# Patient Record
Sex: MALE | Race: WHITE | Employment: UNEMPLOYED | URBAN - METROPOLITAN AREA
[De-identification: names, ages, dates, MRNs, and addresses within clinical notes are randomized per-mention and may not be internally consistent; named-entity substitution may affect disease eponyms.]

---

## 2019-05-05 ENCOUNTER — APPOINTMENT (OUTPATIENT)
Dept: GENERAL RADIOLOGY | Age: 59
DRG: 177 | End: 2019-05-05
Attending: EMERGENCY MEDICINE
Payer: OTHER GOVERNMENT

## 2019-05-05 ENCOUNTER — HOSPITAL ENCOUNTER (INPATIENT)
Age: 59
LOS: 10 days | Discharge: HOME HEALTH | DRG: 177 | End: 2019-05-15
Attending: EMERGENCY MEDICINE | Admitting: FAMILY MEDICINE
Payer: OTHER GOVERNMENT

## 2019-05-05 ENCOUNTER — APPOINTMENT (OUTPATIENT)
Dept: ULTRASOUND IMAGING | Age: 59
DRG: 177 | End: 2019-05-05
Attending: FAMILY MEDICINE
Payer: OTHER GOVERNMENT

## 2019-05-05 DIAGNOSIS — F10.10 ETOH ABUSE: ICD-10-CM

## 2019-05-05 DIAGNOSIS — A41.9 SEPSIS, DUE TO UNSPECIFIED ORGANISM: ICD-10-CM

## 2019-05-05 DIAGNOSIS — J18.9 PNEUMONIA OF RIGHT LOWER LOBE DUE TO INFECTIOUS ORGANISM: Primary | ICD-10-CM

## 2019-05-05 DIAGNOSIS — E87.6 HYPOKALEMIA: ICD-10-CM

## 2019-05-05 PROBLEM — D69.6 THROMBOCYTOPENIA (HCC): Status: ACTIVE | Noted: 2019-05-05

## 2019-05-05 PROBLEM — K92.2 GI BLEED: Status: ACTIVE | Noted: 2019-05-05

## 2019-05-05 PROBLEM — I48.91 A-FIB (HCC): Status: ACTIVE | Noted: 2019-05-05

## 2019-05-05 PROBLEM — R00.0 TACHYCARDIA: Status: ACTIVE | Noted: 2019-05-05

## 2019-05-05 LAB
ALBUMIN SERPL-MCNC: 2.8 G/DL (ref 3.2–4.6)
ALBUMIN/GLOB SERPL: 0.5 {RATIO} (ref 1.2–3.5)
ALP SERPL-CCNC: 58 U/L (ref 50–136)
ALT SERPL-CCNC: 30 U/L (ref 12–65)
AMMONIA PLAS-SCNC: <10 UMOL/L (ref 11–32)
ANION GAP SERPL CALC-SCNC: 17 MMOL/L (ref 7–16)
AST SERPL-CCNC: 41 U/L (ref 15–37)
ATRIAL RATE: 108 BPM
ATRIAL RATE: 110 BPM
BASOPHILS # BLD: 0.1 K/UL (ref 0–0.2)
BASOPHILS NFR BLD: 2 % (ref 0–2)
BILIRUB SERPL-MCNC: 1.2 MG/DL (ref 0.2–1.1)
BNP SERPL-MCNC: 56 PG/ML
BUN SERPL-MCNC: 17 MG/DL (ref 8–23)
CALCIUM SERPL-MCNC: 9.5 MG/DL (ref 8.3–10.4)
CALCULATED P AXIS, ECG09: 76 DEGREES
CALCULATED P AXIS, ECG09: 78 DEGREES
CALCULATED R AXIS, ECG10: -57 DEGREES
CALCULATED R AXIS, ECG10: -75 DEGREES
CALCULATED T AXIS, ECG11: 57 DEGREES
CALCULATED T AXIS, ECG11: 70 DEGREES
CHLORIDE SERPL-SCNC: 91 MMOL/L (ref 98–107)
CO2 SERPL-SCNC: 24 MMOL/L (ref 21–32)
CREAT SERPL-MCNC: 0.69 MG/DL (ref 0.8–1.5)
DIAGNOSIS, 93000: NORMAL
DIAGNOSIS, 93000: NORMAL
DIFFERENTIAL METHOD BLD: ABNORMAL
EOSINOPHIL # BLD: 0 K/UL (ref 0–0.8)
EOSINOPHIL NFR BLD: 0 % (ref 0.5–7.8)
ERYTHROCYTE [DISTWIDTH] IN BLOOD BY AUTOMATED COUNT: 13.6 % (ref 11.9–14.6)
ETHANOL SERPL-MCNC: 19 MG/DL
GLOBULIN SER CALC-MCNC: 5.5 G/DL (ref 2.3–3.5)
GLUCOSE SERPL-MCNC: 106 MG/DL (ref 65–100)
HCT VFR BLD AUTO: 41.9 % (ref 41.1–50.3)
HGB BLD-MCNC: 15.1 G/DL (ref 13.6–17.2)
IMM GRANULOCYTES # BLD AUTO: 0 K/UL (ref 0–0.5)
IMM GRANULOCYTES NFR BLD AUTO: 1 % (ref 0–5)
INR PPP: 1.1
LACTATE BLD-SCNC: 2.8 MMOL/L (ref 0.5–1.9)
LIPASE SERPL-CCNC: 198 U/L (ref 73–393)
LYMPHOCYTES # BLD: 0.6 K/UL (ref 0.5–4.6)
LYMPHOCYTES NFR BLD: 13 % (ref 13–44)
MAGNESIUM SERPL-MCNC: 1.8 MG/DL (ref 1.8–2.4)
MCH RBC QN AUTO: 37.4 PG (ref 26.1–32.9)
MCHC RBC AUTO-ENTMCNC: 36 G/DL (ref 31.4–35)
MCV RBC AUTO: 103.7 FL (ref 79.6–97.8)
MONOCYTES # BLD: 0.3 K/UL (ref 0.1–1.3)
MONOCYTES NFR BLD: 8 % (ref 4–12)
NEUTS SEG # BLD: 3.3 K/UL (ref 1.7–8.2)
NEUTS SEG NFR BLD: 76 % (ref 43–78)
NRBC # BLD: 0 K/UL (ref 0–0.2)
P-R INTERVAL, ECG05: 134 MS
P-R INTERVAL, ECG05: 136 MS
PLATELET # BLD AUTO: 45 K/UL (ref 150–450)
PLATELET COMMENTS,PCOM: ABNORMAL
PMV BLD AUTO: 11.5 FL (ref 9.4–12.3)
POTASSIUM SERPL-SCNC: 2.9 MMOL/L (ref 3.5–5.1)
PROCALCITONIN SERPL-MCNC: 3.6 NG/ML
PROT SERPL-MCNC: 8.3 G/DL (ref 6.3–8.2)
PROTHROMBIN TIME: 14.2 SEC (ref 11.7–14.5)
Q-T INTERVAL, ECG07: 306 MS
Q-T INTERVAL, ECG07: 330 MS
QRS DURATION, ECG06: 82 MS
QRS DURATION, ECG06: 84 MS
QTC CALCULATION (BEZET), ECG08: 414 MS
QTC CALCULATION (BEZET), ECG08: 442 MS
RBC # BLD AUTO: 4.04 M/UL (ref 4.23–5.6)
RBC MORPH BLD: ABNORMAL
SODIUM SERPL-SCNC: 132 MMOL/L (ref 136–145)
TROPONIN I SERPL-MCNC: <0.02 NG/ML (ref 0.02–0.05)
VENTRICULAR RATE, ECG03: 108 BPM
VENTRICULAR RATE, ECG03: 110 BPM
WBC # BLD AUTO: 4.3 K/UL (ref 4.3–11.1)
WBC MORPH BLD: ABNORMAL

## 2019-05-05 PROCEDURE — 65660000000 HC RM CCU STEPDOWN

## 2019-05-05 PROCEDURE — 77030020263 HC SOL INJ SOD CL0.9% LFCR 1000ML

## 2019-05-05 PROCEDURE — 96374 THER/PROPH/DIAG INJ IV PUSH: CPT | Performed by: EMERGENCY MEDICINE

## 2019-05-05 PROCEDURE — 74011000302 HC RX REV CODE- 302: Performed by: FAMILY MEDICINE

## 2019-05-05 PROCEDURE — 76700 US EXAM ABDOM COMPLETE: CPT

## 2019-05-05 PROCEDURE — 84145 PROCALCITONIN (PCT): CPT

## 2019-05-05 PROCEDURE — 83880 ASSAY OF NATRIURETIC PEPTIDE: CPT

## 2019-05-05 PROCEDURE — 87186 SC STD MICRODIL/AGAR DIL: CPT

## 2019-05-05 PROCEDURE — 87040 BLOOD CULTURE FOR BACTERIA: CPT

## 2019-05-05 PROCEDURE — 83605 ASSAY OF LACTIC ACID: CPT

## 2019-05-05 PROCEDURE — 74011000250 HC RX REV CODE- 250: Performed by: FAMILY MEDICINE

## 2019-05-05 PROCEDURE — 74011000258 HC RX REV CODE- 258: Performed by: EMERGENCY MEDICINE

## 2019-05-05 PROCEDURE — 99285 EMERGENCY DEPT VISIT HI MDM: CPT | Performed by: EMERGENCY MEDICINE

## 2019-05-05 PROCEDURE — 82140 ASSAY OF AMMONIA: CPT

## 2019-05-05 PROCEDURE — 71046 X-RAY EXAM CHEST 2 VIEWS: CPT

## 2019-05-05 PROCEDURE — 84484 ASSAY OF TROPONIN QUANT: CPT

## 2019-05-05 PROCEDURE — 80307 DRUG TEST PRSMV CHEM ANLYZR: CPT

## 2019-05-05 PROCEDURE — 93005 ELECTROCARDIOGRAM TRACING: CPT | Performed by: EMERGENCY MEDICINE

## 2019-05-05 PROCEDURE — 74011250636 HC RX REV CODE- 250/636: Performed by: FAMILY MEDICINE

## 2019-05-05 PROCEDURE — 74011250636 HC RX REV CODE- 250/636: Performed by: EMERGENCY MEDICINE

## 2019-05-05 PROCEDURE — 83690 ASSAY OF LIPASE: CPT

## 2019-05-05 PROCEDURE — C9113 INJ PANTOPRAZOLE SODIUM, VIA: HCPCS | Performed by: FAMILY MEDICINE

## 2019-05-05 PROCEDURE — 80053 COMPREHEN METABOLIC PANEL: CPT

## 2019-05-05 PROCEDURE — 74011000258 HC RX REV CODE- 258: Performed by: FAMILY MEDICINE

## 2019-05-05 PROCEDURE — 83735 ASSAY OF MAGNESIUM: CPT

## 2019-05-05 PROCEDURE — 85610 PROTHROMBIN TIME: CPT

## 2019-05-05 PROCEDURE — 85025 COMPLETE CBC W/AUTO DIFF WBC: CPT

## 2019-05-05 PROCEDURE — 36415 COLL VENOUS BLD VENIPUNCTURE: CPT

## 2019-05-05 PROCEDURE — 77030020256 HC SOL INJ NACL 0.9%  500ML

## 2019-05-05 PROCEDURE — 93005 ELECTROCARDIOGRAM TRACING: CPT | Performed by: FAMILY MEDICINE

## 2019-05-05 PROCEDURE — 74011250637 HC RX REV CODE- 250/637: Performed by: FAMILY MEDICINE

## 2019-05-05 PROCEDURE — 87077 CULTURE AEROBIC IDENTIFY: CPT

## 2019-05-05 PROCEDURE — 86580 TB INTRADERMAL TEST: CPT | Performed by: FAMILY MEDICINE

## 2019-05-05 RX ORDER — LORAZEPAM 2 MG/ML
1 INJECTION INTRAMUSCULAR EVERY 4 HOURS
Status: DISCONTINUED | OUTPATIENT
Start: 2019-05-05 | End: 2019-05-05

## 2019-05-05 RX ORDER — LORAZEPAM 2 MG/ML
2 INJECTION INTRAMUSCULAR ONCE
Status: COMPLETED | OUTPATIENT
Start: 2019-05-05 | End: 2019-05-05

## 2019-05-05 RX ORDER — METOPROLOL TARTRATE 5 MG/5ML
5 INJECTION INTRAVENOUS ONCE
Status: COMPLETED | OUTPATIENT
Start: 2019-05-05 | End: 2019-05-05

## 2019-05-05 RX ORDER — SODIUM CHLORIDE 0.9 % (FLUSH) 0.9 %
5-40 SYRINGE (ML) INJECTION EVERY 8 HOURS
Status: DISCONTINUED | OUTPATIENT
Start: 2019-05-05 | End: 2019-05-15 | Stop reason: HOSPADM

## 2019-05-05 RX ORDER — MORPHINE SULFATE 2 MG/ML
1 INJECTION, SOLUTION INTRAMUSCULAR; INTRAVENOUS
Status: DISCONTINUED | OUTPATIENT
Start: 2019-05-05 | End: 2019-05-15 | Stop reason: HOSPADM

## 2019-05-05 RX ORDER — ACETAMINOPHEN 325 MG/1
650 TABLET ORAL
Status: DISCONTINUED | OUTPATIENT
Start: 2019-05-05 | End: 2019-05-15 | Stop reason: HOSPADM

## 2019-05-05 RX ORDER — ONDANSETRON 2 MG/ML
4 INJECTION INTRAMUSCULAR; INTRAVENOUS
Status: DISCONTINUED | OUTPATIENT
Start: 2019-05-05 | End: 2019-05-15 | Stop reason: HOSPADM

## 2019-05-05 RX ORDER — SODIUM CHLORIDE 0.9 % (FLUSH) 0.9 %
5-40 SYRINGE (ML) INJECTION EVERY 8 HOURS
Status: DISCONTINUED | OUTPATIENT
Start: 2019-05-05 | End: 2019-05-10 | Stop reason: SDUPTHER

## 2019-05-05 RX ORDER — POTASSIUM CHLORIDE 20 MEQ/1
40 TABLET, EXTENDED RELEASE ORAL
Status: COMPLETED | OUTPATIENT
Start: 2019-05-05 | End: 2019-05-05

## 2019-05-05 RX ORDER — SODIUM CHLORIDE 9 MG/ML
75 INJECTION, SOLUTION INTRAVENOUS CONTINUOUS
Status: DISCONTINUED | OUTPATIENT
Start: 2019-05-05 | End: 2019-05-09

## 2019-05-05 RX ORDER — NALOXONE HYDROCHLORIDE 0.4 MG/ML
0.4 INJECTION, SOLUTION INTRAMUSCULAR; INTRAVENOUS; SUBCUTANEOUS AS NEEDED
Status: DISCONTINUED | OUTPATIENT
Start: 2019-05-05 | End: 2019-05-15 | Stop reason: HOSPADM

## 2019-05-05 RX ORDER — DIPHENHYDRAMINE HYDROCHLORIDE 50 MG/ML
12.5 INJECTION, SOLUTION INTRAMUSCULAR; INTRAVENOUS
Status: DISCONTINUED | OUTPATIENT
Start: 2019-05-05 | End: 2019-05-15 | Stop reason: HOSPADM

## 2019-05-05 RX ORDER — AMLODIPINE BESYLATE 5 MG/1
5 TABLET ORAL DAILY
Status: DISCONTINUED | OUTPATIENT
Start: 2019-05-05 | End: 2019-05-12

## 2019-05-05 RX ORDER — SODIUM CHLORIDE 0.9 % (FLUSH) 0.9 %
5-40 SYRINGE (ML) INJECTION AS NEEDED
Status: DISCONTINUED | OUTPATIENT
Start: 2019-05-05 | End: 2019-05-15 | Stop reason: HOSPADM

## 2019-05-05 RX ORDER — LORAZEPAM 2 MG/ML
2 INJECTION INTRAMUSCULAR EVERY 4 HOURS
Status: DISCONTINUED | OUTPATIENT
Start: 2019-05-05 | End: 2019-05-06

## 2019-05-05 RX ORDER — LORAZEPAM 1 MG/1
3-4 TABLET ORAL
Status: ACTIVE | OUTPATIENT
Start: 2019-05-05 | End: 2019-05-05

## 2019-05-05 RX ORDER — CHLORDIAZEPOXIDE HYDROCHLORIDE 25 MG/1
50 CAPSULE, GELATIN COATED ORAL EVERY 8 HOURS
Status: DISCONTINUED | OUTPATIENT
Start: 2019-05-05 | End: 2019-05-06

## 2019-05-05 RX ORDER — ACETAMINOPHEN 325 MG/1
650 TABLET ORAL
Status: DISCONTINUED | OUTPATIENT
Start: 2019-05-05 | End: 2019-05-05 | Stop reason: SDUPTHER

## 2019-05-05 RX ORDER — LORAZEPAM 2 MG/ML
1 INJECTION INTRAMUSCULAR
Status: ACTIVE | OUTPATIENT
Start: 2019-05-05 | End: 2019-05-05

## 2019-05-05 RX ORDER — HYDROCODONE BITARTRATE AND ACETAMINOPHEN 5; 325 MG/1; MG/1
1 TABLET ORAL
Status: DISCONTINUED | OUTPATIENT
Start: 2019-05-05 | End: 2019-05-15 | Stop reason: HOSPADM

## 2019-05-05 RX ORDER — IBUPROFEN 200 MG
1 TABLET ORAL EVERY 24 HOURS
Status: DISCONTINUED | OUTPATIENT
Start: 2019-05-05 | End: 2019-05-15 | Stop reason: HOSPADM

## 2019-05-05 RX ORDER — ATORVASTATIN CALCIUM 40 MG/1
40 TABLET, FILM COATED ORAL DAILY
Status: DISCONTINUED | OUTPATIENT
Start: 2019-05-05 | End: 2019-05-12

## 2019-05-05 RX ORDER — SODIUM CHLORIDE 0.9 % (FLUSH) 0.9 %
5-40 SYRINGE (ML) INJECTION AS NEEDED
Status: DISCONTINUED | OUTPATIENT
Start: 2019-05-05 | End: 2019-05-10 | Stop reason: SDUPTHER

## 2019-05-05 RX ORDER — LORAZEPAM 2 MG/ML
1 INJECTION INTRAMUSCULAR
Status: COMPLETED | OUTPATIENT
Start: 2019-05-05 | End: 2019-05-05

## 2019-05-05 RX ORDER — LORAZEPAM 1 MG/1
1-2 TABLET ORAL
Status: DISPENSED | OUTPATIENT
Start: 2019-05-05 | End: 2019-05-05

## 2019-05-05 RX ORDER — NITROGLYCERIN 0.4 MG/1
0.4 TABLET SUBLINGUAL
Status: DISCONTINUED | OUTPATIENT
Start: 2019-05-05 | End: 2019-05-15 | Stop reason: HOSPADM

## 2019-05-05 RX ADMIN — ACETAMINOPHEN 650 MG: 325 TABLET, FILM COATED ORAL at 12:01

## 2019-05-05 RX ADMIN — SODIUM CHLORIDE 1000 ML: 900 INJECTION, SOLUTION INTRAVENOUS at 07:53

## 2019-05-05 RX ADMIN — SODIUM CHLORIDE 125 ML/HR: 900 INJECTION, SOLUTION INTRAVENOUS at 11:12

## 2019-05-05 RX ADMIN — AZITHROMYCIN MONOHYDRATE 500 MG: 500 INJECTION, POWDER, LYOPHILIZED, FOR SOLUTION INTRAVENOUS at 11:49

## 2019-05-05 RX ADMIN — TUBERCULIN PURIFIED PROTEIN DERIVATIVE 5 UNITS: 5 INJECTION, SOLUTION INTRADERMAL at 13:54

## 2019-05-05 RX ADMIN — PANTOPRAZOLE SODIUM 40 MG: 40 INJECTION, POWDER, FOR SOLUTION INTRAVENOUS at 11:48

## 2019-05-05 RX ADMIN — CHLORDIAZEPOXIDE HYDROCHLORIDE 50 MG: 25 CAPSULE ORAL at 22:00

## 2019-05-05 RX ADMIN — PANTOPRAZOLE SODIUM 40 MG: 40 INJECTION, POWDER, FOR SOLUTION INTRAVENOUS at 21:30

## 2019-05-05 RX ADMIN — CEFTRIAXONE SODIUM 1 G: 1 INJECTION, POWDER, FOR SOLUTION INTRAMUSCULAR; INTRAVENOUS at 11:12

## 2019-05-05 RX ADMIN — Medication 5 ML: at 23:56

## 2019-05-05 RX ADMIN — LORAZEPAM 1 MG: 1 TABLET ORAL at 14:03

## 2019-05-05 RX ADMIN — LORAZEPAM 1 MG: 2 INJECTION INTRAMUSCULAR; INTRAVENOUS at 09:02

## 2019-05-05 RX ADMIN — CHLORDIAZEPOXIDE HYDROCHLORIDE 50 MG: 25 CAPSULE ORAL at 11:53

## 2019-05-05 RX ADMIN — Medication 10 ML: at 22:00

## 2019-05-05 RX ADMIN — Medication 10 ML: at 11:39

## 2019-05-05 RX ADMIN — POTASSIUM CHLORIDE 40 MEQ: 20 TABLET, EXTENDED RELEASE ORAL at 11:48

## 2019-05-05 RX ADMIN — LORAZEPAM 2 MG: 2 INJECTION INTRAMUSCULAR at 17:55

## 2019-05-05 RX ADMIN — ATORVASTATIN CALCIUM 40 MG: 40 TABLET, FILM COATED ORAL at 11:48

## 2019-05-05 RX ADMIN — LORAZEPAM 2 MG: 2 INJECTION INTRAMUSCULAR; INTRAVENOUS at 11:11

## 2019-05-05 RX ADMIN — DIPHENHYDRAMINE HYDROCHLORIDE 12.5 MG: 50 INJECTION, SOLUTION INTRAMUSCULAR; INTRAVENOUS at 23:55

## 2019-05-05 RX ADMIN — THIAMINE HYDROCHLORIDE: 100 INJECTION, SOLUTION INTRAMUSCULAR; INTRAVENOUS at 14:38

## 2019-05-05 RX ADMIN — AMLODIPINE BESYLATE 5 MG: 5 TABLET ORAL at 11:48

## 2019-05-05 RX ADMIN — METOPROLOL TARTRATE 5 MG: 5 INJECTION INTRAVENOUS at 11:36

## 2019-05-05 NOTE — CONSULTS
[facility] Consult Note       Primary GI Physician:     Referring Provider:  Dr. Louis Romberg Date:  5/5/2019    Admit Date:  5/5/2019    Chief Complaint:  Melena, alcohol abuse    Subjective:     History of Present Illness:  Patient is a 59 y.o. male with PMH of HTN and alcohol abuse, who is seen in consultation at the request of Dr. Yi Snow for melena and thrombocytopenia. He was admitted today for pneumonia after presenting to the ED with a couple week history of cough, body aches and chills for three days. CXR showed right basilar infiltrate, he was admitted by hospitalists and is on rocephin and zithromax. CBC significant for platelet count of 38X with hgb of 15.1, .7, and white count 4300. Sodium 132, potassium 2.9, BUN 17, creatinine 0.69, t. Bili 1.2, albumin 2.8, ALT 30, AST 41, alk phos 58 and lipase 198. Abdominal ultrasound today showed hepatic steatosis, a 1.6 cm hypoechoic lesion in the right lobe of the liver which could represent focal fatty sparing but hepatic protocol CT recommended as outpatient to r/o small lesion. Also noted was gallbladder sludge and adenomyomatosis. He had been in sinus tachy, but remote tele reported heart rate 170-200, possible rapid a. Fib. He was transferred to third floor and cardiology consultation pending. Tmax is 101.6. Blood cultures pending. Patient reports about 12 episodes of black loose stools this past week--without any use of bismuth or oral iron. Most recent black stool was reportedly one hour ago. He was noted to have black stool on examiner's glove in ER. No abdominal pain. No red blood per rectum. He did have nausea and vomiting 6 days ago without hematemesis or coffee ground emesis but no recurrent nausea/vomiting. He denies any known prior history of PUD, liver disease. He was unaware of thrombocytopenia until this admission. He does drink 1 pint of vodka per day.   Smokes about a pack to 1.5 packs of cigarettes per day. Also takes about 3 ibuprofen most days for muscle/joint pain related to his work as a . Had a colonoscopy 3 years ago at the [place] in [facility] with 7 polyps removed. No significant weight change. PMH:  HTN  Alcohol abuse  Colon polyps (colo in 2016 at [place] revealed 7 colon polyps per patient)    PSH:  No past surgical history on file. Allergies:  No Known Allergies    Home Medications:  Prior to Admission medications    Medication Sig Start Date End Date Taking? Authorizing Provider   aspirin 81 mg chewable tablet Take 1 Tab by mouth daily. 11/28/16   Jolene CARCAMO NP   nitroglycerin (NITROSTAT) 0.4 mg SL tablet 1 Tab by SubLINGual route every five (5) minutes as needed for Chest Pain. 11/28/16   Jolene CARCAMO NP   atorvastatin (LIPITOR) 40 mg tablet Take 1 Tab by mouth daily. 11/28/16   Jolene CARCAMO NP   amLODIPine (NORVASC) 5 mg tablet Take 5 mg by mouth daily. Other, MD Sully       Hospital Medications:  Current Facility-Administered Medications   Medication Dose Route Frequency    cefTRIAXone (ROCEPHIN) 1 g in 0.9% sodium chloride (MBP/ADV) 50 mL  1 g IntraVENous Q24H    azithromycin (ZITHROMAX) 500 mg in 0.9% sodium chloride (MBP/ADV) 250 mL  500 mg IntraVENous Q24H    chlordiazePOXIDE (LIBRIUM) capsule 50 mg  50 mg Oral Q8H    0.9% sodium chloride 1,000 mL with mvi, adult no. 4 with vit K 10 mL, thiamine 929 mg, folic acid 1 mg infusion   IntraVENous Q24H    0.9% sodium chloride infusion  125 mL/hr IntraVENous CONTINUOUS    amLODIPine (NORVASC) tablet 5 mg  5 mg Oral DAILY    atorvastatin (LIPITOR) tablet 40 mg  40 mg Oral DAILY    nitroglycerin (NITROSTAT) tablet 0.4 mg  0.4 mg SubLINGual Q5MIN PRN    sodium chloride (NS) flush 5-40 mL  5-40 mL IntraVENous Q8H    sodium chloride (NS) flush 5-40 mL  5-40 mL IntraVENous PRN    tuberculin injection 5 Units  5 Units IntraDERMal ONCE    acetaminophen (TYLENOL) tablet 650 mg  650 mg Oral Q4H PRN  HYDROcodone-acetaminophen (NORCO) 5-325 mg per tablet 1 Tab  1 Tab Oral Q4H PRN    morphine injection 1 mg  1 mg IntraVENous Q4H PRN    nicotine (NICODERM CQ) 21 mg/24 hr patch 1 Patch  1 Patch TransDERmal Q24H    ondansetron (ZOFRAN) injection 4 mg  4 mg IntraVENous Q4H PRN    diphenhydrAMINE (BENADRYL) injection 12.5 mg  12.5 mg IntraVENous Q4H PRN    naloxone (NARCAN) injection 0.4 mg  0.4 mg IntraVENous PRN    pantoprazole (PROTONIX) 40 mg in sodium chloride 0.9% 10 mL injection  40 mg IntraVENous Q12H    LORazepam (ATIVAN) injection 2 mg  2 mg IntraVENous Q4H    sodium chloride (NS) flush 5-40 mL  5-40 mL IntraVENous Q8H    sodium chloride (NS) flush 5-40 mL  5-40 mL IntraVENous PRN    LORazepam (ATIVAN) injection 1 mg  1 mg IntraVENous Q15MIN PRN    LORazepam (ATIVAN) tablet 3-4 mg  3-4 mg Oral Q1H    LORazepam (ATIVAN) tablet 1-2 mg  1-2 mg Oral Q1H       Social History:  Social History     Tobacco Use    Smoking status: Current Every Day Smoker     Packs/day: 2.00   Substance Use Topics    Alcohol use: Yes     Comment: approx 1 pint per day       Family History:  Family History   Problem Relation Age of Onset    Cancer Neg Hx        Review of Systems:  A detailed 10 system ROS is obtained, with pertinent positives as listed above and in admission H&P. Says his SOB is improved since this morning. Has pleuritic chest pain exacerbated by coughing. All others are negative. Diet:  Clear liquids    Objective:     Physical Exam:  Vitals:  Visit Vitals  /75 (BP 1 Location: Right arm, BP Patient Position: At rest)   Pulse (!) 110   Temp (!) 101.6 °F (38.7 °C)   Resp 20   Ht 5' 11\" (1.803 m)   Wt 67.1 kg (148 lb)   SpO2 91%   BMI 20.64 kg/m²     Gen:  Pt is alert, cooperative, no acute distress  Skin:  Extremities and face reveal no rashes. HEENT: Sclerae anicteric. Extra-occular muscles are intact. No oral ulcers. No abnormal pigmentation of the lips.   The neck is supple. Cardiovascular: tachycardic and irregular. Respiratory:  Comfortable breathing with no accessory muscle use. On room air. Mildly coarse breath sounds on the right  GI:  Abdomen nondistended, soft, and nontender. Normal active bowel sounds. No enlargement of the liver or spleen. No masses palpable. Rectal:  Deferred  Musculoskeletal:  No pitting edema of the lower legs. Neurological:  Gross memory appears intact. Patient is alert and oriented. Psychiatric:  Mood appears appropriate with judgement intact. Laboratory:    Recent Labs     05/05/19  0729   WBC 4.3   HGB 15.1   HCT 41.9   PLT 45*   .7*   *   K 2.9*   CL 91*   CO2 24   BUN 17   CREA 0.69*   CA 9.5   MG 1.8   *   AP 58   SGOT 41*   ALT 30   TBILI 1.2*   ALB 2.8*   TP 8.3*   LPSE 198          Assessment:     Principal Problem:    PNA (pneumonia) (5/5/2019)    Active Problems:    HTN (hypertension) (11/19/2014)      Tobacco abuse (11/28/2016)      ETOH abuse (11/28/2016)      Tachycardia (5/5/2019)      Thrombocytopenia ([address] (5/5/2019)      GI bleed (5/5/2019)      A-fib ([facility] (5/5/2019)      60 y/o male with HTN, colon polyps, h/o EtOH and tobacco abuse is admitted with pneumonia, a. Fib with RVR, EtOH withdrawal and seen by GI for black stools x 1 week and thrombocytopenia. However, his normal hgb of 15.1,normal BUN/creatinine ratio and normal BP argues AGAINST significant/brisk GIB. He is at increased risk for PUD given his NSAIDs, tobacco and EtOH history. Also, his thrombocytopenia raises concern for possible portal hypertension, particularly with his ongoing alcohol abuse, and eventual evaluation with EGD is recommended to assess for PHG and varices.    Plan:   1) check PT/INR to further assess synthetic hepatic function  2) follow hgb  3) empiric IV PPI  4) EGD when he is more stable (currently being evaluated/treated for a. Fib, pneumonia and alcohol withdrawal) unless development of brisk bleeding associated with hemodynamic instability and significant drop in hgb necessitates emergent evaluation. May make him NPO after midnight tonight and reassess in am for possible EGD pending status of other medical issues  5) avoid EtOH, NSAIDs, tobacco  6) hepatic protocol CT as outpatient to follow up on abnormal area in the right hepatic lobe (which may just be an area of focal fatty sparing)  7) replace K+. Defer to primary team  8) he is up to date with surveillance colonoscopies through the [place]    Patient is seen and examined in collaboration with Dr. Nowell Severs. Assessment and plan as per Dr. Jennifer Hylton.   GEO Morales

## 2019-05-05 NOTE — PROGRESS NOTES
Remote tele reports heart rate 170 to 200  Rate 177 now  Said had been sinus tach but now possible rapid a fib  Patient denies symptoms  Dr Montserrat Ashby notified

## 2019-05-05 NOTE — PROGRESS NOTES
Patient received to room 807 from ER  Alert responds appropriate  Dr Janeen Avalos in to see patient

## 2019-05-05 NOTE — H&P
Hospitalist H&P Note Admit Date:  2019  7:26 AM  
Name:  Darreld Jeans Age:  59 y.o. 
:  1960 MRN:  [id number] PCP:  Gillian Bryan MD 
Treatment Team: Attending Provider: Darylene Kindler, MD; Consulting Provider: Kael Avalos MD; Consulting Provider: Sheri Dee MD 
Generalized weakness, body aches/pain, cough, black stools/diarrhea HPI:  
59 yr old male pt with known h/o htn, h/o cardiac cath- non occlusive coronaries, known alcohol dependent. Pt since past 3 weeks c/o generalized weakness ,body aches and decreased appetite. Says the last he had any thing to eat was on Monday. Last he had alcohol was yesterday- drink 1 pint a day- says never had alcohol withdrawal. 
 
Cough since past 2 weeks, dry then productive, chills since past 3 days. Diarrhea 2-3 episodes since past week, black/melenotic. In er pt has mild tremors upper extremities,alcohol odour breath,mild tachycardia. cxr- infiltrate-rt basal. 
Ultrasound abd- ordered for alcohol dependence and thrombocytopenia showed- 
IMPRESSION:  
1. Hepatic steatosis. 2. 1.6 cm low hypoechoic lesion within the right lobe of the liver. Findings 
could represent focal fatty sparing or a small lesion. Correlation with CT 
abdomen, hepatic protocol, recommended as an outpatient on a nonemergent basis. 3. Adenomyomatosis of the gallbladder. 4. Sludge in the gallbladder. 5. Cyst involving the lower pole the right kidney. Hb 15.1,platelet 46,M 7.7,OOGAICE 19. Temp on floor 101.6 Rectal examination- black stool on glove Once pt was admitted to floor heart rate high of 170- afib- then went back to sinus heart rate 110-130/min. Pt was initially admitted to 8th floor,then transferred to telemetry for afib. Pt will be admitted for pna,GI bleed,afib with rvr and alcohol withdrawal. 
 
 
 
10 systems reviewed and negative except as noted in HPI. Past Medical History:  
Diagnosis Date  A-fib ([facility] 2019  Hypertension   
  
past surgical history - polypectomy No Known Allergies Social History Tobacco Use  Smoking status: Current Every Day Smoker Packs/day: 2.00 Substance Use Topics  Alcohol use: Yes Comment: approx 1 pint per day Family History Problem Relation Age of Onset  Cancer Neg Hx There is no immunization history for the selected administration types on file for this patient. PTA Medications: 
Prior to Admission Medications Prescriptions Last Dose Informant Patient Reported? Taking? amLODIPine (NORVASC) 5 mg tablet   Yes No  
Sig: Take 5 mg by mouth daily. aspirin 81 mg chewable tablet   Yes No  
Sig: Take 1 Tab by mouth daily. atorvastatin (LIPITOR) 40 mg tablet   No No  
Sig: Take 1 Tab by mouth daily. nitroglycerin (NITROSTAT) 0.4 mg SL tablet   No No  
Si Tab by SubLINGual route every five (5) minutes as needed for Chest Pain. Facility-Administered Medications: None Objective:  
 
Patient Vitals for the past 24 hrs: 
 Temp Pulse Resp BP SpO2  
19 1136  (!) 110     
19 1042 (!) 101.6 °F (38.7 °C) (!) 114 20 165/75 91 % 19 0933  100   94 % 19 0920  (!) 111  134/77 94 % 19 0912    141/73 93 % 19 0910  92   91 % 19 0841  (!) 119  145/74 93 % 19 0801  (!) 108 (!) 33 150/73 94 % 19 0741  (!) 110 29 143/75 93 % 19 0735  (!) 106 (!) 0 142/76 95 % 19 0723 98.2 °F (36.8 °C) 90 20 142/76 98 % Oxygen Therapy O2 Sat (%): 91 % (19 1042) Pulse via Oximetry: 100 beats per minute (19 0933) O2 Device: Room air (19 0723) No intake or output data in the 24 hours ending 19 1209 Physical Exam: 
General:    Well nourished. Alert. Eyes:   Normal sclera. Extraocular movements intact. ENT:  Normocephalic, atraumatic. Moist mucous membranes CV:   Tachycardia No murmur, rub, or gallop. Lungs:  Coarse breath sounds rt >left Abdomen: Soft, nontender, nondistended. Bowel sounds normal.  
Extremities: Warm and dry. No cyanosis or edema. Neurologic: CN II-XII grossly intact. Sensation intact. mild tremor upper extremities Skin:     No rashes or jaundice. Psych:            Mild anxious I reviewed the labs, imaging, EKGs, telemetry, and other studies done this admission. Data Review:  
Recent Results (from the past 24 hour(s)) CBC WITH AUTOMATED DIFF Collection Time: 05/05/19  7:29 AM  
Result Value Ref Range WBC 4.3 4.3 - 11.1 K/uL  
 RBC 4.04 (L) 4.23 - 5.6 M/uL  
 HGB 15.1 13.6 - 17.2 g/dL HCT 41.9 41.1 - 50.3 % .7 (H) 79.6 - 97.8 FL  
 MCH 37.4 (H) 26.1 - 32.9 PG  
 MCHC 36.0 (H) 31.4 - 35.0 g/dL  
 RDW 13.6 11.9 - 14.6 % PLATELET 45 (L) 232 - 450 K/uL MPV 11.5 9.4 - 12.3 FL ABSOLUTE NRBC 0.00 0.0 - 0.2 K/uL NEUTROPHILS 76 43 - 78 % LYMPHOCYTES 13 13 - 44 % MONOCYTES 8 4.0 - 12.0 % EOSINOPHILS 0 (L) 0.5 - 7.8 % BASOPHILS 2 0.0 - 2.0 % IMMATURE GRANULOCYTES 1 0.0 - 5.0 %  
 ABS. NEUTROPHILS 3.3 1.7 - 8.2 K/UL  
 ABS. LYMPHOCYTES 0.6 0.5 - 4.6 K/UL  
 ABS. MONOCYTES 0.3 0.1 - 1.3 K/UL  
 ABS. EOSINOPHILS 0.0 0.0 - 0.8 K/UL  
 ABS. BASOPHILS 0.1 0.0 - 0.2 K/UL  
 ABS. IMM. GRANS. 0.0 0.0 - 0.5 K/UL  
 RBC COMMENTS NORMOCYTIC/NORMOCHROMIC    
 WBC COMMENTS ATYPICAL LYMPHOCYTES PRESENT    
 PLATELET COMMENTS DECREASED    
 DF AUTOMATED METABOLIC PANEL, COMPREHENSIVE Collection Time: 05/05/19  7:29 AM  
Result Value Ref Range Sodium 132 (L) 136 - 145 mmol/L Potassium 2.9 (LL) 3.5 - 5.1 mmol/L Chloride 91 (L) 98 - 107 mmol/L  
 CO2 24 21 - 32 mmol/L Anion gap 17 (H) 7 - 16 mmol/L Glucose 106 (H) 65 - 100 mg/dL BUN 17 8 - 23 MG/DL Creatinine 0.69 (L) 0.8 - 1.5 MG/DL  
 GFR est AA >60 >60 ml/min/1.73m2 GFR est non-AA >60 >60 ml/min/1.73m2 Calcium 9.5 8.3 - 10.4 MG/DL  Bilirubin, total 1.2 (H) 0.2 - 1.1 MG/DL  
 ALT (SGPT) 30 12 - 65 U/L  
 AST (SGOT) 41 (H) 15 - 37 U/L Alk. phosphatase 58 50 - 136 U/L Protein, total 8.3 (H) 6.3 - 8.2 g/dL Albumin 2.8 (L) 3.2 - 4.6 g/dL Globulin 5.5 (H) 2.3 - 3.5 g/dL A-G Ratio 0.5 (L) 1.2 - 3.5 BNP Collection Time: 05/05/19  7:29 AM  
Result Value Ref Range BNP 56 (H) 0 pg/mL TROPONIN I Collection Time: 05/05/19  7:29 AM  
Result Value Ref Range Troponin-I, Qt. <0.02 (L) 0.02 - 0.05 NG/ML  
LIPASE Collection Time: 05/05/19  7:29 AM  
Result Value Ref Range Lipase 198 73 - 393 U/L MAGNESIUM Collection Time: 05/05/19  7:29 AM  
Result Value Ref Range Magnesium 1.8 1.8 - 2.4 mg/dL PROCALCITONIN Collection Time: 05/05/19  7:29 AM  
Result Value Ref Range Procalcitonin 3.6 ng/mL EKG, 12 LEAD, INITIAL Collection Time: 05/05/19  7:42 AM  
Result Value Ref Range Ventricular Rate 110 BPM  
 Atrial Rate 110 BPM  
 P-R Interval 134 ms QRS Duration 84 ms Q-T Interval 306 ms QTC Calculation (Bezet) 414 ms Calculated P Axis 78 degrees Calculated R Axis -75 degrees Calculated T Axis 70 degrees Diagnosis Sinus tachycardia with occasional Premature ventricular complexes Left axis deviation Abnormal ECG When compared with ECG of 28-NOV-2016 08:21, 
Vent. rate has increased BY  47 BPM 
QRS axis Shifted left Confirmed by Juarez Cruz ([id number]) on 5/5/2019 10:20:17 AM 
  
POC LACTIC ACID Collection Time: 05/05/19  7:54 AM  
Result Value Ref Range Lactic Acid (POC) 2.80 (H) 0.5 - 1.9 mmol/L  
AMMONIA Collection Time: 05/05/19  9:09 AM  
Result Value Ref Range Ammonia <10 (L) 11 - 32 UMOL/L  
ETHYL ALCOHOL Collection Time: 05/05/19  9:16 AM  
Result Value Ref Range ALCOHOL(ETHYL),SERUM 19 MG/DL All Micro Results Procedure Component Value Units Date/Time CULTURE, BLOOD [[id number]] Collected:  05/05/19 0910 Order Status:  Completed Specimen:  Blood Updated:  05/05/19 0941 CULTURE, BLOOD [[id number]] Collected:  05/05/19 8214 Order Status:  Completed Specimen:  Blood Updated:  05/05/19 0941 Other Studies: Xr Chest Pa Lat Result Date: 5/5/2019 History: SHOB x 2 weeks Two views chest COMPARISON: 11/28/2016 Findings: There is a new right basilar infiltrate present. The cardiac silhouette, and mediastinal contour, and osseous structures are stable. Impression: New right basilar infiltrate could represent pneumonia in the appropriate clinical setting. Follow-up until resolution recommended. Us Abd Comp Result Date: 5/5/2019 Abdominal Ultrasound INDICATION:  thrombocytopenia,alcohol dependence FINDINGS: There are no discrete lesions in the visualized portions of the liver, pancreas, or spleen. Splenic calcifications are present. There is increased hepatic echogenicity. Within the right lobe of the liver, there is an area of low echogenicity measuring 1.4 x 1.4 x 1.6 cm. There is no bile duct dilatation. The common bile duct measures 6 mm. Sludge seen in the gallbladder. There is evidence of gallbladder adenomyomatosis. The right kidney measures 12.9 cm in length. The left kidney measures 12.9 cm. There is no hydronephrosis. There is a lower pole cyst measuring 2.9 cm within the right kidney. There is no ascites. The aorta and inferior vena cava are normal in caliber. IMPRESSION: 1. Hepatic steatosis. 2. 1.6 cm low hypoechoic lesion within the right lobe of the liver. Findings could represent focal fatty sparing or a small lesion. Correlation with CT abdomen, hepatic protocol, recommended as an outpatient on a nonemergent basis. 3. Adenomyomatosis of the gallbladder. 4. Sludge in the gallbladder. 5. Cyst involving the lower pole the right kidney. Assessment and Plan:  
 
Hospital Problems as of 5/5/2019 Never Reviewed Codes Class Noted - Resolved POA * (Principal) PNA (pneumonia) ICD-10-CM: J18.9 ICD-9-CM: 287  5/5/2019 - Present Unknown Tachycardia ICD-10-CM: R00.0 ICD-9-CM: 785.0  5/5/2019 - Present Unknown Thrombocytopenia ([address] ICD-10-CM: D69.6 ICD-9-CM: 287.5  5/5/2019 - Present Unknown GI bleed ICD-10-CM: K92.2 ICD-9-CM: 578.9  5/5/2019 - Present Unknown A-fib [facility]) ICD-10-CM: I48.91 
ICD-9-CM: 427.31  5/5/2019 - Present Unknown Tobacco abuse ICD-10-CM: Z72.0 ICD-9-CM: 305.1  11/28/2016 - Present Yes ETOH abuse ICD-10-CM: F10.10 ICD-9-CM: 305.00  11/28/2016 - Present Yes HTN (hypertension) (Chronic) ICD-10-CM: I10 
ICD-9-CM: 401.9  11/19/2014 - Present Yes PLAN: 
pna- cont rocephin and zithromax Alcohol with drawl- cont ciwa protocol- advised on cessation 
afib with rvr- cardiology consulted Gi bleed- rectal examination - melena- protonic bid- gi consulted Thrombocytopenia- prob alcohol related liver problems 
htn Nicotine dependence- advised on cessation. DVT ppx:  scd Anticipated DC needs:   
Code status:  Full Estimated LOS:  Greater than 2 midnights Risk:  high Signed: 
Deepika Hilario MD

## 2019-05-05 NOTE — ED TRIAGE NOTES
Patient reports decreased appetite for 3 weeks. Reports cough and shortness of breath for 2 weeks. States he initially had white sputum production, but about a week ago the sputum turned red. Reports pain \"from head to toe\". Also reports he feels very unstable on his feet. Denies dizziness, just states he feels off balance.

## 2019-05-05 NOTE — ED PROVIDER NOTES
Patient presents to the ER complaining of shortness of breath, fatigue as well as cough and vomiting. Reports symptoms started 3 weeks ago. Has had significant exertional dyspnea. Reports cough, productive of yellowish to green sputum. Reports occasional hemoptysis. Reports subjective fevers and chills. Also has had some nausea and vomiting. Reports generalized weakness and fatigue. The history is provided by the patient. Cough This is a recurrent problem. The current episode started more than 1 week ago. The problem has not changed since onset. The cough is productive of blood-tinged sputum, productive of purulent sputum and productive of brown sputum. Patient reports a subjective fever - was not measured. Associated symptoms include myalgias, shortness of breath, nausea and vomiting. Pertinent negatives include no sweats and no confusion. He has tried nothing for the symptoms. He is a smoker. Past Medical History:  
Diagnosis Date  Hypertension No past surgical history on file. Family History:  
Problem Relation Age of Onset  Cancer Neg Hx Social History Socioeconomic History  Marital status:  Spouse name: Not on file  Number of children: Not on file  Years of education: Not on file  Highest education level: Not on file Occupational History  Not on file Social Needs  Financial resource strain: Not on file  Food insecurity:  
  Worry: Not on file Inability: Not on file  Transportation needs:  
  Medical: Not on file Non-medical: Not on file Tobacco Use  Smoking status: Current Every Day Smoker Packs/day: 2.00 Substance and Sexual Activity  Alcohol use: Yes Comment: approx 1 pint per day  Drug use: Not on file  Sexual activity: Yes  
  Partners: Female Lifestyle  Physical activity:  
  Days per week: Not on file Minutes per session: Not on file  Stress: Not on file Relationships  Social connections:  
  Talks on phone: Not on file Gets together: Not on file Attends Faith service: Not on file Active member of club or organization: Not on file Attends meetings of clubs or organizations: Not on file Relationship status: Not on file  Intimate partner violence:  
  Fear of current or ex partner: Not on file Emotionally abused: Not on file Physically abused: Not on file Forced sexual activity: Not on file Other Topics Concern  Not on file Social History Narrative  Not on file ALLERGIES: Patient has no known allergies. Review of Systems Constitutional: Negative for fatigue, fever and unexpected weight change. HENT: Negative for congestion. Eyes: Negative for photophobia and visual disturbance. Respiratory: Positive for cough, chest tightness and shortness of breath. Gastrointestinal: Positive for nausea and vomiting. Negative for abdominal pain. Endocrine: Negative for polydipsia and polyphagia. Genitourinary: Negative for flank pain, frequency and urgency. Musculoskeletal: Positive for myalgias. Negative for back pain. Skin: Negative for pallor. Allergic/Immunologic: Negative for food allergies and immunocompromised state. Neurological: Negative for light-headedness. Hematological: Negative for adenopathy. Does not bruise/bleed easily. Psychiatric/Behavioral: Negative for behavioral problems and confusion. All other systems reviewed and are negative. Vitals:  
 05/05/19 5627 BP: 142/76 Pulse: 90 Resp: 20 Temp: 98.2 °F (36.8 °C) SpO2: 98% Weight: 67.1 kg (148 lb) Height: 5' 11\" (1.803 m) Physical Exam  
Constitutional: He is oriented to person, place, and time. He appears well-developed and well-nourished. Eyes: Pupils are equal, round, and reactive to light. Conjunctivae and EOM are normal.  
Cardiovascular: Regular rhythm. Tachycardia present. Pulmonary/Chest: Effort normal. No respiratory distress. He has rales. Abdominal: Soft. Bowel sounds are normal. He exhibits no distension. There is no tenderness. Musculoskeletal: Normal range of motion. He exhibits no edema or deformity. Neurological: He is alert and oriented to person, place, and time. No cranial nerve deficit. Nursing note and vitals reviewed. MDM Number of Diagnoses or Management Options Hypokalemia:  
Pneumonia of right lower lobe due to infectious organism [facility]):  
Sepsis, due to unspecified organism [facility]):  
Diagnosis management comments: Differential diagnoses in this patient is brought to include pneumonia, volume overload, electrolyte problem, alcohol withdrawal 
 
8:52 AM 
Lactic acid is elevated at 2.8. Normal white blood cell count. Platelets low at 45 Chemistry panel is significant for a potassium of 2.9. Normal lipase. Normal magnesium, Procalcitonin was elevated at 3.6. Chest ray shows a right basilar infiltrate. Concern for pneumonia as well as possible aspiration We'll discuss case with hospitalist for admission Amount and/or Complexity of Data Reviewed Clinical lab tests: ordered and reviewed Tests in the radiology section of CPT®: ordered and reviewed Discuss the patient with other providers: yes (Hospitalist) Risk of Complications, Morbidity, and/or Mortality Presenting problems: moderate Diagnostic procedures: low Management options: moderate Patient Progress Patient progress: stable Procedures Results Include: 
 
Recent Results (from the past 24 hour(s)) CBC WITH AUTOMATED DIFF Collection Time: 05/05/19  7:29 AM  
Result Value Ref Range WBC 4.3 4.3 - 11.1 K/uL  
 RBC 4.04 (L) 4.23 - 5.6 M/uL  
 HGB 15.1 13.6 - 17.2 g/dL HCT 41.9 41.1 - 50.3 % .7 (H) 79.6 - 97.8 FL  
 MCH 37.4 (H) 26.1 - 32.9 PG  
 MCHC 36.0 (H) 31.4 - 35.0 g/dL  
 RDW 13.6 11.9 - 14.6 % PLATELET 45 (L) 227 - 450 K/uL MPV 11.5 9.4 - 12.3 FL ABSOLUTE NRBC 0.00 0.0 - 0.2 K/uL DF PENDING   
METABOLIC PANEL, COMPREHENSIVE Collection Time: 05/05/19  7:29 AM  
Result Value Ref Range Sodium 132 (L) 136 - 145 mmol/L Potassium 2.9 (LL) 3.5 - 5.1 mmol/L Chloride 91 (L) 98 - 107 mmol/L  
 CO2 24 21 - 32 mmol/L Anion gap 17 (H) 7 - 16 mmol/L Glucose 106 (H) 65 - 100 mg/dL BUN 17 8 - 23 MG/DL Creatinine 0.69 (L) 0.8 - 1.5 MG/DL  
 GFR est AA >60 >60 ml/min/1.73m2 GFR est non-AA >60 >60 ml/min/1.73m2 Calcium 9.5 8.3 - 10.4 MG/DL Bilirubin, total 1.2 (H) 0.2 - 1.1 MG/DL  
 ALT (SGPT) 30 12 - 65 U/L  
 AST (SGOT) 41 (H) 15 - 37 U/L Alk. phosphatase 58 50 - 136 U/L Protein, total 8.3 (H) 6.3 - 8.2 g/dL Albumin 2.8 (L) 3.2 - 4.6 g/dL Globulin 5.5 (H) 2.3 - 3.5 g/dL A-G Ratio 0.5 (L) 1.2 - 3.5 BNP Collection Time: 05/05/19  7:29 AM  
Result Value Ref Range BNP 56 (H) 0 pg/mL TROPONIN I Collection Time: 05/05/19  7:29 AM  
Result Value Ref Range Troponin-I, Qt. <0.02 (L) 0.02 - 0.05 NG/ML  
LIPASE Collection Time: 05/05/19  7:29 AM  
Result Value Ref Range Lipase 198 73 - 393 U/L MAGNESIUM Collection Time: 05/05/19  7:29 AM  
Result Value Ref Range Magnesium 1.8 1.8 - 2.4 mg/dL PROCALCITONIN Collection Time: 05/05/19  7:29 AM  
Result Value Ref Range Procalcitonin 3.6 ng/mL POC LACTIC ACID Collection Time: 05/05/19  7:54 AM  
Result Value Ref Range Lactic Acid (POC) 2.80 (H) 0.5 - 1.9 mmol/L Voice dictation software was used during the making of this note. This software is not perfect and grammatical and other typographical errors may be present. This note has been proofread, but may still contain errors.  
Bettie Mar MD; 5/5/2019 @8:53 AM  
===================================================================

## 2019-05-05 NOTE — PROGRESS NOTES
TRANSFER - IN REPORT: 
 
Verbal report received from Andrea Main RN (name) on Lela Polanco  being received from ED (unit) for routine progression of care Report consisted of patients Situation, Background, Assessment and  
Recommendations(SBAR). Information from the following report(s) SBAR and Kardex was reviewed with the receiving nurse. Opportunity for questions and clarification was provided. Assessment completed upon patients arrival to unit and care assumed. SBAR given to primary receiving Samuel STERN.

## 2019-05-05 NOTE — PROGRESS NOTES
TRANSFER - IN REPORT: 
 
Verbal report received from Riana Claros RN(name) on Starlett Lie  being received from 8th(unit) for change in patient condition(A.Fib RVR) Report consisted of patients Situation, Background, Assessment and  
Recommendations(SBAR). Information from the following report(s) SBAR, Kardex, Intake/Output, MAR and Cardiac Rhythm ST/A. Fib was reviewed with the receiving nurse. Opportunity for questions and clarification was provided. Assessment completed upon patients arrival to unit and care assumed.

## 2019-05-05 NOTE — PROGRESS NOTES
TRANSFER - OUT REPORT: 
 
Verbal report given to Cathy(name) on Carolyn Main  being transferred to [facility](unit) for routine progression of care Report consisted of patients Situation, Background, Assessment and  
Recommendations(SBAR). Information from the following report(s) SBAR was reviewed with the receiving nurse. Lines:  
Peripheral IV 05/05/19 Left Antecubital (Active) Site Assessment Clean, dry, & intact 5/5/2019  7:37 AM  
Phlebitis Assessment 0 5/5/2019  7:37 AM  
Infiltration Assessment 0 5/5/2019  7:37 AM  
Dressing Status Clean, dry, & intact 5/5/2019  7:37 AM  
Hub Color/Line Status Pink 5/5/2019  7:37 AM  
   
Peripheral IV 05/05/19 Right Forearm (Active) Site Assessment Clean, dry, & intact 5/5/2019  9:10 AM  
Phlebitis Assessment 0 5/5/2019  9:10 AM  
Infiltration Assessment 0 5/5/2019  9:10 AM  
Dressing Status Clean, dry, & intact 5/5/2019  9:10 AM  
Hub Color/Line Status Pink 5/5/2019  9:10 AM  
  
 
Opportunity for questions and clarification was provided

## 2019-05-05 NOTE — ROUTINE PROCESS
TRANSFER - OUT REPORT: 
 
Verbal report given to Loly Jose on [address]  being transferred to 8th floor for routine progression of care Report consisted of patients Situation, Background, Assessment and  
Recommendations(SBAR). Information from the following report(s) ED Summary was reviewed with the receiving nurse. Lines:  
Peripheral IV 05/05/19 Left Antecubital (Active) Site Assessment Clean, dry, & intact 5/5/2019  7:37 AM  
Phlebitis Assessment 0 5/5/2019  7:37 AM  
Infiltration Assessment 0 5/5/2019  7:37 AM  
Dressing Status Clean, dry, & intact 5/5/2019  7:37 AM  
Hub Color/Line Status Pink 5/5/2019  7:37 AM  
   
Peripheral IV 05/05/19 Right Forearm (Active) Site Assessment Clean, dry, & intact 5/5/2019  9:10 AM  
Phlebitis Assessment 0 5/5/2019  9:10 AM  
Infiltration Assessment 0 5/5/2019  9:10 AM  
Dressing Status Clean, dry, & intact 5/5/2019  9:10 AM  
Hub Color/Line Status Pink 5/5/2019  9:10 AM  
  
 
Opportunity for questions and clarification was provided.    
 
Patient transported with:

## 2019-05-05 NOTE — PROGRESS NOTES
TRANSFER - IN REPORT: 
 
Verbal report received from Neelam Frausto RN(name) on [facility]  being received from ER(unit) for routine progression of care Report consisted of patients Situation, Background, Assessment and  
Recommendations(SBAR). Information from the following report(s) SBAR was reviewed with the receiving nurse. Opportunity for questions and clarification was provided. Assessment completed upon patients arrival to unit and care assumed.

## 2019-05-05 NOTE — PROGRESS NOTES
Skin assessed upon arrival to unit. Sacrum pink but blanchable. Heels intact. No other abnormalities noted.

## 2019-05-05 NOTE — H&P (VIEW-ONLY)
[facility] Consult Note Primary GI Physician:  
 
Referring Provider:  Dr. Glenna Jennings Consult Date:  5/5/2019 Admit Date:  5/5/2019 Chief Complaint:  Melena, alcohol abuse Subjective:  
 
History of Present Illness:  Patient is a 59 y.o. male with PMH of HTN and alcohol abuse, who is seen in consultation at the request of Dr. Glenna Jennings for melena and thrombocytopenia. He was admitted today for pneumonia after presenting to the ED with a couple week history of cough, body aches and chills for three days. CXR showed right basilar infiltrate, he was admitted by hospitalists and is on rocephin and zithromax. CBC significant for platelet count of 56D with hgb of 15.1, .7, and white count 4300. Sodium 132, potassium 2.9, BUN 17, creatinine 0.69, t. Bili 1.2, albumin 2.8, ALT 30, AST 41, alk phos 58 and lipase 198. Abdominal ultrasound today showed hepatic steatosis, a 1.6 cm hypoechoic lesion in the right lobe of the liver which could represent focal fatty sparing but hepatic protocol CT recommended as outpatient to r/o small lesion. Also noted was gallbladder sludge and adenomyomatosis. He had been in sinus tachy, but remote tele reported heart rate 170-200, possible rapid a. Fib. He was transferred to third floor and cardiology consultation pending. Tmax is 101.6. Blood cultures pending. Patient reports about 12 episodes of black loose stools this past week--without any use of bismuth or oral iron. Most recent black stool was reportedly one hour ago. He was noted to have black stool on examiner's glove in ER. No abdominal pain. No red blood per rectum. He did have nausea and vomiting 6 days ago without hematemesis or coffee ground emesis but no recurrent nausea/vomiting. He denies any known prior history of PUD, liver disease. He was unaware of thrombocytopenia until this admission. He does drink 1 pint of vodka per day.   Smokes about a pack to 1.5 packs of cigarettes per day. Also takes about 3 ibuprofen most days for muscle/joint pain related to his work as a . Had a colonoscopy 3 years ago at the [place] in [facility] with 7 polyps removed. No significant weight change. PMH: 
HTN Alcohol abuse Colon polyps (colo in 2016 at [place] revealed 7 colon polyps per patient) PSH: 
No past surgical history on file. Allergies: 
No Known Allergies Home Medications: 
Prior to Admission medications Medication Sig Start Date End Date Taking? Authorizing Provider  
aspirin 81 mg chewable tablet Take 1 Tab by mouth daily. 11/28/16   nAnabel CARCAMO NP  
nitroglycerin (NITROSTAT) 0.4 mg SL tablet 1 Tab by SubLINGual route every five (5) minutes as needed for Chest Pain. 11/28/16   Annabel CARCAMO NP  
atorvastatin (LIPITOR) 40 mg tablet Take 1 Tab by mouth daily. 11/28/16   Annabel CARCAMO NP  
amLODIPine (NORVASC) 5 mg tablet Take 5 mg by mouth daily. Other, MD Sully  
 
 
Hospital Medications: 
Current Facility-Administered Medications Medication Dose Route Frequency  cefTRIAXone (ROCEPHIN) 1 g in 0.9% sodium chloride (MBP/ADV) 50 mL  1 g IntraVENous Q24H  
 azithromycin (ZITHROMAX) 500 mg in 0.9% sodium chloride (MBP/ADV) 250 mL  500 mg IntraVENous Q24H  chlordiazePOXIDE (LIBRIUM) capsule 50 mg  50 mg Oral Q8H  
 0.9% sodium chloride 1,000 mL with mvi, adult no. 4 with vit K 10 mL, thiamine 831 mg, folic acid 1 mg infusion   IntraVENous Q24H  
 0.9% sodium chloride infusion  125 mL/hr IntraVENous CONTINUOUS  
 amLODIPine (NORVASC) tablet 5 mg  5 mg Oral DAILY  atorvastatin (LIPITOR) tablet 40 mg  40 mg Oral DAILY  nitroglycerin (NITROSTAT) tablet 0.4 mg  0.4 mg SubLINGual Q5MIN PRN  
 sodium chloride (NS) flush 5-40 mL  5-40 mL IntraVENous Q8H  
 sodium chloride (NS) flush 5-40 mL  5-40 mL IntraVENous PRN  
 tuberculin injection 5 Units  5 Units IntraDERMal ONCE  
  acetaminophen (TYLENOL) tablet 650 mg  650 mg Oral Q4H PRN  
 HYDROcodone-acetaminophen (NORCO) 5-325 mg per tablet 1 Tab  1 Tab Oral Q4H PRN  
 morphine injection 1 mg  1 mg IntraVENous Q4H PRN  
 nicotine (NICODERM CQ) 21 mg/24 hr patch 1 Patch  1 Patch TransDERmal Q24H  
 ondansetron (ZOFRAN) injection 4 mg  4 mg IntraVENous Q4H PRN  
 diphenhydrAMINE (BENADRYL) injection 12.5 mg  12.5 mg IntraVENous Q4H PRN  
 naloxone (NARCAN) injection 0.4 mg  0.4 mg IntraVENous PRN  pantoprazole (PROTONIX) 40 mg in sodium chloride 0.9% 10 mL injection  40 mg IntraVENous Q12H  
 LORazepam (ATIVAN) injection 2 mg  2 mg IntraVENous Q4H  
 sodium chloride (NS) flush 5-40 mL  5-40 mL IntraVENous Q8H  
 sodium chloride (NS) flush 5-40 mL  5-40 mL IntraVENous PRN  
 LORazepam (ATIVAN) injection 1 mg  1 mg IntraVENous Q15MIN PRN  
 LORazepam (ATIVAN) tablet 3-4 mg  3-4 mg Oral Q1H  
 LORazepam (ATIVAN) tablet 1-2 mg  1-2 mg Oral Q1H Social History: 
Social History Tobacco Use  Smoking status: Current Every Day Smoker Packs/day: 2.00 Substance Use Topics  Alcohol use: Yes Comment: approx 1 pint per day Family History: 
Family History Problem Relation Age of Onset  Cancer Neg Hx Review of Systems: A detailed 10 system ROS is obtained, with pertinent positives as listed above and in admission H&P. Says his SOB is improved since this morning. Has pleuritic chest pain exacerbated by coughing. All others are negative. Diet:  Clear liquids Objective:  
 
Physical Exam: 
Vitals: 
Visit Vitals /75 (BP 1 Location: Right arm, BP Patient Position: At rest) Pulse (!) 110 Temp (!) 101.6 °F (38.7 °C) Resp 20 Ht 5' 11\" (1.803 m) Wt 67.1 kg (148 lb) SpO2 91% BMI 20.64 kg/m² Gen:  Pt is alert, cooperative, no acute distress Skin:  Extremities and face reveal no rashes. HEENT: Sclerae anicteric. Extra-occular muscles are intact.   No oral ulcers. No abnormal pigmentation of the lips. The neck is supple. Cardiovascular: tachycardic and irregular. Respiratory:  Comfortable breathing with no accessory muscle use. On room air. Mildly coarse breath sounds on the right GI:  Abdomen nondistended, soft, and nontender. Normal active bowel sounds. No enlargement of the liver or spleen. No masses palpable. Rectal:  Deferred Musculoskeletal:  No pitting edema of the lower legs. Neurological:  Gross memory appears intact. Patient is alert and oriented. Psychiatric:  Mood appears appropriate with judgement intact. Laboratory:   
Recent Labs 05/05/19 
4843 WBC 4.3 HGB 15.1 HCT 41.9 PLT 45* .7* *  
K 2.9*  
CL 91* CO2 24 BUN 17 CREA 0.69* CA 9.5 MG 1.8  
* AP 58 SGOT 41* ALT 30  
TBILI 1.2* ALB 2.8*  
TP 8.3*  
LPSE 198 Assessment:  
 
Principal Problem: 
  PNA (pneumonia) (5/5/2019) Active Problems: 
  HTN (hypertension) (11/19/2014) Tobacco abuse (11/28/2016) ETOH abuse (11/28/2016) Tachycardia (5/5/2019) Thrombocytopenia ([facility]) (5/5/2019) GI bleed (5/5/2019) A-fib ([facility]) (5/5/2019) 58 y/o male with HTN, colon polyps, h/o EtOH and tobacco abuse is admitted with pneumonia, a. Fib with RVR, EtOH withdrawal and seen by GI for black stools x 1 week and thrombocytopenia. However, his normal hgb of 15.1,normal BUN/creatinine ratio and normal BP argues AGAINST significant/brisk GIB. He is at increased risk for PUD given his NSAIDs, tobacco and EtOH history. Also, his thrombocytopenia raises concern for possible portal hypertension, particularly with his ongoing alcohol abuse, and eventual evaluation with EGD is recommended to assess for PHG and varices. Plan:  
1) check PT/INR to further assess synthetic hepatic function 2) follow hgb 3) empiric IV PPI 4) EGD when he is more stable (currently being evaluated/treated for a. Fib, pneumonia and alcohol withdrawal) unless development of brisk bleeding associated with hemodynamic instability and significant drop in hgb necessitates emergent evaluation. May make him NPO after midnight tonight and reassess in am for possible EGD pending status of other medical issues 5) avoid EtOH, NSAIDs, tobacco 
6) hepatic protocol CT as outpatient to follow up on abnormal area in the right hepatic lobe (which may just be an area of focal fatty sparing) 7) replace K+. Defer to primary team 
8) he is up to date with surveillance colonoscopies through the [place] Patient is seen and examined in collaboration with Dr. Mary Alice Neves. Assessment and plan as per Dr. Sorin Cervantes.  
GEO Pepper

## 2019-05-05 NOTE — CONSULTS
[facility] Consult                Date of  Admission: 5/5/2019  7:26 AM     Primary Care Physician: Dr Rossy Brown  Primary Cardiologist: None  Referring Physician: Dr Nallely Padgett  Consulting Physician: Dr Cristhian Nieves    CC/Reason for consult: alverto Priest is a 59 y.o. male admitted for PNA (pneumonia) [J18.9]. He has a h/o tobacco abuse, alcohol abuse and htn. LHC  w nonobstructive CAD, echo  w EF 65-70%. He was admitted to [address] [place] [address] 5-5 w nausea and SOB, started on antibiotics. When he was placed on telemetry he was noted to be in a fib w rate 170-200. He was given lopressor and converted back to ST. No h/o a fib. Lactic acid 2.8, WBC 4.3, hgb 15, platelets 45, , K 2.9, cr .69, procal 3.6, BNP 56, trop less than . 02. EKG showed ST w rate 110 w NSST/T wave changes. /75. Patient Active Problem List   Diagnosis Code    Cellulitis L03.90    HTN (hypertension) I10    Chest pain R07.9    Shortness of breath R06.02    Tobacco abuse Z72.0    ETOH abuse F10.10    PNA (pneumonia) J18.9    Tachycardia R00.0    Thrombocytopenia (HCC) D69.6    GI bleed K92.2    A-fib (HCC) I48.91       Past Medical History:   Diagnosis Date    A-fib ([facility] 5/5/2019    Hypertension       No past surgical history on file. No Known Allergies   Family History   Problem Relation Age of Onset    Cancer Neg Hx       Social History     Tobacco Use    Smoking status: Current Every Day Smoker     Packs/day: 2.00   Substance Use Topics    Alcohol use: Yes     Comment: approx 1 pint per day        Current Facility-Administered Medications   Medication Dose Route Frequency    cefTRIAXone (ROCEPHIN) 1 g in 0.9% sodium chloride (MBP/ADV) 50 mL  1 g IntraVENous Q24H    azithromycin (ZITHROMAX) 500 mg in 0.9% sodium chloride (MBP/ADV) 250 mL  500 mg IntraVENous Q24H    chlordiazePOXIDE (LIBRIUM) capsule 50 mg  50 mg Oral Q8H    0.9% sodium chloride 1,000 mL with mvi, adult no. 4 with vit K 10 mL, thiamine 025 mg, folic acid 1 mg infusion   IntraVENous Q24H    0.9% sodium chloride infusion  125 mL/hr IntraVENous CONTINUOUS    amLODIPine (NORVASC) tablet 5 mg  5 mg Oral DAILY    atorvastatin (LIPITOR) tablet 40 mg  40 mg Oral DAILY    nitroglycerin (NITROSTAT) tablet 0.4 mg  0.4 mg SubLINGual Q5MIN PRN    sodium chloride (NS) flush 5-40 mL  5-40 mL IntraVENous Q8H    sodium chloride (NS) flush 5-40 mL  5-40 mL IntraVENous PRN    tuberculin injection 5 Units  5 Units IntraDERMal ONCE    acetaminophen (TYLENOL) tablet 650 mg  650 mg Oral Q4H PRN    HYDROcodone-acetaminophen (NORCO) 5-325 mg per tablet 1 Tab  1 Tab Oral Q4H PRN    morphine injection 1 mg  1 mg IntraVENous Q4H PRN    nicotine (NICODERM CQ) 21 mg/24 hr patch 1 Patch  1 Patch TransDERmal Q24H    ondansetron (ZOFRAN) injection 4 mg  4 mg IntraVENous Q4H PRN    diphenhydrAMINE (BENADRYL) injection 12.5 mg  12.5 mg IntraVENous Q4H PRN    naloxone (NARCAN) injection 0.4 mg  0.4 mg IntraVENous PRN    pantoprazole (PROTONIX) 40 mg in sodium chloride 0.9% 10 mL injection  40 mg IntraVENous Q12H    LORazepam (ATIVAN) injection 2 mg  2 mg IntraVENous Q4H    sodium chloride (NS) flush 5-40 mL  5-40 mL IntraVENous Q8H    sodium chloride (NS) flush 5-40 mL  5-40 mL IntraVENous PRN    LORazepam (ATIVAN) injection 1 mg  1 mg IntraVENous Q15MIN PRN    LORazepam (ATIVAN) tablet 3-4 mg  3-4 mg Oral Q1H    LORazepam (ATIVAN) tablet 1-2 mg  1-2 mg Oral Q1H       Review of Symptoms:  General: no weight change,  no weakness, fever or chills  Skin: no rashes, lumps, or other skin changes  HEENT: no headache, dizziness, lightheadedness, vision changes, hearing changes, tinnitus, vertigo, sinus pressure/pain, bleeding gums, sore throat, or hoarseness  Neck: no swollen glands, goiter, pain or stiffness  Respiratory: no cough, sputum, hemoptysis, + dyspnea, wheezing  Cardiovascular: + as per HPI  Gastrointestinal: no GERD, constipation, diarrhea, liver problems, or h/o GI bleed  Urinary: no frequency, urgency , hematuria, burning/pain with urination, recent flank pain, polyuria, nocturia, or difficulty urinating  Peripheral Vascular: no claudication, leg cramps, prior DVTs, swelling of calves, legs, or feet, color change, or swelling with redness or tenderness  Musculoskeletal: no muscle or joint pain/stiffness, joint swelling, erythema of joints, or back pain  Psychiatric: no depression or excessive stress  Neurological: no sensory or motor loss, seizures, syncope, tremors, numbness, no dementia  Hematologic: no anemia, easy bruising or bleeding  Endocrine: no thyroid problems, heat or cold intolerance, excessive sweating, polyuria, polydipsia, no diabetes.        Physical Exam  Vitals:    05/05/19 0920 05/05/19 0933 05/05/19 1042 05/05/19 1136   BP: 134/77  165/75    Pulse: (!) 111 100 (!) 114 (!) 110   Resp:   20    Temp:   (!) 101.6 °F (38.7 °C)    SpO2: 94% 94% 91%    Weight:       Height:           Physical Exam:  General: Well Developed, Well Nourished, No Acute Distress  HEENT: pupils equal and round, no abnormalities noted  Neck: supple, no JVD, no carotid bruits  Heart: S1S2 with RRR without murmurs or gallops  Lungs: Clear throughout auscultation bilaterally without adventitious sounds  Abd: soft, nontender, nondistended, with good bowel sounds  Ext: warm, no edema, calves supple/nontender, pulses 2+ bilaterally  Skin: warm and dry  Psychiatric: Normal mood and affect  Neurologic: Alert and oriented X 3      Labs:   Recent Labs     05/05/19  0729   *   K 2.9*   MG 1.8   BUN 17   CREA 0.69*   *   WBC 4.3   HGB 15.1   HCT 41.9   PLT 45*        Assessment/Plan:     Assessment:    PNA (pneumonia) (5/5/2019)- Antibiotics     HTN (hypertension) (11/19/2014)- norvasc     Tobacco abuse (11/28/2016)- encouraged to quit    ETOH abuse (11/28/2016)- encouragement to quit     Thrombocytopenia ([facility] (5/5/2019)- monitor     GI bleed (5/5/2019)- Protonix, GI consulted     A-fib ([facility] (5/5/2019)- Amiodarone to try and maintain NSR, not good anticoagulation candidate due to thrombocytopnea and possible GI bleed    Thank you very much for this referral. We appreciate the opportunity to participate in this patient's care. We will follow along with above stated plan.     Willem Villela PA-C  Consulting MD: Denis Goddard

## 2019-05-06 ENCOUNTER — ANESTHESIA EVENT (OUTPATIENT)
Dept: ENDOSCOPY | Age: 59
DRG: 177 | End: 2019-05-06
Payer: OTHER GOVERNMENT

## 2019-05-06 PROBLEM — R78.81 BACTEREMIA DUE TO GRAM-NEGATIVE BACTERIA: Status: ACTIVE | Noted: 2019-05-06

## 2019-05-06 LAB
ABO + RH BLD: NORMAL
ANION GAP SERPL CALC-SCNC: 11 MMOL/L (ref 7–16)
BASOPHILS # BLD: 0.1 K/UL (ref 0–0.2)
BASOPHILS NFR BLD: 1 % (ref 0–2)
BLOOD GROUP ANTIBODIES SERPL: NORMAL
BUN SERPL-MCNC: 11 MG/DL (ref 8–23)
CALCIUM SERPL-MCNC: 8.5 MG/DL (ref 8.3–10.4)
CHLORIDE SERPL-SCNC: 101 MMOL/L (ref 98–107)
CO2 SERPL-SCNC: 26 MMOL/L (ref 21–32)
CREAT SERPL-MCNC: 0.36 MG/DL (ref 0.8–1.5)
DIFFERENTIAL METHOD BLD: ABNORMAL
EOSINOPHIL # BLD: 0 K/UL (ref 0–0.8)
EOSINOPHIL NFR BLD: 0 % (ref 0.5–7.8)
ERYTHROCYTE [DISTWIDTH] IN BLOOD BY AUTOMATED COUNT: 13.7 % (ref 11.9–14.6)
GLUCOSE SERPL-MCNC: 94 MG/DL (ref 65–100)
HCT VFR BLD AUTO: 34.8 % (ref 41.1–50.3)
HEMOCCULT STL QL: POSITIVE
HGB BLD-MCNC: 12.5 G/DL (ref 13.6–17.2)
IMM GRANULOCYTES # BLD AUTO: 0 K/UL (ref 0–0.5)
IMM GRANULOCYTES NFR BLD AUTO: 0 % (ref 0–5)
LYMPHOCYTES # BLD: 0.8 K/UL (ref 0.5–4.6)
LYMPHOCYTES NFR BLD: 14 % (ref 13–44)
MAGNESIUM SERPL-MCNC: 1.7 MG/DL (ref 1.8–2.4)
MCH RBC QN AUTO: 37.3 PG (ref 26.1–32.9)
MCHC RBC AUTO-ENTMCNC: 35.9 G/DL (ref 31.4–35)
MCV RBC AUTO: 103.9 FL (ref 79.6–97.8)
MM INDURATION POC: 0 MM (ref 0–5)
MONOCYTES # BLD: 0.7 K/UL (ref 0.1–1.3)
MONOCYTES NFR BLD: 12 % (ref 4–12)
NEUTS SEG # BLD: 3.9 K/UL (ref 1.7–8.2)
NEUTS SEG NFR BLD: 73 % (ref 43–78)
NRBC # BLD: 0 K/UL (ref 0–0.2)
PLATELET # BLD AUTO: 38 K/UL (ref 150–450)
PLATELET COMMENTS,PCOM: ABNORMAL
PMV BLD AUTO: 12.1 FL (ref 9.4–12.3)
POTASSIUM SERPL-SCNC: 2.6 MMOL/L (ref 3.5–5.1)
PPD POC: NEGATIVE NEGATIVE
RBC # BLD AUTO: 3.35 M/UL (ref 4.23–5.6)
RBC MORPH BLD: ABNORMAL
SODIUM SERPL-SCNC: 138 MMOL/L (ref 136–145)
SPECIMEN EXP DATE BLD: NORMAL
WBC # BLD AUTO: 5.5 K/UL (ref 4.3–11.1)
WBC MORPH BLD: ABNORMAL

## 2019-05-06 PROCEDURE — 82272 OCCULT BLD FECES 1-3 TESTS: CPT

## 2019-05-06 PROCEDURE — 65660000000 HC RM CCU STEPDOWN

## 2019-05-06 PROCEDURE — 74011250637 HC RX REV CODE- 250/637: Performed by: FAMILY MEDICINE

## 2019-05-06 PROCEDURE — C8929 TTE W OR WO FOL WCON,DOPPLER: HCPCS

## 2019-05-06 PROCEDURE — 87389 HIV-1 AG W/HIV-1&-2 AB AG IA: CPT

## 2019-05-06 PROCEDURE — 74011250637 HC RX REV CODE- 250/637: Performed by: INTERNAL MEDICINE

## 2019-05-06 PROCEDURE — 80048 BASIC METABOLIC PNL TOTAL CA: CPT

## 2019-05-06 PROCEDURE — 74011250636 HC RX REV CODE- 250/636: Performed by: FAMILY MEDICINE

## 2019-05-06 PROCEDURE — 86900 BLOOD TYPING SEROLOGIC ABO: CPT

## 2019-05-06 PROCEDURE — 74011000258 HC RX REV CODE- 258: Performed by: FAMILY MEDICINE

## 2019-05-06 PROCEDURE — 83735 ASSAY OF MAGNESIUM: CPT

## 2019-05-06 PROCEDURE — 85025 COMPLETE CBC W/AUTO DIFF WBC: CPT

## 2019-05-06 PROCEDURE — 36415 COLL VENOUS BLD VENIPUNCTURE: CPT

## 2019-05-06 PROCEDURE — 77030020120 HC VLV RESP PEP HI -B

## 2019-05-06 PROCEDURE — 86803 HEPATITIS C AB TEST: CPT

## 2019-05-06 PROCEDURE — C9113 INJ PANTOPRAZOLE SODIUM, VIA: HCPCS | Performed by: FAMILY MEDICINE

## 2019-05-06 PROCEDURE — 77030019605

## 2019-05-06 PROCEDURE — 74011000250 HC RX REV CODE- 250: Performed by: FAMILY MEDICINE

## 2019-05-06 RX ORDER — POTASSIUM CHLORIDE 20 MEQ/1
40 TABLET, EXTENDED RELEASE ORAL 2 TIMES DAILY
Status: COMPLETED | OUTPATIENT
Start: 2019-05-06 | End: 2019-05-06

## 2019-05-06 RX ORDER — POTASSIUM CHLORIDE 20 MEQ/1
40 TABLET, EXTENDED RELEASE ORAL 2 TIMES DAILY
Status: COMPLETED | OUTPATIENT
Start: 2019-05-07 | End: 2019-05-07

## 2019-05-06 RX ORDER — METOPROLOL TARTRATE 50 MG/1
50 TABLET ORAL 2 TIMES DAILY
Status: DISCONTINUED | OUTPATIENT
Start: 2019-05-06 | End: 2019-05-12

## 2019-05-06 RX ORDER — LORAZEPAM 2 MG/ML
1 INJECTION INTRAMUSCULAR EVERY 4 HOURS
Status: DISCONTINUED | OUTPATIENT
Start: 2019-05-06 | End: 2019-05-11

## 2019-05-06 RX ORDER — CHLORDIAZEPOXIDE HYDROCHLORIDE 25 MG/1
25 CAPSULE, GELATIN COATED ORAL EVERY 8 HOURS
Status: DISCONTINUED | OUTPATIENT
Start: 2019-05-06 | End: 2019-05-12

## 2019-05-06 RX ORDER — MAGNESIUM SULFATE HEPTAHYDRATE 40 MG/ML
2 INJECTION, SOLUTION INTRAVENOUS ONCE
Status: COMPLETED | OUTPATIENT
Start: 2019-05-06 | End: 2019-05-06

## 2019-05-06 RX ADMIN — ACETAMINOPHEN 650 MG: 325 TABLET, FILM COATED ORAL at 17:53

## 2019-05-06 RX ADMIN — Medication 10 ML: at 06:16

## 2019-05-06 RX ADMIN — SODIUM CHLORIDE 125 ML/HR: 900 INJECTION, SOLUTION INTRAVENOUS at 00:00

## 2019-05-06 RX ADMIN — POTASSIUM CHLORIDE 40 MEQ: 20 TABLET, EXTENDED RELEASE ORAL at 17:11

## 2019-05-06 RX ADMIN — AZITHROMYCIN MONOHYDRATE 500 MG: 500 INJECTION, POWDER, LYOPHILIZED, FOR SOLUTION INTRAVENOUS at 09:52

## 2019-05-06 RX ADMIN — Medication 5 ML: at 21:19

## 2019-05-06 RX ADMIN — AMLODIPINE BESYLATE 5 MG: 5 TABLET ORAL at 08:18

## 2019-05-06 RX ADMIN — POTASSIUM CHLORIDE 40 MEQ: 20 TABLET, EXTENDED RELEASE ORAL at 06:20

## 2019-05-06 RX ADMIN — METOPROLOL TARTRATE 50 MG: 50 TABLET ORAL at 08:18

## 2019-05-06 RX ADMIN — SODIUM CHLORIDE 125 ML/HR: 900 INJECTION, SOLUTION INTRAVENOUS at 08:18

## 2019-05-06 RX ADMIN — PERFLUTREN 1 ML: 6.52 INJECTION, SUSPENSION INTRAVENOUS at 14:00

## 2019-05-06 RX ADMIN — CHLORDIAZEPOXIDE HYDROCHLORIDE 50 MG: 25 CAPSULE ORAL at 06:12

## 2019-05-06 RX ADMIN — PANTOPRAZOLE SODIUM 40 MG: 40 INJECTION, POWDER, FOR SOLUTION INTRAVENOUS at 08:19

## 2019-05-06 RX ADMIN — LORAZEPAM 2 MG: 2 INJECTION INTRAMUSCULAR at 16:49

## 2019-05-06 RX ADMIN — PANTOPRAZOLE SODIUM 40 MG: 40 INJECTION, POWDER, FOR SOLUTION INTRAVENOUS at 21:18

## 2019-05-06 RX ADMIN — METOPROLOL TARTRATE 50 MG: 50 TABLET ORAL at 17:10

## 2019-05-06 RX ADMIN — CHLORDIAZEPOXIDE HYDROCHLORIDE 25 MG: 25 CAPSULE ORAL at 21:19

## 2019-05-06 RX ADMIN — CEFTRIAXONE SODIUM 1 G: 1 INJECTION, POWDER, FOR SOLUTION INTRAMUSCULAR; INTRAVENOUS at 09:52

## 2019-05-06 RX ADMIN — Medication 10 ML: at 21:10

## 2019-05-06 RX ADMIN — THIAMINE HYDROCHLORIDE: 100 INJECTION, SOLUTION INTRAMUSCULAR; INTRAVENOUS at 12:36

## 2019-05-06 RX ADMIN — MAGNESIUM SULFATE HEPTAHYDRATE 2 G: 40 INJECTION, SOLUTION INTRAVENOUS at 17:53

## 2019-05-06 RX ADMIN — CHLORDIAZEPOXIDE HYDROCHLORIDE 50 MG: 25 CAPSULE ORAL at 14:11

## 2019-05-06 RX ADMIN — LORAZEPAM 2 MG: 2 INJECTION INTRAMUSCULAR at 12:56

## 2019-05-06 RX ADMIN — ATORVASTATIN CALCIUM 40 MG: 40 TABLET, FILM COATED ORAL at 08:18

## 2019-05-06 NOTE — PROGRESS NOTES
[facility] Progress Note Admit Date:  5/5/2019 Today's Date:  5/6/2019 CC:  Melena Subjective:  
 
Patient reports continued black stool. Per RN, no BM since at least 7AM.  Hgb down to 12.5 from Hgb 15.1. Platelets are low at 38 from 45. INR 1.1.  K low at 2.6 and being replaced per RN. BUN/Cr WNL. He denies abdominal pain, N/V. Reports breathing difficulty comes and goes. This morning has improved some and even with laying completely flat. Was in Afib, now in NSR. No oral anticoagulation per Cardiology due to high risk for bleed. Medications:  
Current Facility-Administered Medications Medication Dose Route Frequency  potassium chloride (K-DUR, KLOR-CON) SR tablet 40 mEq  40 mEq Oral BID  metoprolol tartrate (LOPRESSOR) tablet 50 mg  50 mg Oral BID  cefTRIAXone (ROCEPHIN) 1 g in 0.9% sodium chloride (MBP/ADV) 50 mL  1 g IntraVENous Q24H  
 azithromycin (ZITHROMAX) 500 mg in 0.9% sodium chloride (MBP/ADV) 250 mL  500 mg IntraVENous Q24H  chlordiazePOXIDE (LIBRIUM) capsule 50 mg  50 mg Oral Q8H  
 0.9% sodium chloride 1,000 mL with mvi, adult no. 4 with vit K 10 mL, thiamine 489 mg, folic acid 1 mg infusion   IntraVENous Q24H  
 0.9% sodium chloride infusion  125 mL/hr IntraVENous CONTINUOUS  
 amLODIPine (NORVASC) tablet 5 mg  5 mg Oral DAILY  atorvastatin (LIPITOR) tablet 40 mg  40 mg Oral DAILY  nitroglycerin (NITROSTAT) tablet 0.4 mg  0.4 mg SubLINGual Q5MIN PRN  
 sodium chloride (NS) flush 5-40 mL  5-40 mL IntraVENous Q8H  
 sodium chloride (NS) flush 5-40 mL  5-40 mL IntraVENous PRN  
 tuberculin injection 5 Units  5 Units IntraDERMal ONCE  
 acetaminophen (TYLENOL) tablet 650 mg  650 mg Oral Q4H PRN  
 HYDROcodone-acetaminophen (NORCO) 5-325 mg per tablet 1 Tab  1 Tab Oral Q4H PRN  
 morphine injection 1 mg  1 mg IntraVENous Q4H PRN  
 nicotine (NICODERM CQ) 21 mg/24 hr patch 1 Patch  1 Patch TransDERmal Q24H  ondansetron (ZOFRAN) injection 4 mg  4 mg IntraVENous Q4H PRN  
 diphenhydrAMINE (BENADRYL) injection 12.5 mg  12.5 mg IntraVENous Q4H PRN  
 naloxone (NARCAN) injection 0.4 mg  0.4 mg IntraVENous PRN  pantoprazole (PROTONIX) 40 mg in sodium chloride 0.9% 10 mL injection  40 mg IntraVENous Q12H  
 LORazepam (ATIVAN) injection 2 mg  2 mg IntraVENous Q4H  
 sodium chloride (NS) flush 5-40 mL  5-40 mL IntraVENous Q8H  
 sodium chloride (NS) flush 5-40 mL  5-40 mL IntraVENous PRN Review of Systems: ROS was obtained, with pertinent positives as listed above. No chest pain or SOB. Diet:  NPO Objective:  
Vitals: 
Visit Vitals /71 Pulse 75 Temp 98.3 °F (36.8 °C) Resp 20 Ht 5' 11\" (1.803 m) Wt 72.3 kg (159 lb 8 oz) SpO2 94% BMI 22.25 kg/m² Intake/Output: 
No intake/output data recorded. 05/04 1901 - 05/06 0700 In: 480 [P.O.:480] Out: 900 [Urine:900] Exam: 
General appearance: alert, cooperative, no distress Lungs: clear to auscultation bilaterally anteriorly. +Slight coarse breath sounds posteriorly. Heart: regular rate and rhythm Abdomen: soft, non-tender. Bowel sounds normal. No masses, no organomegaly Neuro:  alert and oriented Data Review (Labs):   
Recent Labs 05/06/19 
8191 05/05/19 
1413 05/05/19 
3376 WBC 5.5  --  4.3 HGB 12.5*  --  15.1 HCT 34.8*  --  41.9 PLT 38*  --  45* .9*  --  103.7*   --  132*  
K 2.6*  --  2.9*  
  --  91* CO2 26  --  24 BUN 11  --  17  
CREA 0.36*  --  0.69* CA 8.5  --  9.5 MG  --   --  1.8 GLU 94  --  106* AP  --   --  62 SGOT  --   --  41* ALT  --   --  30  
TBILI  --   --  1.2* ALB  --   --  2.8*  
TP  --   --  8.3*  
LPSE  --   --  198 PTP  --  14.2  --   
INR  --  1.1  -- Assessment:  
 
Principal Problem: 
  PNA (pneumonia) (5/5/2019) Active Problems: 
  HTN (hypertension) (11/19/2014) Tobacco abuse (11/28/2016) ETOH abuse (11/28/2016) Tachycardia (5/5/2019) Thrombocytopenia ([facility] (5/5/2019) GI bleed (5/5/2019) A-fib ([facility] (5/5/2019) 60 y/o male with HTN, colon polyps, h/o EtOH and tobacco abuse is admitted with pneumonia, a. Fib with RVR, EtOH withdrawal and seen by GI for black stools x 1 week and thrombocytopenia. Platelets low at 38. INR 1.1. On presentation had normal hgb of 15.1 (decreased now to Hgb 12.5), with normal BUN/creatinine ratio and normal BP which argued AGAINST significant/brisk GIB. He is at increased risk for PUD given his NSAIDs, tobacco and EtOH history. Also, his thrombocytopenia raises concern for possible portal hypertension, particularly with his ongoing alcohol abuse, and eventual evaluation with EGD is recommended to assess for PHG and varices. Plan:  
1) Follow for recurrent overt GI bleed/melena. 2) Monitor trend of Hgb 3) empiric IV PPI 4) EGD when he is more stable (currently being evaluated/treated for a. Fib, pneumonia and alcohol withdrawal) unless development of brisk bleeding associated with hemodynamic instability and significant drop in hgb necessitates emergent evaluation. He is NPO at this time. Ok to have clear liquids for now. His Afib has converted to NSR. PNA seems to be improving clinically. He is on tx with IV Rocephin, IV Zithromax. May make him NPO after midnight tonight and reassess in am for possible EGD pending status of other medical issues 5) avoid EtOH, NSAIDs, tobacco 
6) hepatic protocol CT as outpatient to follow up on abnormal area in the right hepatic lobe (which may just be an area of focal fatty sparing) 7) Continue to replace K+. Defer to primary team 
8) he is up to date with surveillance colonoscopies through the [place] Clement Hernandez PA-C [facility] I have seen and examined this patient, and agree with above assessment and plan. Planning on EGD in the AM to assess for bleeding. FU echo results Replace electrolytes NPO p MN 
COnt PPI Darren Doyle MD

## 2019-05-06 NOTE — CONSULTS
Infectious Disease Consult    Today's Date: 5/6/2019   Admit Date: 5/5/2019    Impression:   · GNR bacteremia (5/5), source likely PNA   · RLL CAP vs aspiration  · Alcoholism with tobacco use-drinks pint of vodka daily   · Melena-GI following  · AF with RVR     Plan:   ·  Stop Azithromycin and continue CTX 2g IV daily while GNR speciation pending. Duration likely 10 days total.   · Screen HIV/HCV  · Discussed alcoholism and how this relates to current illness. Anti-infectives:   · CTX (5/5-  · Azithro (5/5-5/6)    Subjective:   Date of Consultation:  May 6, 2019  Referring Physician: Fabricio Hidalgo     Patient is a 59 y.o. male with significant medical hx for alcoholism and HTN, who presents to ED for myalgias, chills and cough (sx starting 3 weeks ago). Reportedly, he has not eaten in one week. CXR in ED showing RLL infiltrate. CBC revealing thrombocytopenia, CMP with hypokalemia, and normal hepatic function. US + hepatic steatosis, 1.6cm hypoechoic lesion to R lobe of liver, GB sludge, and adenomyomatosis. Started on CTX and azithromycin. Tm 101. 6F without leukocytosis. Both aerobic bottles + GNR. GI consulted for melena and plan to perform EGD when stable. Hospitalization complicated by AF with RVR. He complains of black loose stools, anorexia, cough with sputum production, fever with chills. Denies dysuria, N/V, or abdominal pain.   -Lives with his wife in apartment off [facility]. Works as a  and has not worked in 3 weeks. Drinks at least one pint of vodka daily, 1.5 PPD cigarettes, denies illicit drug use.      Patient Active Problem List   Diagnosis Code    Cellulitis L03.90    HTN (hypertension) I10    Chest pain R07.9    Shortness of breath R06.02    Tobacco abuse Z72.0    ETOH abuse F10.10    PNA (pneumonia) J18.9    Tachycardia R00.0    Thrombocytopenia (HCC) D69.6    GI bleed K92.2    A-fib (HCC) I48.91    Bacteremia due to Gram-negative bacteria R78.81     Past Medical History: Diagnosis Date    A-fib [facility]) 2019    Hypertension       Family History   Problem Relation Age of Onset    Cancer Neg Hx       Social History     Tobacco Use    Smoking status: Current Every Day Smoker     Packs/day: 2.00   Substance Use Topics    Alcohol use: Yes     Comment: approx 1 pint per day     No past surgical history on file. Prior to Admission medications    Medication Sig Start Date End Date Taking? Authorizing Provider   aspirin 81 mg chewable tablet Take 1 Tab by mouth daily. 16   Haley Givens D, NP   nitroglycerin (NITROSTAT) 0.4 mg SL tablet 1 Tab by SubLINGual route every five (5) minutes as needed for Chest Pain. 16   Haley Givens D, NP   atorvastatin (LIPITOR) 40 mg tablet Take 1 Tab by mouth daily. 16   Haley Givens D, NP   amLODIPine (NORVASC) 5 mg tablet Take 5 mg by mouth daily. Other, MD Sully       No Known Allergies     Review of Systems:  A comprehensive review of systems was negative except for that written in the History of Present Illness. Objective:     Visit Vitals  /71   Pulse 75   Temp 98.3 °F (36.8 °C)   Resp 20   Ht 5' 11\" (1.803 m)   Wt 72.3 kg (159 lb 8 oz)   SpO2 94%   BMI 22.25 kg/m²     Temp (24hrs), Av.2 °F (37.3 °C), Min:97.6 °F (36.4 °C), Max:100.6 °F (38.1 °C)       Lines:  Peripheral IV:       Physical Exam:    General:  Alert, cooperative,  appears stated age   Eyes:  Sclera anicteric. Pupils equally round and reactive to light. Mouth/Throat: Mucous membranes normal, oral pharynx clear   Neck: Supple   Lungs:   Diminished with RLL crackles, on RA    CV:  Regular rate and irreg rhythm,no murmur, click, rub or gallop   Abdomen:   Soft, non-tender. bowel sounds normal. non-distended   Extremities: No cyanosis or edema   Skin: Skin color, texture, turgor normal. no acute rash or lesions.  + tattoos    Lymph nodes: Cervical and supraclavicular normal   Musculoskeletal: No swelling or deformity   Lines/Devices: Intact, no erythema, drainage or tenderness   Psych: Alert and oriented, normal mood affect given the setting       Data Review:     CBC:  Recent Labs     05/06/19  0504 05/05/19 0729   WBC 5.5 4.3   GRANS 73 76   MONOS 12 8   EOS 0* 0*   ANEU 3.9 3.3   ABL 0.8 0.6   HGB 12.5* 15.1   HCT 34.8* 41.9   PLT 38* 45*       BMP:  Recent Labs     05/06/19  0504 05/05/19 0729   CREA 0.36* 0.69*   BUN 11 17    132*   K 2.6* 2.9*    91*   CO2 26 24   AGAP 11 17*   GLU 94 106*       LFTS:  Recent Labs     05/05/19 0729   TBILI 1.2*   ALT 30   SGOT 41*   AP 58   TP 8.3*   ALB 2.8*       Microbiology:     All Micro Results     Procedure Component Value Units Date/Time    CULTURE, BLOOD [[id number]]  (Abnormal) Collected:  05/05/19 0910    Order Status:  Completed Specimen:  Blood Updated:  05/06/19 0640     Special Requests: --        LEFT  FOREARM       GRAM STAIN GRAM NEGATIVE RODS         AEROBIC BOTTLE POSITIVE               CRITICAL RESULT NOT CALLED DUE TO PREVIOUS NOTIFICATION OF CRITICAL RESULT WITHIN THE LAST 24 HOURS.            Culture result: GRAM NEGATIVE RODS               CULTURE IN PROGRESS,FURTHER UPDATES TO FOLLOW          CULTURE, BLOOD [[id number]]  (Abnormal) Collected:  05/05/19 0909    Order Status:  Completed Specimen:  Blood Updated:  05/06/19 9152     Special Requests: --        RIGHT  FOREARM       GRAM STAIN GRAM NEGATIVE RODS         AEROBIC BOTTLE POSITIVE               RESULTS VERIFIED, PHONED TO AND READ BACK BY [address] [place] [address] Street, RN ON 05/05/2019 AT 1815 R           Culture result: GRAM NEGATIVE RODS               IDENTIFICATION AND SUSCEPTIBILITY TO FOLLOW                Imaging:   See HPI/EPIC     Signed By: Julito Regalado NP     May 6, 2019

## 2019-05-06 NOTE — PROGRESS NOTES
Patient rhythm change from A-FIB., uncontrolled rate, 160's, and Now, rhythm has  Changed back to NSR, heart rate 90's. Bhavna Agrawal NP notified via phone. No new orders at this time. Continue to monitor.

## 2019-05-06 NOTE — PROGRESS NOTES
Dr. Eliza Finn requested this Nurse to page [facility], whom are in on consult, for further orders regarding Cardiac Rhythm changes.

## 2019-05-06 NOTE — PROGRESS NOTES
5/6/2019 6:26 AM 
 
Admit Date: 5/5/2019 Admit Diagnosis: PNA (pneumonia) [J18.9] Subjective:  
 Patient has converted back to NSR. Objective:  
  
Visit Vitals /69 (BP 1 Location: Right arm, BP Patient Position: At rest) Pulse 97 Temp 97.6 °F (36.4 °C) Resp (!) 34 Ht 5' 11\" (1.803 m) Wt 67.1 kg (148 lb) SpO2 92% BMI 20.64 kg/m² ROS:  General ROS: negative for - chills Hematological and Lymphatic ROS: negative for - blood clots or jaundice Respiratory ROS: positive for - shortness of breath Cardiovascular ROS: negative for - chest pain Gastrointestinal ROS: no abdominal pain, change in bowel habits, or black or bloody stools Neurological ROS: no TIA or stroke symptoms Physical Exam: 
 
Physical Examination: General appearance - alert, well appearing, and in no distress Mental status - alert, oriented to person, place, and time Eyes - pupils equal and reactive, extraocular eye movements intact Neck/lymph - supple, no significant adenopathy Chest/CV - clear to auscultation, no wheezes, rales or rhonchi, symmetric air entry Heart - normal rate, regular rhythm, normal S1, S2, no murmurs, rubs, clicks or gallops Abdomen/GI - soft, nontender, nondistended, no masses or organomegaly Musculoskeletal - no joint tenderness, deformity or swelling Extremities - peripheral pulses normal, no pedal edema, no clubbing or cyanosis Skin - normal coloration and turgor, no rashes, no suspicious skin lesions noted Current Facility-Administered Medications Medication Dose Route Frequency  potassium chloride (K-DUR, KLOR-CON) SR tablet 40 mEq  40 mEq Oral BID  cefTRIAXone (ROCEPHIN) 1 g in 0.9% sodium chloride (MBP/ADV) 50 mL  1 g IntraVENous Q24H  
 azithromycin (ZITHROMAX) 500 mg in 0.9% sodium chloride (MBP/ADV) 250 mL  500 mg IntraVENous Q24H  chlordiazePOXIDE (LIBRIUM) capsule 50 mg  50 mg Oral Q8H  
  0.9% sodium chloride 1,000 mL with mvi, adult no. 4 with vit K 10 mL, thiamine 674 mg, folic acid 1 mg infusion   IntraVENous Q24H  
 0.9% sodium chloride infusion  125 mL/hr IntraVENous CONTINUOUS  
 amLODIPine (NORVASC) tablet 5 mg  5 mg Oral DAILY  atorvastatin (LIPITOR) tablet 40 mg  40 mg Oral DAILY  nitroglycerin (NITROSTAT) tablet 0.4 mg  0.4 mg SubLINGual Q5MIN PRN  
 sodium chloride (NS) flush 5-40 mL  5-40 mL IntraVENous Q8H  
 sodium chloride (NS) flush 5-40 mL  5-40 mL IntraVENous PRN  
 tuberculin injection 5 Units  5 Units IntraDERMal ONCE  
 acetaminophen (TYLENOL) tablet 650 mg  650 mg Oral Q4H PRN  
 HYDROcodone-acetaminophen (NORCO) 5-325 mg per tablet 1 Tab  1 Tab Oral Q4H PRN  
 morphine injection 1 mg  1 mg IntraVENous Q4H PRN  
 nicotine (NICODERM CQ) 21 mg/24 hr patch 1 Patch  1 Patch TransDERmal Q24H  
 ondansetron (ZOFRAN) injection 4 mg  4 mg IntraVENous Q4H PRN  
 diphenhydrAMINE (BENADRYL) injection 12.5 mg  12.5 mg IntraVENous Q4H PRN  
 naloxone (NARCAN) injection 0.4 mg  0.4 mg IntraVENous PRN  pantoprazole (PROTONIX) 40 mg in sodium chloride 0.9% 10 mL injection  40 mg IntraVENous Q12H  
 LORazepam (ATIVAN) injection 2 mg  2 mg IntraVENous Q4H  
 sodium chloride (NS) flush 5-40 mL  5-40 mL IntraVENous Q8H  
 sodium chloride (NS) flush 5-40 mL  5-40 mL IntraVENous PRN Data Review:  
@LABRCNT(Na,K,BUN,CREA,WBC,HGB,HCT,PLT,INR,TRP,TCHOL*,Triglyceride*,LDL*,LDLCPOC HDL*,HDL])@ TELEMETRY: nsr Assessment/Plan:  
 
Principal Problem: 
  PNA (pneumonia) (5/5/2019)on meds continue Active Problems: 
  HTN (hypertension) (11/19/2014)The current medical regimen is effective;  continue present plan and medications. Tobacco abuse (11/28/2016) ETOH abuse (11/28/2016) Tachycardia (5/5/2019) Thrombocytopenia ([address] (5/5/2019) GI bleed (5/5/2019) A-fib ([facility] (5/5/2019)now NSR. No OAC due to risk of bleeding.  Convert to po lopressor. Call if needed Radha Simental MD

## 2019-05-06 NOTE — PROGRESS NOTES
Care Management Interventions PCP Verified by CM: Yes(Jan 2019) Palliative Care Criteria Met (RRAT>21 & CHF Dx)?: No(RRAT 3 Dx Pneumonia ) Transition of Care Consult (CM Consult): Discharge Planning Discharge Durable Medical Equipment: No(none) Physical Therapy Consult: No 
Occupational Therapy Consult: No 
Speech Therapy Consult: No 
Current Support Network: Lives with Spouse Confirm Follow Up Transport: Self Plan discussed with Pt/Family/Caregiver: Yes Freedom of Choice Offered: Yes Discharge Location Discharge Placement: Unable to determine at this time Met with patient for d/c planning. Patient alert and oriented x 3, independent of ADL's and lives with his wife Lara Alves [phone] in one story home. He requires no DME at home and is able to drive. [address] has [address] and is able to obtain his medications at [facility] [employer] [phone]. He goes to the [place] [facility] in [place] and will obtain some medications from there as needed. Noted patient has positive blood cultures and ID consult. Patient with history of alcohol use and monitoring for withdrawal. CM following for d/c and ID notes. May need IV antibiotics at d/c.  Current d/c plan is home but may need antibiotics IV at d/c

## 2019-05-06 NOTE — PROGRESS NOTES
Dr. Geneva Youngblood notified that Patient had Rhythm change from NSR into Bigeminy, then into A-Fib., uncontrolled rate, 150 to 160 BPM.

## 2019-05-06 NOTE — PROGRESS NOTES
Progress note Admit Date:  2019  7:26 AM  
Name:  Darreld Jeans Age:  59 y.o. 
:  1960 MRN:  [id number] PCP:  Gillian Bryan MD 
Treatment Team: Attending Provider: Darylene Kindler, MD; Consulting Provider: Kael Avalos MD 
Generalized weakness, body aches/pain, cough, black stools/diarrhea HPI:  
59 yr old male pt with known h/o htn, h/o cardiac cath- non occlusive coronaries, known alcohol dependent. Pt since past 3 weeks c/o generalized weakness ,body aches and decreased appetite. Says the last he had any thing to eat was on Monday. Last he had alcohol was yesterday- drink 1 pint a day- says never had alcohol withdrawal. 
 
Cough since past 2 weeks, dry then productive, chills since past 3 days. Diarrhea 2-3 episodes since past week, black/melenotic. In er pt has mild tremors upper extremities,alcohol odour breath,mild tachycardia. cxr- infiltrate-rt basal. 
Ultrasound abd- ordered for alcohol dependence and thrombocytopenia showed- 
IMPRESSION:  
1. Hepatic steatosis. 2. 1.6 cm low hypoechoic lesion within the right lobe of the liver. Findings 
could represent focal fatty sparing or a small lesion. Correlation with CT 
abdomen, hepatic protocol, recommended as an outpatient on a nonemergent basis. 3. Adenomyomatosis of the gallbladder. 4. Sludge in the gallbladder. 5. Cyst involving the lower pole the right kidney. Hb 15.1,platelet 52,L 6.3,PYAZFLI 19. Temp on floor 101.6 Rectal examination- black stool on glove Once pt was admitted to floor heart rate high of 170- afib- then went back to sinus heart rate 110-130/min. Pt was initially admitted to 8th floor,then transferred to telemetry for afib. Pt will be admitted for pna,GI bleed,afib with rvr and alcohol withdrawal. 
 
19 Pt awake,alert,says feels better. Blood culture gram negative rods. Hypokalemia. Says had 2 bowel movements since admission 10 systems reviewed and negative except as noted in HPI. Past Medical History:  
Diagnosis Date  A-fib ([address] 2019  Hypertension   
  
past surgical history - polypectomy No Known Allergies Social History Tobacco Use  Smoking status: Current Every Day Smoker Packs/day: 2.00 Substance Use Topics  Alcohol use: Yes Comment: approx 1 pint per day Family History Problem Relation Age of Onset  Cancer Neg Hx Immunization History Administered Date(s) Administered  TB Skin Test (PPD) Intradermal 2019 PTA Medications: 
Prior to Admission Medications Prescriptions Last Dose Informant Patient Reported? Taking? amLODIPine (NORVASC) 5 mg tablet   Yes No  
Sig: Take 5 mg by mouth daily. aspirin 81 mg chewable tablet   Yes No  
Sig: Take 1 Tab by mouth daily. atorvastatin (LIPITOR) 40 mg tablet   No No  
Sig: Take 1 Tab by mouth daily. nitroglycerin (NITROSTAT) 0.4 mg SL tablet   No No  
Si Tab by SubLINGual route every five (5) minutes as needed for Chest Pain. Facility-Administered Medications: None Objective:  
 
Patient Vitals for the past 24 hrs: 
 Temp Pulse Resp BP SpO2  
1941 98.3 °F (36.8 °C) 75 20 119/71 94 % 19 0449 97.6 °F (36.4 °C) 97 (!) 34 119/69 92 % 19 0302 99.5 °F (37.5 °C) 100 17 111/70 93 % 19 0252 99.6 °F (37.6 °C) (!) 160 18 102/64 93 % 19 0037 99.9 °F (37.7 °C) 100 20 124/70 93 % 19 2032 98.9 °F (37.2 °C) (!) 110 20 128/75 91 % 19 1645 (!) 100.6 °F (38.1 °C) (!) 134 20 130/70 96 % 19 1325 99 °F (37.2 °C) 74 20 105/63 93 % Oxygen Therapy O2 Sat (%): 94 % (19) Pulse via Oximetry: 100 beats per minute (1933) O2 Device: Room air (19) Intake/Output Summary (Last 24 hours) at 2019 1158 Last data filed at 2019 0000 Gross per 24 hour Intake 480 ml Output 900 ml Net -420 ml Physical Exam: General:    Well nourished. Alert. Eyes:   Normal sclera. Extraocular movements intact. ENT:  Normocephalic, atraumatic. Moist mucous membranes CV:   Tachycardia No murmur, rub, or gallop. Lungs:  Coarse breath sounds rt >left Abdomen: Soft, nontender, nondistended. Bowel sounds normal.  
Extremities: Warm and dry. No cyanosis or edema. Neurologic: CN II-XII grossly intact. Sensation intact. mild tremor upper extremities Skin:     No rashes or jaundice. Psych:            Mild anxious I reviewed the labs, imaging, EKGs, telemetry, and other studies done this admission. Data Review:  
Recent Results (from the past 24 hour(s)) PROTHROMBIN TIME + INR Collection Time: 05/05/19  2:13 PM  
Result Value Ref Range Prothrombin time 14.2 11.7 - 14.5 sec INR 1.1 METABOLIC PANEL, BASIC Collection Time: 05/06/19  5:04 AM  
Result Value Ref Range Sodium 138 136 - 145 mmol/L Potassium 2.6 (LL) 3.5 - 5.1 mmol/L Chloride 101 98 - 107 mmol/L  
 CO2 26 21 - 32 mmol/L Anion gap 11 7 - 16 mmol/L Glucose 94 65 - 100 mg/dL BUN 11 8 - 23 MG/DL Creatinine 0.36 (L) 0.8 - 1.5 MG/DL  
 GFR est AA >60 >60 ml/min/1.73m2 GFR est non-AA >60 >60 ml/min/1.73m2 Calcium 8.5 8.3 - 10.4 MG/DL  
CBC WITH AUTOMATED DIFF Collection Time: 05/06/19  5:04 AM  
Result Value Ref Range WBC 5.5 4.3 - 11.1 K/uL  
 RBC 3.35 (L) 4.23 - 5.6 M/uL  
 HGB 12.5 (L) 13.6 - 17.2 g/dL HCT 34.8 (L) 41.1 - 50.3 % .9 (H) 79.6 - 97.8 FL  
 MCH 37.3 (H) 26.1 - 32.9 PG  
 MCHC 35.9 (H) 31.4 - 35.0 g/dL  
 RDW 13.7 11.9 - 14.6 % PLATELET 38 (L) 504 - 450 K/uL MPV 12.1 9.4 - 12.3 FL ABSOLUTE NRBC 0.00 0.0 - 0.2 K/uL NEUTROPHILS 73 43 - 78 % LYMPHOCYTES 14 13 - 44 % MONOCYTES 12 4.0 - 12.0 % EOSINOPHILS 0 (L) 0.5 - 7.8 % BASOPHILS 1 0.0 - 2.0 % IMMATURE GRANULOCYTES 0 0.0 - 5.0 %  
 ABS. NEUTROPHILS 3.9 1.7 - 8.2 K/UL  
 ABS. LYMPHOCYTES 0.8 0.5 - 4.6 K/UL ABS. MONOCYTES 0.7 0.1 - 1.3 K/UL  
 ABS. EOSINOPHILS 0.0 0.0 - 0.8 K/UL  
 ABS. BASOPHILS 0.1 0.0 - 0.2 K/UL  
 ABS. IMM. GRANS. 0.0 0.0 - 0.5 K/UL  
 RBC COMMENTS NORMOCYTIC/NORMOCHROMIC    
 WBC COMMENTS Result Confirmed By Smear PLATELET COMMENTS MODERATE    
 DF AUTOMATED OCCULT BLOOD, STOOL Collection Time: 05/06/19 11:34 AM  
Result Value Ref Range Occult blood, stool POSITIVE (A) NEG All Micro Results Procedure Component Value Units Date/Time CULTURE, BLOOD [[id number]]  (Abnormal) Collected:  05/05/19 0910 Order Status:  Completed Specimen:  Blood Updated:  05/06/19 5906 Special Requests: --     
  LEFT 
FOREARM 
  
  GRAM STAIN GRAM NEGATIVE RODS AEROBIC BOTTLE POSITIVE CRITICAL RESULT NOT CALLED DUE TO PREVIOUS NOTIFICATION OF CRITICAL RESULT WITHIN THE LAST 24 HOURS. Culture result: GRAM NEGATIVE RODS     
      
  CULTURE IN PROGRESS,FURTHER UPDATES TO FOLLOW CULTURE, BLOOD [[id number]]  (Abnormal) Collected:  05/05/19 7867 Order Status:  Completed Specimen:  Blood Updated:  05/06/19 9058 Special Requests: --     
  RIGHT 
FOREARM 
  
  GRAM STAIN GRAM NEGATIVE RODS AEROBIC BOTTLE POSITIVE RESULTS VERIFIED, PHONED TO AND READ BACK BY Sam Temple RN ON 05/05/2019 AT 1815 WMR Culture result: [id number] [facility] IDENTIFICATION AND SUSCEPTIBILITY TO FOLLOW Other Studies: No results found. Assessment and Plan:  
 
Hospital Problems as of 5/6/2019 Never Reviewed Codes Class Noted - Resolved POA Bacteremia due to Gram-negative bacteria ICD-10-CM: R78.81 ICD-9-CM: 790.7, 041.85  5/6/2019 - Present Unknown * (Principal) PNA (pneumonia) ICD-10-CM: J18.9 ICD-9-CM: 286  5/5/2019 - Present Unknown Tachycardia ICD-10-CM: R00.0 ICD-9-CM: 785.0  5/5/2019 - Present Unknown Thrombocytopenia ([facility] [address] [facility] ICD-10-CM: D69.6 ICD-9-CM: 287.5  5/5/2019 - Present Unknown GI bleed ICD-10-CM: K92.2 ICD-9-CM: 578.9  5/5/2019 - Present Unknown A-fib [facility]) ICD-10-CM: I48.91 
ICD-9-CM: 427.31  5/5/2019 - Present Unknown Tobacco abuse ICD-10-CM: Z72.0 ICD-9-CM: 305.1  11/28/2016 - Present Yes ETOH abuse ICD-10-CM: F10.10 ICD-9-CM: 305.00  11/28/2016 - Present Yes HTN (hypertension) (Chronic) ICD-10-CM: I10 
ICD-9-CM: 401.9  11/19/2014 - Present Yes PLAN: 
Bacteremia- cont antibiotics- will consult ID- order echo 
pna- cont rocephin and zithromax Hypokalemia- replace Alcohol with drawl- cont ciwa protocol- advised on cessation 
afib with rvr- cardiology consulted Gi bleed- rectal examination - melena- protonic bid- gi consulted Thrombocytopenia- prob alcohol related liver problems 
htn Nicotine dependence- advised on cessation. DVT ppx:  scd Anticipated DC needs:   
Code status:  Full Estimated LOS:  Greater than 2 midnights Risk:  high Signed: 
Minh Brizuela MD

## 2019-05-07 ENCOUNTER — ANESTHESIA (OUTPATIENT)
Dept: ENDOSCOPY | Age: 59
DRG: 177 | End: 2019-05-07
Payer: OTHER GOVERNMENT

## 2019-05-07 LAB
ALBUMIN SERPL-MCNC: 1.9 G/DL (ref 3.2–4.6)
ALBUMIN/GLOB SERPL: 0.4 {RATIO} (ref 1.2–3.5)
ALP SERPL-CCNC: 49 U/L (ref 50–136)
ALT SERPL-CCNC: 35 U/L (ref 12–65)
ANION GAP SERPL CALC-SCNC: 9 MMOL/L (ref 7–16)
AST SERPL-CCNC: 99 U/L (ref 15–37)
BACTERIA SPEC CULT: ABNORMAL
BILIRUB SERPL-MCNC: 0.8 MG/DL (ref 0.2–1.1)
BUN SERPL-MCNC: 12 MG/DL (ref 8–23)
CALCIUM SERPL-MCNC: 8.3 MG/DL (ref 8.3–10.4)
CHLORIDE SERPL-SCNC: 108 MMOL/L (ref 98–107)
CO2 SERPL-SCNC: 25 MMOL/L (ref 21–32)
CREAT SERPL-MCNC: 0.36 MG/DL (ref 0.8–1.5)
ERYTHROCYTE [DISTWIDTH] IN BLOOD BY AUTOMATED COUNT: 14.2 % (ref 11.9–14.6)
GLOBULIN SER CALC-MCNC: 4.3 G/DL (ref 2.3–3.5)
GLUCOSE SERPL-MCNC: 92 MG/DL (ref 65–100)
GRAM STN SPEC: ABNORMAL
HCT VFR BLD AUTO: 34.3 % (ref 41.1–50.3)
HCV AB S/CO SERPL IA: <0.1 S/CO RATIO (ref 0–0.9)
HCV AB SERPL QL IA: NORMAL
HGB BLD-MCNC: 12 G/DL (ref 13.6–17.2)
HIV 1+2 AB+HIV1 P24 AG SERPL QL IA: NON REACTIVE
MAGNESIUM SERPL-MCNC: 2 MG/DL (ref 1.8–2.4)
MCH RBC QN AUTO: 36.7 PG (ref 26.1–32.9)
MCHC RBC AUTO-ENTMCNC: 35 G/DL (ref 31.4–35)
MCV RBC AUTO: 104.9 FL (ref 79.6–97.8)
MM INDURATION POC: 0 MM (ref 0–5)
NRBC # BLD: 0 K/UL (ref 0–0.2)
PLATELET # BLD AUTO: 48 K/UL (ref 150–450)
PMV BLD AUTO: 11.5 FL (ref 9.4–12.3)
POTASSIUM SERPL-SCNC: 2.4 MMOL/L (ref 3.5–5.1)
POTASSIUM SERPL-SCNC: 3 MMOL/L (ref 3.5–5.1)
PPD POC: NEGATIVE NEGATIVE
PROT SERPL-MCNC: 6.2 G/DL (ref 6.3–8.2)
RBC # BLD AUTO: 3.27 M/UL (ref 4.23–5.6)
SERVICE CMNT-IMP: ABNORMAL
SERVICE CMNT-IMP: ABNORMAL
SODIUM SERPL-SCNC: 142 MMOL/L (ref 136–145)
WBC # BLD AUTO: 8.5 K/UL (ref 4.3–11.1)

## 2019-05-07 PROCEDURE — 76060000031 HC ANESTHESIA FIRST 0.5 HR: Performed by: INTERNAL MEDICINE

## 2019-05-07 PROCEDURE — 74011250637 HC RX REV CODE- 250/637: Performed by: FAMILY MEDICINE

## 2019-05-07 PROCEDURE — 74011250636 HC RX REV CODE- 250/636: Performed by: INTERNAL MEDICINE

## 2019-05-07 PROCEDURE — 74011250636 HC RX REV CODE- 250/636

## 2019-05-07 PROCEDURE — 74011000258 HC RX REV CODE- 258: Performed by: NURSE PRACTITIONER

## 2019-05-07 PROCEDURE — 74011250636 HC RX REV CODE- 250/636: Performed by: HOSPITALIST

## 2019-05-07 PROCEDURE — 83735 ASSAY OF MAGNESIUM: CPT

## 2019-05-07 PROCEDURE — 36415 COLL VENOUS BLD VENIPUNCTURE: CPT

## 2019-05-07 PROCEDURE — 77030020263 HC SOL INJ SOD CL0.9% LFCR 1000ML

## 2019-05-07 PROCEDURE — 84132 ASSAY OF SERUM POTASSIUM: CPT

## 2019-05-07 PROCEDURE — 85027 COMPLETE CBC AUTOMATED: CPT

## 2019-05-07 PROCEDURE — 80053 COMPREHEN METABOLIC PANEL: CPT

## 2019-05-07 PROCEDURE — 65660000000 HC RM CCU STEPDOWN

## 2019-05-07 PROCEDURE — 74011250636 HC RX REV CODE- 250/636: Performed by: NURSE PRACTITIONER

## 2019-05-07 PROCEDURE — C9113 INJ PANTOPRAZOLE SODIUM, VIA: HCPCS | Performed by: FAMILY MEDICINE

## 2019-05-07 PROCEDURE — 74011250637 HC RX REV CODE- 250/637: Performed by: INTERNAL MEDICINE

## 2019-05-07 PROCEDURE — 74011000250 HC RX REV CODE- 250: Performed by: FAMILY MEDICINE

## 2019-05-07 PROCEDURE — 97530 THERAPEUTIC ACTIVITIES: CPT

## 2019-05-07 PROCEDURE — 0DJ08ZZ INSPECTION OF UPPER INTESTINAL TRACT, VIA NATURAL OR ARTIFICIAL OPENING ENDOSCOPIC: ICD-10-PCS | Performed by: INTERNAL MEDICINE

## 2019-05-07 PROCEDURE — 76040000025: Performed by: INTERNAL MEDICINE

## 2019-05-07 PROCEDURE — 97162 PT EVAL MOD COMPLEX 30 MIN: CPT

## 2019-05-07 PROCEDURE — 74011250636 HC RX REV CODE- 250/636: Performed by: FAMILY MEDICINE

## 2019-05-07 RX ORDER — PROPOFOL 10 MG/ML
INJECTION, EMULSION INTRAVENOUS AS NEEDED
Status: DISCONTINUED | OUTPATIENT
Start: 2019-05-07 | End: 2019-05-07 | Stop reason: HOSPADM

## 2019-05-07 RX ORDER — CIPROFLOXACIN 500 MG/1
750 TABLET ORAL EVERY 12 HOURS
Status: DISCONTINUED | OUTPATIENT
Start: 2019-05-08 | End: 2019-05-12

## 2019-05-07 RX ORDER — POTASSIUM CHLORIDE 14.9 MG/ML
20 INJECTION INTRAVENOUS
Status: COMPLETED | OUTPATIENT
Start: 2019-05-07 | End: 2019-05-07

## 2019-05-07 RX ORDER — LIDOCAINE HYDROCHLORIDE 20 MG/ML
INJECTION, SOLUTION EPIDURAL; INFILTRATION; INTRACAUDAL; PERINEURAL AS NEEDED
Status: DISCONTINUED | OUTPATIENT
Start: 2019-05-07 | End: 2019-05-07 | Stop reason: HOSPADM

## 2019-05-07 RX ORDER — SODIUM CHLORIDE, SODIUM LACTATE, POTASSIUM CHLORIDE, CALCIUM CHLORIDE 600; 310; 30; 20 MG/100ML; MG/100ML; MG/100ML; MG/100ML
100 INJECTION, SOLUTION INTRAVENOUS CONTINUOUS
Status: DISCONTINUED | OUTPATIENT
Start: 2019-05-07 | End: 2019-05-07 | Stop reason: HOSPADM

## 2019-05-07 RX ADMIN — ACETAMINOPHEN 650 MG: 325 TABLET, FILM COATED ORAL at 22:14

## 2019-05-07 RX ADMIN — Medication 10 ML: at 05:58

## 2019-05-07 RX ADMIN — POTASSIUM CHLORIDE 40 MEQ: 20 TABLET, EXTENDED RELEASE ORAL at 08:53

## 2019-05-07 RX ADMIN — POTASSIUM CHLORIDE 20 MEQ: 200 INJECTION, SOLUTION INTRAVENOUS at 10:34

## 2019-05-07 RX ADMIN — ATORVASTATIN CALCIUM 40 MG: 40 TABLET, FILM COATED ORAL at 08:53

## 2019-05-07 RX ADMIN — PANTOPRAZOLE SODIUM 40 MG: 40 INJECTION, POWDER, FOR SOLUTION INTRAVENOUS at 22:02

## 2019-05-07 RX ADMIN — CHLORDIAZEPOXIDE HYDROCHLORIDE 25 MG: 25 CAPSULE ORAL at 05:58

## 2019-05-07 RX ADMIN — CHLORDIAZEPOXIDE HYDROCHLORIDE 25 MG: 25 CAPSULE ORAL at 22:02

## 2019-05-07 RX ADMIN — Medication 10 ML: at 22:02

## 2019-05-07 RX ADMIN — METOPROLOL TARTRATE 50 MG: 50 TABLET ORAL at 08:53

## 2019-05-07 RX ADMIN — POTASSIUM CHLORIDE 20 MEQ: 200 INJECTION, SOLUTION INTRAVENOUS at 06:45

## 2019-05-07 RX ADMIN — AMLODIPINE BESYLATE 5 MG: 5 TABLET ORAL at 08:53

## 2019-05-07 RX ADMIN — SODIUM CHLORIDE 125 ML/HR: 900 INJECTION, SOLUTION INTRAVENOUS at 03:00

## 2019-05-07 RX ADMIN — METOPROLOL TARTRATE 50 MG: 50 TABLET ORAL at 17:35

## 2019-05-07 RX ADMIN — SODIUM CHLORIDE 75 ML/HR: 900 INJECTION, SOLUTION INTRAVENOUS at 20:45

## 2019-05-07 RX ADMIN — SODIUM CHLORIDE, SODIUM LACTATE, POTASSIUM CHLORIDE, AND CALCIUM CHLORIDE 100 ML/HR: 600; 310; 30; 20 INJECTION, SOLUTION INTRAVENOUS at 14:07

## 2019-05-07 RX ADMIN — LIDOCAINE HYDROCHLORIDE 60 MG: 20 INJECTION, SOLUTION EPIDURAL; INFILTRATION; INTRACAUDAL; PERINEURAL at 14:21

## 2019-05-07 RX ADMIN — PROPOFOL 20 MG: 10 INJECTION, EMULSION INTRAVENOUS at 14:24

## 2019-05-07 RX ADMIN — PANTOPRAZOLE SODIUM 40 MG: 40 INJECTION, POWDER, FOR SOLUTION INTRAVENOUS at 08:53

## 2019-05-07 RX ADMIN — PROPOFOL 50 MG: 10 INJECTION, EMULSION INTRAVENOUS at 14:21

## 2019-05-07 RX ADMIN — THIAMINE HYDROCHLORIDE: 100 INJECTION, SOLUTION INTRAMUSCULAR; INTRAVENOUS at 11:00

## 2019-05-07 RX ADMIN — POTASSIUM CHLORIDE 20 MEQ: 200 INJECTION, SOLUTION INTRAVENOUS at 08:52

## 2019-05-07 RX ADMIN — CEFTRIAXONE 2 G: 2 INJECTION, POWDER, FOR SOLUTION INTRAMUSCULAR; INTRAVENOUS at 10:32

## 2019-05-07 RX ADMIN — POTASSIUM CHLORIDE 40 MEQ: 20 TABLET, EXTENDED RELEASE ORAL at 17:35

## 2019-05-07 NOTE — PROGRESS NOTES
Initial visit was made, emotional support and a spiritual presence were provided.  card was left with the patient. Hailee Eugene

## 2019-05-07 NOTE — INTERVAL H&P NOTE
H&P Update: 
Hellen Horta was seen and examined. History and physical has been reviewed. The patient has been examined.  There have been no significant clinical changes since the completion of the originally dated History and Physical.

## 2019-05-07 NOTE — PROGRESS NOTES
TRANSFER - IN REPORT: 
 
Verbal report received from [place], [facility] on [address] [facility] being received from GI lab for routine progression of care Report consisted of patients Situation, Background, Assessment and Recommendations(SBAR). Information from the following report(s) SBAR, Kardex, Procedure Summary, Intake/Output, MAR, Recent Results and Med Rec Status was reviewed with the receiving nurse. Opportunity for questions and clarification was provided. Assessment completed upon patients arrival to unit and care assumed.

## 2019-05-07 NOTE — PROGRESS NOTES
Attempted to see pt, but he was on his way to EGD. Will attempt later as appropriate and time allows. Emily Mcguire OTR/L 
5/7/2019

## 2019-05-07 NOTE — PROGRESS NOTES
Problem: Mobility Impaired (Adult and Pediatric) Goal: *Acute Goals and Plan of Care (Insert Text) Description STG: 
(1.)Mr. [place] will move from supine to sit and sit to supine , scoot up and down and roll side to side with MINIMAL ASSIST within 3 treatment day(s). (2.)Mr. [place] will transfer from bed to chair and chair to bed with MODERATE ASSIST using the least restrictive device within 3 treatment day(s). (3.)Mr. [place] will ambulate with MODERATE ASSIST for 10 feet with the least restrictive device within 3 treatment day(s). (4.)Mr. [place] will perform standing static and dynamic balance activities x 15 minutes with MODERATE ASSIST to improve safety within 3 day(s). LTG: 
(1.)Mr. [place] will move from supine to sit and sit to supine , scoot up and down and roll side to side in bed with CONTACT GUARD ASSIST within 7 treatment day(s). (2.)Mr. [place] will transfer from bed to chair and chair to bed with MINIMAL ASSIST using the least restrictive device within 7 treatment day(s). (3.)Mr. [place] will ambulate with MINIMAL ASSIST for 25 feet with the least restrictive device within 7 treatment day(s). (4.)Mr. [place] will perform standing static and dynamic balance activities x 15 minutes with MINIMAL ASSIST to improve safety within 7 day(s). ________________________________________________________________________________________________ Outcome: Progressing Towards Goal 
  
PHYSICAL THERAPY: Initial Assessment, Daily Note and PM 5/7/2019 INPATIENT: PT Visit Days : 1 Payor: Kinjal Dotson / Plan: [place] [employer] / Product Type: Federal Funded Programs /   
  
NAME/AGE/GENDER: Angel Stein is a 59 y.o. male PRIMARY DIAGNOSIS: PNA (pneumonia) [J18.9] PNA (pneumonia) PNA (pneumonia) Procedure(s) (LRB): ESOPHAGOGASTRODUODENOSCOPY (EGD)/BMI 22.25/ROOM 305 (N/A) Day of Surgery ICD-10: Treatment Diagnosis: Difficulty in walking, Not elsewhere classified (R26.2) Precaution/Allergies: 
Patient has no known allergies. ASSESSMENT:  
 
Mr. Dany Manrique is a 59 y.o. Male admitted for pneumonia. He lives in an apartment per chart, however unable to obtain other history questions from patient as he is drowsy and confused. He is supine on contact, sleeping, but easily awoken and agreeable to physical therapy evaluation. He required additional time for all mobility and noted with decreased command following and decreased attention. He transferred to edge of bed with moderate assist, stood with maximal assist from raised bed and attempted ambulation, however unable to take steps forward. He sat suddenly without warning. Treatment initiated to include an additional sit to stand and scooting at edge of bed with moderate assist and max verbal cues. Moderate assist to return to supine. Angel Stein is currently functioning below his baseline and would benefit from skilled PT during acute care stay to maximize safety and independence with functional mobility. This section established at most recent assessment PROBLEM LIST (Impairments causing functional limitations): 
Decreased Strength Decreased ADL/Functional Activities Decreased Transfer Abilities Decreased Ambulation Ability/Technique Decreased Balance Decreased Activity Tolerance Decreased Pacing Skills Increased Shortness of Breath Decreased Knowledge of Precautions Decreased [place] with Home Exercise Program 
 INTERVENTIONS PLANNED: (Benefits and precautions of physical therapy have been discussed with the patient.) Balance Exercise Bed Mobility Family Education Gait Training Group Therapy Home Exercise Program (HEP) Therapeutic Activites Therapeutic Exercise/Strengthening Transfer Training TREATMENT PLAN: Frequency/Duration: 3 times a week for duration of hospital stay Rehabilitation Potential For Stated Goals: Good REHAB RECOMMENDATIONS (at time of discharge pending progress):   
Placement: It is my opinion, based on this patient's performance to date, that Mr. Alex Frazier may benefit from intensive therapy at an [facility] after discharge due to potential to make ongoing and sustainable functional improvement that is of practical value. [place] Equipment:  
None at this time HISTORY:  
History of Present Injury/Illness (Reason for Referral): 
Patient is a 59 y.o. male with PMH of HTN and alcohol abuse, who is seen in consultation at the request of Dr. Mateusz Chacon for melena and thrombocytopenia. He was admitted today for pneumonia after presenting to the ED with a couple week history of cough, body aches and chills for three days. CXR showed right basilar infiltrate, he was admitted by hospitalists and is on rocephin and zithromax. CBC significant for platelet count of 61X with hgb of 15.1, .7, and white count 4300. Sodium 132, potassium 2.9, BUN 17, creatinine 0.69, t. Bili 1.2, albumin 2.8, ALT 30, AST 41, alk phos 58 and lipase 198. Abdominal ultrasound today showed hepatic steatosis, a 1.6 cm hypoechoic lesion in the right lobe of the liver which could represent focal fatty sparing but hepatic protocol CT recommended as outpatient to r/o small lesion. Also noted was gallbladder sludge and adenomyomatosis. He had been in sinus tachy, but remote tele reported heart rate 170-200, possible rapid a. Fib. He was transferred to third floor and cardiology consultation pending. Tmax is 101.6. Blood cultures pending. Patient reports about 12 episodes of black loose stools this past week--without any use of bismuth or oral iron. Most recent black stool was reportedly one hour ago. He was noted to have black stool on examiner's glove in ER. No abdominal pain. No red blood per rectum.   He did have nausea and vomiting 6 days ago without hematemesis or coffee ground emesis but no recurrent nausea/vomiting. He denies any known prior history of PUD, liver disease. He was unaware of thrombocytopenia until this admission. He does drink 1 pint of vodka per day. Smokes about a pack to 1.5 packs of cigarettes per day. Also takes about 3 ibuprofen most days for muscle/joint pain related to his work as a . Had a colonoscopy 3 years ago at the [place] [facility] with 7 polyps removed. No significant weight change. Past Medical History/Comorbidities:  
Mr. Angelo Michael  has a past medical history of A-fib ([address] (5/5/2019) and Hypertension. He also has no past medical history of Aneurysm ([address] Arthritis, Asthma, Autoimmune disease ([address] CAD (coronary artery disease), Cancer ([address] Chronic kidney disease, Chronic obstructive pulmonary disease ([address] Chronic pain, Coagulation disorder ([address] Diabetes ([address] GERD (gastroesophageal reflux disease), Heart failure ([address] Ill-defined condition, Liver disease, Morbid obesity ([address] Psychiatric disorder, PUD (peptic ulcer disease), Seizures ([address] Stroke ([address] Thromboembolus ([address] Thyroid disease, or Unspecified sleep apnea. Mr. Angelo Michael  has no past surgical history on file. Social History/Living Environment:  
Home Environment: Apartment One/Two Story Residence: One story Living Alone: No 
Support Systems: Spouse/Significant Other/Partner Patient Expects to be Discharged to[de-identified] Private residence Current DME Used/Available at Home: None Prior Level of Function/Work/Activity: 
unknown Number of Personal Factors/Comorbidities that affect the Plan of Care: 3+: HIGH COMPLEXITY EXAMINATION:  
Most Recent Physical Functioning:  
Gross Assessment: 
AROM: Generally decreased, functional 
PROM: Generally decreased, functional 
         
  
Posture: 
Posture (WDL): Exceptions to [facility] Posture Assessment: Forward head, Rounded shoulders Balance: 
Sitting: Impaired Sitting - Static: Fair (occasional) Sitting - Dynamic: Fair (occasional) Standing: Impaired Standing - Static: Poor Standing - Dynamic : Poor Bed Mobility: 
Rolling: Moderate assistance Supine to Sit: Moderate assistance Sit to Supine: Moderate assistance Scooting: Moderate assistance Wheelchair Mobility: 
  
Transfers: 
Sit to Stand: Maximum assistance Stand to Sit: Maximum assistance Gait: 
  
Base of Support: Center of gravity altered;Narrowed Speed/Nelly: Slow;Shuffled;Fluctuations Gait Abnormalities: Circumduction; Ataxic; Festinating gait; Path deviations; Shuffling gait;Trunk sway increased Distance (ft): (only able to take steps in place.) Assistive Device: Walker, rolling;Gait belt Ambulation - Level of Assistance: Moderate assistance Interventions: Manual cues; Safety awareness training;Verbal cues; Visual/Demos Body Structures Involved: 
Nerves Heart Digestive Structures Muscles Body Functions Affected: 
Sensory/Pain Cardio Respiratory Neuromusculoskeletal 
Movement Related Activities and Participation Affected: Mobility Self Care Domestic Life Interpersonal Interactions and Relationships Community, Social and [place] Number of elements that affect the Plan of Care: 4+: HIGH COMPLEXITY CLINICAL PRESENTATION:  
Presentation: Evolving clinical presentation with changing clinical characteristics: MODERATE COMPLEXITY CLINICAL DECISION [address] [place] [address] AM-PAC? ?6 Clicks? Basic Mobility Inpatient Short Form How much difficulty does the patient currently have. .. Unable A Lot A Little None 1. Turning over in bed (including adjusting bedclothes, sheets and blankets)? ? 1   ? 2   ? 3   ? 4  
2. Sitting down on and standing up from a chair with arms ( e.g., wheelchair, bedside commode, etc.)   ? 1   ? 2   ? 3   ? 4  
3. Moving from lying on back to sitting on the side of the bed?   ? 1   ? 2   ? 3   ? 4 How much help from another person does the patient currently need. ..  Total A Lot A Little None 4. Moving to and from a bed to a chair (including a wheelchair)? ? 1   ? 2   ? 3   ? 4  
5. Need to walk in hospital room? ? 1   ? 2   ? 3   ? 4  
6. Climbing 3-5 steps with a railing? ? 1   ? 2   ? 3   ? 4  
© 2007, Trustees of [address] [place] [address] [address] [address], under license to [employer] All rights reserved Score:  Initial: 9 Most Recent: X (Date: -- ) Interpretation of Tool:  Represents activities that are increasingly more difficult (i.e. Bed mobility, Transfers, Gait). Medical Necessity:    
Patient demonstrates good 
 rehab potential due to higher previous functional level. Reason for Services/Other Comments: 
Patient continues to require modification of therapeutic interventions to increase complexity of exercises Rodney Orantes Use of outcome tool(s) and clinical judgement create a POC that gives a: Questionable prediction of patient's progress: MODERATE COMPLEXITY  
  
 
 
 
TREATMENT:  
(In addition to Assessment/Re-Assessment sessions the following treatments were rendered) Pre-treatment Symptoms/Complaints:   
Pain: Initial:  
Pain Intensity 1: 0  Post Session:  0/10 Therapeutic Activity: (    10 minutes): Therapeutic activities including sit to stand and scooting at edge of bed  to improve mobility, strength and balance. Required moderate Manual cues; Safety awareness training;Verbal cues; Visual/Demos to promote static and dynamic balance in standing. Braces/Orthotics/Lines/Etc:  
IV 
O2 Device: Room air Treatment/Session Assessment:   
Response to Treatment:  Patient required moderate assist to sit to stand at edge of bed and scoot. Interdisciplinary Collaboration:  
Physical Therapist 
Registered Nurse After treatment position/precautions:  
Supine in bed Bed alarm/tab alert on Bed/Chair-wheels locked Bed in low position Call light within reach RN notified Compliance with Program/Exercises: Will assess as treatment progresses Recommendations/Intent for next treatment session: \"Next visit will focus on advancements to more challenging activities and reduction in assistance provided\". Total Treatment Duration: PT Patient Time In/Time Out Time In: 9102 Time Out: 1502 Shamika Grant DPT

## 2019-05-07 NOTE — PROGRESS NOTES
Shift change, with bedside reporting completed. Reinforced Safety precautions: Call for assistance prior to activity, and use of nonskid socks before getting out of bed (OOB). Verbalized understanding of safety instructions. Hand held call-light within reach. Bed alarm on and functioning as intended. No request when asked.

## 2019-05-07 NOTE — ROUTINE PROCESS
TRANSFER - OUT REPORT: 
 
Verbal report given to [facility], RN(name) on Angela Lie  being transferred to 3rd floor(unit) for routine post - op Report consisted of patients Situation, Background, Assessment and  
Recommendations(SBAR). Information from the following report(s) SBAR, Kardex, Procedure Summary, Intake/Output and MAR was reviewed with the receiving nurse. Lines:  
Peripheral IV 05/05/19 Left Antecubital (Active) Site Assessment Clean, dry, & intact 5/7/2019 12:15 PM  
Phlebitis Assessment 0 5/7/2019 12:15 PM  
Infiltration Assessment 0 5/7/2019 12:15 PM  
Dressing Status Clean, dry, & intact 5/7/2019 12:15 PM  
Dressing Type Transparent;Tape 5/7/2019 12:15 PM  
Hub Color/Line Status Infusing 5/7/2019 12:15 PM  
Alcohol Cap Used No 5/6/2019 10:00 PM  
   
Peripheral IV 05/05/19 Right Forearm (Active) Site Assessment Clean, dry, & intact 5/7/2019 12:15 PM  
Phlebitis Assessment 0 5/7/2019 12:15 PM  
Infiltration Assessment 0 5/7/2019 12:15 PM  
Dressing Status Clean, dry, & intact 5/7/2019 12:15 PM  
Dressing Type Transparent;Tape 5/7/2019 12:15 PM  
Hub Color/Line Status Infusing 5/7/2019 12:15 PM  
Alcohol Cap Used No 5/6/2019 10:00 PM  
  
 
Opportunity for questions and clarification was provided.

## 2019-05-07 NOTE — PROGRESS NOTES
Progress note Admit Date:  2019  7:26 AM  
Name:  Arlene Tanner Age:  59 y.o. 
:  1960 MRN:  [id number] PCP:  Madeline Pedersen MD 
Treatment Team: Attending Provider: Mihaela Sung MD; Consulting Provider: Tierra Nicole MD; Consulting Provider: Radha Michel MD; Utilization Review: Princess Zheng RN; Care Manager: Ida Garcia RN 
 
SUBJECTIVE:  
59 yr old male pt with known h/o htn, h/o cardiac cath- non occlusive coronaries, known alcohol dependent admitted for pna,GI bleed,afib with rvr and alcohol withdrawal 19: 
Pt seen at bedside Is lethargic, alert/awake. Speech is not so clear, mumbling. Denies any chest/abdominal pain, nausea/vomiting He is NPO, wanting to drink some water, but GI is planning for EGD today. No fever, chills, diarrhea. ROS: 10 point ROS negative except what mentioned above. Objective:  
 
Patient Vitals for the past 24 hrs: 
 Temp Pulse Resp BP SpO2  
19 0945 98.3 °F (36.8 °C) 96 18 144/77 93 % 19 0547 99.7 °F (37.6 °C) 76 20 142/77 95 % 19 0032 98.5 °F (36.9 °C) 64 20 118/55 92 % 19 2058 99.7 °F (37.6 °C) 90 20 101/54 90 % 19 1911 99.3 °F (37.4 °C) (!) 101 20 138/72 92 % 19 1712 (!) 103 °F (39.4 °C)      
19 1411 98 °F (36.7 °C) (!) 105 20 135/71 95 % Oxygen Therapy O2 Sat (%): 93 % (19 0945) Pulse via Oximetry: 100 beats per minute (19 0933) O2 Device: Room air (19 0945) Intake/Output Summary (Last 24 hours) at 2019 1019 Last data filed at 2019 0945 Gross per 24 hour Intake 870 ml Output 400 ml Net 470 ml Physical Exam: 
General:    Frail, lethargic, alert/awake, NAD Eyes:   Normal sclera. Extraocular movements intact. ENT:  Normocephalic, atraumatic. Moist mucous membranes CV:   Tachycardia No murmur, rub, or gallop.    
Lungs:  Coarse breath sounds bilaterally, R>L 
 Abdomen: Soft, nontender, nondistended. Bowel sounds normal.  
Extremities: Warm and dry. No cyanosis or edema. Neurologic: CN II-XII grossly intact. Sensation intact. mild tremor upper extremities Skin:     No rashes or jaundice. Psych:            Mild anxious I reviewed the labs, imaging, EKGs, telemetry, and other studies done this admission. Data Review:  
Recent Results (from the past 24 hour(s)) OCCULT BLOOD, STOOL Collection Time: 05/06/19 11:34 AM  
Result Value Ref Range Occult blood, stool POSITIVE (A) NEG    
PLEASE READ & DOCUMENT PPD TEST IN 24 HRS Collection Time: 05/06/19  1:54 PM  
Result Value Ref Range PPD Negative Negative  
 mm Induration 0 0 - 5 mm HIV 1/2 AG/AB, 4TH GENERATION,W RFLX CONFIRM Collection Time: 05/06/19  2:23 PM  
Result Value Ref Range HIV Screen, 4th gen Non Reactive Non Reactive HCV AB W/RFLX TO SAMANTHA Collection Time: 05/06/19  2:23 PM  
Result Value Ref Range HCV Ab <0.1 0.0 - 0.9 s/co ratio HCV INTERPRETATION Collection Time: 05/06/19  2:23 PM  
Result Value Ref Range HCV Interpretation Comment TYPE & SCREEN Collection Time: 05/06/19  6:49 PM  
Result Value Ref Range Crossmatch Expiration 05/09/2019 ABO/Rh(D) A POSITIVE Antibody screen NEG   
CBC W/O DIFF Collection Time: 05/07/19  4:13 AM  
Result Value Ref Range WBC 8.5 4.3 - 11.1 K/uL  
 RBC 3.27 (L) 4.23 - 5.6 M/uL  
 HGB 12.0 (L) 13.6 - 17.2 g/dL HCT 34.3 (L) 41.1 - 50.3 % .9 (H) 79.6 - 97.8 FL  
 MCH 36.7 (H) 26.1 - 32.9 PG  
 MCHC 35.0 31.4 - 35.0 g/dL  
 RDW 14.2 11.9 - 14.6 % PLATELET 48 (L) 897 - 450 K/uL MPV 11.5 9.4 - 12.3 FL ABSOLUTE NRBC 0.00 0.0 - 0.2 K/uL METABOLIC PANEL, COMPREHENSIVE Collection Time: 05/07/19  4:13 AM  
Result Value Ref Range Sodium 142 136 - 145 mmol/L Potassium 2.4 (LL) 3.5 - 5.1 mmol/L Chloride 108 (H) 98 - 107 mmol/L  
 CO2 25 21 - 32 mmol/L  Anion gap 9 7 - 16 mmol/L  
 Glucose 92 65 - 100 mg/dL BUN 12 8 - 23 MG/DL Creatinine 0.36 (L) 0.8 - 1.5 MG/DL  
 GFR est AA >60 >60 ml/min/1.73m2 GFR est non-AA >60 >60 ml/min/1.73m2 Calcium 8.3 8.3 - 10.4 MG/DL Bilirubin, total 0.8 0.2 - 1.1 MG/DL  
 ALT (SGPT) 35 12 - 65 U/L  
 AST (SGOT) 99 (H) 15 - 37 U/L Alk. phosphatase 49 (L) 50 - 136 U/L Protein, total 6.2 (L) 6.3 - 8.2 g/dL Albumin 1.9 (L) 3.2 - 4.6 g/dL Globulin 4.3 (H) 2.3 - 3.5 g/dL A-G Ratio 0.4 (L) 1.2 - 3.5 MAGNESIUM Collection Time: 05/07/19  4:13 AM  
Result Value Ref Range Magnesium 2.0 1.8 - 2.4 mg/dL All Micro Results Procedure Component Value Units Date/Time CULTURE, BLOOD [[id number]]  (Abnormal) Collected:  05/05/19 0910 Order Status:  Completed Specimen:  Blood Updated:  05/07/19 6740 Special Requests: --     
  LEFT 
FOREARM 
  
  GRAM STAIN GRAM NEGATIVE RODS AEROBIC BOTTLE POSITIVE CRITICAL RESULT NOT CALLED DUE TO PREVIOUS NOTIFICATION OF CRITICAL RESULT WITHIN THE LAST 24 HOURS. Culture result: GRAM NEGATIVE RODS     
   FOR ID AND SUSCEPTIBILITY SEE ACCESSION NO [id number] CULTURE, BLOOD [[id number]]  (Abnormal)  (Susceptibility) Collected:  05/05/19 8321 Order Status:  Completed Specimen:  Blood Updated:  05/07/19 0914 Special Requests: --     
  RIGHT 
FOREARM 
  
  GRAM STAIN GRAM NEGATIVE RODS AEROBIC BOTTLE POSITIVE RESULTS VERIFIED, PHONED TO AND READ BACK BY Pat Mann RN ON 05/05/2019 AT 1815 WMR Culture result: ACINETOBACTER BAUMANNII Other Studies: No results found. Assessment and Plan:  
 
Hospital Problems as of 5/7/2019 Never Reviewed Codes Class Noted - Resolved POA Bacteremia due to Gram-negative bacteria ICD-10-CM: R78.81 ICD-9-CM: 790.7, 041.85  5/6/2019 - Present Unknown * (Principal) PNA (pneumonia) ICD-10-CM: J18.9 ICD-9-CM: 210  5/5/2019 - Present Unknown Tachycardia ICD-10-CM: R00.0 ICD-9-CM: 785.0  5/5/2019 - Present Unknown Thrombocytopenia ([address] ICD-10-CM: D69.6 ICD-9-CM: 287.5  5/5/2019 - Present Unknown GI bleed ICD-10-CM: K92.2 ICD-9-CM: 578.9  5/5/2019 - Present Unknown A-fib [facility]) ICD-10-CM: I48.91 
ICD-9-CM: 427.31  5/5/2019 - Present Unknown Tobacco abuse ICD-10-CM: Z72.0 ICD-9-CM: 305.1  11/28/2016 - Present Yes ETOH abuse ICD-10-CM: F10.10 ICD-9-CM: 305.00  11/28/2016 - Present Yes HTN (hypertension) (Chronic) ICD-10-CM: I10 
ICD-9-CM: 401.9  11/19/2014 - Present Yes PLAN: 
Acinetobacter bacteremia: continue IV rocephin for 1 more day, transition to oral Cipro from 5/7/19 as per ID. Pt is on room air, not in respiratory distress. Pneumonia is unlikely. Hypokalemia- getting oral and IV replacement. 2.4 today. Alcohol with drawl- cont ciwa protocol- advised on cessation 
afib with rvr- controlled with oral Lopressor. GI bleed: plan for EGD today. May switch from IV protonix to oral. 
Thrombocytopenia- from chronic alcohol use, no intervention HTN: optimally controlled. Continue lopressor and norvasc. Nicotine dependence- advised on cessation. PT/OT eval 
PPD ordered DVT ppx:  scd Dispo: pending until medically stable.  
Signed: 
Kourtney Chaudhry MD

## 2019-05-07 NOTE — PROGRESS NOTES
TRANSFER - IN REPORT: 
 
Verbal report received from Shauna Chambers RN(name) on Kassandra Srivastava  being received from [facility](unit) for ordered procedure Report consisted of patients Situation, Background, Assessment and  
Recommendations(SBAR). Information from the following report(s) SBAR, MAR and Recent Results was reviewed with the receiving nurse. Opportunity for questions and clarification was provided. No consent on the chart, nurse states patient is lethargic but alert and oriented. Gaines Dakins and Dr. Linda Elizabeth aware

## 2019-05-07 NOTE — ANESTHESIA PREPROCEDURE EVALUATION
Relevant Problems No relevant active problems Anesthetic History No history of anesthetic complications Review of Systems / Medical History Patient summary reviewed and pertinent labs reviewed Pulmonary Pneumonia and smoker Neuro/Psych Within defined limits Cardiovascular Hypertension Dysrhythmias (RvR - currently NSR) : atrial fibrillation GI/Hepatic/Renal 
Within defined limits Endo/Other Within defined limits Other Findings Comments: ETOH abuse Physical Exam 
 
Airway Mallampati: II 
TM Distance: > 6 cm Neck ROM: normal range of motion Mouth opening: Normal 
 
 Cardiovascular Rhythm: regular Rate: normal 
 
 
 
 Dental 
 
 
  
Pulmonary Decreased breath sounds: bilateral 
 
 
 
Pertinent negatives: No rhonchi and wheezes Abdominal 
 
 
 
 Other Findings Anesthetic Plan ASA: 3 Anesthesia type: total IV anesthesia Induction: Intravenous Anesthetic plan and risks discussed with: Patient K+ has been replaced. Plan for TIVA

## 2019-05-07 NOTE — PROCEDURES
EGD Procedure Note    PreOp Diagnosis:   Acute blood loss anemia     PostOp Diagnosis:  Moderate gastritis   No active bleeding     Medications:  Monitored Anesthesia    Procedure:  EGD   Instrument:   After informed consent was obtained, the patient was sedated and the endoscope was inserted  in the mouth and advanced into the duodenum without difficulty. The scope was slowly withdrawn while the mucosa was carefully inspected, including a retroflexed view of the cardia and fundus. Findings:   OROPHARYNX: The piriform sinuses, arytenoid cartilage and vocal cords were briefly evaluated on entry and withdrawal of the endoscope through the oropharynx. These were found to be unremarkable. ESOPHAGUS: The esophageal mucosa was unremarkable. The squamocolumnar junction was intact without erosions, ulcerations, rings, webs or strictures. There was no evidence of Garcia's type intestinal metaplasia. No evidence of esophageal varices     STOMACH: The gastric mucosa was erythematous, consistent with Moderate proximal gastritis. The gastric antrum appeared normal. There were no erosions, ulcerations, polyps, mass lesions, vascular ectasias or other abnormalities noted. This may represent portal hypertensive gastropathy or alcoholic gastritis. The pylorus was patent and easily intubated. There was no hiatal hernia noted by direct view or retroflexion within the stomach. DUODENUM: The endoscope was advanced as far as possible into the duodenum. The villi appeared normal.  There were no mucosal breaks, erosions, ulcerations, vascular ectasias or other abnormalities present.       Recommendations:  B.i.d. PPI   Avoid NSAIDs   Alcohol abstinence   Advance diet   Mild anemia and severe thrombocytopenia may be related to bone marrow suppression from alcohol and malnutrition etc.     Nancy Magaña MD

## 2019-05-07 NOTE — PROGRESS NOTES
Orders for recheck on K+ level prior to possible EGD this afternoon. Patient has received 40meq of oral potassium and 60meq of IV potassium.

## 2019-05-07 NOTE — PROGRESS NOTES
Infectious Disease Note Today's Date: 2019 Admit Date: 2019 Impression: · Acinetobacter baumannii bacteremia (), source likely PNA · RLL CAP vs aspiration · Alcoholism with tobacco use-drinks pint of vodka daily · Melena-GI following, EGD today · Debility Plan:  
·  Continue CTX 2g IV daily for today while NPO. Change to Cipro 750mg oral BID to complete 10 day duration. · EGD planned for today Anti-infectives:  
· CTX (- 
· Azithro (-) Subjective: More lethargic today but will awaken easily. No complaints except for dry mouth. No Known Allergies Review of Systems:  A comprehensive review of systems was negative except for that written in the History of Present Illness. Objective:  
 
Visit Vitals /77 Pulse 76 Temp 99.7 °F (37.6 °C) Resp 20 Ht 5' 11\" (1.803 m) Wt 70.3 kg (155 lb) SpO2 95% BMI 21.62 kg/m² Temp (24hrs), Av.5 °F (37.5 °C), Min:98 °F (36.7 °C), Max:103 °F (39.4 °C) Lines:  Peripheral IV:    
 
Physical Exam:   
General:  Alert, cooperative,  appears stated age Eyes:  Sclera anicteric. Pupils equally round and reactive to light. Mouth/Throat: Mucous membranes normal, oral pharynx clear Neck: Supple Lungs:   Diminished with RLL crackles, on RA   
CV:  Regular rate and irreg rhythm,no murmur, click, rub or gallop Abdomen:   Soft, non-tender. bowel sounds normal. non-distended Extremities: No cyanosis or edema Skin: Skin color, texture, turgor normal. no acute rash or lesions. + tattoos Lymph nodes: Cervical and supraclavicular normal  
Musculoskeletal: No swelling or deformity Lines/Devices:  Intact, no erythema, drainage or tenderness Psych: Alert and oriented, normal mood affect given the setting Data Review: CBC: 
Recent Labs 19 
0413 19 
1258 19 
3636 WBC 8.5 5.5 4.3 GRANS  --  73 76 MONOS  --  12 8 EOS  --  0* 0* ANEU  --  3.9 3.3 ABL  --  0.8 0.6 HGB 12.0* 12.5* 15.1 HCT 34.3* 34.8* 41.9 PLT 48* 38* 45* BMP: 
Recent Labs 05/07/19 
0413 05/06/19 
1274 05/05/19 
9621 CREA 0.36* 0.36* 0.69* BUN 12 11 17  138 132*  
K 2.4* 2.6* 2.9*  
* 101 91* CO2 25 26 24 AGAP 9 11 17* GLU 92 94 106* LFTS: 
Recent Labs 05/07/19 
0413 05/05/19 
9220 TBILI 0.8 1.2* ALT 35 30 SGOT 99* 41* AP 49* 58 TP 6.2* 8.3* ALB 1.9* 2.8* Microbiology:  
 
All Micro Results Procedure Component Value Units Date/Time CULTURE, BLOOD [[id number]]  (Abnormal) Collected:  05/05/19 0910 Order Status:  Completed Specimen:  Blood Updated:  05/07/19 9682 Special Requests: --     
  LEFT 
FOREARM 
  
  GRAM STAIN GRAM NEGATIVE RODS AEROBIC BOTTLE POSITIVE CRITICAL RESULT NOT CALLED DUE TO PREVIOUS NOTIFICATION OF CRITICAL RESULT WITHIN THE LAST 24 HOURS. Culture result: GRAM NEGATIVE RODS     
   FOR ID AND SUSCEPTIBILITY SEE ACCESSION NO [id number] CULTURE, BLOOD [[id number]]  (Abnormal)  (Susceptibility) Collected:  05/05/19 4440 Order Status:  Completed Specimen:  Blood Updated:  05/07/19 7427 Special Requests: --     
  RIGHT 
FOREARM 
  
  GRAM STAIN GRAM NEGATIVE RODS AEROBIC BOTTLE POSITIVE RESULTS VERIFIED, PHONED TO AND READ BACK BY Russell Spears RN ON 05/05/2019 AT 1815 WMR Culture result: ACINETOBACTER BAUMANNII Imaging:  
See HPI/EPIC Signed By: Chano Jaimes NP May 7, 2019

## 2019-05-08 LAB
ERYTHROCYTE [DISTWIDTH] IN BLOOD BY AUTOMATED COUNT: 14.6 % (ref 11.9–14.6)
HCT VFR BLD AUTO: 38.3 % (ref 41.1–50.3)
HGB BLD-MCNC: 13.4 G/DL (ref 13.6–17.2)
MCH RBC QN AUTO: 36.9 PG (ref 26.1–32.9)
MCHC RBC AUTO-ENTMCNC: 35 G/DL (ref 31.4–35)
MCV RBC AUTO: 105.5 FL (ref 79.6–97.8)
MM INDURATION POC: 0 MM (ref 0–5)
NRBC # BLD: 0 K/UL (ref 0–0.2)
PLATELET # BLD AUTO: 102 K/UL (ref 150–450)
PMV BLD AUTO: 11.5 FL (ref 9.4–12.3)
PPD POC: NEGATIVE NEGATIVE
RBC # BLD AUTO: 3.63 M/UL (ref 4.23–5.6)
WBC # BLD AUTO: 17.9 K/UL (ref 4.3–11.1)

## 2019-05-08 PROCEDURE — 87449 NOS EACH ORGANISM AG IA: CPT

## 2019-05-08 PROCEDURE — 74011250637 HC RX REV CODE- 250/637: Performed by: INTERNAL MEDICINE

## 2019-05-08 PROCEDURE — 74011250636 HC RX REV CODE- 250/636: Performed by: HOSPITALIST

## 2019-05-08 PROCEDURE — 97166 OT EVAL MOD COMPLEX 45 MIN: CPT

## 2019-05-08 PROCEDURE — 97530 THERAPEUTIC ACTIVITIES: CPT

## 2019-05-08 PROCEDURE — 74011250637 HC RX REV CODE- 250/637: Performed by: FAMILY MEDICINE

## 2019-05-08 PROCEDURE — 65660000000 HC RM CCU STEPDOWN

## 2019-05-08 PROCEDURE — 74011000250 HC RX REV CODE- 250: Performed by: FAMILY MEDICINE

## 2019-05-08 PROCEDURE — C9113 INJ PANTOPRAZOLE SODIUM, VIA: HCPCS | Performed by: FAMILY MEDICINE

## 2019-05-08 PROCEDURE — 77030019605

## 2019-05-08 PROCEDURE — 97535 SELF CARE MNGMENT TRAINING: CPT

## 2019-05-08 PROCEDURE — 36415 COLL VENOUS BLD VENIPUNCTURE: CPT

## 2019-05-08 PROCEDURE — 77030020263 HC SOL INJ SOD CL0.9% LFCR 1000ML

## 2019-05-08 PROCEDURE — 74011250636 HC RX REV CODE- 250/636: Performed by: FAMILY MEDICINE

## 2019-05-08 PROCEDURE — 74011250637 HC RX REV CODE- 250/637: Performed by: NURSE PRACTITIONER

## 2019-05-08 PROCEDURE — 74011250637 HC RX REV CODE- 250/637: Performed by: HOSPITALIST

## 2019-05-08 PROCEDURE — 85027 COMPLETE CBC AUTOMATED: CPT

## 2019-05-08 RX ORDER — POTASSIUM CHLORIDE 14.9 MG/ML
20 INJECTION INTRAVENOUS
Status: COMPLETED | OUTPATIENT
Start: 2019-05-08 | End: 2019-05-08

## 2019-05-08 RX ORDER — POTASSIUM CHLORIDE 20 MEQ/1
40 TABLET, EXTENDED RELEASE ORAL 2 TIMES DAILY
Status: COMPLETED | OUTPATIENT
Start: 2019-05-08 | End: 2019-05-09

## 2019-05-08 RX ADMIN — POTASSIUM CHLORIDE 20 MEQ: 200 INJECTION, SOLUTION INTRAVENOUS at 11:43

## 2019-05-08 RX ADMIN — CIPROFLOXACIN HYDROCHLORIDE 750 MG: 500 TABLET, FILM COATED ORAL at 21:37

## 2019-05-08 RX ADMIN — CHLORDIAZEPOXIDE HYDROCHLORIDE 25 MG: 25 CAPSULE ORAL at 06:19

## 2019-05-08 RX ADMIN — PANTOPRAZOLE SODIUM 40 MG: 40 INJECTION, POWDER, FOR SOLUTION INTRAVENOUS at 08:04

## 2019-05-08 RX ADMIN — CHLORDIAZEPOXIDE HYDROCHLORIDE 25 MG: 25 CAPSULE ORAL at 21:37

## 2019-05-08 RX ADMIN — LORAZEPAM 1 MG: 2 INJECTION INTRAMUSCULAR at 21:34

## 2019-05-08 RX ADMIN — Medication 5 ML: at 05:48

## 2019-05-08 RX ADMIN — Medication 10 ML: at 22:00

## 2019-05-08 RX ADMIN — Medication 5 ML: at 08:09

## 2019-05-08 RX ADMIN — METOPROLOL TARTRATE 50 MG: 50 TABLET ORAL at 17:21

## 2019-05-08 RX ADMIN — ATORVASTATIN CALCIUM 40 MG: 40 TABLET, FILM COATED ORAL at 08:03

## 2019-05-08 RX ADMIN — POTASSIUM CHLORIDE 40 MEQ: 20 TABLET, EXTENDED RELEASE ORAL at 17:21

## 2019-05-08 RX ADMIN — LORAZEPAM 1 MG: 2 INJECTION INTRAMUSCULAR at 17:22

## 2019-05-08 RX ADMIN — SODIUM CHLORIDE 75 ML/HR: 900 INJECTION, SOLUTION INTRAVENOUS at 22:04

## 2019-05-08 RX ADMIN — AMLODIPINE BESYLATE 5 MG: 5 TABLET ORAL at 08:03

## 2019-05-08 RX ADMIN — CHLORDIAZEPOXIDE HYDROCHLORIDE 25 MG: 25 CAPSULE ORAL at 15:40

## 2019-05-08 RX ADMIN — THIAMINE HYDROCHLORIDE: 100 INJECTION, SOLUTION INTRAMUSCULAR; INTRAVENOUS at 11:00

## 2019-05-08 RX ADMIN — METOPROLOL TARTRATE 50 MG: 50 TABLET ORAL at 08:04

## 2019-05-08 RX ADMIN — CIPROFLOXACIN HYDROCHLORIDE 750 MG: 500 TABLET, FILM COATED ORAL at 08:03

## 2019-05-08 RX ADMIN — POTASSIUM CHLORIDE 40 MEQ: 20 TABLET, EXTENDED RELEASE ORAL at 13:01

## 2019-05-08 RX ADMIN — POTASSIUM CHLORIDE 20 MEQ: 200 INJECTION, SOLUTION INTRAVENOUS at 14:26

## 2019-05-08 RX ADMIN — PANTOPRAZOLE SODIUM 40 MG: 40 INJECTION, POWDER, FOR SOLUTION INTRAVENOUS at 21:57

## 2019-05-08 NOTE — PROGRESS NOTES
Verbal bedside report given to Laquita Barcenas oncgayathri RN. Patient's situation, background, assessment and recommendations provided. Kardex, Mar, and recent results also reviewed. Opportunity for questions provided. Oncoming RN assumed care of patient.

## 2019-05-08 NOTE — PROGRESS NOTES
Problem: Mobility Impaired (Adult and Pediatric) Goal: *Acute Goals and Plan of Care (Insert Text) Description STG: 
(1.)Mr. Carolina will move from supine to sit and sit to supine , scoot up and down and roll side to side with MINIMAL ASSIST within 3 treatment day(s). (2.)Mr. [place] will transfer from bed to chair and chair to bed with MODERATE ASSIST using the least restrictive device within 3 treatment day(s). (3.)Mr. [place] will ambulate with MODERATE ASSIST for 10 feet with the least restrictive device within 3 treatment day(s). (4.)Mr. [place] will perform standing static and dynamic balance activities x 15 minutes with MODERATE ASSIST to improve safety within 3 day(s). LTG: 
(1.)Mr. [place] will move from supine to sit and sit to supine , scoot up and down and roll side to side in bed with CONTACT GUARD ASSIST within 7 treatment day(s). (2.)Mr. [place] will transfer from bed to chair and chair to bed with MINIMAL ASSIST using the least restrictive device within 7 treatment day(s). (3.)Mr. [place] will ambulate with MINIMAL ASSIST for 25 feet with the least restrictive device within 7 treatment day(s). (4.)Mr. [place] will perform standing static and dynamic balance activities x 15 minutes with MINIMAL ASSIST to improve safety within 7 day(s). ________________________________________________________________________________________________ Outcome: Progressing Towards Goal 
  
PHYSICAL THERAPY: Daily Note and AM 5/8/2019 INPATIENT: PT Visit Days : 2 Payor: Shirley Hughes / Plan: [place] [employer] / Product Type: Federal Funded Programs /   
  
NAME/AGE/GENDER: Gayatri Seay is a 24[address] [facility] y.o. male PRIMARY DIAGNOSIS: PNA (pneumonia) [J18.9] PNA (pneumonia) PNA (pneumonia) Procedure(s) (LRB): ESOPHAGOGASTRODUODENOSCOPY (EGD)/BMI 22.25/ROOM 305 (N/A) 1 Day Post-Op ICD-10: Treatment Diagnosis: · Difficulty in walking, Not elsewhere classified (R26.2) Precaution/Allergies: 
Patient has no known allergies. ASSESSMENT:  
Mr. Ivan Rodríguez is a 59 y.o. Male admitted for pneumonia. He is supine on contact, soiled and agreeable to PT treatment. Requires minimal assist for rolling, able to maintain position on side independently. Unable to assist with taz care this AM. He transferred to edge of bed with head of bed raised and minimal to moderate assistance. Improved sitting balance noted this AM. Following commands. He stood with moderate assist and initially unable to take forward steps with RW and cues. Able to ambulate forward 5' and stood for ~5 minutes while speaking with GI before noted BLE fatigue. Assisted to recliner with moderate assist and cues for hand placement. Slow progress noted, however he was able to ambulate this session. He is well below baseline suggested in chart (working as a ). Chair alarm activated and RN at bedside at the end of the session. All activities requiring additional time for patient to process. France Hernández is currently functioning below his baseline and would benefit from skilled PT during acute care stay to maximize safety and independence with functional mobility. This section established at most recent assessment PROBLEM LIST (Impairments causing functional limitations): 1. Decreased Strength 2. Decreased ADL/Functional Activities 3. Decreased Transfer Abilities 4. Decreased Ambulation Ability/Technique 5. Decreased Balance 6. Decreased Activity Tolerance 7. Decreased Pacing Skills 8. Increased Shortness of Breath 9. Decreased Knowledge of Precautions 10. Decreased [place] with Home Exercise Program 
 INTERVENTIONS PLANNED: (Benefits and precautions of physical therapy have been discussed with the patient.) 1. Balance Exercise 2. Bed Mobility 3. Family Education 4. Gait Training 5. Group Therapy 6. Home Exercise Program (HEP) 7. Therapeutic Activites 8. Therapeutic Exercise/Strengthening 9. Transfer Training TREATMENT PLAN: Frequency/Duration: 3 times a week for duration of hospital stay Rehabilitation Potential For Stated Goals: Good REHAB RECOMMENDATIONS (at time of discharge pending progress):   
Placement: It is my opinion, based on this patient's performance to date, that Mr. Isadora Mcmahan may benefit from intensive therapy at an [address] [place] [address] after discharge due to potential to make ongoing and sustainable functional improvement that is of practical value. mSeller Equipment:  
? None at this time HISTORY:  
History of Present Injury/Illness (Reason for Referral): 
Patient is a 59 y.o. male with PMH of HTN and alcohol abuse, who is seen in consultation at the request of Dr. Mariama Bolton for melena and thrombocytopenia. He was admitted today for pneumonia after presenting to the ED with a couple week history of cough, body aches and chills for three days. CXR showed right basilar infiltrate, he was admitted by hospitalists and is on rocephin and zithromax. CBC significant for platelet count of 03M with hgb of 15.1, .7, and white count 4300. Sodium 132, potassium 2.9, BUN 17, creatinine 0.69, t. Bili 1.2, albumin 2.8, ALT 30, AST 41, alk phos 58 and lipase 198. Abdominal ultrasound today showed hepatic steatosis, a 1.6 cm hypoechoic lesion in the right lobe of the liver which could represent focal fatty sparing but hepatic protocol CT recommended as outpatient to r/o small lesion. Also noted was gallbladder sludge and adenomyomatosis. He had been in sinus tachy, but remote tele reported heart rate 170-200, possible rapid a. Fib. He was transferred to third floor and cardiology consultation pending. Tmax is 101.6. Blood cultures pending.  
  
Patient reports about 12 episodes of black loose stools this past week--without any use of bismuth or oral iron. Most recent black stool was reportedly one hour ago. He was noted to have black stool on examiner's glove in ER. No abdominal pain. No red blood per rectum. He did have nausea and vomiting 6 days ago without hematemesis or coffee ground emesis but no recurrent nausea/vomiting. He denies any known prior history of PUD, liver disease. He was unaware of thrombocytopenia until this admission. He does drink 1 pint of vodka per day. Smokes about a pack to 1.5 packs of cigarettes per day. Also takes about 3 ibuprofen most days for muscle/joint pain related to his work as a . Had a colonoscopy 3 years ago at the [place] in [facility] with 7 polyps removed. No significant weight change. Past Medical History/Comorbidities:  
Mr. Jeanette Stone  has a past medical history of A-fib ([address] (5/5/2019) and Hypertension. He also has no past medical history of Aneurysm ([address] Arthritis, Asthma, Autoimmune disease ([address] CAD (coronary artery disease), Cancer ([address] Chronic kidney disease, Chronic obstructive pulmonary disease ([address] Chronic pain, Coagulation disorder ([address] Diabetes ([address] GERD (gastroesophageal reflux disease), Heart failure ([address] Ill-defined condition, Liver disease, Morbid obesity ([address] Psychiatric disorder, PUD (peptic ulcer disease), Seizures ([address] Stroke ([address] Thromboembolus ([address] Thyroid disease, or Unspecified sleep apnea. Mr. Jeanette Stone  has no past surgical history on file. Social History/Living Environment:  
Home Environment: Apartment One/Two Story Residence: One story Living Alone: No 
Support Systems: Spouse/Significant Other/Partner Patient Expects to be Discharged to[de-identified] Private residence Current DME Used/Available at Home: None Prior Level of Function/Work/Activity: 
unknown Number of Personal Factors/Comorbidities that affect the Plan of Care: 3+: HIGH COMPLEXITY EXAMINATION:  
Most Recent Physical Functioning: Gross Assessment: 
AROM: Generally decreased, functional 
PROM: Generally decreased, functional 
         
  
Posture: 
Posture (WDL): Exceptions to [facility] Posture Assessment: Forward head, Rounded shoulders Balance: 
Sitting: Impaired Sitting - Static: Fair (occasional) Sitting - Dynamic: Fair (occasional) Standing: Impaired Standing - Static: Fair Standing - Dynamic : Poor Bed Mobility: 
Supine to Sit: Minimum assistance;Bed Modified Scooting: Moderate assistance Interventions: Safety awareness training;Verbal cues; Visual cues Wheelchair Mobility: 
  
Transfers: 
Sit to Stand: Moderate assistance Stand to Sit: Moderate assistance Gait: 
  
Base of Support: Center of gravity altered;Narrowed Speed/Nelly: Slow;Shuffled;Pace decreased (<100 feet/min) Gait Abnormalities: Decreased step clearance; Path deviations;Trunk sway increased; Ataxic Distance (ft): 5 Feet (ft) Assistive Device: Walker, rolling;Gait belt Ambulation - Level of Assistance: Moderate assistance Interventions: Safety awareness training;Manual cues; Verbal cues Body Structures Involved: 1. Nerves 2. Heart 3. Digestive Structures 4. Muscles Body Functions Affected: 1. Sensory/Pain 2. Cardio 3. Respiratory 4. Neuromusculoskeletal 
5. Movement Related Activities and Participation Affected: 1. Mobility 2. Self Care 3. Domestic Life 4. Interpersonal Interactions and Relationships 5. Community, Social and [place] Number of elements that affect the Plan of Care: 4+: HIGH COMPLEXITY CLINICAL PRESENTATION:  
Presentation: Evolving clinical presentation with changing clinical characteristics: MODERATE COMPLEXITY CLINICAL DECISION MAKIN [facility] [address] [id number] AM-PAC 6 Clicks Basic Mobility Inpatient Short Form How much difficulty does the patient currently have. .. Unable A Lot A Little None 1. Turning over in bed (including adjusting bedclothes, sheets and blankets)?    ? 1   ? 2   ? 3   ? 4  
 2.  Sitting down on and standing up from a chair with arms ( e.g., wheelchair, bedside commode, etc.)   ? 1   ? 2   ? 3   ? 4  
3. Moving from lying on back to sitting on the side of the bed?   ? 1   ? 2   ? 3   ? 4 How much help from another person does the patient currently need. .. Total A Lot A Little None 4. Moving to and from a bed to a chair (including a wheelchair)? ? 1   ? 2   ? 3   ? 4  
5. Need to walk in hospital room? ? 1   ? 2   ? 3   ? 4  
6. Climbing 3-5 steps with a railing? ? 1   ? 2   ? 3   ? 4  
© 2007, Trustees of [address] [place] [address] [address] [id number], under license to [employer]. All rights reserved Score:  Initial: 9 Most Recent: X (Date: -- ) Interpretation of Tool:  Represents activities that are increasingly more difficult (i.e. Bed mobility, Transfers, Gait). Medical Necessity:    
· Patient demonstrates good ·  rehab potential due to higher previous functional level. Reason for Services/Other Comments: 
· Patient continues to require modification of therapeutic interventions to increase complexity of exercises · . Use of outcome tool(s) and clinical judgement create a POC that gives a: Questionable prediction of patient's progress: MODERATE COMPLEXITY  
  
 
 
 
TREATMENT:  
(In addition to Assessment/Re-Assessment sessions the following treatments were rendered) Pre-treatment Symptoms/Complaints:   
Pain: Initial:  
Pain Intensity 1: 0  Post Session:  0/10 Therapeutic Activity: (    38 minutes): Therapeutic activities including rolling in bed with holds, bed transfers, ambulation on level ground, standing balance/tolerance activities sit to stand and scooting at edge of bed  to improve mobility, strength and balance. Required moderate Safety awareness training;Manual cues; Verbal cues to promote static and dynamic balance in standing. Braces/Orthotics/Lines/Etc:  
· IV 
· O2 Device: Room air Treatment/Session Assessment: · Response to Treatment:  Patient required moderate assist to sit to stand at edge of bed and scoot. · Interdisciplinary Collaboration:  
o Physical Therapist 
o Registered Nurse · After treatment position/precautions:  
o Up in chair 
o Bed alarm/tab alert on 
o Bed/Chair-wheels locked 
o Bed in low position 
o Call light within reach 
o RN notified 
o Nurse at bedside · Compliance with Program/Exercises: Will assess as treatment progresses · Recommendations/Intent for next treatment session: \"Next visit will focus on advancements to more challenging activities and reduction in assistance provided\". Total Treatment Duration: PT Patient Time In/Time Out Time In: 0999 Time Out: 1013 Clark Cortes DPT

## 2019-05-08 NOTE — PROGRESS NOTES
[facility] Progress Note Admit Date:  5/5/2019 Today's Date:  5/8/2019 CC:  Melena Subjective:  
 
Patient had several yellow stools this morning for nursing staff. He denies abdominal pain, N/V. On 5/7 Hgb was down to 12.0 from Hgb 12.5 and from Hgb 15.1. Platelets have been low at 38 from 45. I do not see a follow up Hgb value this morning. INR 1.1.  K remains low though improving. BUN/Cr WNL. He is currently standing and working with physical therapy. Medications:  
Current Facility-Administered Medications Medication Dose Route Frequency  ciprofloxacin HCl (CIPRO) tablet 750 mg  750 mg Oral Q12H  
 metoprolol tartrate (LOPRESSOR) tablet 50 mg  50 mg Oral BID  LORazepam (ATIVAN) injection 1 mg  1 mg IntraVENous Q4H  
 chlordiazePOXIDE (LIBRIUM) capsule 25 mg  25 mg Oral Q8H  
 0.9% sodium chloride 1,000 mL with mvi, adult no. 4 with vit K 10 mL, thiamine 234 mg, folic acid 1 mg infusion   IntraVENous Q24H  
 0.9% sodium chloride infusion  75 mL/hr IntraVENous CONTINUOUS  
 amLODIPine (NORVASC) tablet 5 mg  5 mg Oral DAILY  atorvastatin (LIPITOR) tablet 40 mg  40 mg Oral DAILY  nitroglycerin (NITROSTAT) tablet 0.4 mg  0.4 mg SubLINGual Q5MIN PRN  
 sodium chloride (NS) flush 5-40 mL  5-40 mL IntraVENous Q8H  
 sodium chloride (NS) flush 5-40 mL  5-40 mL IntraVENous PRN  
 acetaminophen (TYLENOL) tablet 650 mg  650 mg Oral Q4H PRN  
 HYDROcodone-acetaminophen (NORCO) 5-325 mg per tablet 1 Tab  1 Tab Oral Q4H PRN  
 morphine injection 1 mg  1 mg IntraVENous Q4H PRN  
 nicotine (NICODERM CQ) 21 mg/24 hr patch 1 Patch  1 Patch TransDERmal Q24H  
 ondansetron (ZOFRAN) injection 4 mg  4 mg IntraVENous Q4H PRN  
 diphenhydrAMINE (BENADRYL) injection 12.5 mg  12.5 mg IntraVENous Q4H PRN  
 naloxone (NARCAN) injection 0.4 mg  0.4 mg IntraVENous PRN  pantoprazole (PROTONIX) 40 mg in sodium chloride 0.9% 10 mL injection  40 mg IntraVENous Q12H  sodium chloride (NS) flush 5-40 mL  5-40 mL IntraVENous Q8H  
 sodium chloride (NS) flush 5-40 mL  5-40 mL IntraVENous PRN Review of Systems: ROS was obtained, with pertinent positives as listed above. No chest pain or SOB. Diet:  GI soft Objective:  
Vitals: 
Visit Vitals /78 Pulse 64 Temp 98.4 °F (36.9 °C) Resp 20 Ht 5' 11\" (1.803 m) Wt 72.5 kg (159 lb 14.4 oz) SpO2 94% BMI 22.30 kg/m² Intake/Output: 
No intake/output data recorded. 05/06 1901 - 05/08 0700 In: 0512 [P.O.:390; I.V.:1168] Out: 400 [Urine:400] Exam: 
General appearance: alert, cooperative, no distress Lungs: clear to auscultation bilaterally anteriorly. Heart: regular rate and rhythm Abdomen: soft, non-tender. Bowel sounds normal. No masses, no organomegaly Neuro:  alert and oriented Data Review (Labs):   
Recent Labs 05/07/19 
1223 05/07/19 
0413 05/06/19 
0504 05/05/19 
1413 WBC  --  8.5 5.5  --   
HGB  --  12.0* 12.5*  --   
HCT  --  34.3* 34.8*  --   
PLT  --  48* 38*  --   
MCV  --  104.9* 103.9*  --   
NA  --  142 138  --   
K 3.0* 2.4* 2.6*  --   
CL  --  108* 101  --   
CO2  --  25 26  --   
BUN  --  12 11  --   
CREA  --  0.36* 0.36*  --   
CA  --  8.3 8.5  --   
MG  --  2.0 1.7*  --   
GLU  --  92 94  --   
AP  --  49*  --   --   
SGOT  --  99*  --   --   
ALT  --  35  --   --   
TBILI  --  0.8  --   --   
ALB  --  1.9*  --   --   
TP  --  6.2*  --   --   
PTP  --   --   --  14.2 INR  --   --   --  1.1 ECHO 5/6/19 SUMMARY 
-  Left ventricle: Systolic function was at the lower limits of normal. Ejection fraction was estimated in the range of 50 % to 55 %. This study was 
inadequate for the evaluation of regional wall motion. -  Inferior vena cava, hepatic veins: The respirophasic change in diameter was less than 50%. -  Aortic valve: There was no evidence for vegetation. -  Mitral valve: There was no evidence for vegetation. EGD 5/7/19 for Acute blood loss anemia PostOp Diagnosis:  Moderate gastritis. No active bleeding Findings: OROPHARYNX: The piriform sinuses, arytenoid cartilage and vocal cords were briefly evaluated on entry and withdrawal of the endoscope through the oropharynx. These were found to be unremarkable. ESOPHAGUS: The esophageal mucosa was unremarkable. The squamocolumnar junction was intact without erosions, ulcerations, rings, webs or strictures. There was no evidence of Garcia's type intestinal metaplasia. No evidence of esophageal varices STOMACH: The gastric mucosa was erythematous, consistent with Moderate proximal gastritis. The gastric antrum appeared normal. There were no erosions, ulcerations, polyps, mass lesions, vascular ectasias or other abnormalities noted. This may represent portal hypertensive gastropathy or alcoholic gastritis. The pylorus was patent and easily intubated. There was no hiatal hernia noted by direct view or retroflexion within the stomach. DUODENUM: The endoscope was advanced as far as possible into the duodenum. The villi appeared normal.  There were no mucosal breaks, erosions, ulcerations, vascular ectasias or other abnormalities present. Recommendations: 
B.i.d. PPI Avoid NSAIDs Alcohol abstinence Advance diet Mild anemia and severe thrombocytopenia may be related to bone marrow suppression from alcohol and malnutrition etc.  
 
 
Assessment:  
 
Principal Problem: 
  PNA (pneumonia) (5/5/2019) Active Problems: 
  HTN (hypertension) (11/19/2014) Tobacco abuse (11/28/2016) ETOH abuse (11/28/2016) Tachycardia (5/5/2019) Thrombocytopenia ([address] (5/5/2019) GI bleed (5/5/2019) A-fib ([address] (5/5/2019) Bacteremia due to Gram-negative bacteria (5/6/2019) 60 y/o male with HTN, colon polyps, h/o EtOH and tobacco abuse is admitted with pneumonia, a.  Fib with RVR, EtOH withdrawal and seen by GI for black stools x 1 week and thrombocytopenia. Platelets low at 38. INR 1.1. On presentation had normal hgb of 15.1 (decreased now to Hgb 12.5), with normal BUN/creatinine ratio and normal BP which argued AGAINST significant/brisk GIB. He is at increased risk for PUD given his NSAIDs, tobacco and EtOH history. Also, his thrombocytopenia raises concern for possible portal hypertension, particularly with his ongoing alcohol abuse, and eventual evaluation with EGD is recommended to assess for PHG and varices. He had EGD 5/7 with findings of moderate gastritis, no active bleeding. It was felt mild anemia and severe thrombocytopenia may be related to bone marrow suppression from alcohol and malnutrition etc.  
Plan:  
1) Follow for recurrent overt GI bleed/melena. 2) Follow up CBC this AM.  Specifically, monitor trend of Hgb and platelets. 3) Continue IV PPI b.i.d. 4) Avoid NSAIDs, tobacco, ETOH 5) Hepatic protocol CT as outpatient to follow up on abnormal area in the right hepatic lobe (which may just be an area of focal fatty sparing) 7) Continue to replace K+. Defer to primary team 
8) he is up to date with surveillance colonoscopies through the [place] Lou Hester PA-C [facility] I have seen and examined this patient, and agree with above assessment and plan. Today he reports yellow watery diarrhea No melena or hematochezia He is on Cipro-check C. diff Follow up repeat CBC today Continue PPI bid Mariluz Washington MD

## 2019-05-08 NOTE — CDMP QUERY
Patient admitted with pneumonia. Noted documentation of pneumonia by Dr. Kelsey Laura on H&P written 5/5 @ 1000 and acinetobacter bacteremia by Dr. Cony Lara on progress note written on 5/7 @ 1019. If possible, please document in progress notes and d/c summary if you are evaluating and /or treating any of the following: 
 
Pneumonia confirmed and  Acinetobacter bacteremia, ruled out Acinetobacter bacteremia confirmed and  pneumonia ruled out Pneumonia and acinetobacter bacteremia confirmed The medical record reflects the following: 
  Risk Factors: Age, ETOH withdraw ·   Clinical Indicators: per ID progress note on 5/7 @ 25 [id number] written by LEANNA Pendleton \"Acinetobacter baumannii bacteremia (5/5), source likely PNA · RLL CAP vs aspiration\" ·   Treatment: \"Continue CTX 2g IV daily for today while NPO. Change to Cipro 750mg oral BID to complete 10 day duration. \"-per plan from ID on 5/7 @ 8111 written by LEANNA Pendleton Thanks, 
DIRK Urbano, RN, CDS Compliant Documentation Management Program 
[phone]

## 2019-05-08 NOTE — PROGRESS NOTES
Problem: Mobility Impaired (Adult and Pediatric) Goal: *Acute Goals and Plan of Care (Insert Text) Description STG: 
(1.)Mr. [place] will move from supine to sit and sit to supine , scoot up and down and roll side to side with MINIMAL ASSIST within 3 treatment day(s). (2.)Mr. [place] will transfer from bed to chair and chair to bed with MODERATE ASSIST using the least restrictive device within 3 treatment day(s). (3.)Mr. [place] will ambulate with MODERATE ASSIST for 10 feet with the least restrictive device within 3 treatment day(s). (4.)Mr. [place] will perform standing static and dynamic balance activities x 15 minutes with MODERATE ASSIST to improve safety within 3 day(s). LTG: 
(1.)Mr. [place] will move from supine to sit and sit to supine , scoot up and down and roll side to side in bed with CONTACT GUARD ASSIST within 7 treatment day(s). (2.)Mr. [place] will transfer from bed to chair and chair to bed with MINIMAL ASSIST using the least restrictive device within 7 treatment day(s). (3.)Mr. [place] will ambulate with MINIMAL ASSIST for 25 feet with the least restrictive device within 7 treatment day(s). (4.)Mr. [place] will perform standing static and dynamic balance activities x 15 minutes with MINIMAL ASSIST to improve safety within 7 day(s). ________________________________________________________________________________________________ Outcome: Progressing Towards Goal 
  
PHYSICAL THERAPY: Daily Note and PM 5/8/2019 INPATIENT: PT Visit Days : 2 Payor: Maryse Ferrari / Plan: [place] [employer] / Product Type: Federal Funded Programs /   
  
NAME/AGE/GENDER: Compa Daniels is a 59 y.o. male PRIMARY DIAGNOSIS: PNA (pneumonia) [J18.9] PNA (pneumonia) PNA (pneumonia) Procedure(s) (LRB): ESOPHAGOGASTRODUODENOSCOPY (EGD)/BMI 22.25/ROOM 305 (N/A) 1 Day Post-Op ICD-10: Treatment Diagnosis: · Difficulty in walking, Not elsewhere classified (R26.2) Precaution/Allergies: 
Patient has no known allergies. ASSESSMENT:  
Mr. Batool Chou is a 59 y.o. Male admitted for pneumonia. He is supine on contact, soiled and agreeable to PT treatment. Requires minimal assist for rolling, able to maintain position on side independently. Unable to assist with taz care this AM. He transferred to edge of bed with head of bed raised and minimal to moderate assistance. Improved sitting balance noted this AM. Following commands. He stood with moderate assist and initially unable to take forward steps with RW and cues. Able to ambulate forward 5' and stood for ~5 minutes while speaking with GI before noted BLE fatigue. Assisted to recliner with moderate assist and cues for hand placement. Slow progress noted, however he was able to ambulate this session. He is well below baseline suggested in chart (working as a ). Chair alarm activated and RN at bedside at the end of the session. All activities requiring additional time for patient to process. Arlene Tanner is currently functioning below his baseline and would benefit from skilled PT during acute care stay to maximize safety and independence with functional mobility. PM Note: patient sitting in recliner, somewhat agitated (just pulled out IV) and soiled. He stood with moderate assist x2 and able to stand x5 minutes with max verbal/manual cues while PCT assisted with taz care. Impaired balance as he ambulated from recliner back to bed with mod assist x2, noted fatigue this PM. Max assist to return to supine. He remains well below his baseline and high risk of falling. This section established at most recent assessment PROBLEM LIST (Impairments causing functional limitations): 1. Decreased Strength 2. Decreased ADL/Functional Activities 3. Decreased Transfer Abilities 4. Decreased Ambulation Ability/Technique 5. Decreased Balance 6. Decreased Activity Tolerance 7. Decreased Pacing Skills 8. Increased Shortness of Breath 9. Decreased Knowledge of Precautions 10. Decreased [place] with Home Exercise Program 
 INTERVENTIONS PLANNED: (Benefits and precautions of physical therapy have been discussed with the patient.) 1. Balance Exercise 2. Bed Mobility 3. Family Education 4. Gait Training 5. Group Therapy 6. Home Exercise Program (HEP) 7. Therapeutic Activites 8. Therapeutic Exercise/Strengthening 9. Transfer Training TREATMENT PLAN: Frequency/Duration: 3 times a week for duration of hospital stay Rehabilitation Potential For Stated Goals: Good REHAB RECOMMENDATIONS (at time of discharge pending progress):   
Placement: It is my opinion, based on this patient's performance to date, that Mr. Alex Frazier may benefit from intensive therapy at a [facility] after discharge due to the functional deficits listed above that are likely to improve with skilled rehabilitation and concerns that he/she may be unsafe to be unsupervised at home due to very unsteady gait, requiring significant assist to transfer. Equipment:  
? None at this time HISTORY:  
History of Present Injury/Illness (Reason for Referral): 
Patient is a 59 y.o. male with PMH of HTN and alcohol abuse, who is seen in consultation at the request of Dr. Mateusz Chacon for melena and thrombocytopenia. He was admitted today for pneumonia after presenting to the ED with a couple week history of cough, body aches and chills for three days. CXR showed right basilar infiltrate, he was admitted by hospitalists and is on rocephin and zithromax. CBC significant for platelet count of 64M with hgb of 15.1, .7, and white count 4300. Sodium 132, potassium 2.9, BUN 17, creatinine 0.69, t. Bili 1.2, albumin 2.8, ALT 30, AST 41, alk phos 58 and lipase 198.   Abdominal ultrasound today showed hepatic steatosis, a 1.6 cm hypoechoic lesion in the right lobe of the liver which could represent focal fatty sparing but hepatic protocol CT recommended as outpatient to r/o small lesion. Also noted was gallbladder sludge and adenomyomatosis. He had been in sinus tachy, but remote tele reported heart rate 170-200, possible rapid a. Fib. He was transferred to third floor and cardiology consultation pending. Tmax is 101.6. Blood cultures pending. Patient reports about 12 episodes of black loose stools this past week--without any use of bismuth or oral iron. Most recent black stool was reportedly one hour ago. He was noted to have black stool on examiner's glove in ER. No abdominal pain. No red blood per rectum. He did have nausea and vomiting 6 days ago without hematemesis or coffee ground emesis but no recurrent nausea/vomiting. He denies any known prior history of PUD, liver disease. He was unaware of thrombocytopenia until this admission. He does drink 1 pint of vodka per day. Smokes about a pack to 1.5 packs of cigarettes per day. Also takes about 3 ibuprofen most days for muscle/joint pain related to his work as a . Had a colonoscopy 3 years ago at the [place] [facility] with 7 polyps removed. No significant weight change. Past Medical History/Comorbidities:  
Mr. David Coelho  has a past medical history of A-fib ([address] (5/5/2019) and Hypertension. He also has no past medical history of Aneurysm ([address] Arthritis, Asthma, Autoimmune disease ([address] CAD (coronary artery disease), Cancer ([address] Chronic kidney disease, Chronic obstructive pulmonary disease ([address] Chronic pain, Coagulation disorder ([address] Diabetes ([address] GERD (gastroesophageal reflux disease), Heart failure ([address] Ill-defined condition, Liver disease, Morbid obesity ([address] Psychiatric disorder, PUD (peptic ulcer disease), Seizures ([address] Stroke ([address] Thromboembolus ([address] Thyroid disease, or Unspecified sleep apnea. Mr. David Coelho  has no past surgical history on file. Social History/Living Environment:  
Home Environment: Apartment One/Two Story Residence: One story Living Alone: No 
Support Systems: Spouse/Significant Other/Partner Patient Expects to be Discharged to[de-identified] Rehabilitation facility Current DME Used/Available at Home: Grab bars, Raised toilet seat Tub or Shower Type: Tub/Shower combination Prior Level of Function/Work/Activity: 
unknown Number of Personal Factors/Comorbidities that affect the Plan of Care: 3+: HIGH COMPLEXITY EXAMINATION:  
Most Recent Physical Functioning:  
Gross Assessment: 
AROM: Generally decreased, functional 
PROM: Generally decreased, functional 
         
  
Posture: 
Posture (WDL): Exceptions to [facility] Posture Assessment: Forward head, Rounded shoulders Balance: 
Sitting: Impaired Sitting - Static: Fair (occasional) Sitting - Dynamic: Fair (occasional) Standing: Impaired Standing - Static: Fair Standing - Dynamic : Poor Bed Mobility: 
Supine to Sit: Minimum assistance;Bed Modified Scooting: Moderate assistance Interventions: Safety awareness training;Verbal cues; Visual cues Wheelchair Mobility: 
  
Transfers: 
Sit to Stand: Moderate assistance Stand to Sit: Moderate assistance Gait: 
  
Base of Support: Center of gravity altered;Narrowed Speed/Nelly: Slow;Shuffled;Pace decreased (<100 feet/min) Gait Abnormalities: Decreased step clearance; Path deviations;Trunk sway increased; Ataxic Distance (ft): 5 Feet (ft) Assistive Device: Walker, rolling;Gait belt Ambulation - Level of Assistance: Moderate assistance Interventions: Safety awareness training;Manual cues; Verbal cues Body Structures Involved: 1. Nerves 2. Heart 3. Digestive Structures 4. Muscles Body Functions Affected: 1. Sensory/Pain 2. Cardio 3. Respiratory 4. Neuromusculoskeletal 
5. Movement Related Activities and Participation Affected: 1. Mobility 2. Self Care 3. Domestic Life 4. Interpersonal Interactions and Relationships 5. Community, Social and [place] Number of elements that affect the Plan of Care: 4+: HIGH COMPLEXITY CLINICAL PRESENTATION:  
Presentation: Evolving clinical presentation with changing clinical characteristics: MODERATE COMPLEXITY CLINICAL DECISION MAKIN[address] [place] [address] AM-PAC 6 Clicks Basic Mobility Inpatient Short Form How much difficulty does the patient currently have. .. Unable A Lot A Little None 1. Turning over in bed (including adjusting bedclothes, sheets and blankets)? ? 1   ? 2   ? 3   ? 4  
2. Sitting down on and standing up from a chair with arms ( e.g., wheelchair, bedside commode, etc.)   ? 1   ? 2   ? 3   ? 4  
3. Moving from lying on back to sitting on the side of the bed?   ? 1   ? 2   ? 3   ? 4 How much help from another person does the patient currently need. .. Total A Lot A Little None 4. Moving to and from a bed to a chair (including a wheelchair)? ? 1   ? 2   ? 3   ? 4  
5. Need to walk in hospital room? ? 1   ? 2   ? 3   ? 4  
6. Climbing 3-5 steps with a railing? ? 1   ? 2   ? 3   ? 4  
© , Trustees of [address] [place] [address], under license to [employer]. All rights reserved Score:  Initial: 9 Most Recent: X (Date: -- ) Interpretation of Tool:  Represents activities that are increasingly more difficult (i.e. Bed mobility, Transfers, Gait). Medical Necessity:    
· Patient demonstrates good ·  rehab potential due to higher previous functional level. Reason for Services/Other Comments: 
· Patient continues to require modification of therapeutic interventions to increase complexity of exercises · . Use of outcome tool(s) and clinical judgement create a POC that gives a: Questionable prediction of patient's progress: MODERATE COMPLEXITY  
  
 
 
 
TREATMENT:  
(In addition to Assessment/Re-Assessment sessions the following treatments were rendered) Pre-treatment Symptoms/Complaints:   
Pain: Initial: Pain Intensity 1: 0  Post Session:  0/10 Therapeutic Activity: (    12 minutes): Therapeutic activities including bed transfers, ambulation on level ground, standing balance/tolerance activities sit to stand and scooting at edge of bed  to improve mobility, strength and balance. Required moderate Safety awareness training;Manual cues; Verbal cues to promote static and dynamic balance in standing. Braces/Orthotics/Lines/Etc:  
· IV 
· O2 Device: Room air Treatment/Session Assessment:   
· Response to Treatment:  Patient required moderate assist x2 to transfer back to bed. · Interdisciplinary Collaboration:  
o Physical Therapist 
o Registered Nurse · After treatment position/precautions:  
o Supine in bed 
o Bed alarm/tab alert on 
o Bed/Chair-wheels locked 
o Bed in low position 
o Call light within reach 
o RN notified · Compliance with Program/Exercises: Will assess as treatment progresses · Recommendations/Intent for next treatment session: \"Next visit will focus on advancements to more challenging activities and reduction in assistance provided\". Total Treatment Duration: PT Patient Time In/Time Out Time In: 3158 Time Out: 9350 Luis Antonio Cleary DPT

## 2019-05-08 NOTE — PROGRESS NOTES
Verbal bedside report given to Tierney Levi oncoming RN. Patient's situation, background, assessment and recommendations provided. Opportunity for questions provided. Oncoming RN assumed care of patient.

## 2019-05-08 NOTE — PROGRESS NOTES
Problem: Self Care Deficits Care Plan (Adult) Goal: *Acute Goals and Plan of Care (Insert Text) Description 1. Pt will toilet with CGA 2. Pt will complete functional mobility for ADLs with min assist 
3. Pt will complete lower body dressing with CGA using AE as needed 4. Pt will complete grooming and hygiene at sink with CGA 5. Pt will demonstrate independence with HEP to promote increased BUE strength and functional use for ADLs 6. Pt will tolerate 23 minutes functional activity with min or fewer rest breaks to promote increased endurance for ADLs 7. Pt will complete bed mobility with CGA in prep for ADLs 8. Pt will complete UB bathing and dressing with set up Timeframe: 7 days Outcome: Progressing Towards Goal 
  
OCCUPATIONAL THERAPY: Initial Assessment and Daily Note 5/8/2019 INPATIENT: OT Visit Days: 1 Payor: Devota Boxer / Plan: [place] [employer] / Product Type: Federal Funded Programs /  
  
NAME/AGE/GENDER: Nicanor Schwartz is a 59 y.o. male PRIMARY DIAGNOSIS:  PNA (pneumonia) [J18.9] PNA (pneumonia) PNA (pneumonia) Procedure(s) (LRB): ESOPHAGOGASTRODUODENOSCOPY (EGD)/BMI 22.25/ROOM 305 (N/A) 1 Day Post-Op ICD-10: Treatment Diagnosis:  
 Generalized Muscle Weakness (M62.81) Precautions/Allergies: 
  falls Patient has no known allergies. ASSESSMENT:  
Mr. Jose Blackburn was admitted with the above diagnosis, history of ETOH use. Pt lives with his wife and reports that he is independent at baseline, does not use an AD for mobility, denies any recent falls. This session, pt presented with deficits in mobility, balance, endurance, strength, and cognition impacting ADLs. Pt A&O X4, however presented with impaired attention, problem solving, memory, and safety awareness. Pt required min-mod cues to complete ADLs thoroughly, safely, and successfully.  Pt donned/ doffed socks with min A, completed hygiene with set up and supervision. Pt required max assistance to stand, was very unsteady and required mod A for standing balance. BUE strength is generally decreased, grossly 4- to 4/5. Pt is below his functional baseline and would benefit from skilled OT services to address deficits. Rec d/c to STR. This section established at most recent assessment PROBLEM LIST (Impairments causing functional limitations): 
Decreased Strength Decreased ADL/Functional Activities Decreased Transfer Abilities Decreased Balance Decreased Activity Tolerance Increased Fatigue Decreased Cognition INTERVENTIONS PLANNED: (Benefits and precautions of occupational therapy have been discussed with the patient.) Activities of daily living training Adaptive equipment training Balance training Cognitive training Therapeutic activity Therapeutic exercise TREATMENT PLAN: Frequency/Duration: Follow patient 3 times/ week to address above goals. Rehabilitation Potential For Stated Goals: Good REHAB RECOMMENDATIONS (at time of discharge pending progress):   
Placement: It is my opinion, based on this patient's performance to date, that Mr. Snow Burns may benefit from intensive therapy at a [address] [place] [address] after discharge due to the functional deficits listed above that are likely to improve with skilled rehabilitation and concerns that he/she may be unsafe to be unsupervised at home due to fall risk, inability to complete ADLs . Equipment:  
None at this time OCCUPATIONAL PROFILE AND HISTORY:  
History of Present Injury/Illness (Reason for Referral): 
See H&P Past Medical History/Comorbidities:  
Mr. Snow Burns  has a past medical history of A-fib ([address] (5/5/2019) and Hypertension.  He also has no past medical history of Aneurysm ([address] Arthritis, Asthma, Autoimmune disease ([address] CAD (coronary artery disease), Cancer ([address] Chronic kidney disease, Chronic obstructive pulmonary disease ([facility] Chronic pain, Coagulation disorder ([facility] Diabetes ([facility] GERD (gastroesophageal reflux disease), Heart failure ([facility] Ill-defined condition, Liver disease, Morbid obesity ([facility] Psychiatric disorder, PUD (peptic ulcer disease), Seizures ([facility] St[facility] ([facility] Thromboembolus ([facility] Thyroid disease, or Unspecified sleep apnea. Mr. Augie Juarez  has no past surgical history on file. Social History/Living Environment:  
Home Environment: Apartment One/Two Story Residence: One story Living Alone: No 
Support Systems: Spouse/Significant Other/Partner Patient Expects to be Discharged to[de-identified] Rehabilitation facility Current DME Used/Available at Home: Grab bars, Raised toilet seat Tub or Shower Type: Tub/Shower combination Prior Level of Function/Work/Activity: 
Independent, lives w/ wife Number of Personal Factors/Comorbidities that affect the Plan of Care: Expanded review of therapy/medical records (1-2):  MODERATE COMPLEXITY ASSESSMENT OF OCCUPATIONAL PERFORMANCE[de-identified]  
Activities of Daily Living:  
Basic ADLs (From Assessment) Complex ADLs (From Assessment) Feeding: Setup Oral Facial Hygiene/Grooming: Contact guard assistance Bathing: Moderate assistance Upper Body Dressing: Minimum assistance Lower Body Dressing: Moderate assistance Toileting: Moderate assistance Instrumental ADL Meal Preparation: Maximum assistance Homemaking: Maximum assistance Medication Management: Moderate assistance Financial Management: Moderate assistance Grooming/Bathing/Dressing Activities of Daily Living Grooming Grooming Assistance: Set-up; Stand-by assistance Cognitive Retraining Safety/Judgement: Awareness of environment; Fall prevention Lower Body Dressing Assistance Socks: Minimum assistance Bed/Mat Mobility Supine to Sit: Minimum assistance;Bed Modified Sit to Stand: Moderate assistance Stand to Sit: Moderate assistance Scooting: Moderate assistance Most Recent Physical Functioning:  
Gross Assessment: 
AROM: Within functional limits Strength: Generally decreased, functional 
         
  
Posture: 
Posture (WDL): Exceptions to [facility] Posture Assessment: Forward head, Rounded shoulders Balance: 
Sitting: Impaired Sitting - Static: Fair (occasional) Sitting - Dynamic: Fair (occasional) Standing: Impaired Standing - Static: Fair Standing - Dynamic : Poor Bed Mobility: 
Supine to Sit: Minimum assistance;Bed Modified Scooting: Moderate assistance Interventions: Safety awareness training;Verbal cues; Visual cues Wheelchair Mobility: 
  
Transfers: 
Sit to Stand: Moderate assistance Stand to Sit: Moderate assistance Patient Vitals for the past 6 hrs: 
 BP SpO2 Pulse 19 0935 130/64 90 % 84 Mental Status Neurologic State: Alert Orientation Level: Oriented X4 Cognition: Decreased attention/concentration Perception: Appears intact Perseveration: No perseveration noted Safety/Judgement: Awareness of environment, Fall prevention Physical Skills Involved: 
Balance Strength Activity Tolerance Cognitive Skills Affected (resulting in the inability to perform in a timely and safe manner): Executive Function Short Term Recall Long Term Memory Sustained Attention Divided Attention Comprehension Psychosocial Skills Affected: 
Habits/Routines Environmental Adaptation Number of elements that affect the Plan of Care: 5+:  HIGH COMPLEXITY CLINICAL DECISION MAKIN [facility] [address] [address] AM-PAC? ?6 Clicks? Daily Activity Inpatient Short Form How much help from another person does the patient currently need. .. Total A Lot A Little None 1. Putting on and taking off regular lower body clothing? ? 1   ? 2   ? 3   ? 4  
2. Bathing (including washing, rinsing, drying)? ? 1   ? 2   ? 3   ? 4  
3.   Toileting, which includes using toilet, bedpan or urinal?   ? 1   ? 2   ? 3   ? 4  
 4.  Putting on and taking off regular upper body clothing? ? 1   ? 2   ? 3   ? 4  
5. Taking care of personal grooming such as brushing teeth? ? 1   ? 2   ? 3   ? 4  
6. Eating meals? ? 1   ? 2   ? 3   ? 4  
© 2007, Trustees of [address] [place] [address] [address] [address], under license to [employer]. All rights reserved Score:  Initial: 16 Most Recent: X (Date: -- ) Interpretation of Tool:  Represents activities that are increasingly more difficult (i.e. Bed mobility, Transfers, Gait). Medical Necessity:    
Patient demonstrates good 
 rehab potential due to higher previous functional level. Reason for Services/Other Comments: 
Patient continues to require present interventions due to patient's inability to complete ADLs Stephenie Corral Use of outcome tool(s) and clinical judgement create a POC that gives a: MODERATE COMPLEXITY  
 
 
 
TREATMENT:  
(In addition to Assessment/Re-Assessment sessions the following treatments were rendered) Pre-treatment Symptoms/Complaints:   
Pain: Initial:  
Pain Intensity 1: 6 Pain Location 1: Generalized Pain Intervention(s) 1: Rest  Post Session:  4 Self Care: (8 min): Procedure(s) (per grid) utilized to improve and/or restore self-care/home management as related to dressing and grooming. Required minimal to mod visual, verbal and tactile cueing to facilitate activities of daily living skills and compensatory activities. Braces/Orthotics/Lines/Etc:  
IV 
O2 Device: Room air Treatment/Session Assessment:   
Response to Treatment:  no adverse reaction Interdisciplinary Collaboration: Occupational Therapist 
Registered Nurse After treatment position/precautions:  
Up in chair Bed alarm/tab alert on Bed/Chair-wheels locked Bed in low position Call light within reach RN notified Compliance with Program/Exercises: Will assess as treatment progresses. Recommendations/Intent for next treatment session:   \"Next visit will focus on advancements to more challenging activities and reduction in assistance provided\". Total Treatment Duration: OT Patient Time In/Time Out Time In: 5967 Time Out: 1108 Virgil Nielsen, OT

## 2019-05-08 NOTE — ANESTHESIA POSTPROCEDURE EVALUATION
Procedure(s): ESOPHAGOGASTRODUODENOSCOPY (EGD)/BMI 22.25/ROOM 305. total IV anesthesia Anesthesia Post Evaluation Multimodal analgesia: multimodal analgesia not used between 6 hours prior to anesthesia start to PACU discharge Patient location during evaluation: PACU Patient participation: complete - patient participated Level of consciousness: awake and alert Pain management: adequate Airway patency: patent Anesthetic complications: no 
Cardiovascular status: acceptable and hemodynamically stable Respiratory status: acceptable Hydration status: acceptable Vitals Value Taken Time /74 5/7/2019  6:18 PM  
Temp 36.7 °C (98.1 °F) 5/7/2019  6:18 PM  
Pulse 98 5/7/2019  6:18 PM  
Resp 20 5/7/2019  6:18 PM  
SpO2 95 % 5/7/2019  6:18 PM

## 2019-05-08 NOTE — PROGRESS NOTES
Patient on IV antibiotics and noted fever last night 102 and ID following. D/C plan current is home with wife. CM following.

## 2019-05-08 NOTE — PROGRESS NOTES
Verbal bedside report received from Wilmot Meckel, [address] [place] [address]. Patient's situation, background, assessment and recommendations were provided. Kardex, Mar, and recent results also reviewed. Opportunity for questions provided. Assumed care of patient.

## 2019-05-08 NOTE — PROGRESS NOTES
Infectious Disease Note Today's Date: 2019 Admit Date: 2019 Impression: · Acinetobacter baumannii bacteremia (), source likely PNA · RLL CAP vs aspiration · Alcoholism with tobacco use-drinks pint of vodka daily Plan:  
·  Continue CTX 2g IV daily for today while NPO. Change to Cipro 750mg oral BID to complete 10 day duration. · EGD planned for today Anti-infectives:  
· CTX (- 
· Azithro (-) Subjective: He is sitting up in the chair, but bent forward looking uncomfortable. + minimally productive cough; + fever overnight; no tremor/rigors. He has had postprandial emesis, but reports that he has been tolerating oral medications fine. No Known Allergies Review of Systems:  A comprehensive review of systems was negative except for that written in the History of Present Illness. Objective:  
 
Visit Vitals /78 Pulse 64 Temp 98.4 °F (36.9 °C) Resp 20 Ht 5' 11\" (1.803 m) Wt 72.5 kg (159 lb 14.4 oz) SpO2 94% BMI 22.30 kg/m² Temp (24hrs), Av.9 °F (37.2 °C), Min:98.1 °F (36.7 °C), Max:102.2 °F (39 °C) Lines:  Peripheral IV:    
 
Physical Exam:   
General:  Alert, cooperative,  Sitting up in chair; leaning forward and looking uncomfortable Eyes:  Sclera anicteric. Pupils equally round and reactive to light. Mouth/Throat: Mucous membranes normal, oral pharynx clear Neck: Supple Lungs:   Diminished with RLL crackles, on RA   
CV:  Regular rate and irreg rhythm,no murmur, click, rub or gallop Abdomen:   Soft, non-tender. bowel sounds normal. non-distended Extremities: No cyanosis or edema Skin: Skin color, texture, turgor normal. no acute rash or lesions. + tattoos Lymph nodes: Cervical and supraclavicular normal  
Musculoskeletal: No swelling or deformity Lines/Devices:  Intact, no erythema, drainage or tenderness Psych: Alert and oriented, normal mood affect given the setting Data Review: CBC: 
Recent Labs 05/07/19 
0413 05/06/19 
0305 WBC 8.5 5.5 GRANS  --  73 MONOS  --  12 EOS  --  0* ANEU  --  3.9 ABL  --  0.8 HGB 12.0* 12.5*  
HCT 34.3* 34.8*  
PLT 48* 38* BMP: 
Recent Labs 05/07/19 
1223 05/07/19 
0413 05/06/19 
3364 CREA  --  0.36* 0.36* BUN  --  12 11 NA  --  142 138  
K 3.0* 2.4* 2.6*  
CL  --  108* 101 CO2  --  25 26 AGAP  --  9 11 GLU  --  92 94 LFTS: 
Recent Labs 05/07/19 0413 TBILI 0.8 ALT 35 SGOT 99* AP 49* TP 6.2* ALB 1.9* Microbiology:  
 
All Micro Results Procedure Component Value Units Date/Time CULTURE, BLOOD [[id number]]  (Abnormal) Collected:  05/05/19 0910 Order Status:  Completed Specimen:  Blood Updated:  05/07/19 0588 Special Requests: --     
  LEFT 
FOREARM 
  
  GRAM STAIN GRAM NEGATIVE RODS AEROBIC BOTTLE POSITIVE CRITICAL RESULT NOT CALLED DUE TO PREVIOUS NOTIFICATION OF CRITICAL RESULT WITHIN THE LAST 24 HOURS. Culture result: GRAM NEGATIVE RODS     
   FOR ID AND SUSCEPTIBILITY SEE ACCESSION NO [id number] CULTURE, BLOOD [[id number]]  (Abnormal)  (Susceptibility) Collected:  05/05/19 7630 Order Status:  Completed Specimen:  Blood Updated:  05/07/19 9784 Special Requests: --     
  RIGHT 
FOREARM 
  
  GRAM STAIN GRAM NEGATIVE RODS AEROBIC BOTTLE POSITIVE RESULTS VERIFIED, PHONED TO AND READ BACK BY Warden Laquita RN ON 05/05/2019 AT 1815 WMR Culture result: ACINETOBACTER BAUMANNII Imaging:  
See HPI/EPIC Signed By: Norah Sams MD   
 May 8, 2019

## 2019-05-08 NOTE — PROGRESS NOTES
Progress note Admit Date:  2019  7:26 AM  
Name:  Sherren Rucks Age:  59 y.o. 
:  1960 MRN:  [id number] PCP:  Zahraa Sampson MD 
Treatment Team: Attending Provider: Dayana Huston MD; Consulting Provider: Iona Urena MD; Consulting Provider: Virginia Xie MD; Utilization Review: Yi Alicea RN; Care Manager: Jayashree Carranza RN; Physical Therapist: Evita House DPT SUBJECTIVE:  
59 yr old male pt with known h/o htn, h/o cardiac cath- non occlusive coronaries, known alcohol dependent admitted for pna,GI bleed,afib with rvr and alcohol withdrawal 19: 
Pt seen at bedside Pt is more alert, awake, and coherent Denies any chest pain, nausea/vomiting, chills. Discussed about possible need for rehab on discharge Had a fever of 102.2 last night ROS: 10 point ROS negative except what mentioned above. Objective:  
 
Patient Vitals for the past 24 hrs: 
 Temp Pulse Resp BP SpO2  
19 0444 98.4 °F (36.9 °C) 64 20 131/78 94 % 19 0053 98.6 °F (37 °C) 61 20 113/56 (!) 89 % 19 2147 (!) 102.2 °F (39 °C) 89 20 140/75 90 % 19 1818 98.1 °F (36.7 °C) 98 20 145/74 95 % 19 1459  93 24 132/69 91 % 19 1452  89 22 134/68 91 % 19 1443  90 22 132/64 93 % 19 1433 98.6 °F (37 °C) 86 (!) 40 124/66 95 % 19 1358  88 (!) 32 134/66 92 % 19 1300 98.3 °F (36.8 °C) 96 18 144/77 93 % 19 0945 98.3 °F (36.8 °C) 96 18 144/77 93 % Oxygen Therapy O2 Sat (%): 94 % (19 0444) Pulse via Oximetry: 93 beats per minute (19 1459) O2 Device: Room air (19 0421) Intake/Output Summary (Last 24 hours) at 2019 0675 Last data filed at 2019 1427 Gross per 24 hour Intake 1168 ml Output 0 ml Net 1168 ml Physical Exam: 
General:    elderly, alert/awake, NAD Eyes:   Normal sclera. Extraocular movements intact. ENT:  Normocephalic, atraumatic. Moist mucous membranes CV:   Tachycardia No murmur, rub, or gallop. Lungs:  Coarse breath sounds bilaterally, R>L Abdomen: Soft, nontender, nondistended. Bowel sounds normal.  
Extremities: Warm and dry. No cyanosis or edema. Neurologic: CN II-XII grossly intact. Sensation intact. mild tremor upper extremities Skin:     No rashes or jaundice. Psych:            Mild anxious I reviewed the labs, imaging, EKGs, telemetry, and other studies done this admission. Data Review:  
Recent Results (from the past 24 hour(s)) POTASSIUM Collection Time: 05/07/19 12:23 PM  
Result Value Ref Range Potassium 3.0 (L) 3.5 - 5.1 mmol/L  
PLEASE READ & DOCUMENT PPD TEST IN 48 HRS Collection Time: 05/07/19  1:54 PM  
Result Value Ref Range PPD Negative Negative  
 mm Induration 0 0 - 5 mm All Micro Results Procedure Component Value Units Date/Time CULTURE, BLOOD [[id number]]  (Abnormal) Collected:  05/05/19 0910 Order Status:  Completed Specimen:  Blood Updated:  05/07/19 1932 Special Requests: --     
  LEFT 
FOREARM 
  
  GRAM STAIN GRAM NEGATIVE RODS AEROBIC BOTTLE POSITIVE CRITICAL RESULT NOT CALLED DUE TO PREVIOUS NOTIFICATION OF CRITICAL RESULT WITHIN THE LAST 24 HOURS. Culture result: GRAM NEGATIVE RODS     
   FOR ID AND SUSCEPTIBILITY SEE ACCESSION NO [id number] CULTURE, BLOOD [[id number]]  (Abnormal)  (Susceptibility) Collected:  05/05/19 7200 Order Status:  Completed Specimen:  Blood Updated:  05/07/19 8369 Special Requests: --     
  RIGHT 
FOREARM 
  
  GRAM STAIN GRAM NEGATIVE RODS AEROBIC BOTTLE POSITIVE RESULTS VERIFIED, PHONED TO AND READ BACK BY Porfirio Gomez RN ON 05/05/2019 AT 1815 WMR Culture result: ACINETOBACTER BAUMANNII Other Studies: No results found. Assessment and Plan:  
 
Hospital Problems as of 5/8/2019 Never Reviewed Codes Class Noted - Resolved POA Bacteremia due to Gram-negative bacteria ICD-10-CM: R78.81 ICD-9-CM: 790.7, 041.85  5/6/2019 - Present Unknown * (Principal) PNA (pneumonia) ICD-10-CM: J18.9 ICD-9-CM: 660  5/5/2019 - Present Unknown Tachycardia ICD-10-CM: R00.0 ICD-9-CM: 785.0  5/5/2019 - Present Unknown Thrombocytopenia ([address] ICD-10-CM: D69.6 ICD-9-CM: 287.5  5/5/2019 - Present Unknown GI bleed ICD-10-CM: K92.2 ICD-9-CM: 578.9  5/5/2019 - Present Unknown A-fib [facility]) ICD-10-CM: I48.91 
ICD-9-CM: 427.31  5/5/2019 - Present Unknown Tobacco abuse ICD-10-CM: Z72.0 ICD-9-CM: 305.1  11/28/2016 - Present Yes ETOH abuse ICD-10-CM: F10.10 ICD-9-CM: 305.00  11/28/2016 - Present Yes HTN (hypertension) (Chronic) ICD-10-CM: I10 
ICD-9-CM: 401.9  11/19/2014 - Present Yes PLAN: 
Acinetobacter bacteremia: oral Cipro 750 mg q12h for 10 days from 5/8 S/p EGD on 5/7, showed gastritis with some erosions. Continue PPI. Hb is stable Hypokalemia- resolving, 3.0 today, continue oral replacement Alcohol with drawl- cont ciwa protocol- advised on cessation 
afib with rvr- controlled with oral Lopressor. Thrombocytopenia- from chronic alcohol use, no intervention HTN: optimally controlled. Continue lopressor and norvasc. Nicotine dependence- advised on cessation. PT/OT eval 
PPD ordered DVT ppx:  scd Dispo: pending until medically stable.  
Signed: 
Jesica Alcantar MD

## 2019-05-08 NOTE — PROGRESS NOTES
Bedside and Verbal shift change report given to self (oncoming nurse) by Marlon Wolfe (offgoing nurse). Report included the following information SBAR, Kardex, Intake/Output and MAR.

## 2019-05-08 NOTE — PROGRESS NOTES
Order received, chart reviewed, evaluation attempted; pt working with Pt. Will follow up as schedule allows.   
 
Guillermo Leung, OT

## 2019-05-09 PROBLEM — A04.72 CLOSTRIDIUM DIFFICILE DIARRHEA: Status: ACTIVE | Noted: 2019-05-09

## 2019-05-09 LAB
ANION GAP SERPL CALC-SCNC: 11 MMOL/L (ref 7–16)
BUN SERPL-MCNC: 10 MG/DL (ref 8–23)
C DIFF GDH STL QL: NORMAL
C DIFF TOX A+B STL QL IA: NORMAL
CALCIUM SERPL-MCNC: 8.9 MG/DL (ref 8.3–10.4)
CHLORIDE SERPL-SCNC: 113 MMOL/L (ref 98–107)
CLINICAL CONSIDERATION: NORMAL
CO2 SERPL-SCNC: 22 MMOL/L (ref 21–32)
CREAT SERPL-MCNC: 0.49 MG/DL (ref 0.8–1.5)
ERYTHROCYTE [DISTWIDTH] IN BLOOD BY AUTOMATED COUNT: 14.6 % (ref 11.9–14.6)
GLUCOSE SERPL-MCNC: 94 MG/DL (ref 65–100)
HCT VFR BLD AUTO: 37.8 % (ref 41.1–50.3)
HGB BLD-MCNC: 13.2 G/DL (ref 13.6–17.2)
INTERPRETATION: NORMAL
MCH RBC QN AUTO: 36.7 PG (ref 26.1–32.9)
MCHC RBC AUTO-ENTMCNC: 34.9 G/DL (ref 31.4–35)
MCV RBC AUTO: 105 FL (ref 79.6–97.8)
NRBC # BLD: 0 K/UL (ref 0–0.2)
PCR REFLEX: NORMAL
PLATELET # BLD AUTO: 138 K/UL (ref 150–450)
PMV BLD AUTO: 11 FL (ref 9.4–12.3)
POTASSIUM SERPL-SCNC: 2.5 MMOL/L (ref 3.5–5.1)
RBC # BLD AUTO: 3.6 M/UL (ref 4.23–5.6)
SODIUM SERPL-SCNC: 146 MMOL/L (ref 136–145)
WBC # BLD AUTO: 19 K/UL (ref 4.3–11.1)

## 2019-05-09 PROCEDURE — 65660000000 HC RM CCU STEPDOWN

## 2019-05-09 PROCEDURE — 74011250637 HC RX REV CODE- 250/637: Performed by: INTERNAL MEDICINE

## 2019-05-09 PROCEDURE — C9113 INJ PANTOPRAZOLE SODIUM, VIA: HCPCS | Performed by: FAMILY MEDICINE

## 2019-05-09 PROCEDURE — 80048 BASIC METABOLIC PNL TOTAL CA: CPT

## 2019-05-09 PROCEDURE — 74011250636 HC RX REV CODE- 250/636: Performed by: HOSPITALIST

## 2019-05-09 PROCEDURE — 85027 COMPLETE CBC AUTOMATED: CPT

## 2019-05-09 PROCEDURE — 36415 COLL VENOUS BLD VENIPUNCTURE: CPT

## 2019-05-09 PROCEDURE — 74011250637 HC RX REV CODE- 250/637: Performed by: NURSE PRACTITIONER

## 2019-05-09 PROCEDURE — 74011000250 HC RX REV CODE- 250: Performed by: FAMILY MEDICINE

## 2019-05-09 PROCEDURE — 74011250636 HC RX REV CODE- 250/636: Performed by: FAMILY MEDICINE

## 2019-05-09 PROCEDURE — 77030020291 HC FLEXSEAL FMS BMS -C

## 2019-05-09 PROCEDURE — 74011250637 HC RX REV CODE- 250/637: Performed by: HOSPITALIST

## 2019-05-09 PROCEDURE — 74011250637 HC RX REV CODE- 250/637: Performed by: FAMILY MEDICINE

## 2019-05-09 PROCEDURE — 0D9P70Z DRAINAGE OF RECTUM WITH DRAINAGE DEVICE, VIA NATURAL OR ARTIFICIAL OPENING: ICD-10-PCS | Performed by: FAMILY MEDICINE

## 2019-05-09 PROCEDURE — 83631 LACTOFERRIN FECAL (QUANT): CPT

## 2019-05-09 RX ORDER — POTASSIUM CHLORIDE 14.9 MG/ML
20 INJECTION INTRAVENOUS
Status: COMPLETED | OUTPATIENT
Start: 2019-05-09 | End: 2019-05-09

## 2019-05-09 RX ORDER — DEXTROSE, SODIUM CHLORIDE, AND POTASSIUM CHLORIDE 5; .45; .15 G/100ML; G/100ML; G/100ML
75 INJECTION INTRAVENOUS CONTINUOUS
Status: DISCONTINUED | OUTPATIENT
Start: 2019-05-09 | End: 2019-05-12

## 2019-05-09 RX ORDER — PANTOPRAZOLE SODIUM 40 MG/1
40 TABLET, DELAYED RELEASE ORAL
Status: DISCONTINUED | OUTPATIENT
Start: 2019-05-09 | End: 2019-05-11

## 2019-05-09 RX ADMIN — LORAZEPAM 1 MG: 2 INJECTION INTRAMUSCULAR at 17:01

## 2019-05-09 RX ADMIN — DEXTROSE MONOHYDRATE, SODIUM CHLORIDE, AND POTASSIUM CHLORIDE 75 ML/HR: 50; 4.5; 1.49 INJECTION, SOLUTION INTRAVENOUS at 20:25

## 2019-05-09 RX ADMIN — CHLORDIAZEPOXIDE HYDROCHLORIDE 25 MG: 25 CAPSULE ORAL at 05:49

## 2019-05-09 RX ADMIN — LORAZEPAM 1 MG: 2 INJECTION INTRAMUSCULAR at 09:03

## 2019-05-09 RX ADMIN — VANCOMYCIN HYDROCHLORIDE 125 MG: 1 INJECTION, POWDER, LYOPHILIZED, FOR SOLUTION INTRAVENOUS at 17:46

## 2019-05-09 RX ADMIN — CIPROFLOXACIN HYDROCHLORIDE 750 MG: 500 TABLET, FILM COATED ORAL at 09:04

## 2019-05-09 RX ADMIN — METOPROLOL TARTRATE 50 MG: 50 TABLET ORAL at 17:40

## 2019-05-09 RX ADMIN — CIPROFLOXACIN HYDROCHLORIDE 750 MG: 500 TABLET, FILM COATED ORAL at 21:30

## 2019-05-09 RX ADMIN — CHLORDIAZEPOXIDE HYDROCHLORIDE 25 MG: 25 CAPSULE ORAL at 15:22

## 2019-05-09 RX ADMIN — POTASSIUM CHLORIDE 20 MEQ: 200 INJECTION, SOLUTION INTRAVENOUS at 12:51

## 2019-05-09 RX ADMIN — PANTOPRAZOLE SODIUM 40 MG: 40 INJECTION, POWDER, FOR SOLUTION INTRAVENOUS at 09:03

## 2019-05-09 RX ADMIN — AMLODIPINE BESYLATE 5 MG: 5 TABLET ORAL at 09:04

## 2019-05-09 RX ADMIN — METOPROLOL TARTRATE 50 MG: 50 TABLET ORAL at 09:04

## 2019-05-09 RX ADMIN — LORAZEPAM 1 MG: 2 INJECTION INTRAMUSCULAR at 03:10

## 2019-05-09 RX ADMIN — POTASSIUM CHLORIDE 40 MEQ: 20 TABLET, EXTENDED RELEASE ORAL at 09:04

## 2019-05-09 RX ADMIN — CHLORDIAZEPOXIDE HYDROCHLORIDE 25 MG: 25 CAPSULE ORAL at 21:30

## 2019-05-09 RX ADMIN — ATORVASTATIN CALCIUM 40 MG: 40 TABLET, FILM COATED ORAL at 09:04

## 2019-05-09 RX ADMIN — POTASSIUM CHLORIDE 40 MEQ: 20 TABLET, EXTENDED RELEASE ORAL at 17:40

## 2019-05-09 RX ADMIN — POTASSIUM CHLORIDE 20 MEQ: 200 INJECTION, SOLUTION INTRAVENOUS at 15:21

## 2019-05-09 RX ADMIN — Medication 10 ML: at 21:31

## 2019-05-09 RX ADMIN — THIAMINE HYDROCHLORIDE: 100 INJECTION, SOLUTION INTRAMUSCULAR; INTRAVENOUS at 11:41

## 2019-05-09 RX ADMIN — VANCOMYCIN HYDROCHLORIDE 125 MG: 1 INJECTION, POWDER, LYOPHILIZED, FOR SOLUTION INTRAVENOUS at 11:41

## 2019-05-09 RX ADMIN — Medication 10 ML: at 05:49

## 2019-05-09 RX ADMIN — PANTOPRAZOLE SODIUM 40 MG: 40 TABLET, DELAYED RELEASE ORAL at 17:01

## 2019-05-09 RX ADMIN — Medication 10 ML: at 09:05

## 2019-05-09 NOTE — PROGRESS NOTES
Problem: Pressure Injury - Risk of 
Goal: *Prevention of pressure injury Description Document Dom Scale and appropriate interventions in the flowsheet. Outcome: Progressing Towards Goal 
Note:  
Pressure Injury Interventions: 
Sensory Interventions: Avoid rigorous massage over bony prominences, Float heels, Minimize linen layers Moisture Interventions: Absorbent underpads, Apply protective barrier, creams and emollients Activity Interventions: Assess need for specialty bed, Increase time out of bed Mobility Interventions: Assess need for specialty bed Nutrition Interventions: Document food/fluid/supplement intake Problem: Falls - Risk of 
Goal: *Absence of Falls Description Document Graham Torres Fall Risk and appropriate interventions in the flowsheet. Outcome: Progressing Towards Goal 
Note:  
Fall Risk Interventions: 
Mobility Interventions: Bed/chair exit alarm, Patient to call before getting OOB Mentation Interventions: Bed/chair exit alarm, Adequate sleep, hydration, pain control Medication Interventions: Bed/chair exit alarm, Patient to call before getting OOB Elimination Interventions: Bed/chair exit alarm, Call light in reach Patient progressing towards goal with no falls on current admission. Patient without confusion, agitation, or sensory perception deficits. Patient has steady gait on ambulation. Personal belongings are within reach. Bed is in the low and locked position with side rails up x2. Yellow gripper socks to bilateral feet. Call light within reach and patient verbalizes understanding of use. Problem: Pneumonia: Day 3 Goal: Activity/Safety Outcome: Progressing Towards Goal 
Goal: Diagnostic Test/Procedures Outcome: Progressing Towards Goal 
Goal: Nutrition/Diet Outcome: Progressing Towards Goal 
Goal: Medications Outcome: Progressing Towards Goal 
Note:  
Patient receiving antibiotics for treatment. Goal: *Oxygen saturation within defined limits Outcome: Progressing Towards Goal 
Note:  
 
Patient oxygen within defined limits. Educated to notify nurse if feeling SOB or trouble breathing Goal: *Hemodynamically stable Outcome: Progressing Towards Goal 
Note:  
Patient has been hemodynamically stable on current day with /76 and HR 83

## 2019-05-09 NOTE — PROGRESS NOTES
[facility] Progress Note Admit Date:  5/5/2019 Today's Date:  5/9/2019 CC:  ELevated LFTs Subjective:  
 
Pt reports continued diarrhea, now w/ rectal tube. Crampy lower abdom pain earlier. No rectal bleeding. No N/V.  TOlerating diet. Fevers decreasing. Medications:  
Current Facility-Administered Medications Medication Dose Route Frequency  potassium chloride (K-DUR, KLOR-CON) SR tablet 40 mEq  40 mEq Oral BID  ciprofloxacin HCl (CIPRO) tablet 750 mg  750 mg Oral Q12H  
 metoprolol tartrate (LOPRESSOR) tablet 50 mg  50 mg Oral BID  LORazepam (ATIVAN) injection 1 mg  1 mg IntraVENous Q4H  
 chlordiazePOXIDE (LIBRIUM) capsule 25 mg  25 mg Oral Q8H  
 0.9% sodium chloride 1,000 mL with mvi, adult no. 4 with vit K 10 mL, thiamine 383 mg, folic acid 1 mg infusion   IntraVENous Q24H  
 0.9% sodium chloride infusion  75 mL/hr IntraVENous CONTINUOUS  
 amLODIPine (NORVASC) tablet 5 mg  5 mg Oral DAILY  atorvastatin (LIPITOR) tablet 40 mg  40 mg Oral DAILY  nitroglycerin (NITROSTAT) tablet 0.4 mg  0.4 mg SubLINGual Q5MIN PRN  
 sodium chloride (NS) flush 5-40 mL  5-40 mL IntraVENous Q8H  
 sodium chloride (NS) flush 5-40 mL  5-40 mL IntraVENous PRN  
 acetaminophen (TYLENOL) tablet 650 mg  650 mg Oral Q4H PRN  
 HYDROcodone-acetaminophen (NORCO) 5-325 mg per tablet 1 Tab  1 Tab Oral Q4H PRN  
 morphine injection 1 mg  1 mg IntraVENous Q4H PRN  
 nicotine (NICODERM CQ) 21 mg/24 hr patch 1 Patch  1 Patch TransDERmal Q24H  
 ondansetron (ZOFRAN) injection 4 mg  4 mg IntraVENous Q4H PRN  
 diphenhydrAMINE (BENADRYL) injection 12.5 mg  12.5 mg IntraVENous Q4H PRN  
 naloxone (NARCAN) injection 0.4 mg  0.4 mg IntraVENous PRN  pantoprazole (PROTONIX) 40 mg in sodium chloride 0.9% 10 mL injection  40 mg IntraVENous Q12H  
 sodium chloride (NS) flush 5-40 mL  5-40 mL IntraVENous Q8H  
 sodium chloride (NS) flush 5-40 mL  5-40 mL IntraVENous PRN  
 
 
 Review of Systems: ROS was obtained, with pertinent positives as listed above. No chest pain or SOB. Diet:  GI soft Objective:  
Vitals: 
Visit Vitals /76 Pulse (!) 109 Temp 99 °F (37.2 °C) Resp 17 Ht 5' 11\" (1.803 m) Wt 67.1 kg (148 lb) SpO2 92% BMI 20.64 kg/m² Intake/Output: 
No intake/output data recorded. 05/07 1901 - 05/09 0700 In: 900 [P.O.:900] Out: - Exam: 
General appearance: alert, cooperative, no distress Lungs: clear to auscultation bilaterally anteriorly. Heart: regular rate and rhythm Abdomen: soft, non-tender. Bowel sounds normal. No masses, no organomegaly Neuro:  alert + tremor, answers questions but slowly Data Review (Labs):   
Recent Labs 05/09/19 
0413 05/08/19 
1106 05/07/19 
1223 05/07/19 
0413 WBC 19.0* 17.9*  --  8.5 HGB 13.2* 13.4*  --  12.0*  
HCT 37.8* 38.3*  --  34.3*  
* 102*  --  48* .0* 105.5*  --  104.9* NA  --   --   --  142 K  --   --  3.0* 2.4*  
CL  --   --   --  108* CO2  --   --   --  25 BUN  --   --   --  12  
CREA  --   --   --  0.36* CA  --   --   --  8.3 MG  --   --   --  2.0  
GLU  --   --   --  92  
AP  --   --   --  49* SGOT  --   --   --  99* ALT  --   --   --  35  
TBILI  --   --   --  0.8 ALB  --   --   --  1.9* TP  --   --   --  6.2* C diff Pending ECHO 5/6/19 SUMMARY 
-  Left ventricle: Systolic function was at the lower limits of normal. Ejection fraction was estimated in the range of 50 % to 55 %. This study was 
inadequate for the evaluation of regional wall motion. -  Inferior vena cava, hepatic veins: The respirophasic change in diameter was less than 50%. -  Aortic valve: There was no evidence for vegetation. -  Mitral valve: There was no evidence for vegetation. EGD 5/7/19 for Acute blood loss anemia PostOp Diagnosis:  Moderate gastritis. No active bleeding Findings: OROPHARYNX: The piriform sinuses, arytenoid cartilage and vocal cords were briefly evaluated on entry and withdrawal of the endoscope through the oropharynx. These were found to be unremarkable. ESOPHAGUS: The esophageal mucosa was unremarkable. The squamocolumnar junction was intact without erosions, ulcerations, rings, webs or strictures. There was no evidence of Garcia's type intestinal metaplasia. No evidence of esophageal varices STOMACH: The gastric mucosa was erythematous, consistent with Moderate proximal gastritis. The gastric antrum appeared normal. There were no erosions, ulcerations, polyps, mass lesions, vascular ectasias or other abnormalities noted. This may represent portal hypertensive gastropathy or alcoholic gastritis. The pylorus was patent and easily intubated. There was no hiatal hernia noted by direct view or retroflexion within the stomach. DUODENUM: The endoscope was advanced as far as possible into the duodenum. The villi appeared normal.  There were no mucosal breaks, erosions, ulcerations, vascular ectasias or other abnormalities present. Recommendations: 
B.i.d. PPI Avoid NSAIDs Alcohol abstinence Advance diet Mild anemia and severe thrombocytopenia may be related to bone marrow suppression from alcohol and malnutrition etc.  
 
 
Assessment:  
 
Principal Problem: 
  PNA (pneumonia) (5/5/2019) Active Problems: 
  HTN (hypertension) (11/19/2014) Tobacco abuse (11/28/2016) ETOH abuse (11/28/2016) Tachycardia (5/5/2019) Thrombocytopenia ([address] (5/5/2019) GI bleed (5/5/2019) A-fib ([address] (5/5/2019) Bacteremia due to Gram-negative bacteria (5/6/2019) 58 y/o male with HTN, colon polyps, h/o EtOH and tobacco abuse is admitted with pneumonia, a. Fib with RVR, EtOH withdrawal and seen by GI for black stools x 1 week and thrombocytopenia. Plan: No signs of GI bleeding, and Hgb remains stable WBC trending up, low grade fevers last PM 
On Cipro for PNA, possible aspiration. Now w/ diarrhea, concern for C diff, stool pending Continue PPI bid for moderate gastritis Etoh withdraw- improved. LFTs trending down- Etoh abstinence Nancy Magaña MD

## 2019-05-09 NOTE — PROGRESS NOTES
Progress note Admit Date:  2019  7:26 AM  
Name:  Brittany Wagner Age:  59 y.o. 
:  1960 MRN:  [id number] PCP:  Ulysses Holly, MD 
Treatment Team: Attending Provider: Marquis Celia MD; Consulting Provider: Thao Villareal MD; Consulting Provider: Rossy Bergman MD; Utilization Review: Claudean Blocker, RN; Care Manager: Racheal Ibanez RN 
 
SUBJECTIVE:  
59 yr old male pt with known h/o htn, h/o cardiac cath- non occlusive coronaries, known alcohol dependent admitted for pna,GI bleed,afib with rvr and alcohol withdrawal 19: 
Pt is more alert, awake, and coherent Denies any chest pain, nausea/vomiting, chills. Reports of having diarrhea since yesterday, place on contact precautions. Had a fever of 100.8 last night ROS: 10 point ROS negative except what mentioned above. Objective:  
 
Patient Vitals for the past 24 hrs: 
 Temp Pulse Resp BP SpO2  
19 0507 99.3 °F (37.4 °C) 91 18 124/64 93 % 19 0100     92 % 19 0050 99.3 °F (37.4 °C)  18 139/74 (!) 86 % 19 2038 (!) 100.8 °F (38.2 °C) 83 18 127/66 92 % 19 1853 99.3 °F (37.4 °C) 79 18 127/71 100 % 19 1426 95.6 °F (35.3 °C) 77 20 98/50 94 % 19 0935 98 °F (36.7 °C) 84 18 130/64 90 % Oxygen Therapy O2 Sat (%): 93 % (19 0507) Pulse via Oximetry: 93 beats per minute (19 1459) O2 Device: Room air (19 0415) Intake/Output Summary (Last 24 hours) at 2019 0741 Last data filed at 2019 1776 Gross per 24 hour Intake 900 ml Output  Net 900 ml Physical Exam: 
General:    elderly, alert/awake, NAD Eyes:   Normal sclera. Extraocular movements intact. ENT:  Normocephalic, atraumatic. Moist mucous membranes CV:   Tachycardia No murmur, rub, or gallop. Lungs:  Coarse breath sounds bilaterally, R>L Abdomen: Soft, nontender, nondistended.  Bowel sounds normal.  
 Extremities: Warm and dry. No cyanosis or edema. Neurologic: CN II-XII grossly intact. Sensation intact. mild tremor upper extremities Skin:     No rashes or jaundice. Psych:            Mild anxious I reviewed the labs, imaging, EKGs, telemetry, and other studies done this admission. Data Review:  
Recent Results (from the past 24 hour(s)) CBC W/O DIFF Collection Time: 05/08/19 11:06 AM  
Result Value Ref Range WBC 17.9 (H) 4.3 - 11.1 K/uL  
 RBC 3.63 (L) 4.23 - 5.6 M/uL  
 HGB 13.4 (L) 13.6 - 17.2 g/dL HCT 38.3 (L) 41.1 - 50.3 % .5 (H) 79.6 - 97.8 FL  
 MCH 36.9 (H) 26.1 - 32.9 PG  
 MCHC 35.0 31.4 - 35.0 g/dL  
 RDW 14.6 11.9 - 14.6 % PLATELET 175 (L) 448 - 450 K/uL MPV 11.5 9.4 - 12.3 FL ABSOLUTE NRBC 0.00 0.0 - 0.2 K/uL PLEASE READ & DOCUMENT PPD TEST IN 72 HRS Collection Time: 05/08/19  6:50 PM  
Result Value Ref Range PPD Negative Negative  
 mm Induration 0 0 - 5 mm CBC W/O DIFF Collection Time: 05/09/19  4:13 AM  
Result Value Ref Range WBC 19.0 (H) 4.3 - 11.1 K/uL  
 RBC 3.60 (L) 4.23 - 5.6 M/uL  
 HGB 13.2 (L) 13.6 - 17.2 g/dL HCT 37.8 (L) 41.1 - 50.3 % .0 (H) 79.6 - 97.8 FL  
 MCH 36.7 (H) 26.1 - 32.9 PG  
 MCHC 34.9 31.4 - 35.0 g/dL  
 RDW 14.6 11.9 - 14.6 % PLATELET 044 (L) 533 - 450 K/uL MPV 11.0 9.4 - 12.3 FL ABSOLUTE NRBC 0.00 0.0 - 0.2 K/uL All Micro Results Procedure Component Value Units Date/Time C. DIFFICILE AG & TOXIN A/B [[id number]] Collected:  05/08/19 2206 Order Status:  Completed Specimen:  Stool Updated:  05/08/19 2315 CULTURE, BLOOD [[id number]]  (Abnormal) Collected:  05/05/19 0910 Order Status:  Completed Specimen:  Blood Updated:  05/07/19 7396 Special Requests: --     
  LEFT 
FOREARM 
  
  GRAM STAIN GRAM NEGATIVE RODS AEROBIC BOTTLE POSITIVE CRITICAL RESULT NOT CALLED DUE TO PREVIOUS NOTIFICATION OF CRITICAL RESULT WITHIN THE LAST 24 HOURS. Culture result: GRAM NEGATIVE RODS     
   FOR ID AND SUSCEPTIBILITY SEE ACCESSION NO [id number] CULTURE, BLOOD [[id number]]  (Abnormal)  (Susceptibility) Collected:  05/05/19 509 Order Status:  Completed Specimen:  Blood Updated:  05/07/19 4619 Special Requests: --     
  RIGHT 
FOREARM 
  
  GRAM STAIN GRAM NEGATIVE RODS AEROBIC BOTTLE POSITIVE RESULTS VERIFIED, PHONED TO AND READ BACK BY Maximo Blood RN ON 05/05/2019 AT 1815 WMR Culture result: ACINETOBACTER BAUMANNII Other Studies: No results found. Assessment and Plan:  
 
Hospital Problems as of 5/9/2019 Never Reviewed Codes Class Noted - Resolved POA Bacteremia due to Gram-negative bacteria ICD-10-CM: R78.81 ICD-9-CM: 790.7, 041.85  5/6/2019 - Present Unknown * (Principal) PNA (pneumonia) ICD-10-CM: J18.9 ICD-9-CM: 991  5/5/2019 - Present Unknown Tachycardia ICD-10-CM: R00.0 ICD-9-CM: 785.0  5/5/2019 - Present Unknown Thrombocytopenia ([facility] [address] ICD-10-CM: D69.6 ICD-9-CM: 287.5  5/5/2019 - Present Unknown GI bleed ICD-10-CM: K92.2 ICD-9-CM: 578.9  5/5/2019 - Present Unknown A-fib [facility]) ICD-10-CM: I48.91 
ICD-9-CM: 427.31  5/5/2019 - Present Unknown Tobacco abuse ICD-10-CM: Z72.0 ICD-9-CM: 305.1  11/28/2016 - Present Yes ETOH abuse ICD-10-CM: F10.10 ICD-9-CM: 305.00  11/28/2016 - Present Yes HTN (hypertension) (Chronic) ICD-10-CM: I10 
ICD-9-CM: 401.9  11/19/2014 - Present Yes PLAN: 
Diarrhea: likely from antibiotics Check for C.diff. Continue contact isolation until ruled out Acinetobacter bacteremia: oral Cipro 750 mg q12h for 10 days from 5/8 S/p EGD on 5/7, showed gastritis with some erosions. Continue PPI. Hb is stable Hypokalemia- improving, AM labs pending. Alcohol with drawl- cont ciwa protocol- advised on cessation 
afib with rvr- controlled with oral Lopressor. Thrombocytopenia- from chronic alcohol use, no intervention HTN: optimally controlled. Continue lopressor and norvasc. Nicotine dependence- advised on cessation. PT/OT eval 
PPD ordered DVT ppx:  scd Dispo: discussed with CM about discharge planning. Pt has [employer] benefits. Vin Butler will talk with the pt, and start the paper work for rehab.  
Signed: 
Jo Campbell MD

## 2019-05-09 NOTE — PROGRESS NOTES
Infectious Disease Note Today's Date: 2019 Admit Date: 2019 Impression: · Acinetobacter baumannii bacteremia (), source likely PNA · New watery stools- C diff pending. · RLL CAP vs aspiration · Alcoholism with tobacco use-drinks pint of vodka daily Plan:  
·  Continue Cipro 750mg oral BID to complete 7 day duration. · Watch C diff PCR Anti-infectives:  
· CTX (-), Cipro - 
· Azithro (-) Subjective: He is sitting up in the chair, tearful as he wants to go home. FMS placed overnight due to excessive watery stools. Tm 100.8F with new leukocytosis No Known Allergies Review of Systems:  A comprehensive review of systems was negative except for that written in the History of Present Illness. Objective:  
 
Visit Vitals /76 Pulse (!) 109 Temp 99 °F (37.2 °C) Resp 17 Ht 5' 11\" (1.803 m) Wt 67.1 kg (148 lb) SpO2 92% BMI 20.64 kg/m² Temp (24hrs), Av.9 °F (37.2 °C), Min:95.6 °F (35.3 °C), Max:100.8 °F (38.2 °C) Lines:  Peripheral IV:    
 
Physical Exam:   
General:  Alert, cooperative,  Sitting up in chair; leaning forward and looking uncomfortable Eyes:  Sclera anicteric. Pupils equally round and reactive to light. Mouth/Throat: Mucous membranes normal, oral pharynx clear Neck: Supple Lungs:   Diminished with RLL crackles, on RA   
CV:  Regular rate and irreg rhythm,no murmur, click, rub or gallop Abdomen:   Soft, non-tender. bowel sounds normal. non-distended Extremities: No cyanosis or edema Skin: Skin color, texture, turgor normal. no acute rash or lesions. + tattoos Lymph nodes: Cervical and supraclavicular normal  
Musculoskeletal: No swelling or deformity Lines/Devices:  Intact, no erythema, drainage or tenderness Psych: Alert and oriented, normal mood affect given the setting Data Review: CBC: 
Recent Labs 19 
0413 19 
1106 19 
0413 WBC 19.0* 17.9* 8.5 HGB 13.2* 13.4* 12.0*  
HCT 37.8* 38.3* 34.3*  
* 102* 48* BMP: 
Recent Labs 05/07/19 
1223 05/07/19 0413 CREA  --  0.36* BUN  --  12  
NA  --  142  
K 3.0* 2.4*  
CL  --  108* CO2  --  25 AGAP  --  9  
GLU  --  92 LFTS: 
Recent Labs 05/07/19 0413 TBILI 0.8 ALT 35 SGOT 99* AP 49* TP 6.2* ALB 1.9* Microbiology:  
 
All Micro Results Procedure Component Value Units Date/Time C. DIFFICILE AG & TOXIN A/B [[id number]] Collected:  05/08/19 2206 Order Status:  Completed Specimen:  Stool Updated:  05/08/19 2315 CULTURE, BLOOD [[id number]]  (Abnormal) Collected:  05/05/19 0910 Order Status:  Completed Specimen:  Blood Updated:  05/07/19 8883 Special Requests: --     
  LEFT 
FOREARM 
  
  GRAM STAIN GRAM NEGATIVE RODS AEROBIC BOTTLE POSITIVE CRITICAL RESULT NOT CALLED DUE TO PREVIOUS NOTIFICATION OF CRITICAL RESULT WITHIN THE LAST 24 HOURS. Culture result: GRAM NEGATIVE RODS     
   FOR ID AND SUSCEPTIBILITY SEE ACCESSION NO [id number] CULTURE, BLOOD [[id number]]  (Abnormal)  (Susceptibility) Collected:  05/05/19 8559 Order Status:  Completed Specimen:  Blood Updated:  05/07/19 0086 Special Requests: --     
  RIGHT 
FOREARM 
  
  GRAM STAIN GRAM NEGATIVE RODS AEROBIC BOTTLE POSITIVE RESULTS VERIFIED, PHONED TO AND READ BACK BY Samira Gross RN ON 05/05/2019 AT 1815 WMR Culture result: ACINETOBACTER BAUMANNII Imaging:  
See HPI/EPIC Signed By: Laverta Frankel, NP May 9, 2019

## 2019-05-09 NOTE — PROGRESS NOTES
Critical Result Notification Received and verbally repeated the following test results K=2.5  from 1225 Today. Dr. Cony Lara notified. New orders received. Change IVF to D5 0.45NS with 20K at 75ml/hr. And give 2 bags of IV Potassium Rolf Stacy.  Merribeth Marker

## 2019-05-09 NOTE — PROGRESS NOTES
Paged hospitalist regarding patients excessive watery stools. Per, Dr. Mayra Chase orders to place a fecal management system.

## 2019-05-09 NOTE — PROGRESS NOTES
Care Management Interventions PCP Verified by CM: Yes(Jan 2019) Palliative Care Criteria Met (RRAT>21 & CHF Dx)?: No(RRAT 3 Dx Pneumonia ) Transition of Care Consult (CM Consult): Discharge Planning Discharge Durable Medical Equipment: No(none) Physical Therapy Consult: No 
Occupational Therapy Consult: No 
Speech Therapy Consult: No 
Current Support Network: Lives with Spouse Confirm Follow Up Transport: Self Plan discussed with Pt/Family/Caregiver: Yes Freedom of Choice Offered: Yes Discharge Location Discharge Placement: Unable to determine at this time Follow up with patient d/c planning. Patient wants to go home and physician and PT/OT have recommended rehab for patient. Patient does not have Medicare and has VA Benefits and will need to see if he is connected for rehab benefits. The SN[facility] that will accept VA benefits [facility]. Patient request CM discuss with wife. Left message for wife Princess Hu [phone] to discuss rehab options if has benefits and see which ones they would be interested in. If patient does not have benefits for rehab then will need to d/c home with wife. PPD/PT/OT have been placed. CM following.

## 2019-05-09 NOTE — PROGRESS NOTES
Verbal bedside report given to Cony Olivera oncoming RN. Patient's situation, background, assessment and recommendations provided. Kardex, Mar, and recent results also reviewed. Opportunity for questions provided. Oncoming RN assumed care of patient.

## 2019-05-09 NOTE — PROGRESS NOTES
Bedside and Verbal shift change report given to Jessee Almeida (oncoming nurse) by self (offgoing nurse). Report included the following information SBAR, Kardex, Procedure Summary, Intake/Output, MAR and Recent Results.

## 2019-05-09 NOTE — CDMP QUERY
Pt admitted with acinetobacter bacteremia. /Pt noted to have tachycardia and fever. If possible, please document in the progress notes and d/c summary if you are evaluating and / or treating any of the following: 
 
? Sepsis, present on admission, suspected or probable causative organism (please specify) ? Sepsis, now resolved, suspected or probable causative organism (please specify) ? Sepsis, not present on admission, suspected or probable causative organism (please specify) ? No Sepsis, suspected or probable localized infection (please specify) ? Sepsis was ruled out (include corresponding diagnosis for patients clinical picture and treatment) ? Other, please specify ? Clinically unable to determine The medical record reflects the following: 
   Risk Factors: Age, Bacteremia Clinical Indicators:T-102.2, R-20 on 5/7 @ 2147 vital check;  T-95.6, R-20 on 5/8 @ 1426 vital check; WBC-17.9 on 5/8 and WBC-19 pm 5/9 Treatment: Cipro 750mg PO BID started on 5/8 Thanks, 
DIRK Marsh, RN, CDS Compliant Documentation Management Program 
[phone]

## 2019-05-09 NOTE — PROGRESS NOTES
C. Diff stool sample sent to lab. Pt having watery stools. Enteric precautions placed on patient at this time

## 2019-05-09 NOTE — PROGRESS NOTES
Verbal bedside report received from [address]. Patient's situation, background, assessment and recommendations were provided. Kardex, Mar, and recent results also reviewed. Opportunity for questions provided. Assumed care of patient.

## 2019-05-10 LAB
ANION GAP SERPL CALC-SCNC: 8 MMOL/L (ref 7–16)
BASOPHILS # BLD: 0.2 K/UL (ref 0–0.2)
BASOPHILS NFR BLD: 1 % (ref 0–2)
BUN SERPL-MCNC: 11 MG/DL (ref 8–23)
CALCIUM SERPL-MCNC: 8.6 MG/DL (ref 8.3–10.4)
CHLORIDE SERPL-SCNC: 116 MMOL/L (ref 98–107)
CO2 SERPL-SCNC: 24 MMOL/L (ref 21–32)
CREAT SERPL-MCNC: 0.43 MG/DL (ref 0.8–1.5)
DIFFERENTIAL METHOD BLD: ABNORMAL
EOSINOPHIL # BLD: 0 K/UL (ref 0–0.8)
EOSINOPHIL NFR BLD: 0 % (ref 0.5–7.8)
ERYTHROCYTE [DISTWIDTH] IN BLOOD BY AUTOMATED COUNT: 15.4 % (ref 11.9–14.6)
GLUCOSE SERPL-MCNC: 130 MG/DL (ref 65–100)
HCT VFR BLD AUTO: 35.2 % (ref 41.1–50.3)
HGB BLD-MCNC: 12.1 G/DL (ref 13.6–17.2)
IMM GRANULOCYTES # BLD AUTO: 0.5 K/UL (ref 0–0.5)
IMM GRANULOCYTES NFR BLD AUTO: 3 % (ref 0–5)
LYMPHOCYTES # BLD: 2.2 K/UL (ref 0.5–4.6)
LYMPHOCYTES NFR BLD: 13 % (ref 13–44)
MAGNESIUM SERPL-MCNC: 1.7 MG/DL (ref 1.8–2.4)
MCH RBC QN AUTO: 36.8 PG (ref 26.1–32.9)
MCHC RBC AUTO-ENTMCNC: 34.4 G/DL (ref 31.4–35)
MCV RBC AUTO: 107 FL (ref 79.6–97.8)
MONOCYTES # BLD: 0.7 K/UL (ref 0.1–1.3)
MONOCYTES NFR BLD: 4 % (ref 4–12)
NEUTS SEG # BLD: 13.4 K/UL (ref 1.7–8.2)
NEUTS SEG NFR BLD: 79 % (ref 43–78)
NRBC # BLD: 0 K/UL (ref 0–0.2)
PLATELET # BLD AUTO: 166 K/UL (ref 150–450)
PLATELET COMMENTS,PCOM: ADEQUATE
PMV BLD AUTO: 11.7 FL (ref 9.4–12.3)
POTASSIUM SERPL-SCNC: 2.7 MMOL/L (ref 3.5–5.1)
POTASSIUM SERPL-SCNC: 3.1 MMOL/L (ref 3.5–5.1)
RBC # BLD AUTO: 3.29 M/UL (ref 4.23–5.6)
RBC MORPH BLD: ABNORMAL
SODIUM SERPL-SCNC: 148 MMOL/L (ref 136–145)
WBC # BLD AUTO: 17 K/UL (ref 4.3–11.1)
WBC MORPH BLD: SLIGHT

## 2019-05-10 PROCEDURE — 83735 ASSAY OF MAGNESIUM: CPT

## 2019-05-10 PROCEDURE — 36415 COLL VENOUS BLD VENIPUNCTURE: CPT

## 2019-05-10 PROCEDURE — 65660000000 HC RM CCU STEPDOWN

## 2019-05-10 PROCEDURE — 74011250637 HC RX REV CODE- 250/637: Performed by: NURSE PRACTITIONER

## 2019-05-10 PROCEDURE — 74011250637 HC RX REV CODE- 250/637: Performed by: FAMILY MEDICINE

## 2019-05-10 PROCEDURE — 74011250636 HC RX REV CODE- 250/636: Performed by: FAMILY MEDICINE

## 2019-05-10 PROCEDURE — 74011250637 HC RX REV CODE- 250/637: Performed by: INTERNAL MEDICINE

## 2019-05-10 PROCEDURE — 74011250636 HC RX REV CODE- 250/636: Performed by: INTERNAL MEDICINE

## 2019-05-10 PROCEDURE — 74011000250 HC RX REV CODE- 250: Performed by: FAMILY MEDICINE

## 2019-05-10 PROCEDURE — 74011250636 HC RX REV CODE- 250/636: Performed by: HOSPITALIST

## 2019-05-10 PROCEDURE — 74011250637 HC RX REV CODE- 250/637: Performed by: PHYSICIAN ASSISTANT

## 2019-05-10 PROCEDURE — 84132 ASSAY OF SERUM POTASSIUM: CPT

## 2019-05-10 PROCEDURE — 85025 COMPLETE CBC W/AUTO DIFF WBC: CPT

## 2019-05-10 PROCEDURE — 80048 BASIC METABOLIC PNL TOTAL CA: CPT

## 2019-05-10 PROCEDURE — 77030019605

## 2019-05-10 RX ORDER — POTASSIUM CHLORIDE 20 MEQ/1
40 TABLET, EXTENDED RELEASE ORAL EVERY 4 HOURS
Status: COMPLETED | OUTPATIENT
Start: 2019-05-10 | End: 2019-05-11

## 2019-05-10 RX ORDER — POTASSIUM CHLORIDE 20 MEQ/1
40 TABLET, EXTENDED RELEASE ORAL EVERY 4 HOURS
Status: COMPLETED | OUTPATIENT
Start: 2019-05-10 | End: 2019-05-10

## 2019-05-10 RX ORDER — MAGNESIUM SULFATE HEPTAHYDRATE 40 MG/ML
2 INJECTION, SOLUTION INTRAVENOUS ONCE
Status: COMPLETED | OUTPATIENT
Start: 2019-05-10 | End: 2019-05-11

## 2019-05-10 RX ORDER — SAME BUTANEDISULFONATE/BETAINE 400-600 MG
500 POWDER IN PACKET (EA) ORAL 2 TIMES DAILY
Status: DISCONTINUED | OUTPATIENT
Start: 2019-05-10 | End: 2019-05-12

## 2019-05-10 RX ORDER — POTASSIUM CHLORIDE 20 MEQ/1
40 TABLET, EXTENDED RELEASE ORAL
Status: COMPLETED | OUTPATIENT
Start: 2019-05-10 | End: 2019-05-10

## 2019-05-10 RX ADMIN — METOPROLOL TARTRATE 50 MG: 50 TABLET ORAL at 08:49

## 2019-05-10 RX ADMIN — POTASSIUM CHLORIDE 40 MEQ: 20 TABLET, EXTENDED RELEASE ORAL at 12:36

## 2019-05-10 RX ADMIN — Medication 500 MG: at 12:36

## 2019-05-10 RX ADMIN — VANCOMYCIN HYDROCHLORIDE 125 MG: 1 INJECTION, POWDER, LYOPHILIZED, FOR SOLUTION INTRAVENOUS at 01:07

## 2019-05-10 RX ADMIN — METOPROLOL TARTRATE 50 MG: 50 TABLET ORAL at 17:24

## 2019-05-10 RX ADMIN — CIPROFLOXACIN HYDROCHLORIDE 750 MG: 500 TABLET, FILM COATED ORAL at 22:31

## 2019-05-10 RX ADMIN — Medication 500 MG: at 17:24

## 2019-05-10 RX ADMIN — VANCOMYCIN HYDROCHLORIDE 125 MG: 1 INJECTION, POWDER, LYOPHILIZED, FOR SOLUTION INTRAVENOUS at 17:28

## 2019-05-10 RX ADMIN — POTASSIUM CHLORIDE 40 MEQ: 20 TABLET, EXTENDED RELEASE ORAL at 08:48

## 2019-05-10 RX ADMIN — MAGNESIUM SULFATE HEPTAHYDRATE 2 G: 40 INJECTION, SOLUTION INTRAVENOUS at 15:57

## 2019-05-10 RX ADMIN — DEXTROSE MONOHYDRATE, SODIUM CHLORIDE, AND POTASSIUM CHLORIDE 75 ML/HR: 50; 4.5; 1.49 INJECTION, SOLUTION INTRAVENOUS at 10:38

## 2019-05-10 RX ADMIN — ATORVASTATIN CALCIUM 40 MG: 40 TABLET, FILM COATED ORAL at 08:49

## 2019-05-10 RX ADMIN — PANTOPRAZOLE SODIUM 40 MG: 40 TABLET, DELAYED RELEASE ORAL at 08:48

## 2019-05-10 RX ADMIN — VANCOMYCIN HYDROCHLORIDE 125 MG: 1 INJECTION, POWDER, LYOPHILIZED, FOR SOLUTION INTRAVENOUS at 05:54

## 2019-05-10 RX ADMIN — Medication 10 ML: at 13:59

## 2019-05-10 RX ADMIN — AMLODIPINE BESYLATE 5 MG: 5 TABLET ORAL at 08:49

## 2019-05-10 RX ADMIN — CHLORDIAZEPOXIDE HYDROCHLORIDE 25 MG: 25 CAPSULE ORAL at 05:53

## 2019-05-10 RX ADMIN — CHLORDIAZEPOXIDE HYDROCHLORIDE 25 MG: 25 CAPSULE ORAL at 22:31

## 2019-05-10 RX ADMIN — THIAMINE HYDROCHLORIDE: 100 INJECTION, SOLUTION INTRAMUSCULAR; INTRAVENOUS at 12:36

## 2019-05-10 RX ADMIN — POTASSIUM CHLORIDE 40 MEQ: 20 TABLET, EXTENDED RELEASE ORAL at 22:31

## 2019-05-10 RX ADMIN — CIPROFLOXACIN HYDROCHLORIDE 750 MG: 500 TABLET, FILM COATED ORAL at 08:48

## 2019-05-10 RX ADMIN — CHLORDIAZEPOXIDE HYDROCHLORIDE 25 MG: 25 CAPSULE ORAL at 13:59

## 2019-05-10 RX ADMIN — PANTOPRAZOLE SODIUM 40 MG: 40 TABLET, DELAYED RELEASE ORAL at 15:57

## 2019-05-10 RX ADMIN — POTASSIUM CHLORIDE 40 MEQ: 20 TABLET, EXTENDED RELEASE ORAL at 15:56

## 2019-05-10 RX ADMIN — LORAZEPAM 1 MG: 2 INJECTION INTRAMUSCULAR at 22:30

## 2019-05-10 RX ADMIN — VANCOMYCIN HYDROCHLORIDE 125 MG: 1 INJECTION, POWDER, LYOPHILIZED, FOR SOLUTION INTRAVENOUS at 12:53

## 2019-05-10 RX ADMIN — Medication 10 ML: at 22:00

## 2019-05-10 NOTE — PROGRESS NOTES
Bedside and Verbal shift change report given to [facility] (oncoming nurse) by self (offgoing nurse). Report included the following information SBAR, Kardex, Intake/Output and MAR.

## 2019-05-10 NOTE — PROGRESS NOTES
Physical Therapy Note: Attempted to see patient for physical therapy treatment, patient reports, \"I just finished eating breakfast.\" Would not attempt mobility despite attempts to redirect. Will attempt another time as patient is able and schedule allows. Thank you, LATIA RogersT

## 2019-05-10 NOTE — PROGRESS NOTES
Hospitalist Progress Note Admit Date:  2019  7:26 AM  
Name:  Carolyn Main Age:  59 y.o. 
:  1960 MRN:  [id number] PCP:  Everton Gonzalez MD 
Treatment Team: Attending Provider: Tri Mcduffie MD; Consulting Provider: Jarrell Brown MD; Consulting Provider: Boone Couch MD; Utilization Review: Denae Miller RN; Care Manager: Jose Goodwin RN; Occupational Therapist: Shahab Negron OT; Physical Therapist: Don Mujica DPT Subjective:  
59 yr old male pt with known h/o htn, h/o cardiac cath- non occlusive coronaries, known alcohol dependent admitted for pna,GI bleed,afib with rvr and alcohol withdrawal 19. 
 
 
05/10/2019- pt seen - awaiting rehab placement; still having copious diarrhea in rectal tube. Objective:  
 
Patient Vitals for the past 24 hrs: 
 Temp Pulse Resp BP SpO2  
05/10/19 0906 97.7 °F (36.5 °C) 78 16 121/68 92 % 05/10/19 0517 98.7 °F (37.1 °C) 80 18 114/62 91 % 05/10/19 0119 98.1 °F (36.7 °C) 77 18 112/59 91 % 19 2111 97.8 °F (36.6 °C) 71 18 125/71 91 % 19 1652 97.7 °F (36.5 °C) 78 18 101/63 91 % 19 1321 98.1 °F (36.7 °C) 80 18 98/59 92 % Oxygen Therapy O2 Sat (%): 92 % (05/10/19 0906) Pulse via Oximetry: 93 beats per minute (19 1459) O2 Device: Room air (05/10/19 0839) Intake/Output Summary (Last 24 hours) at 5/10/2019 1057 Last data filed at 5/10/2019 6547 Gross per 24 hour Intake 60 ml Output 475 ml Net -415 ml General:    Well nourished. Alert. CV:   RRR. No murmur, rub, or gallop. Lungs:   CTAB. No wheezing, rhonchi, or rales. Abdomen:   Soft, nontender, nondistended. Extremities: Warm and dry. No cyanosis or edema. Skin:     No rashes or jaundice. Data Review: 
I have reviewed all labs, meds, telemetry events, and studies from the last 24 hours. Recent Results (from the past 24 hour(s)) METABOLIC PANEL, BASIC  Collection Time: 19 11:24 AM  
 Result Value Ref Range Sodium 146 (H) 136 - 145 mmol/L Potassium 2.5 (LL) 3.5 - 5.1 mmol/L Chloride 113 (H) 98 - 107 mmol/L  
 CO2 22 21 - 32 mmol/L Anion gap 11 7 - 16 mmol/L Glucose 94 65 - 100 mg/dL BUN 10 8 - 23 MG/DL Creatinine 0.49 (L) 0.8 - 1.5 MG/DL  
 GFR est AA >60 >60 ml/min/1.73m2 GFR est non-AA >60 >60 ml/min/1.73m2 Calcium 8.9 8.3 - 80.2 MG/DL  
METABOLIC PANEL, BASIC Collection Time: 05/10/19  4:56 AM  
Result Value Ref Range Sodium 148 (H) 136 - 145 mmol/L Potassium 2.7 (LL) 3.5 - 5.1 mmol/L Chloride 116 (H) 98 - 107 mmol/L  
 CO2 24 21 - 32 mmol/L Anion gap 8 7 - 16 mmol/L Glucose 130 (H) 65 - 100 mg/dL BUN 11 8 - 23 MG/DL Creatinine 0.43 (L) 0.8 - 1.5 MG/DL  
 GFR est AA >60 >60 ml/min/1.73m2 GFR est non-AA >60 >60 ml/min/1.73m2 Calcium 8.6 8.3 - 10.4 MG/DL  
CBC WITH AUTOMATED DIFF Collection Time: 05/10/19  4:56 AM  
Result Value Ref Range WBC 17.0 (H) 4.3 - 11.1 K/uL  
 RBC 3.29 (L) 4.23 - 5.6 M/uL  
 HGB 12.1 (L) 13.6 - 17.2 g/dL HCT 35.2 (L) 41.1 - 50.3 % .0 (H) 79.6 - 97.8 FL  
 MCH 36.8 (H) 26.1 - 32.9 PG  
 MCHC 34.4 31.4 - 35.0 g/dL  
 RDW 15.4 (H) 11.9 - 14.6 % PLATELET 873 487 - 218 K/uL MPV 11.7 9.4 - 12.3 FL ABSOLUTE NRBC 0.00 0.0 - 0.2 K/uL DF PENDING All Micro Results Procedure Component Value Units Date/Time C. DIFFICILE AG & TOXIN A/B [[id number]] Collected:  05/08/19 2206 Order Status:  Completed Specimen:  Stool Updated:  05/09/19 [id number] ANTIGEN    
  C. DIFFICILE GDH ANTIGEN-NEGATIVE  
     
  C. difficile toxin C. DIFFICILE TOXIN-NEGATIVE  
     
  PCR Reflex NOT APPLICABLE INTERPRETATION    
  NEGATIVE FOR TOXIGENIC C. DIFFICILE Clinical Consideration NEGATIVE FOR TOXIGENIC C. DIFFICILE  
     
 CULTURE, BLOOD [[id number]]  (Abnormal) Collected:  05/05/19 0910 Order Status:  Completed Specimen:  Blood Updated:  05/07/19 1208 Special Requests: --     
  LEFT 
FOREARM 
  
  GRAM STAIN GRAM NEGATIVE RODS AEROBIC BOTTLE POSITIVE CRITICAL RESULT NOT CALLED DUE TO PREVIOUS NOTIFICATION OF CRITICAL RESULT WITHIN THE LAST 24 HOURS. Culture result: GRAM NEGATIVE RODS     
   FOR ID AND SUSCEPTIBILITY SEE ACCESSION NO [id number] CULTURE, BLOOD [[id number]]  (Abnormal)  (Susceptibility) Collected:  05/05/19 5494 Order Status:  Completed Specimen:  Blood Updated:  05/07/19 2253 Special Requests: --     
  RIGHT 
FOREARM 
  
  GRAM STAIN GRAM NEGATIVE RODS AEROBIC BOTTLE POSITIVE RESULTS VERIFIED, PHONED TO AND READ BACK BY Bonnie Godinez RN ON 05/05/2019 AT 1815 WMR Culture result: ACINETOBACTER BAUMANNII Current Meds: 
Current Facility-Administered Medications Medication Dose Route Frequency  vancomycin 50 mg/mL oral solution (compounded) 125 mg  125 mg Oral Q6H  
 dextrose 5% - 0.45% NaCl with KCl 20 mEq/L infusion  75 mL/hr IntraVENous CONTINUOUS  
 pantoprazole (PROTONIX) tablet 40 mg  40 mg Oral ACB&D  ciprofloxacin HCl (CIPRO) tablet 750 mg  750 mg Oral Q12H  
 metoprolol tartrate (LOPRESSOR) tablet 50 mg  50 mg Oral BID  LORazepam (ATIVAN) injection 1 mg  1 mg IntraVENous Q4H  
 chlordiazePOXIDE (LIBRIUM) capsule 25 mg  25 mg Oral Q8H  
 0.9% sodium chloride 1,000 mL with mvi, adult no. 4 with vit K 10 mL, thiamine 863 mg, folic acid 1 mg infusion   IntraVENous Q24H  
 amLODIPine (NORVASC) tablet 5 mg  5 mg Oral DAILY  atorvastatin (LIPITOR) tablet 40 mg  40 mg Oral DAILY  nitroglycerin (NITROSTAT) tablet 0.4 mg  0.4 mg SubLINGual Q5MIN PRN  
 sodium chloride (NS) flush 5-40 mL  5-40 mL IntraVENous Q8H  
 sodium chloride (NS) flush 5-40 mL  5-40 mL IntraVENous PRN  
 acetaminophen (TYLENOL) tablet 650 mg  650 mg Oral Q4H PRN  
 HYDROcodone-acetaminophen (NORCO) 5-325 mg per tablet 1 Tab  1 Tab Oral Q4H PRN  
  morphine injection 1 mg  1 mg IntraVENous Q4H PRN  
 nicotine (NICODERM CQ) 21 mg/24 hr patch 1 Patch  1 Patch TransDERmal Q24H  
 ondansetron (ZOFRAN) injection 4 mg  4 mg IntraVENous Q4H PRN  
 diphenhydrAMINE (BENADRYL) injection 12.5 mg  12.5 mg IntraVENous Q4H PRN  
 naloxone (NARCAN) injection 0.4 mg  0.4 mg IntraVENous PRN  
 sodium chloride (NS) flush 5-40 mL  5-40 mL IntraVENous Q8H  
 sodium chloride (NS) flush 5-40 mL  5-40 mL IntraVENous PRN Other Studies (last 24 hours): No results found. Assessment and Plan:  
 
Hospital Problems as of 5/10/2019 Never Reviewed Codes Class Noted - Resolved POA Clostridium difficile diarrhea ICD-10-CM: A04.72 
ICD-9-CM: 008.45  5/9/2019 - Present Unknown Bacteremia due to Gram-negative bacteria ICD-10-CM: R78.81 ICD-9-CM: 790.7, 041.85  5/6/2019 - Present Unknown * (Principal) PNA (pneumonia) ICD-10-CM: J18.9 ICD-9-CM: 260  5/5/2019 - Present Unknown Tachycardia ICD-10-CM: R00.0 ICD-9-CM: 785.0  5/5/2019 - Present Unknown Thrombocytopenia ([facility] [address] ICD-10-CM: D69.6 ICD-9-CM: 287.5  5/5/2019 - Present Unknown GI bleed ICD-10-CM: K92.2 ICD-9-CM: 578.9  5/5/2019 - Present Unknown A-fib [facility]) ICD-10-CM: I48.91 
ICD-9-CM: 427.31  5/5/2019 - Present Unknown Tobacco abuse ICD-10-CM: Z72.0 ICD-9-CM: 305.1  11/28/2016 - Present Yes ETOH abuse ICD-10-CM: F10.10 ICD-9-CM: 305.00  11/28/2016 - Present Yes HTN (hypertension) (Chronic) ICD-10-CM: I10 
ICD-9-CM: 401.9  11/19/2014 - Present Yes PLAN:   
· Diarrhea- suspect from abx. Agree with Id plan to continue vanc for now- wbc trending down and pt feeling better since his was started Acinetobacter bacteremia- continue oral cipro- 750mg po q12h 1st does 05/08/2019 · S/p EGD on 05/07- which showed gastritis and some erosions · Hypokalemia- replete and re-check this pm  
· Hypomag- replete · afib with rvr- controlled on lopressor · thrombocytopenia from alcohol use- monitor- no active bleeding · htn- continue current meds · Further workup and mg based on his clinical course · Awaiting placement  For rehab via the [place]. - hopefully next week DC planning/Dispo:  As above DVT ppx:  scds- hb stable with no evidence of active bleed so may be able to start meds soon Signed: 
Carlos Alberto Tobar MD

## 2019-05-10 NOTE — PROGRESS NOTES
Bedside and Verbal shift change report given to self (oncoming nurse) by Kylee Chang (offgoing nurse). Report included the following information SBAR, Kardex, Intake/Output, MAR and Recent Results.

## 2019-05-10 NOTE — PROGRESS NOTES
Critical lab value of Potassium 2.7. Paged hospitalist Alyse duron on call. Per Dr. Joeann Holstein, orders for 40 mEq P. O potassium ordered.

## 2019-05-10 NOTE — PROGRESS NOTES
Received information from Kristina Sanchez at [facility] that patient has no VA benefits for rehab as he was not service connected. Patient does not have Medicare or Medicaid insurance. Patient unable to go to rehab and will set up home health for patient when medically stable.

## 2019-05-10 NOTE — PROGRESS NOTES
Visit with patient to build rapport with . Patient is calm. Receptive to  presence. Encouraged and assured patient of our continued care. Olivier Macdonald,  Staff  C: [phone]  /  [email or web address]

## 2019-05-10 NOTE — PROGRESS NOTES
Verbal bedside report received from Nakul Salazar, [address] [place] [address]. Patient's situation, background, assessment and recommendations were provided. Kardex, Mar, and recent results also reviewed. Opportunity for questions provided. Assumed care of patient.

## 2019-05-10 NOTE — PROGRESS NOTES
Verbal bedside report given to Cooper Silvestre, oncoming RN. Patient's situation, background, assessment and recommendations provided. Kardex, Mar, and recent results also reviewed. Opportunity for questions provided. Oncoming RN assumed care of patient.

## 2019-05-10 NOTE — PROGRESS NOTES
Infectious Disease Note Today's Date: 5/10/2019 Admit Date: 2019 Impression: · Acinetobacter baumannii bacteremia (), source likely PNA · New watery stools- C diff NG 
· RLL CAP vs aspiration · Alcoholism with tobacco use-drinks pint of vodka daily. HIV/HCV NR  
· Debility with malnutrition Plan:  
·  Continue Cipro 750mg oral BID to complete 7 day duration (EOT ). · Stool lactoferrin pending · Check CBC today Anti-infectives:  
· CTX (-), Cipro - 
· Azithro (-) Subjective: Afebrile for 18h. No CBC today. Patient sitting in bed, breakfast in front of him but not eating as he states that food \"Causes diarrhea. \" States cough much better. FMS with 300cc/12h. No Known Allergies Review of Systems:  A comprehensive review of systems was negative except for that written in the History of Present Illness. Objective:  
 
Visit Vitals /68 (BP 1 Location: Right arm, BP Patient Position: At rest) Pulse 78 Temp 97.7 °F (36.5 °C) Resp 16 Ht 5' 11\" (1.803 m) Wt 75.3 kg (166 lb 1.6 oz) SpO2 92% BMI 23.17 kg/m² Temp (24hrs), Av °F (36.7 °C), Min:97.7 °F (36.5 °C), Max:98.7 °F (37.1 °C) Lines:  Peripheral IV:    
 
Physical Exam:   
General:  Alert, cooperative, NAD Eyes:  Sclera anicteric. Pupils equally round and reactive to light. Mouth/Throat: Mucous membranes normal, oral pharynx clear Neck: Supple Lungs:   Clear anteriorly, RA   
CV:  Regular rate and irreg rhythm,no murmur, click, rub or gallop Abdomen:   Soft, non-tender. bowel sounds normal. non-distended Extremities: No cyanosis or edema Skin: Skin color, texture, turgor normal. no acute rash or lesions. + tattoos Lymph nodes: Cervical and supraclavicular normal  
Musculoskeletal: No swelling or deformity Lines/Devices:  Intact, no erythema, drainage or tenderness Psych: Alert and oriented, normal mood affect given the setting Data Review: CBC: 
 Recent Labs 05/09/19 
0413 05/08/19 
1106 WBC 19.0* 17.9* HGB 13.2* 13.4* HCT 37.8* 38.3*  
* 102* BMP: 
Recent Labs 05/10/19 
0456 05/09/19 
1124 05/07/19 
1223 CREA 0.43* 0.49*  --   
BUN 11 10  --   
* 146*  --   
K 2.7* 2.5* 3.0*  
* 113*  --   
CO2 24 22  --   
AGAP 8 11  --   
* 94  --   
 
 
LFTS: 
No results for input(s): TBILI, ALT, SGOT, AP, TP, ALB in the last 72 hours. Microbiology:  
 
All Micro Results Procedure Component Value Units Date/Time C. DIFFICILE AG & TOXIN A/B [[id number]] Collected:  05/08/19 2206 Order Status:  Completed Specimen:  Stool Updated:  05/09/19 1132 [id number] ANTIGEN    
  C. DIFFICILE GDH ANTIGEN-NEGATIVE  
     
  C. difficile toxin C. DIFFICILE TOXIN-NEGATIVE  
     
  PCR Reflex NOT APPLICABLE INTERPRETATION    
  NEGATIVE FOR TOXIGENIC C. DIFFICILE Clinical Consideration NEGATIVE FOR TOXIGENIC C. DIFFICILE  
     
 CULTURE, BLOOD [[id number]]  (Abnormal) Collected:  05/05/19 0910 Order Status:  Completed Specimen:  Blood Updated:  05/07/19 0770 Special Requests: --     
  LEFT 
FOREARM 
  
  GRAM STAIN GRAM NEGATIVE RODS AEROBIC BOTTLE POSITIVE CRITICAL RESULT NOT CALLED DUE TO PREVIOUS NOTIFICATION OF CRITICAL RESULT WITHIN THE LAST 24 HOURS. Culture result: GRAM NEGATIVE RODS     
   FOR ID AND SUSCEPTIBILITY SEE ACCESSION NO [id number] CULTURE, BLOOD [[id number]]  (Abnormal)  (Susceptibility) Collected:  05/05/19 1198 Order Status:  Completed Specimen:  Blood Updated:  05/07/19 4013 Special Requests: --     
  RIGHT 
FOREARM 
  
  GRAM STAIN GRAM NEGATIVE RODS AEROBIC BOTTLE POSITIVE RESULTS VERIFIED, PHONED TO AND READ BACK BY Megan Lovell RN ON 05/05/2019 AT 1815 WMR Culture result: ACINETOBACTER BAUMANNII Imaging:  
See HPI/EPIC Signed By: Deloise Felty, NP May 10, 2019

## 2019-05-10 NOTE — PROGRESS NOTES
[facility] Progress Note Admit Date:  5/5/2019 Today's Date:  5/10/2019 CC:  ELevated LFTs Subjective:  
 
Pt \"feels better. \"  He cannot tell me how many BMs he has had. Rectal tube is in place with 250 cc brown stool. He now denies abdominal pain. Yesterday he reported crampy lower abdominal pain. No recurrent GI bleeding. No N/V. He is tolerating diet- he just started breakfast- eating eggs and Beninese toast.  Afebrile for at least 24 hrs Medications:  
Current Facility-Administered Medications Medication Dose Route Frequency  vancomycin 50 mg/mL oral solution (compounded) 125 mg  125 mg Oral Q6H  
 dextrose 5% - 0.45% NaCl with KCl 20 mEq/L infusion  75 mL/hr IntraVENous CONTINUOUS  
 pantoprazole (PROTONIX) tablet 40 mg  40 mg Oral ACB&D  ciprofloxacin HCl (CIPRO) tablet 750 mg  750 mg Oral Q12H  
 metoprolol tartrate (LOPRESSOR) tablet 50 mg  50 mg Oral BID  LORazepam (ATIVAN) injection 1 mg  1 mg IntraVENous Q4H  
 chlordiazePOXIDE (LIBRIUM) capsule 25 mg  25 mg Oral Q8H  
 0.9% sodium chloride 1,000 mL with mvi, adult no. 4 with vit K 10 mL, thiamine 107 mg, folic acid 1 mg infusion   IntraVENous Q24H  
 amLODIPine (NORVASC) tablet 5 mg  5 mg Oral DAILY  atorvastatin (LIPITOR) tablet 40 mg  40 mg Oral DAILY  nitroglycerin (NITROSTAT) tablet 0.4 mg  0.4 mg SubLINGual Q5MIN PRN  
 sodium chloride (NS) flush 5-40 mL  5-40 mL IntraVENous Q8H  
 sodium chloride (NS) flush 5-40 mL  5-40 mL IntraVENous PRN  
 acetaminophen (TYLENOL) tablet 650 mg  650 mg Oral Q4H PRN  
 HYDROcodone-acetaminophen (NORCO) 5-325 mg per tablet 1 Tab  1 Tab Oral Q4H PRN  
 morphine injection 1 mg  1 mg IntraVENous Q4H PRN  
 nicotine (NICODERM CQ) 21 mg/24 hr patch 1 Patch  1 Patch TransDERmal Q24H  
 ondansetron (ZOFRAN) injection 4 mg  4 mg IntraVENous Q4H PRN  
 diphenhydrAMINE (BENADRYL) injection 12.5 mg  12.5 mg IntraVENous Q4H PRN  
  naloxone (NARCAN) injection 0.4 mg  0.4 mg IntraVENous PRN  
 sodium chloride (NS) flush 5-40 mL  5-40 mL IntraVENous Q8H  
 sodium chloride (NS) flush 5-40 mL  5-40 mL IntraVENous PRN Review of Systems: ROS was obtained, with pertinent positives as listed above. No chest pain or SOB. Diet:  GI soft Objective:  
Vitals: 
Visit Vitals /68 (BP 1 Location: Right arm, BP Patient Position: At rest) Pulse 78 Temp 97.7 °F (36.5 °C) Resp 16 Ht 5' 11\" (1.803 m) Wt 75.3 kg (166 lb 1.6 oz) SpO2 92% BMI 23.17 kg/m² Intake/Output: 
No intake/output data recorded. 05/08 1901 - 05/10 0700 In: 120 [P.O.:120] Out: 300 [Drains:300] Exam: 
General appearance: alert, cooperative, no distress Lungs: clear to auscultation bilaterally anteriorly. Heart: regular rate and rhythm Abdomen: soft, non-tender. Bowel sounds normal. No masses, no organomegaly Neuro:  alert + tremor, answers questions but slowly Data Review (Labs):   
Recent Labs 05/10/19 
0456 05/09/19 
1124 05/09/19 
0413 05/08/19 
1106 05/07/19 
1223 WBC  --   --  19.0* 17.9*  --   
HGB  --   --  13.2* 13.4*  --   
HCT  --   --  37.8* 38.3*  --   
PLT  --   --  138* 102*  --   
MCV  --   --  105.0* 105.5*  --   
* 146*  --   --   --   
K 2.7* 2.5*  --   --  3.0*  
* 113*  --   --   --   
CO2 24 22  --   --   --   
BUN 11 10  --   --   --   
CREA 0.43* 0.49*  --   --   --   
CA 8.6 8.9  --   --   --   
* 94  --   --   --   
 
C diff Neg 
 
ECHO 5/6/19 SUMMARY 
-  Left ventricle: Systolic function was at the lower limits of normal. Ejection fraction was estimated in the range of 50 % to 55 %. This study was 
inadequate for the evaluation of regional wall motion. -  Inferior vena cava, hepatic veins: The respirophasic change in diameter was less than 50%. -  Aortic valve: There was no evidence for vegetation. -  Mitral valve: There was no evidence for vegetation. EGD 5/7/19 for Acute blood loss anemia PostOp Diagnosis:  Moderate gastritis. No active bleeding Findings: OROPHARYNX: The piriform sinuses, arytenoid cartilage and vocal cords were briefly evaluated on entry and withdrawal of the endoscope through the oropharynx. These were found to be unremarkable. ESOPHAGUS: The esophageal mucosa was unremarkable. The squamocolumnar junction was intact without erosions, ulcerations, rings, webs or strictures. There was no evidence of Garcia's type intestinal metaplasia. No evidence of esophageal varices STOMACH: The gastric mucosa was erythematous, consistent with Moderate proximal gastritis. The gastric antrum appeared normal. There were no erosions, ulcerations, polyps, mass lesions, vascular ectasias or other abnormalities noted. This may represent portal hypertensive gastropathy or alcoholic gastritis. The pylorus was patent and easily intubated. There was no hiatal hernia noted by direct view or retroflexion within the stomach. DUODENUM: The endoscope was advanced as far as possible into the duodenum. The villi appeared normal.  There were no mucosal breaks, erosions, ulcerations, vascular ectasias or other abnormalities present. Recommendations: 
B.i.d. PPI Avoid NSAIDs Alcohol abstinence Advance diet Mild anemia and severe thrombocytopenia may be related to bone marrow suppression from alcohol and malnutrition etc.  
 
 
Assessment:  
 
Principal Problem: 
  PNA (pneumonia) (5/5/2019) Active Problems: 
  HTN (hypertension) (11/19/2014) Tobacco abuse (11/28/2016) ETOH abuse (11/28/2016) Tachycardia (5/5/2019) Thrombocytopenia ([address] (5/5/2019) GI bleed (5/5/2019) A-fib ([address] (5/5/2019) Bacteremia due to Gram-negative bacteria (5/6/2019) Clostridium difficile diarrhea (5/9/2019) 58 y/o male with HTN, colon polyps, h/o EtOH and tobacco abuse is admitted with pneumonia, a. Fib with RVR, EtOH withdrawal and seen by GI for black stools x 1 week and thrombocytopenia. EGD showed Moderate gastritis. No signs of bleeding since and now H/H is stable. Now he is having more issues with diarrhea. Cdiff neg. ?antibiotic induced though WBC was trending up. ID following. Was on Azithro. Now on Cipro for PNA- CAP vs. Aspiration. Plan: - No signs of GI bleeding, and Hgb remains stable. - Continue PPI bid for moderate gastritis - WBC trending up, had low grade fevers PM of 5/8. Now afebrile for at least 24hrs. CBC this AM pending. 
- C/o Diarrhea and with increasing concern for Cdiff especially in setting of antibx for PNA, possible CPAP vs. Aspiration. ID following and po Vancomycin started for clinical suspicion of Cdiff while were awaiting Cdiff results. Cdiff returned negative 5/9. Awaiting f/u CBC this AM, fecal lactoferrin. He appears to be feeling better. Rectal tube in place with 250cc brown stool. Follow output volumes. Ok to continue po Vanc for now per ID recommendations. Question if he could have component of antibiotic induced diarrhea- Will add probiotic. - Etoh withdraw- improved. - LFTs trending down- Etoh abstinence Robert Moya PA-C [facility] I have seen and examined this patient, and agree with above assessment and plan. Clinically patient improving. Afebrile and white count trending down. Agree w/ above measures. Follow up lactoferrin Darren Doyle MD

## 2019-05-10 NOTE — PROGRESS NOTES
Problem: Falls - Risk of 
Goal: *Absence of Falls Description Document Eileen Seguraak Fall Risk and appropriate interventions in the flowsheet. Outcome: Progressing Towards Goal 
Note:  
Fall Risk Interventions: 
Mobility Interventions: Bed/chair exit alarm, Communicate number of staff needed for ambulation/transfer, Patient to call before getting OOB Mentation Interventions: Adequate sleep, hydration, pain control, Bed/chair exit alarm Medication Interventions: Bed/chair exit alarm, Patient to call before getting OOB, Teach patient to arise slowly Elimination Interventions: Call light in reach, Bed/chair exit alarm Patient progressing towards goal with no falls on current admission. Patient without confusion, agitation, or sensory perception deficits. Patient has steady gait on ambulation. Personal belongings are within reach. Bed is in the low and locked position with side rails up x2. Yellow gripper socks to bilateral feet. Call light within reach and patient verbalizes understanding of use. Problem: Pressure Injury - Risk of 
Goal: *Prevention of pressure injury Description Document Dom Scale and appropriate interventions in the flowsheet. Note:  
Pressure Injury Interventions: 
Sensory Interventions: Assess changes in LOC, Minimize linen layers Moisture Interventions: Absorbent underpads, Apply protective barrier, creams and emollients, Check for incontinence Q2 hours and as needed Activity Interventions: Assess need for specialty bed, Increase time out of bed Mobility Interventions: Assess need for specialty bed, Float heels, HOB 30 degrees or less Nutrition Interventions: Document food/fluid/supplement intake

## 2019-05-10 NOTE — PROGRESS NOTES
Spoke with patient and wife about d/c planning. Patient slept through discussion. Wife informed that he has no benefits with [facility] for SNF per our liason. She was given information on [place] benefits and numbers to call to see what benefits he may have that she can arrange for him. She states up until 2 weeks ago patient was working then he quit his job. He was offered [facility] to keep his insurance but wife states they cannot afford the [facility]. Wife understands patient cannot go to rehab as he has no benefits but she has limited assistance for patient and she states he was independent and working until admission. She states he has been an alcoholic for awhile. CM following for d/c.

## 2019-05-11 LAB
ANION GAP SERPL CALC-SCNC: 8 MMOL/L (ref 7–16)
BASOPHILS # BLD: 0.2 K/UL (ref 0–0.2)
BASOPHILS NFR BLD: 1 % (ref 0–2)
BUN SERPL-MCNC: 7 MG/DL (ref 8–23)
CALCIUM SERPL-MCNC: 7.7 MG/DL (ref 8.3–10.4)
CHLORIDE SERPL-SCNC: 114 MMOL/L (ref 98–107)
CO2 SERPL-SCNC: 23 MMOL/L (ref 21–32)
CREAT SERPL-MCNC: 0.37 MG/DL (ref 0.8–1.5)
DIFFERENTIAL METHOD BLD: ABNORMAL
EOSINOPHIL # BLD: 0 K/UL (ref 0–0.8)
EOSINOPHIL NFR BLD: 0 % (ref 0.5–7.8)
ERYTHROCYTE [DISTWIDTH] IN BLOOD BY AUTOMATED COUNT: 15.5 % (ref 11.9–14.6)
GLUCOSE SERPL-MCNC: 114 MG/DL (ref 65–100)
HCT VFR BLD AUTO: 35.9 % (ref 41.1–50.3)
HGB BLD-MCNC: 11.8 G/DL (ref 13.6–17.2)
IMM GRANULOCYTES # BLD AUTO: 0.3 K/UL (ref 0–0.5)
IMM GRANULOCYTES NFR BLD AUTO: 2 % (ref 0–5)
LYMPHOCYTES # BLD: 2.3 K/UL (ref 0.5–4.6)
LYMPHOCYTES NFR BLD: 14 % (ref 13–44)
MAGNESIUM SERPL-MCNC: 1.7 MG/DL (ref 1.8–2.4)
MCH RBC QN AUTO: 36.9 PG (ref 26.1–32.9)
MCHC RBC AUTO-ENTMCNC: 32.9 G/DL (ref 31.4–35)
MCV RBC AUTO: 112.2 FL (ref 79.6–97.8)
MONOCYTES # BLD: 0.8 K/UL (ref 0.1–1.3)
MONOCYTES NFR BLD: 5 % (ref 4–12)
NEUTS SEG # BLD: 12.5 K/UL (ref 1.7–8.2)
NEUTS SEG NFR BLD: 78 % (ref 43–78)
NRBC # BLD: 0 K/UL (ref 0–0.2)
PLATELET # BLD AUTO: 159 K/UL (ref 150–450)
PLATELET COMMENTS,PCOM: ADEQUATE
PMV BLD AUTO: 11.7 FL (ref 9.4–12.3)
POTASSIUM SERPL-SCNC: 3.1 MMOL/L (ref 3.5–5.1)
POTASSIUM SERPL-SCNC: 3.2 MMOL/L (ref 3.5–5.1)
RBC # BLD AUTO: 3.2 M/UL (ref 4.23–5.6)
RBC MORPH BLD: ABNORMAL
SODIUM SERPL-SCNC: 145 MMOL/L (ref 136–145)
WBC # BLD AUTO: 16.1 K/UL (ref 4.3–11.1)
WBC MORPH BLD: ABNORMAL

## 2019-05-11 PROCEDURE — 85025 COMPLETE CBC W/AUTO DIFF WBC: CPT

## 2019-05-11 PROCEDURE — 74011250637 HC RX REV CODE- 250/637: Performed by: INTERNAL MEDICINE

## 2019-05-11 PROCEDURE — 74011000258 HC RX REV CODE- 258: Performed by: INTERNAL MEDICINE

## 2019-05-11 PROCEDURE — 74011250636 HC RX REV CODE- 250/636: Performed by: INTERNAL MEDICINE

## 2019-05-11 PROCEDURE — 83735 ASSAY OF MAGNESIUM: CPT

## 2019-05-11 PROCEDURE — 80048 BASIC METABOLIC PNL TOTAL CA: CPT

## 2019-05-11 PROCEDURE — 74011250637 HC RX REV CODE- 250/637: Performed by: FAMILY MEDICINE

## 2019-05-11 PROCEDURE — 74011250636 HC RX REV CODE- 250/636: Performed by: FAMILY MEDICINE

## 2019-05-11 PROCEDURE — 84132 ASSAY OF SERUM POTASSIUM: CPT

## 2019-05-11 PROCEDURE — 74011250636 HC RX REV CODE- 250/636: Performed by: HOSPITALIST

## 2019-05-11 PROCEDURE — 74011250637 HC RX REV CODE- 250/637: Performed by: NURSE PRACTITIONER

## 2019-05-11 PROCEDURE — 65660000000 HC RM CCU STEPDOWN

## 2019-05-11 PROCEDURE — 74011000250 HC RX REV CODE- 250: Performed by: FAMILY MEDICINE

## 2019-05-11 PROCEDURE — 36415 COLL VENOUS BLD VENIPUNCTURE: CPT

## 2019-05-11 PROCEDURE — 74011250637 HC RX REV CODE- 250/637: Performed by: PHYSICIAN ASSISTANT

## 2019-05-11 RX ORDER — FAMOTIDINE 20 MG/1
20 TABLET, FILM COATED ORAL 2 TIMES DAILY
Status: DISCONTINUED | OUTPATIENT
Start: 2019-05-11 | End: 2019-05-12

## 2019-05-11 RX ORDER — LORAZEPAM 2 MG/ML
0.5 INJECTION INTRAMUSCULAR EVERY 4 HOURS
Status: DISCONTINUED | OUTPATIENT
Start: 2019-05-11 | End: 2019-05-12

## 2019-05-11 RX ORDER — POTASSIUM CHLORIDE 20 MEQ/1
40 TABLET, EXTENDED RELEASE ORAL EVERY 6 HOURS
Status: COMPLETED | OUTPATIENT
Start: 2019-05-11 | End: 2019-05-12

## 2019-05-11 RX ORDER — LORAZEPAM 1 MG/1
1 TABLET ORAL
Status: DISCONTINUED | OUTPATIENT
Start: 2019-05-11 | End: 2019-05-15 | Stop reason: HOSPADM

## 2019-05-11 RX ORDER — SUCRALFATE 1 G/1
1 TABLET ORAL
Status: DISCONTINUED | OUTPATIENT
Start: 2019-05-11 | End: 2019-05-15 | Stop reason: HOSPADM

## 2019-05-11 RX ADMIN — CHLORDIAZEPOXIDE HYDROCHLORIDE 25 MG: 25 CAPSULE ORAL at 06:19

## 2019-05-11 RX ADMIN — PANTOPRAZOLE SODIUM 40 MG: 40 TABLET, DELAYED RELEASE ORAL at 08:33

## 2019-05-11 RX ADMIN — VANCOMYCIN HYDROCHLORIDE 125 MG: 1 INJECTION, POWDER, LYOPHILIZED, FOR SOLUTION INTRAVENOUS at 01:28

## 2019-05-11 RX ADMIN — THIAMINE HYDROCHLORIDE: 100 INJECTION, SOLUTION INTRAMUSCULAR; INTRAVENOUS at 10:08

## 2019-05-11 RX ADMIN — CIPROFLOXACIN HYDROCHLORIDE 750 MG: 500 TABLET, FILM COATED ORAL at 22:44

## 2019-05-11 RX ADMIN — CHLORDIAZEPOXIDE HYDROCHLORIDE 25 MG: 25 CAPSULE ORAL at 22:43

## 2019-05-11 RX ADMIN — POTASSIUM CHLORIDE 40 MEQ: 20 TABLET, EXTENDED RELEASE ORAL at 17:32

## 2019-05-11 RX ADMIN — Medication 10 ML: at 22:44

## 2019-05-11 RX ADMIN — SUCRALFATE 1 G: 1 TABLET ORAL at 22:44

## 2019-05-11 RX ADMIN — FAMOTIDINE 20 MG: 20 TABLET ORAL at 17:32

## 2019-05-11 RX ADMIN — VANCOMYCIN HYDROCHLORIDE 125 MG: 1 INJECTION, POWDER, LYOPHILIZED, FOR SOLUTION INTRAVENOUS at 17:32

## 2019-05-11 RX ADMIN — ATORVASTATIN CALCIUM 40 MG: 40 TABLET, FILM COATED ORAL at 08:33

## 2019-05-11 RX ADMIN — LORAZEPAM 1 MG: 2 INJECTION INTRAMUSCULAR at 02:00

## 2019-05-11 RX ADMIN — VANCOMYCIN HYDROCHLORIDE 125 MG: 1 INJECTION, POWDER, LYOPHILIZED, FOR SOLUTION INTRAVENOUS at 06:19

## 2019-05-11 RX ADMIN — FAMOTIDINE 20 MG: 20 TABLET ORAL at 10:59

## 2019-05-11 RX ADMIN — POTASSIUM CHLORIDE 40 MEQ: 20 TABLET, EXTENDED RELEASE ORAL at 01:28

## 2019-05-11 RX ADMIN — LORAZEPAM 1 MG: 2 INJECTION INTRAMUSCULAR at 08:32

## 2019-05-11 RX ADMIN — METOPROLOL TARTRATE 50 MG: 50 TABLET ORAL at 08:33

## 2019-05-11 RX ADMIN — SUCRALFATE 1 G: 1 TABLET ORAL at 11:01

## 2019-05-11 RX ADMIN — Medication 500 MG: at 17:32

## 2019-05-11 RX ADMIN — DEXTROSE MONOHYDRATE, SODIUM CHLORIDE, AND POTASSIUM CHLORIDE 75 ML/HR: 50; 4.5; 1.49 INJECTION, SOLUTION INTRAVENOUS at 17:33

## 2019-05-11 RX ADMIN — SUCRALFATE 1 G: 1 TABLET ORAL at 17:32

## 2019-05-11 RX ADMIN — Medication 10 ML: at 14:11

## 2019-05-11 RX ADMIN — METOPROLOL TARTRATE 50 MG: 50 TABLET ORAL at 17:32

## 2019-05-11 RX ADMIN — CHLORDIAZEPOXIDE HYDROCHLORIDE 25 MG: 25 CAPSULE ORAL at 14:11

## 2019-05-11 RX ADMIN — AMLODIPINE BESYLATE 5 MG: 5 TABLET ORAL at 08:33

## 2019-05-11 RX ADMIN — CIPROFLOXACIN HYDROCHLORIDE 750 MG: 500 TABLET, FILM COATED ORAL at 08:32

## 2019-05-11 RX ADMIN — Medication 5 ML: at 06:21

## 2019-05-11 RX ADMIN — Medication 500 MG: at 08:32

## 2019-05-11 RX ADMIN — MAGNESIUM SULFATE HEPTAHYDRATE 3 G: 500 INJECTION, SOLUTION INTRAMUSCULAR; INTRAVENOUS at 12:19

## 2019-05-11 RX ADMIN — POTASSIUM CHLORIDE 40 MEQ: 20 TABLET, EXTENDED RELEASE ORAL at 12:19

## 2019-05-11 RX ADMIN — VANCOMYCIN HYDROCHLORIDE 125 MG: 1 INJECTION, POWDER, LYOPHILIZED, FOR SOLUTION INTRAVENOUS at 11:04

## 2019-05-11 NOTE — PROGRESS NOTES
Hospitalist Progress Note Admit Date:  2019  7:26 AM  
Name:  Lucrezia Gitelman Age:  59 y.o. 
:  1960 MRN:  [id number] PCP:  Gisselle Foster MD 
Treatment Team: Attending Provider: Yoli Ribeiro MD; Consulting Provider: Kaylen Das MD; Consulting Provider: Israel Carmona MD; Utilization Review: Contreras Cooper RN; Care Manager: Gail Costa RN Subjective:  
59 yr old male pt with known h/o htn, h/o cardiac cath- non occlusive coronaries, known alcohol dependent admitted for pna,GI bleed,afib with rvr and alcohol withdrawal 19. 
 
 
05/10/2019- pt seen - awaiting rehab placement; still having copious diarrhea in rectal tube. 2019- pt seen - lethargic drowsy from sleeping. Still having diarrhea - cdiff negative. WBC trending down. Objective:  
 
Patient Vitals for the past 24 hrs: 
 Temp Pulse Resp BP SpO2  
19 0910 97.9 °F (36.6 °C) 81 19 115/67 95 % 19 0549 97.9 °F (36.6 °C) 78 18 116/78 93 % 19 0121 98 °F (36.7 °C) 81 18 126/75 90 % 05/10/19 2056 98.4 °F (36.9 °C) 67 18 92/53 91 % 05/10/19 1735 98.2 °F (36.8 °C) 73 18 115/58 90 % 05/10/19 1415 98.5 °F (36.9 °C) 74 20 103/60 95 % Oxygen Therapy O2 Sat (%): 95 % (19 0910) Pulse via Oximetry: 93 beats per minute (19 1459) O2 Device: Room air (19 0756) Intake/Output Summary (Last 24 hours) at 2019 1121 Last data filed at 2019 9347 Gross per 24 hour Intake  Output 900 ml Net -900 ml General:    Well nourished. Lethargic but easily arousable CV:   RRR. No murmur, rub, or gallop. Lungs:   CTAB. No wheezing, rhonchi, or rales. Abdomen:   Soft, nontender, nondistended. Extremities: Warm and dry. No cyanosis or edema. Skin:     No rashes or jaundice. Data Review: 
I have reviewed all labs, meds, telemetry events, and studies from the last 24 hours. Recent Results (from the past 24 hour(s)) POTASSIUM  
 Collection Time: 05/10/19  5:08 PM  
Result Value Ref Range Potassium 3.1 (L) 3.5 - 5.1 mmol/L METABOLIC PANEL, BASIC Collection Time: 05/11/19  4:38 AM  
Result Value Ref Range Sodium 145 136 - 145 mmol/L Potassium 3.1 (L) 3.5 - 5.1 mmol/L Chloride 114 (H) 98 - 107 mmol/L  
 CO2 23 21 - 32 mmol/L Anion gap 8 7 - 16 mmol/L Glucose 114 (H) 65 - 100 mg/dL BUN 7 (L) 8 - 23 MG/DL Creatinine 0.37 (L) 0.8 - 1.5 MG/DL  
 GFR est AA >60 >60 ml/min/1.73m2 GFR est non-AA >60 >60 ml/min/1.73m2 Calcium 7.7 (L) 8.3 - 10.4 MG/DL MAGNESIUM Collection Time: 05/11/19  4:38 AM  
Result Value Ref Range Magnesium 1.7 (L) 1.8 - 2.4 mg/dL CBC WITH AUTOMATED DIFF Collection Time: 05/11/19  9:17 AM  
Result Value Ref Range WBC 16.1 (H) 4.3 - 11.1 K/uL  
 RBC 3.20 (L) 4.23 - 5.6 M/uL  
 HGB 11.8 (L) 13.6 - 17.2 g/dL HCT 35.9 (L) 41.1 - 50.3 % .2 (H) 79.6 - 97.8 FL  
 MCH 36.9 (H) 26.1 - 32.9 PG  
 MCHC 32.9 31.4 - 35.0 g/dL  
 RDW 15.5 (H) 11.9 - 14.6 % PLATELET 163 881 - 121 K/uL MPV 11.7 9.4 - 12.3 FL ABSOLUTE NRBC 0.00 0.0 - 0.2 K/uL NEUTROPHILS 78 43 - 78 % LYMPHOCYTES 14 13 - 44 % MONOCYTES 5 4.0 - 12.0 % EOSINOPHILS 0 (L) 0.5 - 7.8 % BASOPHILS 1 0.0 - 2.0 % IMMATURE GRANULOCYTES 2 0.0 - 5.0 %  
 ABS. NEUTROPHILS 12.5 (H) 1.7 - 8.2 K/UL  
 ABS. LYMPHOCYTES 2.3 0.5 - 4.6 K/UL  
 ABS. MONOCYTES 0.8 0.1 - 1.3 K/UL  
 ABS. EOSINOPHILS 0.0 0.0 - 0.8 K/UL  
 ABS. BASOPHILS 0.2 0.0 - 0.2 K/UL  
 ABS. IMM. GRANS. 0.3 0.0 - 0.5 K/UL  
 RBC COMMENTS NORMOCYTIC/NORMOCHROMIC    
 WBC COMMENTS ATYPICAL LYMPHOCYTES PRESENT    
 PLATELET COMMENTS ADEQUATE    
 DF AUTOMATED All Micro Results Procedure Component Value Units Date/Time C. DIFFICILE AG & TOXIN A/B [[id number]] Collected:  05/08/19 220 Order Status:  Completed Specimen:  Stool Updated:  05/09/19 1132   GDH ANTIGEN    
  C. DIFFICILE GDH ANTIGEN-NEGATIVE  
     
 C. difficile toxin C. DIFFICILE TOXIN-NEGATIVE  
     
  PCR Reflex NOT APPLICABLE INTERPRETATION    
  NEGATIVE FOR TOXIGENIC C. DIFFICILE Clinical Consideration NEGATIVE FOR TOXIGENIC C. DIFFICILE  
     
 CULTURE, BLOOD [[id number]]  (Abnormal) Collected:  05/05/19 0910 Order Status:  Completed Specimen:  Blood Updated:  05/07/19 0000 Special Requests: --     
  LEFT 
FOREARM 
  
  GRAM STAIN GRAM NEGATIVE RODS AEROBIC BOTTLE POSITIVE CRITICAL RESULT NOT CALLED DUE TO PREVIOUS NOTIFICATION OF CRITICAL RESULT WITHIN THE LAST 24 HOURS. Culture result: GRAM NEGATIVE RODS     
   FOR ID AND SUSCEPTIBILITY SEE ACCESSION NO [id number] CULTURE, BLOOD [[id number]]  (Abnormal)  (Susceptibility) Collected:  05/05/19 6462 Order Status:  Completed Specimen:  Blood Updated:  05/07/19 8567 Special Requests: --     
  RIGHT 
FOREARM 
  
  GRAM STAIN GRAM NEGATIVE RODS AEROBIC BOTTLE POSITIVE RESULTS VERIFIED, PHONED TO AND READ BACK BY Amilcar Timmons RN ON 05/05/2019 AT 1815 WMR Culture result: ACINETOBACTER BAUMANNII Current Meds: 
Current Facility-Administered Medications Medication Dose Route Frequency  famotidine (PEPCID) tablet 20 mg  20 mg Oral BID  sucralfate (CARAFATE) tablet 1 g  1 g Oral AC&HS  potassium chloride (K-DUR, KLOR-CON) SR tablet 40 mEq  40 mEq Oral Q6H  
 magnesium sulfate 3 g in 0.9% sodium chloride 100 mL IVPB  3 g IntraVENous ONCE  Saccharomyces boulardii (FLORASTOR) capsule 500 mg  500 mg Oral BID  vancomycin 50 mg/mL oral solution (compounded) 125 mg  125 mg Oral Q6H  
 dextrose 5% - 0.45% NaCl with KCl 20 mEq/L infusion  75 mL/hr IntraVENous CONTINUOUS  
 ciprofloxacin HCl (CIPRO) tablet 750 mg  750 mg Oral Q12H  
 metoprolol tartrate (LOPRESSOR) tablet 50 mg  50 mg Oral BID  LORazepam (ATIVAN) injection 1 mg  1 mg IntraVENous Q4H  
  chlordiazePOXIDE (LIBRIUM) capsule 25 mg  25 mg Oral Q8H  
 0.9% sodium chloride 1,000 mL with mvi, adult no. 4 with vit K 10 mL, thiamine 890 mg, folic acid 1 mg infusion   IntraVENous Q24H  
 amLODIPine (NORVASC) tablet 5 mg  5 mg Oral DAILY  atorvastatin (LIPITOR) tablet 40 mg  40 mg Oral DAILY  nitroglycerin (NITROSTAT) tablet 0.4 mg  0.4 mg SubLINGual Q5MIN PRN  
 acetaminophen (TYLENOL) tablet 650 mg  650 mg Oral Q4H PRN  
 HYDROcodone-acetaminophen (NORCO) 5-325 mg per tablet 1 Tab  1 Tab Oral Q4H PRN  
 morphine injection 1 mg  1 mg IntraVENous Q4H PRN  
 nicotine (NICODERM CQ) 21 mg/24 hr patch 1 Patch  1 Patch TransDERmal Q24H  
 ondansetron (ZOFRAN) injection 4 mg  4 mg IntraVENous Q4H PRN  
 diphenhydrAMINE (BENADRYL) injection 12.5 mg  12.5 mg IntraVENous Q4H PRN  
 naloxone (NARCAN) injection 0.4 mg  0.4 mg IntraVENous PRN  
 sodium chloride (NS) flush 5-40 mL  5-40 mL IntraVENous Q8H  
 sodium chloride (NS) flush 5-40 mL  5-40 mL IntraVENous PRN Other Studies (last 24 hours): No results found. Assessment and Plan:  
 
Hospital Problems as of 5/11/2019 Never Reviewed Codes Class Noted - Resolved POA Clostridium difficile diarrhea ICD-10-CM: A04.72 
ICD-9-CM: 008.45  5/9/2019 - Present Unknown Bacteremia due to Gram-negative bacteria ICD-10-CM: R78.81 ICD-9-CM: 790.7, 041.85  5/6/2019 - Present Unknown * (Principal) PNA (pneumonia) ICD-10-CM: J18.9 ICD-9-CM: 125  5/5/2019 - Present Unknown Tachycardia ICD-10-CM: R00.0 ICD-9-CM: 785.0  5/5/2019 - Present Unknown Thrombocytopenia ([facility] [address] ICD-10-CM: D69.6 ICD-9-CM: 287.5  5/5/2019 - Present Unknown GI bleed ICD-10-CM: K92.2 ICD-9-CM: 578.9  5/5/2019 - Present Unknown A-fib [facility]) ICD-10-CM: I48.91 
ICD-9-CM: 427.31  5/5/2019 - Present Unknown Tobacco abuse ICD-10-CM: Z72.0 ICD-9-CM: 305.1  11/28/2016 - Present Yes ETOH abuse ICD-10-CM: F10.10 ICD-9-CM: 305.00  11/28/2016 - Present Yes HTN (hypertension) (Chronic) ICD-10-CM: I10 
ICD-9-CM: 401.9  11/19/2014 - Present Yes PLAN:   
· Diarrhea- suspect from abx. Agree with ID plan to continue vanc for now- wbc trending down and pt feeling better since his was started; also on floratsor · Acinetobacter bacteremia- continue oral cipro- 750mg po q12h 1st dose 05/08/2019 · S/p EGD on 05/07- which showed gastritis and some erosions · Hypokalemia- replete and re-check this pm  
· Hypomag- replete · afib with rvr- controlled on lopressor · thrombocytopenia from alcohol use- monitor- no active bleeding · htn- continue current meds · letharrgy- wean standing ativan · Further workup and mg based on his clinical course · Awaiting placement  For rehab via the [place]. - hopefully next week DC planning/Dispo:  As above DVT ppx:  scds- hb stable with no evidence of active bleed so may be able to re- start meds soon Signed: 
Rex Puente MD

## 2019-05-11 NOTE — PROGRESS NOTES
Bedside and Verbal shift change report given to self (oncoming nurse) by Dominique Chavez RN (offgoing nurse). Report included the following information SBAR, Kardex, Intake/Output, MAR and Recent Results.

## 2019-05-11 NOTE — PROGRESS NOTES
Verbal bedside report given to Johnny Schroeder oncoming RN. Patient's situation, background, assessment and recommendations provided. Opportunity for questions provided. Oncoming RN assumed care of patient.

## 2019-05-11 NOTE — PROGRESS NOTES
[facility] Progress Note Admit Date:  5/5/2019 Today's Date:  5/11/2019 CC:  ELevated LFTs Subjective:  
 
Pt feeling better. More alert/awake. No abdom pain. No N/V. Rectal tube in place w/ brown liq stool. Medications:  
Current Facility-Administered Medications Medication Dose Route Frequency  Saccharomyces boulardii (FLORASTOR) capsule 500 mg  500 mg Oral BID  vancomycin 50 mg/mL oral solution (compounded) 125 mg  125 mg Oral Q6H  
 dextrose 5% - 0.45% NaCl with KCl 20 mEq/L infusion  75 mL/hr IntraVENous CONTINUOUS  
 pantoprazole (PROTONIX) tablet 40 mg  40 mg Oral ACB&D  ciprofloxacin HCl (CIPRO) tablet 750 mg  750 mg Oral Q12H  
 metoprolol tartrate (LOPRESSOR) tablet 50 mg  50 mg Oral BID  LORazepam (ATIVAN) injection 1 mg  1 mg IntraVENous Q4H  
 chlordiazePOXIDE (LIBRIUM) capsule 25 mg  25 mg Oral Q8H  
 0.9% sodium chloride 1,000 mL with mvi, adult no. 4 with vit K 10 mL, thiamine 369 mg, folic acid 1 mg infusion   IntraVENous Q24H  
 amLODIPine (NORVASC) tablet 5 mg  5 mg Oral DAILY  atorvastatin (LIPITOR) tablet 40 mg  40 mg Oral DAILY  nitroglycerin (NITROSTAT) tablet 0.4 mg  0.4 mg SubLINGual Q5MIN PRN  
 acetaminophen (TYLENOL) tablet 650 mg  650 mg Oral Q4H PRN  
 HYDROcodone-acetaminophen (NORCO) 5-325 mg per tablet 1 Tab  1 Tab Oral Q4H PRN  
 morphine injection 1 mg  1 mg IntraVENous Q4H PRN  
 nicotine (NICODERM CQ) 21 mg/24 hr patch 1 Patch  1 Patch TransDERmal Q24H  
 ondansetron (ZOFRAN) injection 4 mg  4 mg IntraVENous Q4H PRN  
 diphenhydrAMINE (BENADRYL) injection 12.5 mg  12.5 mg IntraVENous Q4H PRN  
 naloxone (NARCAN) injection 0.4 mg  0.4 mg IntraVENous PRN  
 sodium chloride (NS) flush 5-40 mL  5-40 mL IntraVENous Q8H  
 sodium chloride (NS) flush 5-40 mL  5-40 mL IntraVENous PRN Review of Systems: ROS was obtained, with pertinent positives as listed above. No chest pain or SOB. Diet:  GI soft Objective:  
Vitals: 
Visit Vitals /67 (BP 1 Location: Right arm, BP Patient Position: At rest) Pulse 81 Temp 97.9 °F (36.6 °C) Resp 19 Ht 5' 11\" (1.803 m) Wt 73.2 kg (161 lb 6.4 oz) SpO2 95% BMI 22.51 kg/m² Intake/Output: 
No intake/output data recorded. 05/09 1901 - 05/11 0700 In: 0 Out: 1200 [Drains:1200] Exam: 
General appearance: alert, cooperative, no distress Lungs: clear to auscultation bilaterally anteriorly. Heart: regular rate and rhythm Abdomen: soft, non-tender. Bowel sounds normal. No masses, no organomegaly Neuro:  alert + tremor, answers questions but slowly Data Review (Labs):   
Recent Labs 05/11/19 
1970 05/10/19 
1708 05/10/19 
0456 05/09/19 
1124 05/09/19 
0413 05/08/19 
1106 WBC  --   --  17.0*  --  19.0* 17.9* HGB  --   --  12.1*  --  13.2* 13.4* HCT  --   --  35.2*  --  37.8* 38.3*  
PLT  --   --  166  --  138* 102* MCV  --   --  107.0*  --  105.0* 105.5*   --  148* 146*  --   --   
K 3.1* 3.1* 2.7* 2.5*  --   --   
*  --  116* 113*  --   --   
CO2 23  --  24 22  --   --   
BUN 7*  --  11 10  --   --   
CREA 0.37*  --  0.43* 0.49*  --   --   
CA 7.7*  --  8.6 8.9  --   --   
MG 1.7*  --  1.7*  --   --   --   
*  --  130* 94  --   --   
 
C diff Neg 
 
ECHO 5/6/19 SUMMARY 
-  Left ventricle: Systolic function was at the lower limits of normal. Ejection fraction was estimated in the range of 50 % to 55 %. This study was 
inadequate for the evaluation of regional wall motion. -  Inferior vena cava, hepatic veins: The respirophasic change in diameter was less than 50%. -  Aortic valve: There was no evidence for vegetation. -  Mitral valve: There was no evidence for vegetation. EGD 5/7/19 for Acute blood loss anemia PostOp Diagnosis:  Moderate gastritis. No active bleeding Findings: OROPHARYNX: The piriform sinuses, arytenoid cartilage and vocal cords were briefly evaluated on entry and withdrawal of the endoscope through the oropharynx. These were found to be unremarkable. ESOPHAGUS: The esophageal mucosa was unremarkable. The squamocolumnar junction was intact without erosions, ulcerations, rings, webs or strictures. There was no evidence of Garcia's type intestinal metaplasia. No evidence of esophageal varices STOMACH: The gastric mucosa was erythematous, consistent with Moderate proximal gastritis. The gastric antrum appeared normal. There were no erosions, ulcerations, polyps, mass lesions, vascular ectasias or other abnormalities noted. This may represent portal hypertensive gastropathy or alcoholic gastritis. The pylorus was patent and easily intubated. There was no hiatal hernia noted by direct view or retroflexion within the stomach. DUODENUM: The endoscope was advanced as far as possible into the duodenum. The villi appeared normal.  There were no mucosal breaks, erosions, ulcerations, vascular ectasias or other abnormalities present. Recommendations: 
B.i.d. PPI Avoid NSAIDs Alcohol abstinence Advance diet Mild anemia and severe thrombocytopenia may be related to bone marrow suppression from alcohol and malnutrition etc.  
 
 
Assessment:  
 
Principal Problem: 
  PNA (pneumonia) (5/5/2019) Active Problems: 
  HTN (hypertension) (11/19/2014) Tobacco abuse (11/28/2016) ETOH abuse (11/28/2016) Tachycardia (5/5/2019) Thrombocytopenia ([address] (5/5/2019) GI bleed (5/5/2019) A-fib ([address] (5/5/2019) Bacteremia due to Gram-negative bacteria (5/6/2019) Clostridium difficile diarrhea (5/9/2019) 58 y/o male with HTN, colon polyps, h/o EtOH and tobacco abuse is admitted with pneumonia, a. Fib with RVR, EtOH withdrawal and seen by GI for black stools x 1 week and thrombocytopenia. EGD showed Moderate gastritis. No signs of bleeding since and now H/H is stable.   Now he is having more issues with diarrhea. Cdiff neg. ?antibiotic induced though WBC was trending up. ID following. Was on Azithro. Now on Cipro for PNA- CAP vs. Aspiration. Plan: - No signs of GI bleeding, and Hgb remains stable. - DC PPI and begin carafate/pepcid for gastritis - WBC trending down. Afevbrile. - Etoh withdraw- improved. - Etoh hepatitis- improved-  Etoh abstinence 
- Cont probiotics - If stable tomorrow, RED langley and observe Sari Durham MD

## 2019-05-11 NOTE — PROGRESS NOTES
Problem: Pressure Injury - Risk of 
Goal: *Prevention of pressure injury Description Document Dom Scale and appropriate interventions in the flowsheet. Outcome: Progressing Towards Goal 
Note:  
Pressure Injury Interventions: 
Sensory Interventions: Assess need for specialty bed Moisture Interventions: Apply protective barrier, creams and emollients Activity Interventions: Increase time out of bed, Pressure redistribution bed/mattress(bed type) Mobility Interventions: Assess need for specialty bed, HOB 30 degrees or less, Pressure redistribution bed/mattress (bed type) Nutrition Interventions: Document food/fluid/supplement intake, Discuss nutritional consult with provider, Offer support with meals,snacks and hydration Problem: Patient Education: Go to Patient Education Activity Goal: Patient/Family Education Outcome: Progressing Towards Goal 
  
Problem: Falls - Risk of 
Goal: *Absence of Falls Description Document Melody Ace Fall Risk and appropriate interventions in the flowsheet. Outcome: Progressing Towards Goal 
Note:  
Fall Risk Interventions: 
Mobility Interventions: Bed/chair exit alarm Mentation Interventions: Bed/chair exit alarm Medication Interventions: Bed/chair exit alarm Elimination Interventions: Call light in reach Problem: Patient Education: Go to Patient Education Activity Goal: Patient/Family Education Outcome: Progressing Towards Goal 
  
Problem: Patient Education: Go to Patient Education Activity Goal: Patient/Family Education Outcome: Progressing Towards Goal

## 2019-05-11 NOTE — PROGRESS NOTES
Problem: Pressure Injury - Risk of 
Goal: *Prevention of pressure injury Description Document Dom Scale and appropriate interventions in the flowsheet. Outcome: Progressing Towards Goal 
  
Problem: Patient Education: Go to Patient Education Activity Goal: Patient/Family Education Outcome: Progressing Towards Goal 
  
Problem: Falls - Risk of 
Goal: *Absence of Falls Description Document Meeta Waddell Fall Risk and appropriate interventions in the flowsheet. Outcome: Progressing Towards Goal 
  
Problem: Patient Education: Go to Patient Education Activity Goal: Patient/Family Education Outcome: Progressing Towards Goal 
  
Problem: Pneumonia: Day 3 Goal: Activity/Safety Outcome: Progressing Towards Goal 
Goal: Diagnostic Test/Procedures Outcome: Progressing Towards Goal 
Goal: Nutrition/Diet Outcome: Progressing Towards Goal 
Goal: Medications Outcome: Progressing Towards Goal 
Goal: Respiratory Outcome: Progressing Towards Goal 
Goal: Treatments/Interventions/Procedures Outcome: Progressing Towards Goal 
Goal: Psychosocial 
Outcome: Progressing Towards Goal 
Goal: *Oxygen saturation within defined limits Outcome: Progressing Towards Goal 
Goal: *Hemodynamically stable Outcome: Progressing Towards Goal 
Goal: *Demonstrates progressive activity Outcome: Progressing Towards Goal 
  
Problem: Patient Education: Go to Patient Education Activity Goal: Patient/Family Education Outcome: Progressing Towards Goal 
  
Problem: Patient Education: Go to Patient Education Activity Goal: Patient/Family Education Outcome: Progressing Towards Goal

## 2019-05-12 PROBLEM — E87.6 HYPOKALEMIA: Status: ACTIVE | Noted: 2019-05-10

## 2019-05-12 PROBLEM — R19.7 DIARRHEA: Status: ACTIVE | Noted: 2019-05-12

## 2019-05-12 PROBLEM — K70.10 ALCOHOLIC HEPATITIS WITHOUT ASCITES: Status: ACTIVE | Noted: 2019-05-11

## 2019-05-12 PROBLEM — E83.42 HYPOMAGNESEMIA: Status: ACTIVE | Noted: 2019-05-12

## 2019-05-12 PROBLEM — R16.0 LIVER MASS: Status: ACTIVE | Noted: 2019-05-12

## 2019-05-12 LAB — MAGNESIUM SERPL-MCNC: 1.6 MG/DL (ref 1.8–2.4)

## 2019-05-12 PROCEDURE — 74011250636 HC RX REV CODE- 250/636: Performed by: FAMILY MEDICINE

## 2019-05-12 PROCEDURE — 74011250637 HC RX REV CODE- 250/637: Performed by: INTERNAL MEDICINE

## 2019-05-12 PROCEDURE — 74011250637 HC RX REV CODE- 250/637: Performed by: FAMILY MEDICINE

## 2019-05-12 PROCEDURE — 74011250637 HC RX REV CODE- 250/637: Performed by: NURSE PRACTITIONER

## 2019-05-12 PROCEDURE — 83735 ASSAY OF MAGNESIUM: CPT

## 2019-05-12 PROCEDURE — 74011000250 HC RX REV CODE- 250: Performed by: FAMILY MEDICINE

## 2019-05-12 PROCEDURE — 36415 COLL VENOUS BLD VENIPUNCTURE: CPT

## 2019-05-12 PROCEDURE — 65660000000 HC RM CCU STEPDOWN

## 2019-05-12 PROCEDURE — 74011250636 HC RX REV CODE- 250/636: Performed by: INTERNAL MEDICINE

## 2019-05-12 PROCEDURE — 74011250637 HC RX REV CODE- 250/637: Performed by: PHYSICIAN ASSISTANT

## 2019-05-12 RX ORDER — POTASSIUM CHLORIDE 20 MEQ/1
40 TABLET, EXTENDED RELEASE ORAL EVERY 4 HOURS
Status: COMPLETED | OUTPATIENT
Start: 2019-05-12 | End: 2019-05-12

## 2019-05-12 RX ORDER — CHOLESTYRAMINE 4 G/4.8G
4 POWDER, FOR SUSPENSION ORAL
Status: DISCONTINUED | OUTPATIENT
Start: 2019-05-12 | End: 2019-05-15 | Stop reason: HOSPADM

## 2019-05-12 RX ORDER — AMLODIPINE BESYLATE 5 MG/1
5 TABLET ORAL DAILY
Status: DISCONTINUED | OUTPATIENT
Start: 2019-05-13 | End: 2019-05-15 | Stop reason: HOSPADM

## 2019-05-12 RX ORDER — CIPROFLOXACIN 500 MG/1
750 TABLET ORAL EVERY 12 HOURS
Status: COMPLETED | OUTPATIENT
Start: 2019-05-12 | End: 2019-05-13

## 2019-05-12 RX ORDER — MAGNESIUM SULFATE HEPTAHYDRATE 40 MG/ML
4 INJECTION, SOLUTION INTRAVENOUS ONCE
Status: COMPLETED | OUTPATIENT
Start: 2019-05-12 | End: 2019-05-12

## 2019-05-12 RX ORDER — CHLORDIAZEPOXIDE HYDROCHLORIDE 10 MG/1
10 CAPSULE, GELATIN COATED ORAL EVERY 8 HOURS
Status: DISCONTINUED | OUTPATIENT
Start: 2019-05-12 | End: 2019-05-14

## 2019-05-12 RX ORDER — METOPROLOL TARTRATE 50 MG/1
50 TABLET ORAL 2 TIMES DAILY
Status: DISCONTINUED | OUTPATIENT
Start: 2019-05-12 | End: 2019-05-15 | Stop reason: HOSPADM

## 2019-05-12 RX ORDER — FAMOTIDINE 20 MG/1
20 TABLET, FILM COATED ORAL 2 TIMES DAILY
Status: DISCONTINUED | OUTPATIENT
Start: 2019-05-12 | End: 2019-05-15 | Stop reason: HOSPADM

## 2019-05-12 RX ORDER — ATORVASTATIN CALCIUM 40 MG/1
40 TABLET, FILM COATED ORAL
Status: DISCONTINUED | OUTPATIENT
Start: 2019-05-13 | End: 2019-05-15 | Stop reason: HOSPADM

## 2019-05-12 RX ORDER — GUAIFENESIN 100 MG/5ML
400 SOLUTION ORAL
Status: DISCONTINUED | OUTPATIENT
Start: 2019-05-12 | End: 2019-05-15 | Stop reason: HOSPADM

## 2019-05-12 RX ORDER — SAME BUTANEDISULFONATE/BETAINE 400-600 MG
500 POWDER IN PACKET (EA) ORAL 2 TIMES DAILY
Status: DISCONTINUED | OUTPATIENT
Start: 2019-05-12 | End: 2019-05-15 | Stop reason: HOSPADM

## 2019-05-12 RX ORDER — LORAZEPAM 2 MG/ML
0.5 INJECTION INTRAMUSCULAR
Status: DISCONTINUED | OUTPATIENT
Start: 2019-05-12 | End: 2019-05-15 | Stop reason: HOSPADM

## 2019-05-12 RX ADMIN — AMLODIPINE BESYLATE 5 MG: 5 TABLET ORAL at 10:33

## 2019-05-12 RX ADMIN — GUAIFENESIN 400 MG: 100 SOLUTION ORAL at 18:22

## 2019-05-12 RX ADMIN — POTASSIUM CHLORIDE 40 MEQ: 20 TABLET, EXTENDED RELEASE ORAL at 18:22

## 2019-05-12 RX ADMIN — ATORVASTATIN CALCIUM 40 MG: 40 TABLET, FILM COATED ORAL at 10:34

## 2019-05-12 RX ADMIN — VANCOMYCIN HYDROCHLORIDE 125 MG: 1 INJECTION, POWDER, LYOPHILIZED, FOR SOLUTION INTRAVENOUS at 00:21

## 2019-05-12 RX ADMIN — METOPROLOL TARTRATE 50 MG: 50 TABLET ORAL at 10:33

## 2019-05-12 RX ADMIN — CHLORDIAZEPOXIDE HYDROCHLORIDE 25 MG: 25 CAPSULE ORAL at 14:56

## 2019-05-12 RX ADMIN — CHLORDIAZEPOXIDE HYDROCHLORIDE 10 MG: 10 CAPSULE ORAL at 21:33

## 2019-05-12 RX ADMIN — CIPROFLOXACIN HYDROCHLORIDE 750 MG: 500 TABLET, FILM COATED ORAL at 10:34

## 2019-05-12 RX ADMIN — POTASSIUM CHLORIDE 40 MEQ: 20 TABLET, EXTENDED RELEASE ORAL at 14:56

## 2019-05-12 RX ADMIN — VANCOMYCIN HYDROCHLORIDE 125 MG: 1 INJECTION, POWDER, LYOPHILIZED, FOR SOLUTION INTRAVENOUS at 05:53

## 2019-05-12 RX ADMIN — Medication 10 ML: at 06:02

## 2019-05-12 RX ADMIN — Medication 5 ML: at 17:00

## 2019-05-12 RX ADMIN — POTASSIUM CHLORIDE 40 MEQ: 20 TABLET, EXTENDED RELEASE ORAL at 22:02

## 2019-05-12 RX ADMIN — CIPROFLOXACIN HYDROCHLORIDE 750 MG: 500 TABLET, FILM COATED ORAL at 21:33

## 2019-05-12 RX ADMIN — Medication 500 MG: at 21:32

## 2019-05-12 RX ADMIN — LORAZEPAM 1 MG: 1 TABLET ORAL at 10:33

## 2019-05-12 RX ADMIN — METOPROLOL TARTRATE 50 MG: 50 TABLET ORAL at 21:32

## 2019-05-12 RX ADMIN — FAMOTIDINE 20 MG: 20 TABLET ORAL at 21:33

## 2019-05-12 RX ADMIN — THIAMINE HYDROCHLORIDE: 100 INJECTION, SOLUTION INTRAMUSCULAR; INTRAVENOUS at 11:00

## 2019-05-12 RX ADMIN — CHLORDIAZEPOXIDE HYDROCHLORIDE 25 MG: 25 CAPSULE ORAL at 05:53

## 2019-05-12 RX ADMIN — SUCRALFATE 1 G: 1 TABLET ORAL at 14:56

## 2019-05-12 RX ADMIN — LORAZEPAM 1 MG: 1 TABLET ORAL at 00:50

## 2019-05-12 RX ADMIN — Medication 500 MG: at 10:34

## 2019-05-12 RX ADMIN — FAMOTIDINE 20 MG: 20 TABLET ORAL at 10:34

## 2019-05-12 RX ADMIN — CHOLESTYRAMINE 4 G: 4 POWDER, FOR SUSPENSION ORAL at 17:49

## 2019-05-12 RX ADMIN — POTASSIUM CHLORIDE 40 MEQ: 20 TABLET, EXTENDED RELEASE ORAL at 00:21

## 2019-05-12 RX ADMIN — MAGNESIUM SULFATE IN WATER 4 G: 40 INJECTION, SOLUTION INTRAVENOUS at 16:45

## 2019-05-12 RX ADMIN — SUCRALFATE 1 G: 1 TABLET ORAL at 10:34

## 2019-05-12 RX ADMIN — POTASSIUM CHLORIDE 40 MEQ: 20 TABLET, EXTENDED RELEASE ORAL at 05:53

## 2019-05-12 RX ADMIN — SUCRALFATE 1 G: 1 TABLET ORAL at 21:33

## 2019-05-12 RX ADMIN — Medication 10 ML: at 21:34

## 2019-05-12 NOTE — PROGRESS NOTES
Hospitalist Progress Note Admit Date:  2019  7:26 AM  
Name:  Darreld Jeans Age:  59 y.o. 
:  1960 MRN:  [id number] PCP:  Gillian Bryan MD 
Treatment Team: Attending Provider: Darylene Kindler, MD; Consulting Provider: Kael Avalos MD; Consulting Provider: Aime Beltran MD; Utilization Review: Jose Lilly RN; Care Manager: Tanisha Souza RN Subjective:  
59 yr old male pt with known h/o htn, h/o cardiac cath- non occlusive coronaries, known alcohol dependent admitted for pna,GI bleed,afib with rvr and alcohol withdrawal 19. Pt has been here awaiting rehab placement as he is very debilitated secondary to chronic alcohol abuse; Hospital course also complicated by copious diarrhea requiring a rectal tube, severe debility CDIFF workup negative and he did not complete a course of vanc. He is on scheduled cipro and florastor. GI plans to start  [address] today for his diarrhea with hopes of firming it up. He is overall getting better is is very debilitated. Wife at bedside today all /'s answered. He will need f/up with gi for etoh hepatitis and liver mass. He will need placement for severe debility. 2019- pt seen - lethargic drowsy from meds but less than yesterday- eating a little more than usual.  
Still having diarrhea - cdiff negative. Objective:  
 
Patient Vitals for the past 24 hrs: 
 Temp Pulse Resp BP SpO2  
19 1400 98.1 °F (36.7 °C) 68 17 95/50 94 % 19 0930 97.7 °F (36.5 °C) 72 16 105/57 93 % 19 0513 98 °F (36.7 °C) 78 18 110/69 92 % 19 0102 98.1 °F (36.7 °C) 76 18 110/66 93 % 19 2120 97.9 °F (36.6 °C) 68 16 104/66 92 % 19 1740 98.8 °F (37.1 °C) 79 16 129/68 90 % Oxygen Therapy O2 Sat (%): 94 % (19 1400) Pulse via Oximetry: 93 beats per minute (19 1459) O2 Device: Room air (19 0440) Intake/Output Summary (Last 24 hours) at 2019 1507 Last data filed at 5/12/2019 7300 Gross per 24 hour Intake 1800 ml Output 1200 ml Net 600 ml General:    Well nourished. Lethargic but easily arousable; less lethargic than yesterday. Rectal tube in place with liquid stool CV:   RRR. No murmur, rub, or gallop. Lungs:   CTAB. No wheezing, rhonchi, or rales. Abdomen:   Soft, nontender, nondistended. Extremities: Warm and dry. No cyanosis or edema. Skin:     No rashes or jaundice. Data Review: 
I have reviewed all labs, meds, telemetry events, and studies from the last 24 hours. Recent Results (from the past 24 hour(s)) POTASSIUM Collection Time: 05/11/19  6:05 PM  
Result Value Ref Range Potassium 3.2 (L) 3.5 - 5.1 mmol/L All Micro Results Procedure Component Value Units Date/Time C. DIFFICILE AG & TOXIN A/B [[id number]] Collected:  05/08/19 2206 Order Status:  Completed Specimen:  Stool Updated:  05/09/19 1132 [address] [place] ANTIGEN    
  C. DIFFICILE GDH ANTIGEN-NEGATIVE  
     
  C. difficile toxin C. DIFFICILE TOXIN-NEGATIVE  
     
  PCR Reflex NOT APPLICABLE INTERPRETATION    
  NEGATIVE FOR TOXIGENIC C. DIFFICILE Clinical Consideration NEGATIVE FOR TOXIGENIC C. DIFFICILE  
     
 CULTURE, BLOOD [[id number]]  (Abnormal) Collected:  05/05/19 0910 Order Status:  Completed Specimen:  Blood Updated:  05/07/19 1637 Special Requests: --     
  LEFT 
FOREARM 
  
  GRAM STAIN GRAM NEGATIVE RODS AEROBIC BOTTLE POSITIVE CRITICAL RESULT NOT CALLED DUE TO PREVIOUS NOTIFICATION OF CRITICAL RESULT WITHIN THE LAST 24 HOURS. Culture result: GRAM NEGATIVE RODS     
   FOR ID AND SUSCEPTIBILITY SEE ACCESSION NO [id number] CULTURE, BLOOD [[id number]]  (Abnormal)  (Susceptibility) Collected:  05/05/19 0224 Order Status:  Completed Specimen:  Blood Updated:  05/07/19 9696   Special Requests: --     
  RIGHT 
FOREARM 
  
  GRAM STAIN GRAM NEGATIVE RODS     
 AEROBIC BOTTLE POSITIVE RESULTS VERIFIED, PHONED TO AND READ BACK BY Bridgette Humphries RN ON 05/05/2019 AT 1815 WMR Culture result: ACINETOBACTER BAUMANNII Current Meds: 
Current Facility-Administered Medications Medication Dose Route Frequency  cholestyramine-aspartame (QUESTRAN LIGHT) packet 4 g  4 g Oral TID WITH MEALS  
 [START ON 5/13/2019] amLODIPine (NORVASC) tablet 5 mg  5 mg Oral DAILY  [START ON 5/13/2019] atorvastatin (LIPITOR) tablet 40 mg  40 mg Oral QHS  famotidine (PEPCID) tablet 20 mg  20 mg Oral BID  metoprolol tartrate (LOPRESSOR) tablet 50 mg  50 mg Oral BID  Saccharomyces boulardii (FLORASTOR) capsule 500 mg  500 mg Oral BID  ciprofloxacin HCl (CIPRO) tablet 750 mg  750 mg Oral Q12H  potassium chloride (K-DUR, KLOR-CON) SR tablet 40 mEq  40 mEq Oral Q4H  
 LORazepam (ATIVAN) injection 0.5 mg  0.5 mg IntraVENous Q4H PRN  
 sucralfate (CARAFATE) tablet 1 g  1 g Oral AC&HS  
 LORazepam (ATIVAN) tablet 1 mg  1 mg Oral Q4H PRN  
 dextrose 5% - 0.45% NaCl with KCl 20 mEq/L infusion  75 mL/hr IntraVENous CONTINUOUS  chlordiazePOXIDE (LIBRIUM) capsule 25 mg  25 mg Oral Q8H  
 0.9% sodium chloride 1,000 mL with mvi, adult no. 4 with vit K 10 mL, thiamine 391 mg, folic acid 1 mg infusion   IntraVENous Q24H  
 nitroglycerin (NITROSTAT) tablet 0.4 mg  0.4 mg SubLINGual Q5MIN PRN  
 acetaminophen (TYLENOL) tablet 650 mg  650 mg Oral Q4H PRN  
 HYDROcodone-acetaminophen (NORCO) 5-325 mg per tablet 1 Tab  1 Tab Oral Q4H PRN  
 morphine injection 1 mg  1 mg IntraVENous Q4H PRN  
 nicotine (NICODERM CQ) 21 mg/24 hr patch 1 Patch  1 Patch TransDERmal Q24H  
 ondansetron (ZOFRAN) injection 4 mg  4 mg IntraVENous Q4H PRN  
 diphenhydrAMINE (BENADRYL) injection 12.5 mg  12.5 mg IntraVENous Q4H PRN  
 naloxone (NARCAN) injection 0.4 mg  0.4 mg IntraVENous PRN  
 sodium chloride (NS) flush 5-40 mL  5-40 mL IntraVENous Q8H  
  sodium chloride (NS) flush 5-40 mL  5-40 mL IntraVENous PRN Other Studies (last 24 hours): No results found. Assessment and Plan:  
 
Hospital Problems as of 5/12/2019 Never Reviewed Codes Class Noted - Resolved POA Hypomagnesemia ICD-10-CM: J86.33 
ICD-9-CM: 275.2  5/12/2019 - Present Yes Diarrhea ICD-10-CM: R19.7 ICD-9-CM: 787.91  5/12/2019 - Present Clinically Undetermined Liver mass ICD-10-CM: R16.0 ICD-9-CM: 573.9  5/12/2019 - Present Yes Overview Signed 5/12/2019  3:44 PM by Zia Walter MD  
  Further workup as an outpatient Alcoholic hepatitis without ascites ICD-10-CM: K70.10 ICD-9-CM: 571.1  5/11/2019 - Present Yes Hypokalemia ICD-10-CM: E87.6 ICD-9-CM: 276.8  5/10/2019 - Present Yes Clostridium difficile diarrhea ICD-10-CM: A04.72 
ICD-9-CM: 008.45  5/9/2019 - Present Unknown Bacteremia due to Gram-negative bacteria ICD-10-CM: R78.81 ICD-9-CM: 790.7, 041.85  5/6/2019 - Present Unknown * (Principal) PNA (pneumonia) ICD-10-CM: J18.9 ICD-9-CM: 382  5/5/2019 - Present Unknown Tachycardia ICD-10-CM: R00.0 ICD-9-CM: 785.0  5/5/2019 - Present Unknown Thrombocytopenia ([facility] [address] ICD-10-CM: D69.6 ICD-9-CM: 287.5  5/5/2019 - Present Unknown GI bleed ICD-10-CM: K92.2 ICD-9-CM: 578.9  5/5/2019 - Present Unknown A-fib [facility]) ICD-10-CM: I48.91 
ICD-9-CM: 427.31  5/5/2019 - Present Unknown Tobacco abuse ICD-10-CM: Z72.0 ICD-9-CM: 305.1  11/28/2016 - Present Yes ETOH abuse ICD-10-CM: F10.10 ICD-9-CM: 305.00  11/28/2016 - Present Yes HTN (hypertension) (Chronic) ICD-10-CM: I10 
ICD-9-CM: 401.9  11/19/2014 - Present Yes PLAN:   
· Diarrhea- suspect from abx. On florastor - plan to start [place] today. ... · Acinetobacter bacteremia- continue oral cipro- 750mg po q12h 1st dose 05/08/2019 · S/p EGD on 05/07- which showed gastritis and some erosions · Hypokalemia- repleting · Hypomag- repleting · afib with rvr- controlled on lopressor · Thrombocytopenia from alcohol use- resolved- no active bleeding · htn- continue current meds · Lethargy- standing ativan weaned and stopped. Will start weaning librium · ETOH hepatitis- further mgt per gi · Liver mass- further outpt mgt with GI 
· Further workup and mg based on his clinical course · Awaiting placement  For rehab via the [place]. - hopefully next week · Will transfer to remote tele today. DC planning/Dispo:  As above- unclear DVT ppx:  scds- hb stable with no evidence of active bleed- will re-instate his aspirin Signed: 
Vivienne Dye MD

## 2019-05-12 NOTE — PROGRESS NOTES
Problem: Pressure Injury - Risk of 
Goal: *Prevention of pressure injury Description Document Dom Scale and appropriate interventions in the flowsheet. Outcome: Progressing Towards Goal 
  
Problem: Patient Education: Go to Patient Education Activity Goal: Patient/Family Education Outcome: Progressing Towards Goal 
  
Problem: Falls - Risk of 
Goal: *Absence of Falls Description Document Nish Stubbs Fall Risk and appropriate interventions in the flowsheet. Outcome: Progressing Towards Goal 
  
Problem: Patient Education: Go to Patient Education Activity Goal: Patient/Family Education Outcome: Progressing Towards Goal 
  
Problem: Pneumonia: Day 4 Goal: Activity/Safety Outcome: Progressing Towards Goal 
Goal: Nutrition/Diet Outcome: Progressing Towards Goal 
Goal: Discharge Planning Outcome: Progressing Towards Goal 
Goal: Medications Outcome: Progressing Towards Goal 
Goal: Respiratory Outcome: Progressing Towards Goal 
Goal: Treatments/Interventions/Procedures Outcome: Progressing Towards Goal 
Goal: Psychosocial 
Outcome: Progressing Towards Goal

## 2019-05-12 NOTE — PROGRESS NOTES
Transferred to room 611. Multi-vitamin infusion running at 125mg/hr, magnesium infusion running at 33.3mg/hr. Bed alarm on.

## 2019-05-12 NOTE — PROGRESS NOTES
TRANSFER - OUT REPORT: 
 
Verbal report given to Papa Monroy on [address]  being transferred to 6th floor room 611 for routine progression of care Report consisted of patients Situation, Background, Assessment and Recommendations(SBAR). Information from the following report(s) Kardex, MAR, Recent Results and Cardiac Rhythm SR was reviewed with the receiving nurse. Opportunity for questions and clarification was provided.

## 2019-05-12 NOTE — PROGRESS NOTES
Problem: Pressure Injury - Risk of 
Goal: *Prevention of pressure injury Description Document Dom Scale and appropriate interventions in the flowsheet. Outcome: Progressing Towards Goal 
  
Problem: Falls - Risk of 
Goal: *Absence of Falls Description Document Lee Hines Fall Risk and appropriate interventions in the flowsheet. Note:  
Fall Risk Interventions: 
Mobility Interventions: Bed/chair exit alarm, Communicate number of staff needed for ambulation/transfer, Patient to call before getting OOB, PT Consult for mobility concerns, PT Consult for assist device competence Mentation Interventions: Bed/chair exit alarm, Door open when patient unattended, More frequent rounding Medication Interventions: Bed/chair exit alarm, Evaluate medications/consider consulting pharmacy, Patient to call before getting OOB, Teach patient to arise slowly Elimination Interventions: Bed/chair exit alarm, Elevated toilet seat, Toilet paper/wipes in reach Bed alarm remains on. Call light and bedside table within reach.

## 2019-05-12 NOTE — PROGRESS NOTES
TRANSFER - IN REPORT: 
 
Verbal report received from [address] [facility] [address], RN(name) on Lela Polanco  being received from 305(unit) for routine progression of care Report consisted of patients Situation, Background, Assessment and  
Recommendations(SBAR). Information from the following report(s) SBAR was reviewed with the receiving nurse. Opportunity for questions and clarification was provided. Assessment completed upon patients arrival to unit and care assumed.

## 2019-05-12 NOTE — PROGRESS NOTES
[facility] Progress Note Admit Date:  5/5/2019 Today's Date:  5/12/2019 CC:  ELevated LFTs Subjective:  
 
Pt feeling the same. More alert/awake. Sitting up today. No abdom pain. No N/V. Rectal tube in place w/ brown liq stool. No rectal bleeding. Medications:  
Current Facility-Administered Medications Medication Dose Route Frequency  famotidine (PEPCID) tablet 20 mg  20 mg Oral BID  sucralfate (CARAFATE) tablet 1 g  1 g Oral AC&HS  
 LORazepam (ATIVAN) injection 0.5 mg  0.5 mg IntraVENous Q4H  
 LORazepam (ATIVAN) tablet 1 mg  1 mg Oral Q4H PRN  
 Saccharomyces boulardii (FLORASTOR) capsule 500 mg  500 mg Oral BID  vancomycin 50 mg/mL oral solution (compounded) 125 mg  125 mg Oral Q6H  
 dextrose 5% - 0.45% NaCl with KCl 20 mEq/L infusion  75 mL/hr IntraVENous CONTINUOUS  
 ciprofloxacin HCl (CIPRO) tablet 750 mg  750 mg Oral Q12H  
 metoprolol tartrate (LOPRESSOR) tablet 50 mg  50 mg Oral BID  chlordiazePOXIDE (LIBRIUM) capsule 25 mg  25 mg Oral Q8H  
 0.9% sodium chloride 1,000 mL with mvi, adult no. 4 with vit K 10 mL, thiamine 903 mg, folic acid 1 mg infusion   IntraVENous Q24H  
 amLODIPine (NORVASC) tablet 5 mg  5 mg Oral DAILY  atorvastatin (LIPITOR) tablet 40 mg  40 mg Oral DAILY  nitroglycerin (NITROSTAT) tablet 0.4 mg  0.4 mg SubLINGual Q5MIN PRN  
 acetaminophen (TYLENOL) tablet 650 mg  650 mg Oral Q4H PRN  
 HYDROcodone-acetaminophen (NORCO) 5-325 mg per tablet 1 Tab  1 Tab Oral Q4H PRN  
 morphine injection 1 mg  1 mg IntraVENous Q4H PRN  
 nicotine (NICODERM CQ) 21 mg/24 hr patch 1 Patch  1 Patch TransDERmal Q24H  
 ondansetron (ZOFRAN) injection 4 mg  4 mg IntraVENous Q4H PRN  
 diphenhydrAMINE (BENADRYL) injection 12.5 mg  12.5 mg IntraVENous Q4H PRN  
 naloxone (NARCAN) injection 0.4 mg  0.4 mg IntraVENous PRN  
 sodium chloride (NS) flush 5-40 mL  5-40 mL IntraVENous Q8H  
  sodium chloride (NS) flush 5-40 mL  5-40 mL IntraVENous PRN Review of Systems: ROS was obtained, with pertinent positives as listed above. No chest pain or SOB. Diet:  GI soft Objective:  
Vitals: 
Visit Vitals /57 (BP 1 Location: Left arm, BP Patient Position: At rest) Pulse 72 Temp 97.7 °F (36.5 °C) Resp 16 Ht 5' 11\" (1.803 m) Wt 70.7 kg (155 lb 12.8 oz) SpO2 93% BMI 21.73 kg/m² Intake/Output: 
No intake/output data recorded. 05/10 1901 - 05/12 0700 In: 300 [P.O.:300] Out: 1975 [Drains:1975] Diarrhea unchanged , 1200cc last 2 days Exam: 
General appearance: alert, cooperative, no distress Lungs: clear to auscultation bilaterally anteriorly. Heart: regular rate and rhythm Abdomen: soft, non-tender. Bowel sounds normal. No masses, no organomegaly Neuro:  alert + tremor, answers questions but slowly Data Review (Labs):   
Recent Labs 05/11/19 
1805 05/11/19 
3541 05/11/19 
9227 05/10/19 
1708 05/10/19 
0456 05/09/19 
1124 WBC  --  16.1*  --   --  17.0*  --   
HGB  --  11.8*  --   --  12.1*  --   
HCT  --  35.9*  --   --  35.2*  --   
PLT  --  159  --   --  166  -- MCV  --  112.2*  --   --  107.0*  --   
NA  --   --  145  --  148* 146*  
K 3.2*  --  3.1* 3.1* 2.7* 2.5*  
CL  --   --  114*  --  116* 113* CO2  --   --  23  --  24 22 BUN  --   --  7*  --  11 10 CREA  --   --  0.37*  --  0.43* 0.49* CA  --   --  7.7*  --  8.6 8.9 MG  --   --  1.7*  --  1.7*  --   
GLU  --   --  114*  --  130* 94  
 
C diff Neg 
 
ECHO 5/6/19 SUMMARY 
-  Left ventricle: Systolic function was at the lower limits of normal. Ejection fraction was estimated in the range of 50 % to 55 %. This study was 
inadequate for the evaluation of regional wall motion. -  Inferior vena cava, hepatic veins: The respirophasic change in diameter was less than 50%. -  Aortic valve: There was no evidence for vegetation. -  Mitral valve: There was no evidence for vegetation. EGD 5/7/19 for Acute blood loss anemia PostOp Diagnosis:  Moderate gastritis. No active bleeding Findings: OROPHARYNX: The piriform sinuses, arytenoid cartilage and vocal cords were briefly evaluated on entry and withdrawal of the endoscope through the oropharynx. These were found to be unremarkable. ESOPHAGUS: The esophageal mucosa was unremarkable. The squamocolumnar junction was intact without erosions, ulcerations, rings, webs or strictures. There was no evidence of Garcia's type intestinal metaplasia. No evidence of esophageal varices STOMACH: The gastric mucosa was erythematous, consistent with Moderate proximal gastritis. The gastric antrum appeared normal. There were no erosions, ulcerations, polyps, mass lesions, vascular ectasias or other abnormalities noted. This may represent portal hypertensive gastropathy or alcoholic gastritis. The pylorus was patent and easily intubated. There was no hiatal hernia noted by direct view or retroflexion within the stomach. DUODENUM: The endoscope was advanced as far as possible into the duodenum. The villi appeared normal.  There were no mucosal breaks, erosions, ulcerations, vascular ectasias or other abnormalities present. Recommendations: 
B.i.d. PPI Avoid NSAIDs Alcohol abstinence Advance diet Mild anemia and severe thrombocytopenia may be related to bone marrow suppression from alcohol and malnutrition etc.  
 
 
Assessment:  
 
Principal Problem: 
  PNA (pneumonia) (5/5/2019) Active Problems: 
  HTN (hypertension) (11/19/2014) Tobacco abuse (11/28/2016) ETOH abuse (11/28/2016) Tachycardia (5/5/2019) Thrombocytopenia ([address] (5/5/2019) GI bleed (5/5/2019) A-fib ([address] (5/5/2019) Bacteremia due to Gram-negative bacteria (5/6/2019) Clostridium difficile diarrhea (5/9/2019) 60 y/o male with HTN, colon polyps, h/o EtOH and tobacco abuse is admitted with pneumonia, a. Fib with RVR, EtOH withdrawal and seen by GI for black stools x 1 week and thrombocytopenia. EGD showed Moderate gastritis. No signs of bleeding since and now H/H is stable. Now he is having more issues with diarrhea. Cdiff neg. ?antibiotic induced though WBC was trending up. ID following. Was on Azithro. Now on Cipro for PNA- CAP vs. Aspiration. Plan: - No signs of GI bleeding, and Hgb remains stable. - DC'd PPI and changed to carafate/pepcid for gastritis - WBC trending down. Afebrile. - Etoh withdraw- improved. - Etoh hepatitis- improved-  Etoh abstinence 
- Cont probiotics - DC Vanco- C diff neg - Suspect ABx - associted diarrhea- sudden onset w/ Cipro, consider change of ABx to alternative for PNA/ bacteremia - Trial of Questran for symptomatic relief, consider imodium or lomotil  if needed.   
 
 
Elissa Perez MD

## 2019-05-12 NOTE — PROGRESS NOTES
Verbal bedside report given to [facility], oncoming RN. Patient's situation, background, assessment and recommendations provided. Opportunity for questions provided. Oncoming RN assumed care of patient.

## 2019-05-13 LAB
ANION GAP SERPL CALC-SCNC: 9 MMOL/L (ref 7–16)
BASOPHILS # BLD: 0.1 K/UL (ref 0–0.2)
BASOPHILS NFR BLD: 1 % (ref 0–2)
BUN SERPL-MCNC: 7 MG/DL (ref 8–23)
CALCIUM SERPL-MCNC: 7.7 MG/DL (ref 8.3–10.4)
CHLORIDE SERPL-SCNC: 112 MMOL/L (ref 98–107)
CO2 SERPL-SCNC: 23 MMOL/L (ref 21–32)
CREAT SERPL-MCNC: 0.42 MG/DL (ref 0.8–1.5)
DIFFERENTIAL METHOD BLD: ABNORMAL
EOSINOPHIL # BLD: 0.1 K/UL (ref 0–0.8)
EOSINOPHIL NFR BLD: 1 % (ref 0.5–7.8)
ERYTHROCYTE [DISTWIDTH] IN BLOOD BY AUTOMATED COUNT: 14.8 % (ref 11.9–14.6)
GLUCOSE SERPL-MCNC: 88 MG/DL (ref 65–100)
HCT VFR BLD AUTO: 30.3 % (ref 41.1–50.3)
HGB BLD-MCNC: 10.1 G/DL (ref 13.6–17.2)
IMM GRANULOCYTES # BLD AUTO: 0.3 K/UL (ref 0–0.5)
IMM GRANULOCYTES NFR BLD AUTO: 2 % (ref 0–5)
LACTOFERRIN, LCTFLT: 19 UG/ML(G) (ref 0–7.24)
LYMPHOCYTES # BLD: 1.9 K/UL (ref 0.5–4.6)
LYMPHOCYTES NFR BLD: 14 % (ref 13–44)
MAGNESIUM SERPL-MCNC: 2 MG/DL (ref 1.8–2.4)
MCH RBC QN AUTO: 36.1 PG (ref 26.1–32.9)
MCHC RBC AUTO-ENTMCNC: 33.3 G/DL (ref 31.4–35)
MCV RBC AUTO: 108.2 FL (ref 79.6–97.8)
MONOCYTES # BLD: 0.7 K/UL (ref 0.1–1.3)
MONOCYTES NFR BLD: 5 % (ref 4–12)
NEUTS SEG # BLD: 11.1 K/UL (ref 1.7–8.2)
NEUTS SEG NFR BLD: 78 % (ref 43–78)
NRBC # BLD: 0 K/UL (ref 0–0.2)
PHOSPHATE SERPL-MCNC: 1.5 MG/DL (ref 2.3–3.7)
PLATELET # BLD AUTO: 248 K/UL (ref 150–450)
PMV BLD AUTO: 10.7 FL (ref 9.4–12.3)
POTASSIUM SERPL-SCNC: 3.8 MMOL/L (ref 3.5–5.1)
PROCALCITONIN SERPL-MCNC: 0.2 NG/ML
RBC # BLD AUTO: 2.8 M/UL (ref 4.23–5.6)
SODIUM SERPL-SCNC: 144 MMOL/L (ref 136–145)
WBC # BLD AUTO: 14.3 K/UL (ref 4.3–11.1)

## 2019-05-13 PROCEDURE — 74011250636 HC RX REV CODE- 250/636: Performed by: FAMILY MEDICINE

## 2019-05-13 PROCEDURE — 84100 ASSAY OF PHOSPHORUS: CPT

## 2019-05-13 PROCEDURE — 97110 THERAPEUTIC EXERCISES: CPT

## 2019-05-13 PROCEDURE — 97530 THERAPEUTIC ACTIVITIES: CPT

## 2019-05-13 PROCEDURE — 65270000029 HC RM PRIVATE

## 2019-05-13 PROCEDURE — 80048 BASIC METABOLIC PNL TOTAL CA: CPT

## 2019-05-13 PROCEDURE — 74011250637 HC RX REV CODE- 250/637: Performed by: INTERNAL MEDICINE

## 2019-05-13 PROCEDURE — 97535 SELF CARE MNGMENT TRAINING: CPT

## 2019-05-13 PROCEDURE — 85025 COMPLETE CBC W/AUTO DIFF WBC: CPT

## 2019-05-13 PROCEDURE — 74011250637 HC RX REV CODE- 250/637: Performed by: FAMILY MEDICINE

## 2019-05-13 PROCEDURE — 83735 ASSAY OF MAGNESIUM: CPT

## 2019-05-13 PROCEDURE — 74011000250 HC RX REV CODE- 250: Performed by: FAMILY MEDICINE

## 2019-05-13 PROCEDURE — 36415 COLL VENOUS BLD VENIPUNCTURE: CPT

## 2019-05-13 PROCEDURE — 77030019605

## 2019-05-13 PROCEDURE — 84145 PROCALCITONIN (PCT): CPT

## 2019-05-13 RX ORDER — FLUTICASONE PROPIONATE 50 MCG
2 SPRAY, SUSPENSION (ML) NASAL DAILY
Status: DISCONTINUED | OUTPATIENT
Start: 2019-05-14 | End: 2019-05-15 | Stop reason: HOSPADM

## 2019-05-13 RX ORDER — LOPERAMIDE HYDROCHLORIDE 2 MG/1
4 CAPSULE ORAL ONCE
Status: COMPLETED | OUTPATIENT
Start: 2019-05-13 | End: 2019-05-13

## 2019-05-13 RX ADMIN — Medication 10 ML: at 05:28

## 2019-05-13 RX ADMIN — Medication 500 MG: at 21:40

## 2019-05-13 RX ADMIN — CIPROFLOXACIN HYDROCHLORIDE 750 MG: 500 TABLET, FILM COATED ORAL at 21:40

## 2019-05-13 RX ADMIN — SUCRALFATE 1 G: 1 TABLET ORAL at 11:34

## 2019-05-13 RX ADMIN — Medication 10 ML: at 21:42

## 2019-05-13 RX ADMIN — Medication 10 ML: at 14:16

## 2019-05-13 RX ADMIN — THIAMINE HYDROCHLORIDE: 100 INJECTION, SOLUTION INTRAMUSCULAR; INTRAVENOUS at 11:38

## 2019-05-13 RX ADMIN — METOPROLOL TARTRATE 50 MG: 50 TABLET ORAL at 06:01

## 2019-05-13 RX ADMIN — SUCRALFATE 1 G: 1 TABLET ORAL at 06:00

## 2019-05-13 RX ADMIN — SUCRALFATE 1 G: 1 TABLET ORAL at 15:31

## 2019-05-13 RX ADMIN — LOPERAMIDE HYDROCHLORIDE 4 MG: 2 CAPSULE ORAL at 14:10

## 2019-05-13 RX ADMIN — FAMOTIDINE 20 MG: 20 TABLET ORAL at 06:01

## 2019-05-13 RX ADMIN — CIPROFLOXACIN HYDROCHLORIDE 750 MG: 500 TABLET, FILM COATED ORAL at 06:01

## 2019-05-13 RX ADMIN — AMLODIPINE BESYLATE 5 MG: 5 TABLET ORAL at 06:01

## 2019-05-13 RX ADMIN — CHLORDIAZEPOXIDE HYDROCHLORIDE 10 MG: 10 CAPSULE ORAL at 05:26

## 2019-05-13 RX ADMIN — CHOLESTYRAMINE 4 G: 4 POWDER, FOR SUSPENSION ORAL at 08:18

## 2019-05-13 RX ADMIN — METOPROLOL TARTRATE 50 MG: 50 TABLET ORAL at 21:40

## 2019-05-13 RX ADMIN — CHOLESTYRAMINE 4 G: 4 POWDER, FOR SUSPENSION ORAL at 17:06

## 2019-05-13 RX ADMIN — CHLORDIAZEPOXIDE HYDROCHLORIDE 10 MG: 10 CAPSULE ORAL at 21:40

## 2019-05-13 RX ADMIN — ATORVASTATIN CALCIUM 40 MG: 40 TABLET, FILM COATED ORAL at 21:40

## 2019-05-13 RX ADMIN — FAMOTIDINE 20 MG: 20 TABLET ORAL at 21:40

## 2019-05-13 RX ADMIN — SUCRALFATE 1 G: 1 TABLET ORAL at 21:40

## 2019-05-13 RX ADMIN — Medication 500 MG: at 06:00

## 2019-05-13 RX ADMIN — CHLORDIAZEPOXIDE HYDROCHLORIDE 10 MG: 10 CAPSULE ORAL at 14:10

## 2019-05-13 NOTE — PROGRESS NOTES
Infectious Disease Note Today's Date: 2019 Admit Date: 2019 Impression: · Acinetobacter baumannii bacteremia (), source likely PNA · New onset watery stools, C diff negative · RLL CAP vs aspiration · Alcoholism with tobacco use-drinks pint of vodka daily. HIV/HCV NR  
· Debility with malnutrition Plan: · Complete seven day course of cipro today. · Fecal lactoferrin still pending · ID will continue to follow. Anti-infectives:  
· CTX (-), Cipro - 
· Azithro (-) Subjective: Interval History: WBCs improved now 14.3K, Tmax past 24 hrs 99.9. Hemoglobin down to 10.1 today, no sign of active bleeding; GI following. Reports both chills and sweats, denies nausea, vomiting, still with FMT with liquid stool. Reports some dyspnea. Fatigued. Asking to go home. No Known Allergies Review of Systems:  A comprehensive review of systems was negative except for that written in the History of Present Illness. Objective:  
 
Visit Vitals /65 (BP 1 Location: Right arm, BP Patient Position: At rest) Pulse 71 Temp 99.1 °F (37.3 °C) Resp 16 Ht 5' 11\" (1.803 m) Wt 70.7 kg (155 lb 12.8 oz) SpO2 92% BMI 21.73 kg/m² Temp (24hrs), Av.6 °F (37 °C), Min:97.8 °F (36.6 °C), Max:99.9 °F (37.7 °C) Lines:  Peripheral IV:   LUE Physical Exam:   
General:  Awake but slow speech, no acute distress, but appears uncomfortable, fatigued,  and older than stated age. Eyes:  Anicteric sclera, no drainage, not injected Mouth/Throat: Mucous membranes normal, oral pharynx clear Neck: Supple Lungs:   Clear anterior lung fields on room air, mild dyspnea with conversation, HOB 90 degress CV:  Irregular rhythm, no audible murmur Abdomen:   Soft, non tender, no distention, active bowel sounds, FMT with liquid stool Extremities: No cyanosis or edema Skin: Texture and turgor normal, pale, no rash, (+) tattoos Musculoskeletal: Moves all extremities, no swelling, no deformity, general weakness Lines/Devices:  Intact, no erythema, drainage or tenderness Psych: Awake but somewhat drowsy Data Review: CBC: 
Recent Labs 05/13/19 
7999 05/11/19 
5726 WBC 14.3* 16.1*  
GRANS 78 78 MONOS 5 5 EOS 1 0* ANEU 11.1* 12.5* ABL 1.9 2.3 HGB 10.1* 11.8* HCT 30.3* 35.9*  
 159 BMP: 
Recent Labs 05/13/19 
1142 05/11/19 
1805 05/11/19 
5298 CREA 0.42*  --  0.37* BUN 7*  --  7*   --  145  
K 3.8 3.2* 3.1*  
*  --  114* CO2 23  --  23 AGAP 9  --  8  
GLU 88  --  114* LFTS: 
No results for input(s): TBILI, ALT, SGOT, AP, TP, ALB in the last 72 hours. Microbiology:  
 
All Micro Results Procedure Component Value Units Date/Time C. DIFFICILE AG & TOXIN A/B [[id number]] Collected:  05/08/19 2206 Order Status:  Completed Specimen:  Stool Updated:  05/09/19 1132 [id number] [place] ANTIGEN    
  C. DIFFICILE GDH ANTIGEN-NEGATIVE  
     
  C. difficile toxin C. DIFFICILE TOXIN-NEGATIVE  
     
  PCR Reflex NOT APPLICABLE INTERPRETATION    
  NEGATIVE FOR TOXIGENIC C. DIFFICILE Clinical Consideration NEGATIVE FOR TOXIGENIC C. DIFFICILE  
     
 CULTURE, BLOOD [[id number]]  (Abnormal) Collected:  05/05/19 0910 Order Status:  Completed Specimen:  Blood Updated:  05/07/19 6440 Special Requests: --     
  LEFT 
FOREARM 
  
  GRAM STAIN GRAM NEGATIVE RODS AEROBIC BOTTLE POSITIVE CRITICAL RESULT NOT CALLED DUE TO PREVIOUS NOTIFICATION OF CRITICAL RESULT WITHIN THE LAST 24 HOURS. Culture result: GRAM NEGATIVE RODS     
   FOR ID AND SUSCEPTIBILITY SEE ACCESSION NO [id number] CULTURE, BLOOD [[id number]]  (Abnormal)  (Susceptibility) Collected:  05/05/19 1186 Order Status:  Completed Specimen:  Blood Updated:  05/07/19 6219   Special Requests: --     
  RIGHT 
FOREARM 
  
  GRAM STAIN GRAM NEGATIVE RODS     
 AEROBIC BOTTLE POSITIVE RESULTS VERIFIED, PHONED TO AND READ BACK BY Carly Aguirre RN ON 05/05/2019 AT 1815 WMR Culture result: ACINETOBACTER BAUMANNII Imaging:  
Reviewed:  
 
5/5/19 US Abd 
IMPRESSION:  
1. Hepatic steatosis. 2. 1.6 cm low hypoechoic lesion within the right lobe of the liver. Findings 
could represent focal fatty sparing or a small lesion. Correlation with CT 
abdomen, hepatic protocol, recommended as an outpatient on a nonemergent basis. 3. Adenomyomatosis of the gallbladder. 4. Sludge in the gallbladder. 5. Cyst involving the lower pole the right kidney. Signed By: Iman Rodriguez NP May 13, 2019

## 2019-05-13 NOTE — PROGRESS NOTES
Problem: Pressure Injury - Risk of 
Goal: *Prevention of pressure injury Description Document Dom Scale and appropriate interventions in the flowsheet. Outcome: Progressing Towards Goal 
  
Problem: Patient Education: Go to Patient Education Activity Goal: Patient/Family Education Outcome: Progressing Towards Goal 
  
Problem: Falls - Risk of 
Goal: *Absence of Falls Description Document Milta Raring Fall Risk and appropriate interventions in the flowsheet. Outcome: Progressing Towards Goal 
  
Problem: Patient Education: Go to Patient Education Activity Goal: Patient/Family Education Outcome: Progressing Towards Goal 
  
Problem: Patient Education: Go to Patient Education Activity Goal: Patient/Family Education Outcome: Progressing Towards Goal 
  
Problem: Pneumonia: Discharge Outcomes Goal: *Demonstrates progressive activity Outcome: Progressing Towards Goal 
Goal: *Describes follow-up/return visits to physicians Outcome: Progressing Towards Goal 
Goal: *Tolerating diet Outcome: Progressing Towards Goal 
Goal: *Verbalizes name, dosage, time, side effects, and number of days to continue medications Outcome: Progressing Towards Goal 
Goal: *Influenza immunization Outcome: Progressing Towards Goal 
Goal: *Pneumococcal immunization Outcome: Progressing Towards Goal 
Goal: *Respiratory status at baseline Outcome: Progressing Towards Goal 
Goal: *Vital signs within defined limits Outcome: Progressing Towards Goal 
Goal: *Describes available resources and support systems Outcome: Progressing Towards Goal 
Goal: *Optimal pain control at patient's stated goal 
Outcome: Progressing Towards Goal 
  
Problem: Patient Education: Go to Patient Education Activity Goal: Patient/Family Education Outcome: Progressing Towards Goal 
  
Problem: Patient Education: Go to Patient Education Activity Goal: Patient/Family Education Outcome: Progressing Towards Goal 
  
Problem: Risk for Spread of Infection Goal: Prevent transmission of infectious organism to others Description Prevent the transmission of infectious organisms to other patients, staff members, and visitors. Outcome: Progressing Towards Goal 
  
Problem: Patient Education:  Go to Education Activity Goal: Patient/Family Education Outcome: Progressing Towards Goal

## 2019-05-13 NOTE — PROGRESS NOTES
Cm reached out to [facility] Nurse Mgr to inquire about [place] for pt. No answer-phone not accepting incoming calls at this time. CM will try again later today.

## 2019-05-13 NOTE — PROGRESS NOTES
Problem: Self Care Deficits Care Plan (Adult) Goal: *Acute Goals and Plan of Care (Insert Text) Description 1. Pt will toilet with CGA 2. Pt will complete functional mobility for ADLs with min assist 
3. Pt will complete lower body dressing with CGA using AE as needed 4. Pt will complete grooming and hygiene at sink with CGA 5. Pt will demonstrate independence with HEP to promote increased BUE strength and functional use for ADLs 6. Pt will tolerate 23 minutes functional activity with min or fewer rest breaks to promote increased endurance for ADLs 7. Pt will complete bed mobility with CGA in prep for ADLs 8. Pt will complete UB bathing and dressing with set up Timeframe: 7 days Outcome: Progressing Towards Goal 
  
OCCUPATIONAL THERAPY: Daily Note and PM 5/13/2019 INPATIENT: OT Visit Days: 2 Payor: Roosevelt May / Plan: [place] [employer] / Product Type: Federal Funded Programs /  
  
NAME/AGE/GENDER: Brittany Wagner is a 59 y.o. male PRIMARY DIAGNOSIS:  PNA (pneumonia) [J18.9] PNA (pneumonia) PNA (pneumonia) Procedure(s) (LRB): ESOPHAGOGASTRODUODENOSCOPY (EGD)/BMI 22.25/ROOM 305 (N/A) 6 Days Post-Op ICD-10: Treatment Diagnosis:  
 · Generalized Muscle Weakness (M62.81) Precautions/Allergies: 
  falls Patient has no known allergies. ASSESSMENT:  
Mr. Reji Aguirre was admitted with the above diagnosis, history of ETOH use. Pt lives with his wife and reports that he is independent at baseline, does not use an AD for mobility, denies any recent falls. This session, pt presented with deficits in mobility, balance, endurance, strength, and cognition impacting ADLs. Pt A&O X4, however presented with impaired attention, problem solving, memory, and safety awareness. Pt required min-mod cues to complete ADLs thoroughly, safely, and successfully.  Pt donned/ doffed socks with min A, completed hygiene with set up and supervision. Pt required max assistance to stand, was very unsteady and required mod A for standing balance. BUE strength is generally decreased, grossly 4- to 4/5. Pt is below his functional baseline and would benefit from skilled OT services to address deficits. Rec d/c to STR. This section established at most recent assessment PROBLEM LIST (Impairments causing functional limitations): 1. Decreased Strength 2. Decreased ADL/Functional Activities 3. Decreased Transfer Abilities 4. Decreased Balance 5. Decreased Activity Tolerance 6. Increased Fatigue 7. Decreased Cognition INTERVENTIONS PLANNED: (Benefits and precautions of occupational therapy have been discussed with the patient.) 1. Activities of daily living training 2. Adaptive equipment training 3. Balance training 4. Cognitive training 5. Therapeutic activity 6. Therapeutic exercise TREATMENT PLAN: Frequency/Duration: Follow patient 3 times/ week to address above goals. Rehabilitation Potential For Stated Goals: Good REHAB RECOMMENDATIONS (at time of discharge pending progress):   
Placement: It is my opinion, based on this patient's performance to date, that Mr. Gaudencio Johnson may benefit from intensive therapy at a [address] [place] [address] after discharge due to the functional deficits listed above that are likely to improve with skilled rehabilitation and concerns that he/she may be unsafe to be unsupervised at home due to fall risk, inability to complete ADLs . Equipment:  
? None at this time OCCUPATIONAL PROFILE AND HISTORY:  
History of Present Injury/Illness (Reason for Referral): 
See H&P Past Medical History/Comorbidities:  
Mr. Gaudencio Johnson  has a past medical history of A-fib ([address] (5/2/6688), Alcoholic hepatitis without ascites (5/11/2019), and Hypertension.  He also has no past medical history of Aneurysm ([address] Arthritis, Asthma, Autoimmune disease ([address] CAD (coronary artery disease), Cancer ([address] Chronic kidney disease, Chronic obstructive pulmonary disease (HCC), Chronic pain, Coagulation disorder ([facility] Diabetes ([facility] GERD (gastroesophageal reflux disease), Heart failure ([facility] Ill-defined condition, Morbid obesity ([facility] Psychiatric disorder, PUD (peptic ulcer disease), Seizures ([facility] Stroke ([facility] Thromboembolus ([facility] Thyroid disease, or Unspecified sleep apnea. Mr. Meme Bansal  has no past surgical history on file. Social History/Living Environment:  
Home Environment: Apartment One/Two Story Residence: One story Living Alone: No 
Support Systems: Spouse/Significant Other/Partner Patient Expects to be Discharged to[de-identified] Rehabilitation facility Current DME Used/Available at Home: Grab bars, Raised toilet seat Tub or Shower Type: Tub/Shower combination Prior Level of Function/Work/Activity: 
Independent, lives w/ wife Number of Personal Factors/Comorbidities that affect the Plan of Care: Expanded review of therapy/medical records (1-2):  MODERATE COMPLEXITY ASSESSMENT OF OCCUPATIONAL PERFORMANCE[de-identified]  
Activities of Daily Living:  
Basic ADLs (From Assessment) Complex ADLs (From Assessment) Feeding: Setup Oral Facial Hygiene/Grooming: Contact guard assistance Bathing: Moderate assistance Upper Body Dressing: Minimum assistance Lower Body Dressing: Moderate assistance Toileting: Moderate assistance Instrumental ADL Meal Preparation: Maximum assistance Homemaking: Maximum assistance Medication Management: Moderate assistance Financial Management: Moderate assistance Grooming/Bathing/Dressing Activities of Daily Living Grooming Brushing Teeth: Stand-by assistance Cognitive Retraining Safety/Judgement: Fall prevention;Home safety Toileting Toileting Assistance: Moderate assistance Bladder Hygiene: Minimum assistance Bowel Hygiene: Maximum assistance Clothing Management: Maximum assistance Cues: Tactile cues provided;Verbal cues provided;Visual cues provided Bed/Mat Mobility Rolling: Moderate assistance Supine to Sit: Minimum assistance; Moderate assistance Sit to Stand: Minimum assistance(Simultaneous filing. User may not have seen previous data.) Stand to Sit: Contact guard assistance(Simultaneous filing. User may not have seen previous data.) Bed to Chair: Minimum assistance;Assist x2 Scooting: Stand-by assistance Most Recent Physical Functioning:  
Gross Assessment: 
  
         
  
Posture: 
Posture (WDL): Exceptions to [facility] Posture Assessment: Forward head, Rounded shoulders Balance: 
Sitting: Impaired Sitting - Static: Good (unsupported) Sitting - Dynamic: Fair (occasional) Standing: Impaired; With support Standing - Static: Fair Standing - Dynamic : Fair Bed Mobility: 
Rolling: Moderate assistance Supine to Sit: Minimum assistance; Moderate assistance Scooting: Stand-by assistance Wheelchair Mobility: 
  
Transfers: 
Sit to Stand: Minimum assistance(Simultaneous filing. User may not have seen previous data.) Stand to Sit: Contact guard assistance(Simultaneous filing. User may not have seen previous data.) Bed to Chair: Minimum assistance;Assist x2 Patient Vitals for the past 6 hrs: 
 BP BP Patient Position SpO2 Pulse 05/13/19 1136 108/64 At rest 92 % 70 Mental Status Neurologic State: Alert Orientation Level: Oriented to person Cognition: Decreased attention/concentration, Follows commands(delayed responses) Perception: Appears intact Perseveration: No perseveration noted Safety/Judgement: Fall prevention, Home safety Physical Skills Involved: 
1. Balance 2. Strength 3. Activity Tolerance Cognitive Skills Affected (resulting in the inability to perform in a timely and safe manner): 1. Executive Function 2. Short Term Recall 3. Long Term Memory 4. Sustained Attention 5. Divided Attention 6. Comprehension Psychosocial Skills Affected: 1. Habits/Routines 2. Environmental Adaptation Number of elements that affect the Plan of Care: 5+:  HIGH COMPLEXITY CLINICAL DECISION MAKIN[address] [place] [id number] AM-PAC 6 Clicks Daily Activity Inpatient Short Form How much help from another person does the patient currently need. .. Total A Lot A Little None 1. Putting on and taking off regular lower body clothing? ? 1   ? 2   ? 3   ? 4  
2. Bathing (including washing, rinsing, drying)? ? 1   ? 2   ? 3   ? 4  
3. Toileting, which includes using toilet, bedpan or urinal?   ? 1   ? 2   ? 3   ? 4  
4. Putting on and taking off regular upper body clothing? ? 1   ? 2   ? 3   ? 4  
5. Taking care of personal grooming such as brushing teeth? ? 1   ? 2   ? 3   ? 4  
6. Eating meals? ? 1   ? 2   ? 3   ? 4  
© , Trustees of [address] [place] [address], under license to [employer]. All rights reserved Score:  Initial: 16 Most Recent: X (Date: -- ) Interpretation of Tool:  Represents activities that are increasingly more difficult (i.e. Bed mobility, Transfers, Gait). Medical Necessity:    
· Patient demonstrates good ·  rehab potential due to higher previous functional level. Reason for Services/Other Comments: 
· Patient continues to require present interventions due to patient's inability to complete ADLs · . Use of outcome tool(s) and clinical judgement create a POC that gives a: MODERATE COMPLEXITY  
 
 
 
TREATMENT:  
(In addition to Assessment/Re-Assessment sessions the following treatments were rendered) Pre-treatment Symptoms/Complaints:   
Pain: Initial:  
Pain Intensity 1: 0  Post Session:  4 Self Care: (15 minutes): Procedure(s) (per grid) utilized to improve and/or restore self-care/home management as related to dressing, bathing and grooming. Required minimal to moderate visual, verbal, manual and tactile cueing to facilitate activities of daily living skills and compensatory activities. Therapeutic Activity: (    10 minutes):   Therapeutic activities including Chair transfers and Ambulation on level ground to improve mobility, strength, balance and coordination. Required minimal assistance x 2   to promote static and dynamic balance in standing.  
 
n/a Therapeutic Exercise: (15 Minutes):  Exercises per grid below to improve mobility, strength, balance and coordination. Required moderate visual, verbal and tactile cues to promote proper body alignment, promote proper body posture and promote proper body mechanics. Progressed resistance, range, repetitions and complexity of movement as indicated. Date: 
5/13/19 Date: 
 Date: Activity/Exercise Parameters Parameters Parameters Shoulder flexion  8-10 reps each UE red theraband Shoulder horizontal abd/add 10 reps with red theraband Elbow flexion/extension  10-15 reps with red theraband Punches w/ elbow extension 10 reps with red theraband Braces/Orthotics/Lines/Etc:  
· IV 
· O2 Device: Room air Treatment/Session Assessment:   
· Response to Treatment:  no adverse reaction · Interdisciplinary Collaboration:  
o Occupational Therapist 
o Registered Nurse · After treatment position/precautions:  
o Up in chair 
o Bed alarm/tab alert on 
o Bed/Chair-wheels locked 
o Call light within reach 
o RN notified 
o Family at bedside · Compliance with Program/Exercises: Will assess as treatment progresses. · Recommendations/Intent for next treatment session: \"Next visit will focus on advancements to more challenging activities and reduction in assistance provided\". Total Treatment Duration: OT Patient Time In/Time Out Time In: 4186 Time Out: 1427 Oral Padron OT

## 2019-05-13 NOTE — PROGRESS NOTES
CM spoke with pt's spouse about options through [place]. Spouse aware that [facility] does not offer much assistance bc pt is not service connected. CM still waiting to hear from Ade Cardona5 through [place].

## 2019-05-13 NOTE — PROGRESS NOTES
Interdisciplinary Rounds completed 05/13/19. Nursing, Case Management, Physician and PT present. Plan of care reviewed and updated. Pt will discharge home Wednesday/Thursday. Continues with rectal tube d/t diarrhea. D/c remote tele.

## 2019-05-13 NOTE — PROGRESS NOTES
Hourly rounds performed, all needs met. Pt up to chair with therapy. Will continue to monitor pt and give report to oncoming nurse.

## 2019-05-13 NOTE — PROGRESS NOTES
Hospitalist Progress Note Admit Date:  2019  7:26 AM  
Name:  Danielle Butler Age:  59 y.o. 
:  1960 MRN:  [id number] PCP:  Romy Gilliland MD 
Treatment Team: Attending Provider: Raynaldo Spatz, MD; Consulting Provider: Noah Reynolds MD; Consulting Provider: Janine Scott MD; Utilization Review: Shawn Ruiz RN; Care Manager: Amy Sanchez RN; Physical Therapy Assistant: Paris Reyes PTA; Occupational Therapist: Merlin Cons, OT Subjective:  
59 yr old male pt with known h/o htn, h/o cardiac cath- non occlusive coronaries, known alcohol dependent admitted for pna,GI bleed,afib with rvr and alcohol withdrawal 19. Pt has been here awaiting rehab placement as he is very debilitated secondary to chronic alcohol abuse; Hospital course also complicated by copious diarrhea requiring a rectal tube, severe debility CDIFF workup negative and he did not complete a course of vanc. He is on scheduled cipro with florastor and [place]. He is overall getting better is is very debilitated. He will need f/up with gi for etoh hepatitis and liver mass. He will need placement for severe debility. :  
Still having diarrhea, but improved. GI following, immodium if needed. Cdiff negative. Seen by ID, finished cipro Objective:  
 
Patient Vitals for the past 24 hrs: 
 Temp Pulse Resp BP SpO2  
19 0735 99.1 °F (37.3 °C) 71 16 116/65 92 % 19 0526 97.8 °F (36.6 °C) 76 18 114/63 93 % 19 2341 98.7 °F (37.1 °C) 75 18 99/59 91 % 19 2015 98.2 °F (36.8 °C) 78 20 102/64 93 % 19 1706 99.9 °F (37.7 °C) 70 18 98/59 90 % 19 1400 98.1 °F (36.7 °C) 68 17 95/50 94 % Oxygen Therapy O2 Sat (%): 92 % (19 0735) Pulse via Oximetry: 93 beats per minute (19 1459) O2 Device: Room air (19 0440) Intake/Output Summary (Last 24 hours) at 201957 Last data filed at 2019 Gross per 24 hour Intake 1598.2 ml Output  Net 1598.2 ml General:    Well nourished. Lethargic but easily arousable;. Rectal tube in place CV:   RRR. No murmur, rub, or gallop. Lungs:   CTAB. No wheezing, rhonchi, or rales. Abdomen:   Soft, nontender, nondistended. Extremities: Warm and dry. No cyanosis or edema. Skin:     No rashes or jaundice. Data Review: 
I have reviewed all labs, meds, telemetry events, and studies from the last 24 hours. Recent Results (from the past 24 hour(s)) MAGNESIUM Collection Time: 05/12/19  2:58 PM  
Result Value Ref Range Magnesium 1.6 (L) 1.8 - 2.4 mg/dL CBC WITH AUTOMATED DIFF Collection Time: 05/13/19  6:07 AM  
Result Value Ref Range WBC 14.3 (H) 4.3 - 11.1 K/uL  
 RBC 2.80 (L) 4.23 - 5.6 M/uL  
 HGB 10.1 (L) 13.6 - 17.2 g/dL HCT 30.3 (L) 41.1 - 50.3 % .2 (H) 79.6 - 97.8 FL  
 MCH 36.1 (H) 26.1 - 32.9 PG  
 MCHC 33.3 31.4 - 35.0 g/dL  
 RDW 14.8 (H) 11.9 - 14.6 % PLATELET 254 625 - 463 K/uL MPV 10.7 9.4 - 12.3 FL ABSOLUTE NRBC 0.00 0.0 - 0.2 K/uL  
 DF AUTOMATED NEUTROPHILS 78 43 - 78 % LYMPHOCYTES 14 13 - 44 % MONOCYTES 5 4.0 - 12.0 % EOSINOPHILS 1 0.5 - 7.8 % BASOPHILS 1 0.0 - 2.0 % IMMATURE GRANULOCYTES 2 0.0 - 5.0 %  
 ABS. NEUTROPHILS 11.1 (H) 1.7 - 8.2 K/UL  
 ABS. LYMPHOCYTES 1.9 0.5 - 4.6 K/UL  
 ABS. MONOCYTES 0.7 0.1 - 1.3 K/UL  
 ABS. EOSINOPHILS 0.1 0.0 - 0.8 K/UL  
 ABS. BASOPHILS 0.1 0.0 - 0.2 K/UL  
 ABS. IMM. GRANS. 0.3 0.0 - 0.5 K/UL METABOLIC PANEL, BASIC Collection Time: 05/13/19  6:07 AM  
Result Value Ref Range Sodium 144 136 - 145 mmol/L Potassium 3.8 3.5 - 5.1 mmol/L Chloride 112 (H) 98 - 107 mmol/L  
 CO2 23 21 - 32 mmol/L Anion gap 9 7 - 16 mmol/L Glucose 88 65 - 100 mg/dL BUN 7 (L) 8 - 23 MG/DL Creatinine 0.42 (L) 0.8 - 1.5 MG/DL  
 GFR est AA >60 >60 ml/min/1.73m2 GFR est non-AA >60 >60 ml/min/1.73m2 Calcium 7.7 (L) 8.3 - 10.4 MG/DL MAGNESIUM  
 Collection Time: 05/13/19  6:07 AM  
Result Value Ref Range Magnesium 2.0 1.8 - 2.4 mg/dL PHOSPHORUS Collection Time: 05/13/19  6:07 AM  
Result Value Ref Range Phosphorus 1.5 (L) 2.3 - 3.7 MG/DL All Micro Results Procedure Component Value Units Date/Time C. DIFFICILE AG & TOXIN A/B [[id number]] Collected:  05/08/19 2206 Order Status:  Completed Specimen:  Stool Updated:  05/09/19 1132 [address] ANTIGEN    
  C. DIFFICILE GDH ANTIGEN-NEGATIVE  
     
  C. difficile toxin C. DIFFICILE TOXIN-NEGATIVE  
     
  PCR Reflex NOT APPLICABLE INTERPRETATION    
  NEGATIVE FOR TOXIGENIC C. DIFFICILE Clinical Consideration NEGATIVE FOR TOXIGENIC C. DIFFICILE  
     
 CULTURE, BLOOD [[id number]]  (Abnormal) Collected:  05/05/19 0910 Order Status:  Completed Specimen:  Blood Updated:  05/07/19 1310 Special Requests: --     
  LEFT 
FOREARM 
  
  GRAM STAIN GRAM NEGATIVE RODS AEROBIC BOTTLE POSITIVE CRITICAL RESULT NOT CALLED DUE TO PREVIOUS NOTIFICATION OF CRITICAL RESULT WITHIN THE LAST 24 HOURS. Culture result: GRAM NEGATIVE RODS     
   FOR ID AND SUSCEPTIBILITY SEE ACCESSION NO [id number] CULTURE, BLOOD [[id number]]  (Abnormal)  (Susceptibility) Collected:  05/05/19 4428 Order Status:  Completed Specimen:  Blood Updated:  05/07/19 9479 Special Requests: --     
  RIGHT 
FOREARM 
  
  GRAM STAIN GRAM NEGATIVE RODS AEROBIC BOTTLE POSITIVE RESULTS VERIFIED, PHONED TO AND READ BACK BY Carolyn Donohue RN ON 05/05/2019 AT 1815 WMR Culture result: ACINETOBACTER BAUMANNII Current Meds: 
Current Facility-Administered Medications Medication Dose Route Frequency  cholestyramine-aspartame (QUESTRAN LIGHT) packet 4 g  4 g Oral TID WITH MEALS  
 amLODIPine (NORVASC) tablet 5 mg  5 mg Oral DAILY  atorvastatin (LIPITOR) tablet 40 mg  40 mg Oral QHS  famotidine (PEPCID) tablet 20 mg  20 mg Oral BID  metoprolol tartrate (LOPRESSOR) tablet 50 mg  50 mg Oral BID  Saccharomyces boulardii (FLORASTOR) capsule 500 mg  500 mg Oral BID  ciprofloxacin HCl (CIPRO) tablet 750 mg  750 mg Oral Q12H  
 LORazepam (ATIVAN) injection 0.5 mg  0.5 mg IntraVENous Q4H PRN  chlordiazePOXIDE (LIBRIUM) capsule 10 mg  10 mg Oral Q8H  
 guaiFENesin (ROBITUSSIN) 100 mg/5 mL oral liquid 400 mg  400 mg Oral Q6H PRN  
 sucralfate (CARAFATE) tablet 1 g  1 g Oral AC&HS  
 LORazepam (ATIVAN) tablet 1 mg  1 mg Oral Q4H PRN  
 0.9% sodium chloride 1,000 mL with mvi, adult no. 4 with vit K 10 mL, thiamine 521 mg, folic acid 1 mg infusion   IntraVENous Q24H  
 nitroglycerin (NITROSTAT) tablet 0.4 mg  0.4 mg SubLINGual Q5MIN PRN  
 acetaminophen (TYLENOL) tablet 650 mg  650 mg Oral Q4H PRN  
 HYDROcodone-acetaminophen (NORCO) 5-325 mg per tablet 1 Tab  1 Tab Oral Q4H PRN  
 morphine injection 1 mg  1 mg IntraVENous Q4H PRN  
 nicotine (NICODERM CQ) 21 mg/24 hr patch 1 Patch  1 Patch TransDERmal Q24H  
 ondansetron (ZOFRAN) injection 4 mg  4 mg IntraVENous Q4H PRN  
 diphenhydrAMINE (BENADRYL) injection 12.5 mg  12.5 mg IntraVENous Q4H PRN  
 naloxone (NARCAN) injection 0.4 mg  0.4 mg IntraVENous PRN  
 sodium chloride (NS) flush 5-40 mL  5-40 mL IntraVENous Q8H  
 sodium chloride (NS) flush 5-40 mL  5-40 mL IntraVENous PRN Other Studies (last 24 hours): No results found. Assessment and Plan:  
 
Hospital Problems as of 5/13/2019 Never Reviewed Codes Class Noted - Resolved POA Hypomagnesemia ICD-10-CM: B30.75 
ICD-9-CM: 275.2  5/12/2019 - Present Yes Diarrhea ICD-10-CM: R19.7 ICD-9-CM: 787.91  5/12/2019 - Present Clinically Undetermined Liver mass ICD-10-CM: R16.0 ICD-9-CM: 573.9  5/12/2019 - Present Yes Overview Signed 5/12/2019  3:44 PM by Tristin Paris MD  
  Further workup as an outpatient Alcoholic hepatitis without ascites ICD-10-CM: K70.10 ICD-9-CM: 571.1  5/11/2019 - Present Yes Hypokalemia ICD-10-CM: E87.6 ICD-9-CM: 276.8  5/10/2019 - Present Yes Clostridium difficile diarrhea ICD-10-CM: A04.72 
ICD-9-CM: 008.45  5/9/2019 - Present Unknown Bacteremia due to Gram-negative bacteria ICD-10-CM: R78.81 ICD-9-CM: 790.7, 041.85  5/6/2019 - Present Unknown * (Principal) PNA (pneumonia) ICD-10-CM: J18.9 ICD-9-CM: 718  5/5/2019 - Present Unknown Tachycardia ICD-10-CM: R00.0 ICD-9-CM: 785.0  5/5/2019 - Present Unknown Thrombocytopenia ([address] ICD-10-CM: D69.6 ICD-9-CM: 287.5  5/5/2019 - Present Unknown GI bleed ICD-10-CM: K92.2 ICD-9-CM: 578.9  5/5/2019 - Present Unknown A-fib [facility]) ICD-10-CM: I48.91 
ICD-9-CM: 427.31  5/5/2019 - Present Unknown Tobacco abuse ICD-10-CM: Z72.0 ICD-9-CM: 305.1  11/28/2016 - Present Yes ETOH abuse ICD-10-CM: F10.10 ICD-9-CM: 305.00  11/28/2016 - Present Yes HTN (hypertension) (Chronic) ICD-10-CM: I10 
ICD-9-CM: 401.9  11/19/2014 - Present Yes PLAN:   
· Antibiotic associated diarrhea: florastor and questran, GI following. Finished abx today · Acinetobacter bacteremia- today final day cipro 7d course. Appreciate ID recs. · S/p EGD on 05/07- which showed gastritis and some erosions · Hypokalemia- repleted · Hypomag- repleted · afib with rvr- controlled on lopressor · Thrombocytopenia from alcohol use- resolved- no active bleeding · htn- continue current meds · Lethargy- standing ativan weaned and stopped. Weaning librium · ETOH hepatitis- further mgt per gi · Liver mass- outpt f/u with GI 
· Further workup and mg based on his clinical course · Awaiting placement  For rehab via the [place]. - hopefully next week DC planning/Dispo:  As above- unclear DVT ppx: scds- hb stable with no evidence of active bleed- will re-instate his aspirin Signed: 
Blayne Damon MD

## 2019-05-13 NOTE — PROGRESS NOTES
Problem: Mobility Impaired (Adult and Pediatric) Goal: *Acute Goals and Plan of Care (Insert Text) Description STG: 
(1.)Mr. [place] will move from supine to sit and sit to supine , scoot up and down and roll side to side with MINIMAL ASSIST within 3 treatment day(s). (2.)Mr. [place] will transfer from bed to chair and chair to bed with MODERATE ASSIST using the least restrictive device within 3 treatment day(s). (3.)Mr. [place] will ambulate with MODERATE ASSIST for 10 feet with the least restrictive device within 3 treatment day(s). (4.)Mr. [place] will perform standing static and dynamic balance activities x 15 minutes with MODERATE ASSIST to improve safety within 3 day(s). LTG: 
(1.)Mr. [place] will move from supine to sit and sit to supine , scoot up and down and roll side to side in bed with CONTACT GUARD ASSIST within 7 treatment day(s). (2.)Mr. [place] will transfer from bed to chair and chair to bed with MINIMAL ASSIST using the least restrictive device within 7 treatment day(s). (3.)Mr. [place] will ambulate with MINIMAL ASSIST for 25 feet with the least restrictive device within 7 treatment day(s). (4.)Mr. [place] will perform standing static and dynamic balance activities x 15 minutes with MINIMAL ASSIST to improve safety within 7 day(s). ________________________________________________________________________________________________ Outcome: Progressing Towards Goal 
  
PHYSICAL THERAPY: Daily Note and PM 5/13/2019 INPATIENT: PT Visit Days : 3 Payor: Shirley Hughes / Plan: [place] [employer] / Product Type: Federal Funded Programs /   
  
NAME/AGE/GENDER: Gayatri Seay is a 24[address] [facility] y.o. male PRIMARY DIAGNOSIS: PNA (pneumonia) [J18.9] PNA (pneumonia) PNA (pneumonia) Procedure(s) (LRB): ESOPHAGOGASTRODUODENOSCOPY (EGD)/BMI 22.25/ROOM 305 (N/A) 6 Days Post-Op ICD-10: Treatment Diagnosis: · Difficulty in walking, Not elsewhere classified (R26.2) Precaution/Allergies: 
Patient has no known allergies. ASSESSMENT:  
Mr. Sindhu Callahan is supine somewhat confused telling me he has been in bed for 250 days but willing to participate. He performed supine exercises below with fair ability. He then worked on scooting and rolling in bed and was able to sit up from sidelying with minimal assist.  He stood with minimal assist into the walker several times improving each time. He then started taking side steps along the EOB one way then the other with seated rests along the way. After a brief change he stood again and walked a few feet to the chair. Good progress with all STG's met. This section established at most recent assessment PROBLEM LIST (Impairments causing functional limitations): 1. Decreased Strength 2. Decreased ADL/Functional Activities 3. Decreased Transfer Abilities 4. Decreased Ambulation Ability/Technique 5. Decreased Balance 6. Decreased Activity Tolerance 7. Decreased Pacing Skills 8. Increased Shortness of Breath 9. Decreased Knowledge of Precautions 10. Decreased Menard with Home Exercise Program 
 INTERVENTIONS PLANNED: (Benefits and precautions of physical therapy have been discussed with the patient.) 1. Balance Exercise 2. Bed Mobility 3. Family Education 4. Gait Training 5. Group Therapy 6. Home Exercise Program (HEP) 7. Therapeutic Activites 8. Therapeutic Exercise/Strengthening 9. Transfer Training TREATMENT PLAN: Frequency/Duration: 3 times a week for duration of hospital stay Rehabilitation Potential For Stated Goals: Good REHAB RECOMMENDATIONS (at time of discharge pending progress):   
Placement:  
It is my opinion, based on this patient's performance to date, that Mr. Sindhu Callahan may benefit from intensive therapy at a [address] [place] [address] after discharge due to the functional deficits listed above that are likely to improve with skilled rehabilitation and concerns that he/she may be unsafe to be unsupervised at home due to very unsteady gait, requiring significant assist to transfer. Equipment:  
? None at this time HISTORY:  
History of Present Injury/Illness (Reason for Referral): 
Patient is a 59 y.o. male with PMH of HTN and alcohol abuse, who is seen in consultation at the request of Dr. Jose Martin Castillo for melena and thrombocytopenia. He was admitted today for pneumonia after presenting to the ED with a couple week history of cough, body aches and chills for three days. CXR showed right basilar infiltrate, he was admitted by hospitalists and is on rocephin and zithromax. CBC significant for platelet count of 79O with hgb of 15.1, .7, and white count 4300. Sodium 132, potassium 2.9, BUN 17, creatinine 0.69, t. Bili 1.2, albumin 2.8, ALT 30, AST 41, alk phos 58 and lipase 198. Abdominal ultrasound today showed hepatic steatosis, a 1.6 cm hypoechoic lesion in the right lobe of the liver which could represent focal fatty sparing but hepatic protocol CT recommended as outpatient to r/o small lesion. Also noted was gallbladder sludge and adenomyomatosis. He had been in sinus tachy, but remote tele reported heart rate 170-200, possible rapid a. Fib. He was transferred to third floor and cardiology consultation pending. Tmax is 101.6. Blood cultures pending. Patient reports about 12 episodes of black loose stools this past week--without any use of bismuth or oral iron. Most recent black stool was reportedly one hour ago. He was noted to have black stool on examiner's glove in ER. No abdominal pain. No red blood per rectum. He did have nausea and vomiting 6 days ago without hematemesis or coffee ground emesis but no recurrent nausea/vomiting. He denies any known prior history of PUD, liver disease.   He was unaware of thrombocytopenia until this admission. He does drink 1 pint of vodka per day. Smokes about a pack to 1.5 packs of cigarettes per day. Also takes about 3 ibuprofen most days for muscle/joint pain related to his work as a . Had a colonoscopy 3 years ago at the [facility] in [facility] with 7 polyps removed. No significant weight change. Past Medical History/Comorbidities:  
Mr. Tia Ahn  has a past medical history of A-fib ([address] (2/9/5233), Alcoholic hepatitis without ascites (5/11/2019), and Hypertension. He also has no past medical history of Aneurysm ([address] Arthritis, Asthma, Autoimmune disease ([address] CAD (coronary artery disease), Cancer ([address] Chronic kidney disease, Chronic obstructive pulmonary disease ([address] Chronic pain, Coagulation disorder ([address] Diabetes ([address] GERD (gastroesophageal reflux disease), Heart failure ([address] Ill-defined condition, Morbid obesity ([address] Psychiatric disorder, PUD (peptic ulcer disease), Seizures ([address] Stroke ([address] Thromboembolus ([address] Thyroid disease, or Unspecified sleep apnea. Mr. Tia Ahn  has no past surgical history on file. Social History/Living Environment:  
Home Environment: Apartment One/Two Story Residence: One story Living Alone: No 
Support Systems: Spouse/Significant Other/Partner Patient Expects to be Discharged to[de-identified] Rehabilitation facility Current DME Used/Available at Home: Grab bars, Raised toilet seat Tub or Shower Type: Tub/Shower combination Prior Level of Function/Work/Activity: 
unknown Number of Personal Factors/Comorbidities that affect the Plan of Care: 3+: HIGH COMPLEXITY EXAMINATION:  
Most Recent Physical Functioning:  
Gross Assessment: 
  
         
  
Posture: 
  
Balance: 
  Bed Mobility: 
Rolling: Moderate assistance Supine to Sit: Minimum assistance; Moderate assistance Scooting: Stand-by assistance Wheelchair Mobility: 
  
Transfers: 
Sit to Stand: Minimum assistance(Simultaneous filing. User may not have seen previous data.) Stand to Sit: Contact guard assistance(Simultaneous filing. User may not have seen previous data.) Gait: 
  
Speed/Nelly: Shuffled; Slow Gait Abnormalities: Decreased step clearance;Shuffling gait; Steppage gait Distance (ft): 5 Feet (ft)(x3) Assistive Device: Walker, rolling Ambulation - Level of Assistance: Stand-by assistance;Contact guard assistance Body Structures Involved: 1. Nerves 2. Heart 3. Digestive Structures 4. Muscles Body Functions Affected: 1. Sensory/Pain 2. Cardio 3. Respiratory 4. Neuromusculoskeletal 
5. Movement Related Activities and Participation Affected: 1. Mobility 2. Self Care 3. Domestic Life 4. Interpersonal Interactions and Relationships 5. Community, Social and [place] Number of elements that affect the Plan of Care: 4+: HIGH COMPLEXITY CLINICAL PRESENTATION:  
Presentation: Evolving clinical presentation with changing clinical characteristics: MODERATE COMPLEXITY CLINICAL DECISION MAKIN[address] [place] [id number] AM-PAC 6 Clicks Basic Mobility Inpatient Short Form How much difficulty does the patient currently have. .. Unable A Lot A Little None 1. Turning over in bed (including adjusting bedclothes, sheets and blankets)? ? 1   ? 2   ? 3   ? 4  
2. Sitting down on and standing up from a chair with arms ( e.g., wheelchair, bedside commode, etc.)   ? 1   ? 2   ? 3   ? 4  
3. Moving from lying on back to sitting on the side of the bed?   ? 1   ? 2   ? 3   ? 4 How much help from another person does the patient currently need. .. Total A Lot A Little None 4. Moving to and from a bed to a chair (including a wheelchair)? ? 1   ? 2   ? 3   ? 4  
5. Need to walk in hospital room? ? 1   ? 2   ? 3   ? 4  
6. Climbing 3-5 steps with a railing? ? 1   ? 2   ? 3   ? 4  
© 2007, Trustees of [address] [place] [address] [address] [address], under license to [employer] All rights reserved Score:  Initial: 9 Most Recent: X (Date: -- ) Interpretation of Tool:  Represents activities that are increasingly more difficult (i.e. Bed mobility, Transfers, Gait). Medical Necessity:    
· Patient demonstrates good ·  rehab potential due to higher previous functional level. Reason for Services/Other Comments: 
· Patient continues to require modification of therapeutic interventions to increase complexity of exercises · . Use of outcome tool(s) and clinical judgement create a POC that gives a: Questionable prediction of patient's progress: MODERATE COMPLEXITY  
  
 
 
 
TREATMENT:  
(In addition to Assessment/Re-Assessment sessions the following treatments were rendered) Pre-treatment Symptoms/Complaints:   \"I have been in bed for 250 days\" Pain: Initial:  
Pain Intensity 1: 0  Post Session:  0/10 Therapeutic Activity: (    25 minutes): Therapeutic activities including, rolling, scooting, sit to stand numerous times and  Ambulation on level ground to improve mobility, strength, balance, coordination and endurance. Required minimal   to promote static and dynamic balance in standing. Therapeutic Exercise: (15 Minutes):  Exercises per grid below to improve mobility and strength. Required moderate visual, verbal, manual and tactile cues to promote proper body mechanics. Progressed complexity of movement as indicated. Date: 
5/13/19 Date: 
 Date: Activity/Exercise Parameters Parameters Parameters Supine AP 20x B Supine heel slides 10x B Supine SAQ 10x B Supine bridging 5x Braces/Orthotics/Lines/Etc:  
· rectal tube Treatment/Session Assessment:   
· Response to Treatment:  above · Interdisciplinary Collaboration:  
o Physical Therapy Assistant 
o Registered Nurse · After treatment position/precautions:  
o Up in chair 
o Bed alarm/tab alert on 
o Bed/Chair-wheels locked 
o Bed in low position 
o Call light within reach 
o RN notified 
o Family at bedside 
o with OT  
 · Compliance with Program/Exercises: Compliant most of the time · Recommendations/Intent for next treatment session: \"Next visit will focus on advancements to more challenging activities and reduction in assistance provided\". Total Treatment Duration: PT Patient Time In/Time Out Time In: 8926 Time Out: 1987 Lucy Madison, PTA

## 2019-05-13 NOTE — PROGRESS NOTES
Rj left Vm with [facility] to inquire about getting [facility] arranged through [place]. Awaiting response.

## 2019-05-13 NOTE — PROGRESS NOTES
[facility] Progress Note Admit Date:  5/5/2019 Today's Date:  5/13/2019 CC:  ELevated LFTs Subjective:  
 
Denies abdominal pain and diarrhea. Patient feels that the diarrhea is improving. FMS intact with copious tan/brown stools. No blooding. Medications:  
Current Facility-Administered Medications Medication Dose Route Frequency  cholestyramine-aspartame (QUESTRAN LIGHT) packet 4 g  4 g Oral TID WITH MEALS  
 amLODIPine (NORVASC) tablet 5 mg  5 mg Oral DAILY  atorvastatin (LIPITOR) tablet 40 mg  40 mg Oral QHS  famotidine (PEPCID) tablet 20 mg  20 mg Oral BID  metoprolol tartrate (LOPRESSOR) tablet 50 mg  50 mg Oral BID  Saccharomyces boulardii (FLORASTOR) capsule 500 mg  500 mg Oral BID  ciprofloxacin HCl (CIPRO) tablet 750 mg  750 mg Oral Q12H  
 LORazepam (ATIVAN) injection 0.5 mg  0.5 mg IntraVENous Q4H PRN  chlordiazePOXIDE (LIBRIUM) capsule 10 mg  10 mg Oral Q8H  
 guaiFENesin (ROBITUSSIN) 100 mg/5 mL oral liquid 400 mg  400 mg Oral Q6H PRN  
 sucralfate (CARAFATE) tablet 1 g  1 g Oral AC&HS  
 LORazepam (ATIVAN) tablet 1 mg  1 mg Oral Q4H PRN  
 0.9% sodium chloride 1,000 mL with mvi, adult no. 4 with vit K 10 mL, thiamine 023 mg, folic acid 1 mg infusion   IntraVENous Q24H  
 nitroglycerin (NITROSTAT) tablet 0.4 mg  0.4 mg SubLINGual Q5MIN PRN  
 acetaminophen (TYLENOL) tablet 650 mg  650 mg Oral Q4H PRN  
 HYDROcodone-acetaminophen (NORCO) 5-325 mg per tablet 1 Tab  1 Tab Oral Q4H PRN  
 morphine injection 1 mg  1 mg IntraVENous Q4H PRN  
 nicotine (NICODERM CQ) 21 mg/24 hr patch 1 Patch  1 Patch TransDERmal Q24H  
 ondansetron (ZOFRAN) injection 4 mg  4 mg IntraVENous Q4H PRN  
 diphenhydrAMINE (BENADRYL) injection 12.5 mg  12.5 mg IntraVENous Q4H PRN  
 naloxone (NARCAN) injection 0.4 mg  0.4 mg IntraVENous PRN  
 sodium chloride (NS) flush 5-40 mL  5-40 mL IntraVENous Q8H  
  sodium chloride (NS) flush 5-40 mL  5-40 mL IntraVENous PRN Review of Systems: ROS was obtained, with pertinent positives as listed above. No chest pain or SOB. Diet:  GI soft Objective:  
Vitals: 
Visit Vitals /65 (BP 1 Location: Right arm, BP Patient Position: At rest) Pulse 71 Temp 99.1 °F (37.3 °C) Resp 16 Ht 5' 11\" (1.803 m) Wt 70.7 kg (155 lb 12.8 oz) SpO2 92% BMI 21.73 kg/m² Intake/Output: 
No intake/output data recorded. 05/11 1901 - 05/13 0700 In: 3398.2 [I.V.:3398.2] Out: 1200 [Drains:1200] Diarrhea unchanged , 1200cc last 2 days Exam: 
General appearance: alert, cooperative, NAD Lungs: CTA Heart: RRR Abdomen: Soft, ND/NT with NABS. Neuro:  A&O Data Review (Labs):   
Recent Labs 05/13/19 
2790 05/12/19 
1458 05/11/19 
1805 05/11/19 
2779 05/11/19 
4796 05/10/19 
1708 WBC 14.3*  --   --  16.1*  --   --   
HGB 10.1*  --   --  11.8*  --   --   
HCT 30.3*  --   --  35.9*  --   --   
  --   --  159  --   --   
.2*  --   --  112.2*  --   --   
  --   --   --  145  --   
K 3.8  --  3.2*  --  3.1* 3.1*  
*  --   --   --  114*  --   
CO2 23  --   --   --  23  --   
BUN 7*  --   --   --  7*  --   
CREA 0.42*  --   --   --  0.37*  --   
CA 7.7*  --   --   --  7.7*  --   
MG 2.0 1.6*  --   --  1.7*  --   
GLU 88  --   --   --  114*  --   
 
US 5/5 IMPRESSION:  
1. Hepatic steatosis. 2. 1.6 cm low hypoechoic lesion within the right lobe of the liver. Findings 
could represent focal fatty sparing or a small lesion. Correlation with CT 
abdomen, hepatic protocol, recommended as an outpatient on a nonemergent basis. 3. Adenomyomatosis of the gallbladder. 4. Sludge in the gallbladder. 5. Cyst involving the lower pole the right kidney. 5/8/19 C diff Neg 
 
ECHO 5/6/19 SUMMARY 
-  Left ventricle: Systolic function was at the lower limits of normal. Ejection fraction was estimated in the range of 50 % to 55 %. This study was inadequate for the evaluation of regional wall motion. -  Inferior vena cava, hepatic veins: The respirophasic change in diameter was less than 50%. -  Aortic valve: There was no evidence for vegetation. -  Mitral valve: There was no evidence for vegetation. EGD 5/7/19 for Acute blood loss anemia PostOp Diagnosis:  Moderate gastritis. No active bleeding Findings: OROPHARYNX: The piriform sinuses, arytenoid cartilage and vocal cords were briefly evaluated on entry and withdrawal of the endoscope through the oropharynx. These were found to be unremarkable. ESOPHAGUS: The esophageal mucosa was unremarkable. The squamocolumnar junction was intact without erosions, ulcerations, rings, webs or strictures. There was no evidence of Garcia's type intestinal metaplasia. No evidence of esophageal varices STOMACH: The gastric mucosa was erythematous, consistent with Moderate proximal gastritis. The gastric antrum appeared normal. There were no erosions, ulcerations, polyps, mass lesions, vascular ectasias or other abnormalities noted. This may represent portal hypertensive gastropathy or alcoholic gastritis. The pylorus was patent and easily intubated. There was no hiatal hernia noted by direct view or retroflexion within the stomach. DUODENUM: The endoscope was advanced as far as possible into the duodenum. The villi appeared normal.  There were no mucosal breaks, erosions, ulcerations, vascular ectasias or other abnormalities present. Recommendations: 
B.i.d. PPI Avoid NSAIDs Alcohol abstinence Advance diet Mild anemia and severe thrombocytopenia may be related to bone marrow suppression from alcohol and malnutrition etc.  
 
 
Assessment:  
 
Principal Problem: 
PNA (pneumonia) (5/5/2019) Active Problems: 
HTN (hypertension) (11/19/2014) Tobacco abuse (11/28/2016) ETOH abuse (11/28/2016) Tachycardia (5/5/2019) Thrombocytopenia [address] (5/5/2019) GI bleed (5/5/2019) A-fib ([facility] (5/5/2019) Bacteremia due to Gram-negative bacteria (5/6/2019) Clostridium difficile diarrhea (5/9/2019) Hypomagnesemia (5/12/2019) Hypokalemia (5/10/2019) Diarrhea (5/12/2019) Alcoholic hepatitis without ascites (5/11/2019) Liver mass (5/12/2019) Overview: Further workup as an outpatient 58 y/o male with HTN, colon polyps, h/o EtOH and tobacco abuse is admitted with pneumonia, a. Fib with RVR, EtOH withdrawal and seen by GI for black stools x 1 week and thrombocytopenia. EGD showed moderate gastritis. No signs of bleeding since and now H/H is stable. Now he is having more issues with diarrhea. Cdiff neg. Suspect ABx - associted diarrhea- sudden onset w/ Cipro, consider change of ABx to alternative for PNA/ bacteremiaID following. Was on Azithro. Now on Cipro for PNA- CAP vs. Aspiration. WBC trending down. Low grade fever. Etoh withdraw- improved. Etoh hepatitis- improved. Vanc d/c's, C diff neg Plan:  
 
Hgb down over a gram overnight, but no active or overt bleeding. PPI discontinued. Changed to Carafate/Pepcid for gastritis on EGD. Etoh abstinence Cont probiotics and Questran. Monitor response. Consider imodium or lomotil  if needed. Record stool output. FU fecal lactoferrin results. Leonard Miranda PA-C [facility]

## 2019-05-14 ENCOUNTER — APPOINTMENT (OUTPATIENT)
Dept: CT IMAGING | Age: 59
DRG: 177 | End: 2019-05-14
Attending: INTERNAL MEDICINE
Payer: OTHER GOVERNMENT

## 2019-05-14 LAB
ANION GAP SERPL CALC-SCNC: 8 MMOL/L (ref 7–16)
BUN SERPL-MCNC: 8 MG/DL (ref 8–23)
CALCIUM SERPL-MCNC: 7.6 MG/DL (ref 8.3–10.4)
CHLORIDE SERPL-SCNC: 111 MMOL/L (ref 98–107)
CO2 SERPL-SCNC: 23 MMOL/L (ref 21–32)
CREAT SERPL-MCNC: 0.37 MG/DL (ref 0.8–1.5)
ERYTHROCYTE [DISTWIDTH] IN BLOOD BY AUTOMATED COUNT: 14.6 % (ref 11.9–14.6)
GLUCOSE SERPL-MCNC: 88 MG/DL (ref 65–100)
HCT VFR BLD AUTO: 28.3 % (ref 41.1–50.3)
HGB BLD-MCNC: 9.5 G/DL (ref 13.6–17.2)
MCH RBC QN AUTO: 36.7 PG (ref 26.1–32.9)
MCHC RBC AUTO-ENTMCNC: 33.6 G/DL (ref 31.4–35)
MCV RBC AUTO: 109.3 FL (ref 79.6–97.8)
NRBC # BLD: 0 K/UL (ref 0–0.2)
PLATELET # BLD AUTO: 223 K/UL (ref 150–450)
PMV BLD AUTO: 10.6 FL (ref 9.4–12.3)
POTASSIUM SERPL-SCNC: 3.4 MMOL/L (ref 3.5–5.1)
RBC # BLD AUTO: 2.59 M/UL (ref 4.23–5.6)
SODIUM SERPL-SCNC: 142 MMOL/L (ref 136–145)
WBC # BLD AUTO: 14 K/UL (ref 4.3–11.1)

## 2019-05-14 PROCEDURE — 74011000250 HC RX REV CODE- 250: Performed by: FAMILY MEDICINE

## 2019-05-14 PROCEDURE — 65270000029 HC RM PRIVATE

## 2019-05-14 PROCEDURE — 85027 COMPLETE CBC AUTOMATED: CPT

## 2019-05-14 PROCEDURE — 74011250637 HC RX REV CODE- 250/637: Performed by: FAMILY MEDICINE

## 2019-05-14 PROCEDURE — 80048 BASIC METABOLIC PNL TOTAL CA: CPT

## 2019-05-14 PROCEDURE — 74160 CT ABDOMEN W/CONTRAST: CPT

## 2019-05-14 PROCEDURE — 74011250637 HC RX REV CODE- 250/637: Performed by: INTERNAL MEDICINE

## 2019-05-14 PROCEDURE — 74011250636 HC RX REV CODE- 250/636: Performed by: FAMILY MEDICINE

## 2019-05-14 PROCEDURE — 74011000258 HC RX REV CODE- 258: Performed by: FAMILY MEDICINE

## 2019-05-14 PROCEDURE — 74011636320 HC RX REV CODE- 636/320: Performed by: FAMILY MEDICINE

## 2019-05-14 PROCEDURE — 36415 COLL VENOUS BLD VENIPUNCTURE: CPT

## 2019-05-14 RX ORDER — CHLORDIAZEPOXIDE HYDROCHLORIDE 5 MG/1
5 CAPSULE, GELATIN COATED ORAL 4 TIMES DAILY
Status: DISCONTINUED | OUTPATIENT
Start: 2019-05-14 | End: 2019-05-15 | Stop reason: HOSPADM

## 2019-05-14 RX ORDER — FOLIC ACID 1 MG/1
1 TABLET ORAL DAILY
Status: DISCONTINUED | OUTPATIENT
Start: 2019-05-15 | End: 2019-05-15 | Stop reason: HOSPADM

## 2019-05-14 RX ORDER — LANOLIN ALCOHOL/MO/W.PET/CERES
100 CREAM (GRAM) TOPICAL DAILY
Status: DISCONTINUED | OUTPATIENT
Start: 2019-05-15 | End: 2019-05-15 | Stop reason: HOSPADM

## 2019-05-14 RX ORDER — SODIUM CHLORIDE 0.9 % (FLUSH) 0.9 %
10 SYRINGE (ML) INJECTION
Status: COMPLETED | OUTPATIENT
Start: 2019-05-14 | End: 2019-05-14

## 2019-05-14 RX ORDER — POTASSIUM CHLORIDE 20 MEQ/1
40 TABLET, EXTENDED RELEASE ORAL
Status: COMPLETED | OUTPATIENT
Start: 2019-05-14 | End: 2019-05-14

## 2019-05-14 RX ADMIN — THIAMINE HYDROCHLORIDE: 100 INJECTION, SOLUTION INTRAMUSCULAR; INTRAVENOUS at 11:13

## 2019-05-14 RX ADMIN — Medication 500 MG: at 22:17

## 2019-05-14 RX ADMIN — CHLORDIAZEPOXIDE HYDROCHLORIDE 10 MG: 10 CAPSULE ORAL at 06:11

## 2019-05-14 RX ADMIN — SUCRALFATE 1 G: 1 TABLET ORAL at 17:21

## 2019-05-14 RX ADMIN — Medication 10 ML: at 14:12

## 2019-05-14 RX ADMIN — SUCRALFATE 1 G: 1 TABLET ORAL at 11:12

## 2019-05-14 RX ADMIN — FAMOTIDINE 20 MG: 20 TABLET ORAL at 22:17

## 2019-05-14 RX ADMIN — ATORVASTATIN CALCIUM 40 MG: 40 TABLET, FILM COATED ORAL at 22:17

## 2019-05-14 RX ADMIN — DIATRIZOATE MEGLUMINE AND DIATRIZOATE SODIUM 15 ML: 660; 100 LIQUID ORAL; RECTAL at 08:19

## 2019-05-14 RX ADMIN — FAMOTIDINE 20 MG: 20 TABLET ORAL at 06:11

## 2019-05-14 RX ADMIN — Medication 10 ML: at 22:19

## 2019-05-14 RX ADMIN — Medication 500 MG: at 06:11

## 2019-05-14 RX ADMIN — POTASSIUM CHLORIDE 40 MEQ: 20 TABLET, EXTENDED RELEASE ORAL at 11:12

## 2019-05-14 RX ADMIN — CHOLESTYRAMINE 4 G: 4 POWDER, FOR SUSPENSION ORAL at 16:20

## 2019-05-14 RX ADMIN — METOPROLOL TARTRATE 50 MG: 50 TABLET ORAL at 06:11

## 2019-05-14 RX ADMIN — FLUTICASONE PROPIONATE 2 SPRAY: 50 SPRAY, METERED NASAL at 08:20

## 2019-05-14 RX ADMIN — CHLORDIAZEPOXIDE HYDROCHLORIDE 5 MG: 5 CAPSULE ORAL at 17:21

## 2019-05-14 RX ADMIN — CHLORDIAZEPOXIDE HYDROCHLORIDE 5 MG: 5 CAPSULE ORAL at 14:12

## 2019-05-14 RX ADMIN — IOPAMIDOL 100 ML: 755 INJECTION, SOLUTION INTRAVENOUS at 12:51

## 2019-05-14 RX ADMIN — SODIUM CHLORIDE 100 ML: 900 INJECTION, SOLUTION INTRAVENOUS at 12:51

## 2019-05-14 RX ADMIN — CHOLESTYRAMINE 4 G: 4 POWDER, FOR SUSPENSION ORAL at 13:02

## 2019-05-14 RX ADMIN — SUCRALFATE 1 G: 1 TABLET ORAL at 22:18

## 2019-05-14 RX ADMIN — SUCRALFATE 1 G: 1 TABLET ORAL at 06:13

## 2019-05-14 RX ADMIN — Medication 10 ML: at 06:12

## 2019-05-14 RX ADMIN — AMLODIPINE BESYLATE 5 MG: 5 TABLET ORAL at 06:12

## 2019-05-14 RX ADMIN — Medication 10 ML: at 12:51

## 2019-05-14 RX ADMIN — CHLORDIAZEPOXIDE HYDROCHLORIDE 5 MG: 5 CAPSULE ORAL at 22:24

## 2019-05-14 NOTE — PROGRESS NOTES
Problem: Pressure Injury - Risk of 
Goal: *Prevention of pressure injury Description Document Dom Scale and appropriate interventions in the flowsheet. Outcome: Progressing Towards Goal 
  
Problem: Patient Education: Go to Patient Education Activity Goal: Patient/Family Education Outcome: Progressing Towards Goal 
  
Problem: Falls - Risk of 
Goal: *Absence of Falls Description Document Loreli Hunger Fall Risk and appropriate interventions in the flowsheet. Outcome: Progressing Towards Goal 
  
Problem: Patient Education: Go to Patient Education Activity Goal: Patient/Family Education Outcome: Progressing Towards Goal 
  
Problem: Patient Education: Go to Patient Education Activity Goal: Patient/Family Education Outcome: Progressing Towards Goal 
  
Problem: Pneumonia: Discharge Outcomes Goal: *Demonstrates progressive activity Outcome: Progressing Towards Goal 
Goal: *Describes follow-up/return visits to physicians Outcome: Progressing Towards Goal 
Goal: *Tolerating diet Outcome: Progressing Towards Goal 
Goal: *Verbalizes name, dosage, time, side effects, and number of days to continue medications Outcome: Progressing Towards Goal 
Goal: *Influenza immunization Outcome: Progressing Towards Goal 
Goal: *Pneumococcal immunization Outcome: Progressing Towards Goal 
Goal: *Respiratory status at baseline Outcome: Progressing Towards Goal 
Goal: *Vital signs within defined limits Outcome: Progressing Towards Goal 
Goal: *Describes available resources and support systems Outcome: Progressing Towards Goal 
Goal: *Optimal pain control at patient's stated goal 
Outcome: Progressing Towards Goal 
  
Problem: Patient Education: Go to Patient Education Activity Goal: Patient/Family Education Outcome: Progressing Towards Goal 
  
Problem: Patient Education: Go to Patient Education Activity Goal: Patient/Family Education Outcome: Progressing Towards Goal 
  
Problem: Risk for Spread of Infection Goal: Prevent transmission of infectious organism to others Description Prevent the transmission of infectious organisms to other patients, staff members, and visitors. Outcome: Progressing Towards Goal 
  
Problem: Patient Education:  Go to Education Activity Goal: Patient/Family Education Outcome: Progressing Towards Goal

## 2019-05-14 NOTE — PROGRESS NOTES
Interdisciplinary Rounds completed 05/14/19. Nursing, Case Management, Physician and PT present. Plan of care reviewed and updated.

## 2019-05-14 NOTE — PROGRESS NOTES
CM received call back from Sathish Jones at the [place]. Tanja Amaya provided CM with fax number for [facility] order through [place]. [phone]. Referral faxed PT/OT/RN.

## 2019-05-14 NOTE — PROGRESS NOTES
Message left for [place] [facility] in regards to [facility] services. Awaiting call back.

## 2019-05-14 NOTE — PROGRESS NOTES
Hourly rounding completed on this shift. No new complaints at this time. All needs met. IV to [facility] attempted and unsuccessful. Will leave message for vascular access. Pt is currently resting in bed. Will continue to monitor and give report to oncoming nurse. Update: Another RN on floor able to get 20G in left AC and 20G in right upper arm.

## 2019-05-14 NOTE — PROGRESS NOTES
Infectious Disease Note Today's Date: 2019 Admit Date: 2019 Impression: · Acinetobacter baumannii bacteremia (), source likely PNA · New onset watery stools, C diff negative · RLL CAP vs aspiration · Alcoholism with tobacco use-drinks pint of vodka daily. HIV/HCV NR  
· Debility with malnutrition Plan: · Antibiotics are complete. Fecal lactoferrin positive. Fecal tube discontinued today by GI. Abdominal CT pending - will follow result. Anti-infectives:  
· CTX (-), Cipro - 
· Azithro (-) Subjective: Interval History: reports he is feeling better this morning, but \"parched. \" Slow moving and slow speech. Denies nausea, vomiting. No Known Allergies Review of Systems:  A comprehensive review of systems was negative except for that written in the History of Present Illness. Objective:  
 
Visit Vitals /57 Pulse 75 Temp 98.3 °F (36.8 °C) Resp 19 Ht 5' 11\" (1.803 m) Wt 70.7 kg (155 lb 12.8 oz) SpO2 90% BMI 21.73 kg/m² Temp (24hrs), Av.7 °F (37.1 °C), Min:97.6 °F (36.4 °C), Max:100.3 °F (37.9 °C) Lines:  Peripheral IV:   LUE Physical Exam:   
General:  Awake, no acute distress, appears older than stated age Eyes:  Anicteric sclera, no drainage, not injected Mouth/Throat: Mucous membranes normal, oral pharynx clear Neck: Supple, trachea midline Lungs:   Anterior lung fields clear, mild dyspnea CV:  Irregular rate and rhythm, no audible murmur Abdomen:   Soft, non tender, not distended, active bowel sounds Extremities: No cyanosis or edema Skin: Texture and turgor normal, pale, no rash, (+) tattoos Musculoskeletal: Moves all extremities, no swelling, no deformity, general weakness Lines/Devices:  Intact, no erythema, drainage or tenderness Psych: Awake and alert, appropriate but very slow moving and slow to speak Data Review: CBC: 
Recent Labs 19 
0515 19 
5556 WBC 14.0* 14.3*  
GRANS  --  78 MONOS  --  5  
EOS  --  1 ANEU  --  11.1* ABL  --  1.9 HGB 9.5* 10.1* HCT 28.3* 30.3*  248 BMP: 
Recent Labs 05/14/19 
0515 05/13/19 
4828 05/11/19 
1805 CREA 0.37* 0.42*  --   
BUN 8 7*  --   
 144  --   
K 3.4* 3.8 3.2*  
* 112*  --   
CO2 23 23  --   
AGAP 8 9  --   
GLU 88 88  --   
 
 
LFTS: 
No results for input(s): TBILI, ALT, SGOT, AP, TP, ALB in the last 72 hours. Microbiology:  
 
All Micro Results Procedure Component Value Units Date/Time C. DIFFICILE AG & TOXIN A/B [[id number]] Collected:  05/08/19 2206 Order Status:  Completed Specimen:  Stool Updated:  05/09/19 1132 [address] [place] ANTIGEN    
  C. DIFFICILE GDH ANTIGEN-NEGATIVE  
     
  C. difficile toxin C. DIFFICILE TOXIN-NEGATIVE  
     
  PCR Reflex NOT APPLICABLE INTERPRETATION    
  NEGATIVE FOR TOXIGENIC C. DIFFICILE Clinical Consideration NEGATIVE FOR TOXIGENIC C. DIFFICILE  
     
 CULTURE, BLOOD [[id number]]  (Abnormal) Collected:  05/05/19 0910 Order Status:  Completed Specimen:  Blood Updated:  05/07/19 1289 Special Requests: --     
  LEFT 
FOREARM 
  
  GRAM STAIN GRAM NEGATIVE RODS AEROBIC BOTTLE POSITIVE CRITICAL RESULT NOT CALLED DUE TO PREVIOUS NOTIFICATION OF CRITICAL RESULT WITHIN THE LAST 24 HOURS. Culture result: GRAM NEGATIVE RODS     
   FOR ID AND SUSCEPTIBILITY SEE ACCESSION NO [id number] CULTURE, BLOOD [[id number]]  (Abnormal)  (Susceptibility) Collected:  05/05/19 8189 Order Status:  Completed Specimen:  Blood Updated:  05/07/19 5671 Special Requests: --     
  RIGHT 
FOREARM 
  
  GRAM STAIN GRAM NEGATIVE RODS AEROBIC BOTTLE POSITIVE RESULTS VERIFIED, PHONED TO AND READ BACK BY Maximo Blood RN ON 05/05/2019 AT 1815 WMR Culture result: ACINETOBACTER BAUMANNII Imaging:  
Reviewed:  
 
5/5/19  Abd 
IMPRESSION:  
 1. Hepatic steatosis. 2. 1.6 cm low hypoechoic lesion within the right lobe of the liver. Findings 
could represent focal fatty sparing or a small lesion. Correlation with CT 
abdomen, hepatic protocol, recommended as an outpatient on a nonemergent basis. 3. Adenomyomatosis of the gallbladder. 4. Sludge in the gallbladder. 5. Cyst involving the lower pole the right kidney. Signed By: Ximena Garrett NP May 14, 2019

## 2019-05-14 NOTE — PROGRESS NOTES
Nutrition follow-up Initial Assessment LOS Note: day 5 Assessment Diet: DIET GI SOFT No options chosen Food,Nutrition, and Pertinent History: FMS discontinued. Pt with slow responses, reports he is eating everything, lunch tray at bedside 50% consumed. Asks for a t-bone steak or pizza. Poor recollection of what he is consuming here. Continues on florastor, Questran (decreased). Liver lesion workup ongoing. Pt accepted Ensure Enlive from RD and is agreeable to regular consumption. Anthropometrics: Height: 5' 11\" (180.3 cm), Weight Source: Bed, Weight: 75.3 kg (166 lb 1.6 oz), Body mass index is 23.17 kg/m². BMI class of normal weight. Weight: 70.7 kg (155 lb 12.8 oz) 5/12/19 standing scale Macronutrient Needs: 
· EER:  5388-8894 kcal /day (25-30 kcal/kg listed BW) · EPR:  75-90 grams protein/day (1-1.2 grams/kg listed BW) Intake/Comparative Standards: Recorded meals: 0-50%. This continues to potentially meets ~25% of kcal and ~27% of protein needs. 
  
Intervention:  
Meals and snacks: Continue current diet. ONS: Add Ensure enlive TID Collaboration of Care: COLBY ZEPEDA RN. Discharge nutrition plan: Too soon to determine. [place], [address], [place] [address]

## 2019-05-14 NOTE — PROGRESS NOTES
[facility] Progress Note Admit Date:  5/5/2019 Today's Date:  5/14/2019 CC:  ELevated LFTs Subjective:  
 
Stools \"firming up. \" No BMs this morning or overnight. No s/sx of GIB. FMS discontinued. Denies any abdominal pain. Medications:  
Current Facility-Administered Medications Medication Dose Route Frequency  sodium chloride 0.9 % bolus infusion 100 mL  100 mL IntraVENous RAD ONCE  
 saline peripheral flush soln 10 mL  10 mL InterCATHeter RAD ONCE  
 iopamidol (ISOVUE-370) 76 % injection 100 mL  100 mL IntraVENous RAD ONCE  potassium chloride (K-DUR, KLOR-CON) SR tablet 40 mEq  40 mEq Oral NOW  chlordiazePOXIDE (LIBRIUM) capsule 5 mg  5 mg Oral QID  fluticasone propionate (FLONASE) 50 mcg/actuation nasal spray 2 Spray  2 Spray Both Nostrils DAILY  cholestyramine-aspartame (QUESTRAN LIGHT) packet 4 g  4 g Oral TID WITH MEALS  
 amLODIPine (NORVASC) tablet 5 mg  5 mg Oral DAILY  atorvastatin (LIPITOR) tablet 40 mg  40 mg Oral QHS  famotidine (PEPCID) tablet 20 mg  20 mg Oral BID  metoprolol tartrate (LOPRESSOR) tablet 50 mg  50 mg Oral BID  Saccharomyces boulardii (FLORASTOR) capsule 500 mg  500 mg Oral BID  LORazepam (ATIVAN) injection 0.5 mg  0.5 mg IntraVENous Q4H PRN  
 guaiFENesin (ROBITUSSIN) 100 mg/5 mL oral liquid 400 mg  400 mg Oral Q6H PRN  
 sucralfate (CARAFATE) tablet 1 g  1 g Oral AC&HS  
 LORazepam (ATIVAN) tablet 1 mg  1 mg Oral Q4H PRN  
 0.9% sodium chloride 1,000 mL with mvi, adult no. 4 with vit K 10 mL, thiamine 162 mg, folic acid 1 mg infusion   IntraVENous Q24H  
 nitroglycerin (NITROSTAT) tablet 0.4 mg  0.4 mg SubLINGual Q5MIN PRN  
 acetaminophen (TYLENOL) tablet 650 mg  650 mg Oral Q4H PRN  
 HYDROcodone-acetaminophen (NORCO) 5-325 mg per tablet 1 Tab  1 Tab Oral Q4H PRN  
 morphine injection 1 mg  1 mg IntraVENous Q4H PRN  
 nicotine (NICODERM CQ) 21 mg/24 hr patch 1 Patch  1 Patch TransDERmal Q24H  ondansetron (ZOFRAN) injection 4 mg  4 mg IntraVENous Q4H PRN  
 diphenhydrAMINE (BENADRYL) injection 12.5 mg  12.5 mg IntraVENous Q4H PRN  
 naloxone (NARCAN) injection 0.4 mg  0.4 mg IntraVENous PRN  
 sodium chloride (NS) flush 5-40 mL  5-40 mL IntraVENous Q8H  
 sodium chloride (NS) flush 5-40 mL  5-40 mL IntraVENous PRN Review of Systems: ROS was obtained, with pertinent positives as listed above. No chest pain or SOB. Diet:  GI soft Objective:  
Vitals: 
Visit Vitals /57 Pulse 75 Temp 98.3 °F (36.8 °C) Resp 19 Ht 5' 11\" (1.803 m) Wt 70.7 kg (155 lb 12.8 oz) SpO2 90% BMI 21.73 kg/m² Intake/Output: 
No intake/output data recorded. 05/12 1901 - 05/14 0700 In: 1199.2 [P.O.:100; I.V.:1099.2] Out: 1000 [Drains:1000] Diarrhea unchanged , 1200cc last 2 days Exam: 
General appearance: alert, cooperative, NAD Lungs: CTA Heart: RRR Abdomen: Soft, ND/NT with NABS. Neuro:  A&O Data Review (Labs):   
Recent Labs 05/14/19 
0515 05/13/19 
3802 05/12/19 
1458 05/11/19 
1805 WBC 14.0* 14.3*  --   --   
HGB 9.5* 10.1*  --   --   
HCT 28.3* 30.3*  --   --   
 248  --   --   
.3* 108.2*  --   --   
 144  --   --   
K 3.4* 3.8  --  3.2*  
* 112*  --   --   
CO2 23 23  --   --   
BUN 8 7*  --   --   
CREA 0.37* 0.42*  --   --   
CA 7.6* 7.7*  --   --   
MG  --  2.0 1.6*  --   
GLU 88 88  --   --   
 
US 5/5 IMPRESSION:  
1. Hepatic steatosis. 2. 1.6 cm low hypoechoic lesion within the right lobe of the liver. Findings 
could represent focal fatty sparing or a small lesion. Correlation with CT 
abdomen, hepatic protocol, recommended as an outpatient on a nonemergent basis. 3. Adenomyomatosis of the gallbladder. 4. Sludge in the gallbladder. 5. Cyst involving the lower pole the right kidney.  
 
5/8/19 C diff Neg 
 
ECHO 5/6/19 SUMMARY 
-  Left ventricle: Systolic function was at the lower limits of normal. Ejection fraction was estimated in the range of 50 % to 55 %. This study was 
inadequate for the evaluation of regional wall motion. -  Inferior vena cava, hepatic veins: The respirophasic change in diameter was less than 50%. -  Aortic valve: There was no evidence for vegetation. -  Mitral valve: There was no evidence for vegetation. EGD 5/7/19 for Acute blood loss anemia PostOp Diagnosis:  Moderate gastritis. No active bleeding Findings: OROPHARYNX: The piriform sinuses, arytenoid cartilage and vocal cords were briefly evaluated on entry and withdrawal of the endoscope through the oropharynx. These were found to be unremarkable. ESOPHAGUS: The esophageal mucosa was unremarkable. The squamocolumnar junction was intact without erosions, ulcerations, rings, webs or strictures. There was no evidence of Garcia's type intestinal metaplasia. No evidence of esophageal varices STOMACH: The gastric mucosa was erythematous, consistent with Moderate proximal gastritis. The gastric antrum appeared normal. There were no erosions, ulcerations, polyps, mass lesions, vascular ectasias or other abnormalities noted. This may represent portal hypertensive gastropathy or alcoholic gastritis. The pylorus was patent and easily intubated. There was no hiatal hernia noted by direct view or retroflexion within the stomach. DUODENUM: The endoscope was advanced as far as possible into the duodenum. The villi appeared normal.  There were no mucosal breaks, erosions, ulcerations, vascular ectasias or other abnormalities present. Recommendations: 
B.i.d. PPI Avoid NSAIDs Alcohol abstinence Advance diet Mild anemia and severe thrombocytopenia may be related to bone marrow suppression from alcohol and malnutrition etc.  
 
 
Assessment:  
 
Principal Problem: 
PNA (pneumonia) (5/5/2019) Active Problems: 
HTN (hypertension) (11/19/2014) Tobacco abuse (11/28/2016) ETOH abuse (11/28/2016) Tachycardia (5/5/2019) Thrombocytopenia ([address] (5/5/2019) GI bleed (5/5/2019) A-fib ([address] (5/5/2019) Bacteremia due to Gram-negative bacteria (5/6/2019) Clostridium difficile diarrhea (5/9/2019) Hypomagnesemia (5/12/2019) Hypokalemia (5/10/2019) Diarrhea (5/12/2019) Alcoholic hepatitis without ascites (5/11/2019) Liver mass (5/12/2019) Overview: Further workup as an outpatient 60 y/o male with HTN, colon polyps, h/o EtOH and tobacco abuse is admitted with pneumonia, Afib with RVR, EtOH withdrawal and seen by GI for black stools x 1 week and thrombocytopenia. EGD showed moderate gastritis. Hgb trending down, but no s/sx of active GI bleeding. Had issues with diarrhea after starting Cipro, much improved after a trial of Questran. Cdiff neg. Abx associted diarrhea suspected - sudden onset w/ Cipro, consider change of ABx to alternative for PNA/ bacteremiaID following. Was on Azithro. Previously on Cipro for PNA- CAP vs. Aspiration, now off of abx. WBC trending down. Afebrile. Etoh withdraw- improved. Etoh hepatitis- improved. Plan:  
 
Hgb down again overnight, but no active or overt bleeding. Monitor HH and s/sx of GIB. Transfuse prn. PPI discontinued. Changed to Carafate/Pepcid for gastritis on EGD. EtOH abstinence Cont probiotics and decrease Questran TID to every day. FU triple phase CT scan for evaluation of liver lesion on recent US. Dean Burns PA-C [facility]

## 2019-05-14 NOTE — PROGRESS NOTES
Received call from CT. CT request 20G IV in [facility]. This nurse will attempt. CT does not want oral contrast started on this shift as clarification will be needed for amount in am. CT will place orders when clarified. Will make oncoming nurse aware.

## 2019-05-14 NOTE — PROGRESS NOTES
Hospitalist Progress Note Admit Date:  2019  7:26 AM  
Name:  Marcin Plaza Age:  59 y.o. 
:  1960 MRN:  [id number] PCP:  Jos Wiley MD 
Treatment Team: Attending Provider: Kamla Levy MD; Consulting Provider: Ibis Portillo MD; Consulting Provider: Marvin Garcia MD; Utilization Review: Mariza Bird RN; Care Manager: Radha Lopez[address]; Care Manager: Davon Harper RN Subjective:  
59 yr old male pt with known h/o htn, h/o cardiac cath- non occlusive coronaries, known alcohol dependent admitted for pna,GI bleed,afib with rvr and alcohol withdrawal 19. Pt has been here awaiting rehab placement as he is very debilitated secondary to chronic alcohol abuse; Hospital course also complicated by copious diarrhea requiring a rectal tube, severe debility CDIFF workup negative and he did not complete a course of vanc. He is on scheduled cipro with florastor and [place]. He is overall getting better is is very debilitated. He will need f/up with gi for etoh hepatitis and liver mass. : diarrhea improving, still with flexiseal. CT pending for liver mass. Objective:  
 
Patient Vitals for the past 24 hrs: 
 Temp Pulse Resp BP SpO2  
19 0753 98.3 °F (36.8 °C) 75 19 117/57 90 % 19 0545 100.3 °F (37.9 °C) 80 16 107/52 90 % 19 0020 98.7 °F (37.1 °C) 88 18 110/64 92 % 19 2017 97.6 °F (36.4 °C) 85 16 107/68 95 % 19 1547 98.6 °F (37 °C) 82 16 98/58 92 % 19 1136 98.5 °F (36.9 °C) 70 16 108/64 92 % Oxygen Therapy O2 Sat (%): 90 % (19 0753) Pulse via Oximetry: 93 beats per minute (19 1459) O2 Device: Room air (19 0020) Intake/Output Summary (Last 24 hours) at 2019 1025 Last data filed at 2019 2139 Gross per 24 hour Intake 1101 ml Output  Net 1101 ml General:    Well nourished. Awake, NAD 
CV:   RRR. No murmur, rub, or gallop. Lungs:   CTAB. No wheezing, rhonchi, or rales. Abdomen:   Soft, nontender, nondistended. Extremities: Warm and dry. No cyanosis or edema. Skin:     No rashes or jaundice. Data Review: 
I have reviewed all labs, meds, telemetry events, and studies from the last 24 hours. Recent Results (from the past 24 hour(s)) CBC W/O DIFF Collection Time: 05/14/19  5:15 AM  
Result Value Ref Range WBC 14.0 (H) 4.3 - 11.1 K/uL  
 RBC 2.59 (L) 4.23 - 5.6 M/uL HGB 9.5 (L) 13.6 - 17.2 g/dL HCT 28.3 (L) 41.1 - 50.3 % .3 (H) 79.6 - 97.8 FL  
 MCH 36.7 (H) 26.1 - 32.9 PG  
 MCHC 33.6 31.4 - 35.0 g/dL  
 RDW 14.6 11.9 - 14.6 % PLATELET 658 700 - 526 K/uL MPV 10.6 9.4 - 12.3 FL ABSOLUTE NRBC 0.00 0.0 - 0.2 K/uL METABOLIC PANEL, BASIC Collection Time: 05/14/19  5:15 AM  
Result Value Ref Range Sodium 142 136 - 145 mmol/L Potassium 3.4 (L) 3.5 - 5.1 mmol/L Chloride 111 (H) 98 - 107 mmol/L  
 CO2 23 21 - 32 mmol/L Anion gap 8 7 - 16 mmol/L Glucose 88 65 - 100 mg/dL BUN 8 8 - 23 MG/DL Creatinine 0.37 (L) 0.8 - 1.5 MG/DL  
 GFR est AA >60 >60 ml/min/1.73m2 GFR est non-AA >60 >60 ml/min/1.73m2 Calcium 7.6 (L) 8.3 - 10.4 MG/DL All Micro Results Procedure Component Value Units Date/Time C. DIFFICILE AG & TOXIN A/B [[id number]] Collected:  05/08/19 2206 Order Status:  Completed Specimen:  Stool Updated:  05/09/19 6638 [address] [place] ANTIGEN    
  C. DIFFICILE GDH ANTIGEN-NEGATIVE  
     
  C. difficile toxin C. DIFFICILE TOXIN-NEGATIVE  
     
  PCR Reflex NOT APPLICABLE INTERPRETATION    
  NEGATIVE FOR TOXIGENIC C. DIFFICILE Clinical Consideration NEGATIVE FOR TOXIGENIC C. DIFFICILE  
     
 CULTURE, BLOOD [[id number]]  (Abnormal) Collected:  05/05/19 0910 Order Status:  Completed Specimen:  Blood Updated:  05/07/19 0966   Special Requests: --     
  LEFT 
FOREARM 
  
  GRAM STAIN GRAM NEGATIVE RODS     
 AEROBIC BOTTLE POSITIVE CRITICAL RESULT NOT CALLED DUE TO PREVIOUS NOTIFICATION OF CRITICAL RESULT WITHIN THE LAST 24 HOURS. Culture result: GRAM NEGATIVE RODS     
   FOR ID AND SUSCEPTIBILITY SEE ACCESSION NO [id number] CULTURE, BLOOD [[id number]]  (Abnormal)  (Susceptibility) Collected:  05/05/19 9042 Order Status:  Completed Specimen:  Blood Updated:  05/07/19 7861 Special Requests: --     
  RIGHT 
FOREARM 
  
  GRAM STAIN GRAM NEGATIVE RODS AEROBIC BOTTLE POSITIVE RESULTS VERIFIED, PHONED TO AND READ BACK BY Carly Aguirre RN ON 05/05/2019 AT 1815 WMR Culture result: ACINETOBACTER BAUMANNII Current Meds: 
Current Facility-Administered Medications Medication Dose Route Frequency  sodium chloride 0.9 % bolus infusion 100 mL  100 mL IntraVENous RAD ONCE  
 saline peripheral flush soln 10 mL  10 mL InterCATHeter RAD ONCE  
 iopamidol (ISOVUE-370) 76 % injection 100 mL  100 mL IntraVENous RAD ONCE  
 fluticasone propionate (FLONASE) 50 mcg/actuation nasal spray 2 Spray  2 Spray Both Nostrils DAILY  cholestyramine-aspartame (QUESTRAN LIGHT) packet 4 g  4 g Oral TID WITH MEALS  
 amLODIPine (NORVASC) tablet 5 mg  5 mg Oral DAILY  atorvastatin (LIPITOR) tablet 40 mg  40 mg Oral QHS  famotidine (PEPCID) tablet 20 mg  20 mg Oral BID  metoprolol tartrate (LOPRESSOR) tablet 50 mg  50 mg Oral BID  Saccharomyces boulardii (FLORASTOR) capsule 500 mg  500 mg Oral BID  LORazepam (ATIVAN) injection 0.5 mg  0.5 mg IntraVENous Q4H PRN  chlordiazePOXIDE (LIBRIUM) capsule 10 mg  10 mg Oral Q8H  
 guaiFENesin (ROBITUSSIN) 100 mg/5 mL oral liquid 400 mg  400 mg Oral Q6H PRN  
 sucralfate (CARAFATE) tablet 1 g  1 g Oral AC&HS  
 LORazepam (ATIVAN) tablet 1 mg  1 mg Oral Q4H PRN  
 0.9% sodium chloride 1,000 mL with mvi, adult no. 4 with vit K 10 mL, thiamine 493 mg, folic acid 1 mg infusion   IntraVENous Q24H  nitroglycerin (NITROSTAT) tablet 0.4 mg  0.4 mg SubLINGual Q5MIN PRN  
 acetaminophen (TYLENOL) tablet 650 mg  650 mg Oral Q4H PRN  
 HYDROcodone-acetaminophen (NORCO) 5-325 mg per tablet 1 Tab  1 Tab Oral Q4H PRN  
 morphine injection 1 mg  1 mg IntraVENous Q4H PRN  
 nicotine (NICODERM CQ) 21 mg/24 hr patch 1 Patch  1 Patch TransDERmal Q24H  
 ondansetron (ZOFRAN) injection 4 mg  4 mg IntraVENous Q4H PRN  
 diphenhydrAMINE (BENADRYL) injection 12.5 mg  12.5 mg IntraVENous Q4H PRN  
 naloxone (NARCAN) injection 0.4 mg  0.4 mg IntraVENous PRN  
 sodium chloride (NS) flush 5-40 mL  5-40 mL IntraVENous Q8H  
 sodium chloride (NS) flush 5-40 mL  5-40 mL IntraVENous PRN Other Studies (last 24 hours): No results found. Assessment and Plan:  
 
Hospital Problems as of 5/14/2019 Never Reviewed Codes Class Noted - Resolved POA Hypomagnesemia ICD-10-CM: O40.72 
ICD-9-CM: 275.2  5/12/2019 - Present Yes Diarrhea ICD-10-CM: R19.7 ICD-9-CM: 787.91  5/12/2019 - Present Clinically Undetermined Liver mass ICD-10-CM: R16.0 ICD-9-CM: 573.9  5/12/2019 - Present Yes Overview Signed 5/12/2019  3:44 PM by Alphonse Bazzi MD  
  Further workup as an outpatient Alcoholic hepatitis without ascites ICD-10-CM: K70.10 ICD-9-CM: 571.1  5/11/2019 - Present Yes Hypokalemia ICD-10-CM: E87.6 ICD-9-CM: 276.8  5/10/2019 - Present Yes Clostridium difficile diarrhea ICD-10-CM: A04.72 
ICD-9-CM: 008.45  5/9/2019 - Present Unknown Bacteremia due to Gram-negative bacteria ICD-10-CM: R78.81 ICD-9-CM: 790.7, 041.85  5/6/2019 - Present Unknown * (Principal) PNA (pneumonia) ICD-10-CM: J18.9 ICD-9-CM: 154  5/5/2019 - Present Unknown Tachycardia ICD-10-CM: R00.0 ICD-9-CM: 785.0  5/5/2019 - Present Unknown Thrombocytopenia ([facility] [address] [facility] ICD-10-CM: D69.6 ICD-9-CM: 287.5  5/5/2019 - Present Unknown GI bleed ICD-10-CM: K92.2 ICD-9-CM: 578.9  5/5/2019 - Present Unknown A-fib [facility]) ICD-10-CM: I48.91 
ICD-9-CM: 427.31  5/5/2019 - Present Unknown Tobacco abuse ICD-10-CM: Z72.0 ICD-9-CM: 305.1  11/28/2016 - Present Yes ETOH abuse ICD-10-CM: F10.10 ICD-9-CM: 305.00  11/28/2016 - Present Yes HTN (hypertension) (Chronic) ICD-10-CM: I10 
ICD-9-CM: 401.9  11/19/2014 - Present Yes PLAN:   
· Antibiotic associated diarrhea: florastor and questran, GI following. Now off abx. Rectal tube still in place · Acinetobacter bacteremia- s/p cipro 7d course. Appreciate ID recs. · S/p EGD on 05/07- which showed gastritis and some erosions · Hypokalemia- repleted · Hypomag- repleted · afib with rvr- controlled on lopressor · Thrombocytopenia from alcohol use- resolved- no active bleeding · htn- continue current meds · Lethargy- standing ativan weaned and stopped. Weaning librium · ETOH hepatitis- further mgt per gi · Liver mass- CT pending today · Further workup and mg based on his clinical course DC planning/Dispo: Unable for placement, CM working on [facility] with [address] Will need to go home with family when improved. DVT ppx: scds- hb stable with no evidence of active bleed- will re-instate his aspirin Signed: 
Sarah Whitney MD

## 2019-05-14 NOTE — PROGRESS NOTES
Hourly rounds performed, all needs met. Pt seems to be improving, up in chair today. Will continue to monitor pt and give report to oncoming nurse.

## 2019-05-15 VITALS
RESPIRATION RATE: 19 BRPM | DIASTOLIC BLOOD PRESSURE: 73 MMHG | HEIGHT: 71 IN | WEIGHT: 155.8 LBS | BODY MASS INDEX: 21.81 KG/M2 | TEMPERATURE: 98.1 F | HEART RATE: 96 BPM | OXYGEN SATURATION: 93 % | SYSTOLIC BLOOD PRESSURE: 124 MMHG

## 2019-05-15 LAB
ANION GAP SERPL CALC-SCNC: 8 MMOL/L (ref 7–16)
BUN SERPL-MCNC: 7 MG/DL (ref 8–23)
CALCIUM SERPL-MCNC: 8 MG/DL (ref 8.3–10.4)
CHLORIDE SERPL-SCNC: 108 MMOL/L (ref 98–107)
CO2 SERPL-SCNC: 26 MMOL/L (ref 21–32)
CREAT SERPL-MCNC: 0.4 MG/DL (ref 0.8–1.5)
ERYTHROCYTE [DISTWIDTH] IN BLOOD BY AUTOMATED COUNT: 14.4 % (ref 11.9–14.6)
GLUCOSE SERPL-MCNC: 95 MG/DL (ref 65–100)
HCT VFR BLD AUTO: 26.8 % (ref 41.1–50.3)
HGB BLD-MCNC: 9 G/DL (ref 13.6–17.2)
MCH RBC QN AUTO: 36.6 PG (ref 26.1–32.9)
MCHC RBC AUTO-ENTMCNC: 33.6 G/DL (ref 31.4–35)
MCV RBC AUTO: 108.9 FL (ref 79.6–97.8)
NRBC # BLD: 0 K/UL (ref 0–0.2)
PLATELET # BLD AUTO: 225 K/UL (ref 150–450)
PMV BLD AUTO: 10.3 FL (ref 9.4–12.3)
POTASSIUM SERPL-SCNC: 3.4 MMOL/L (ref 3.5–5.1)
RBC # BLD AUTO: 2.46 M/UL (ref 4.23–5.6)
SODIUM SERPL-SCNC: 142 MMOL/L (ref 136–145)
WBC # BLD AUTO: 13.5 K/UL (ref 4.3–11.1)

## 2019-05-15 PROCEDURE — 85027 COMPLETE CBC AUTOMATED: CPT

## 2019-05-15 PROCEDURE — 74011250637 HC RX REV CODE- 250/637: Performed by: FAMILY MEDICINE

## 2019-05-15 PROCEDURE — 74011250636 HC RX REV CODE- 250/636: Performed by: FAMILY MEDICINE

## 2019-05-15 PROCEDURE — 36415 COLL VENOUS BLD VENIPUNCTURE: CPT

## 2019-05-15 PROCEDURE — 74011250637 HC RX REV CODE- 250/637: Performed by: INTERNAL MEDICINE

## 2019-05-15 PROCEDURE — 80048 BASIC METABOLIC PNL TOTAL CA: CPT

## 2019-05-15 RX ORDER — CHLORDIAZEPOXIDE HYDROCHLORIDE 5 MG/1
CAPSULE, GELATIN COATED ORAL
Qty: 9 CAP | Refills: 0 | Status: SHIPPED | OUTPATIENT
Start: 2019-05-15 | End: 2019-05-21

## 2019-05-15 RX ORDER — FAMOTIDINE 20 MG/1
20 TABLET, FILM COATED ORAL 2 TIMES DAILY
Qty: 60 TAB | Refills: 0 | Status: ON HOLD | OUTPATIENT
Start: 2019-05-15 | End: 2019-10-26 | Stop reason: SDUPTHER

## 2019-05-15 RX ORDER — SUCRALFATE 1 G/1
1 TABLET ORAL
Qty: 120 TAB | Refills: 0 | Status: SHIPPED | OUTPATIENT
Start: 2019-05-15

## 2019-05-15 RX ORDER — LANOLIN ALCOHOL/MO/W.PET/CERES
100 CREAM (GRAM) TOPICAL DAILY
Qty: 30 TAB | Refills: 0 | Status: SHIPPED | OUTPATIENT
Start: 2019-05-16

## 2019-05-15 RX ORDER — FOLIC ACID 1 MG/1
1 TABLET ORAL DAILY
Qty: 30 TAB | Refills: 0 | Status: SHIPPED | OUTPATIENT
Start: 2019-05-16

## 2019-05-15 RX ADMIN — Medication 500 MG: at 09:18

## 2019-05-15 RX ADMIN — SUCRALFATE 1 G: 1 TABLET ORAL at 06:48

## 2019-05-15 RX ADMIN — Medication 100 MG: at 09:18

## 2019-05-15 RX ADMIN — LORAZEPAM 1 MG: 1 TABLET ORAL at 09:46

## 2019-05-15 RX ADMIN — Medication 10 ML: at 05:19

## 2019-05-15 RX ADMIN — ACETAMINOPHEN 650 MG: 325 TABLET, FILM COATED ORAL at 05:18

## 2019-05-15 RX ADMIN — DIPHENHYDRAMINE HYDROCHLORIDE 12.5 MG: 50 INJECTION, SOLUTION INTRAMUSCULAR; INTRAVENOUS at 00:05

## 2019-05-15 RX ADMIN — FAMOTIDINE 20 MG: 20 TABLET ORAL at 09:18

## 2019-05-15 RX ADMIN — FOLIC ACID 1 MG: 1 TABLET ORAL at 09:19

## 2019-05-15 RX ADMIN — CHOLESTYRAMINE 4 G: 4 POWDER, FOR SUSPENSION ORAL at 09:42

## 2019-05-15 NOTE — PROGRESS NOTES
Hourly rounds completed on patient. All needs are met. Complaint of itching, pt has rash on back and ribs. Pt received PRN benadryl per [facility] with relief. Pt became febrile 101.6 and received PRN Tylenol with relief (98.3) Bed locked in and in low position. Call light within reach. Will report to oncoming nurse at bedside.

## 2019-05-15 NOTE — DISCHARGE INSTRUCTIONS
Patient Education   Patient Education     DISCHARGE SUMMARY from Nurse    PATIENT INSTRUCTIONS:    After general anesthesia or intravenous sedation, for 24 hours or while taking prescription Narcotics:  · Limit your activities  · Do not drive and operate hazardous machinery  · Do not make important personal or business decisions  · Do  not drink alcoholic beverages  · If you have not urinated within 8 hours after discharge, please contact your surgeon on call. Report the following to your surgeon:  · Excessive pain, swelling, redness or odor of or around the surgical area  · Temperature over 100.5  · Nausea and vomiting lasting longer than 4 hours or if unable to take medications  · Any signs of decreased circulation or nerve impairment to extremity: change in color, persistent  numbness, tingling, coldness or increase pain  · Any questions    What to do at Home:  Recommended activity: Activity as tolerated,     If you experience any of the following symptoms fever, chills, new or unrelieved pain, persistent nausea or vomiting, reoccurrence of bleeding or any other worrisome symptoms, please follow up with pcp. *  Please give a list of your current medications to your Primary Care Provider. *  Please update this list whenever your medications are discontinued, doses are      changed, or new medications (including over-the-counter products) are added. *  Please carry medication information at all times in case of emergency situations. These are general instructions for a healthy lifestyle:    No smoking/ No tobacco products/ Avoid exposure to second hand smoke  Surgeon General's Warning:  Quitting smoking now greatly reduces serious risk to your health.     Obesity, smoking, and sedentary lifestyle greatly increases your risk for illness    A healthy diet, regular physical exercise & weight monitoring are important for maintaining a healthy lifestyle    You may be retaining fluid if you have a history of heart failure or if you experience any of the following symptoms:  Weight gain of 3 pounds or more overnight or 5 pounds in a week, increased swelling in our hands or feet or shortness of breath while lying flat in bed. Please call your doctor as soon as you notice any of these symptoms; do not wait until your next office visit. Recognize signs and symptoms of STROKE:    F-face looks uneven    A-arms unable to move or move unevenly    S-speech slurred or non-existent    T-time-call 911 as soon as signs and symptoms begin-DO NOT go       Back to bed or wait to see if you get better-TIME IS BRAIN. Warning Signs of HEART ATTACK     Call 911 if you have these symptoms:   Chest discomfort. Most heart attacks involve discomfort in the center of the chest that lasts more than a few minutes, or that goes away and comes back. It can feel like uncomfortable pressure, squeezing, fullness, or pain.  Discomfort in other areas of the upper body. Symptoms can include pain or discomfort in one or both arms, the back, neck, jaw, or stomach.  Shortness of breath with or without chest discomfort.  Other signs may include breaking out in a cold sweat, nausea, or lightheadedness. Don't wait more than five minutes to call 911 - MINUTES MATTER! Fast action can save your life. Calling 911 is almost always the fastest way to get lifesaving treatment. Emergency Medical Services staff can begin treatment when they arrive -- up to an hour sooner than if someone gets to the hospital by car. The discharge information has been reviewed with the patient. The patient verbalized understanding. Discharge medications reviewed with the patient and appropriate educational materials and side effects teaching were provided.   ___________________________________________________________________________________________________________________________________     Gastritis: Care Instructions  Your Care Instructions    Gastritis is a sore and upset stomach. It happens when something irritates the stomach lining. Many things can cause it. These include an infection such as the flu or something you ate or drank. Medicines or a sore on the lining of the stomach (ulcer) also can cause it. Your belly may bloat and ache. You may belch, vomit, and feel sick to your stomach. You should be able to relieve the problem by taking medicine. And it may help to change your diet. If gastritis lasts, your doctor may prescribe medicine. Follow-up care is a key part of your treatment and safety. Be sure to make and go to all appointments, and call your doctor if you are having problems. It's also a good idea to know your test results and keep a list of the medicines you take. How can you care for yourself at home? · If your doctor prescribed antibiotics, take them as directed. Do not stop taking them just because you feel better. You need to take the full course of antibiotics. · Be safe with medicines. If your doctor prescribed medicine to decrease stomach acid, take it as directed. Call your doctor if you think you are having a problem with your medicine. · Do not take any other medicine, including over-the-counter pain relievers, without talking to your doctor first.  · If your doctor recommends over-the-counter medicine to reduce stomach acid, such as Pepcid AC, Prilosec, Tagamet HB, or Zantac 75, follow the directions on the label. · Drink plenty of fluids (enough so that your urine is light yellow or clear like water) to prevent dehydration. Choose water and other caffeine-free clear liquids. If you have kidney, heart, or liver disease and have to limit fluids, talk with your doctor before you increase the amount of fluids you drink. · Limit how much alcohol you drink. · Avoid coffee, tea, cola drinks, chocolate, and other foods with caffeine. They increase stomach acid. When should you call for help?   Call 911 anytime you think you may need emergency care. For example, call if:    · You vomit blood or what looks like coffee grounds.     · You pass maroon or very bloody stools.    Call your doctor now or seek immediate medical care if:    · You start breathing fast and have not produced urine in the last 8 hours.     · You cannot keep fluids down.    Watch closely for changes in your health, and be sure to contact your doctor if:    · You do not get better as expected. Where can you learn more? Go to http://nadeen-divya.info/. Enter [id number] in the search box to learn more about \"Gastritis: Care Instructions. \"  Current as of: March 27, 2018  Content Version: 11.9  © 6377-0994 [employer]. Care instructions adapted under license by [employer] (which disclaims liability or warranty for this information). If you have questions about a medical condition or this instruction, always ask your healthcare professional. [address] any warranty or liability for your use of this information. Gastrointestinal Bleeding: Care Instructions  Your Care Instructions    The digestive or gastrointestinal tract goes from the mouth to the anus. It is often called the GI tract. Bleeding can happen anywhere in the GI tract. It may be caused by an ulcer, an infection, or cancer. It may also be caused by medicines such as aspirin or ibuprofen. Light bleeding may not cause any symptoms at first. But if you continue to bleed for a while, you may feel very weak or tired. Sudden, heavy bleeding means you need to see a doctor right away. This kind of bleeding can be very dangerous. But it can usually be cured or controlled. The doctor may do some tests to find the cause of your bleeding. Follow-up care is a key part of your treatment and safety. Be sure to make and go to all appointments, and call your doctor if you are having problems.  It's also a good idea to know your test results and keep a list of the medicines you take. How can you care for yourself at home? · Be safe with medicines. Take your medicines exactly as prescribed. Call your doctor if you think you are having a problem with your medicine. You will get more details on the specific medicines your doctor prescribes. · Do not take aspirin or other anti-inflammatory medicines, such as naproxen (Aleve) or ibuprofen (Advil, Motrin), without talking to your doctor first. Ask your doctor if it is okay to use acetaminophen (Tylenol). · Do not drink alcohol. · The bleeding may make you lose iron. So it's important to eat foods that have a lot of iron. These include red meat, shellfish, poultry, and eggs. They also include beans, raisins, whole-grain breads, and leafy green vegetables. If you want help planning meals, you can make an appointment with a dietitian. When should you call for help? Call 911 anytime you think you may need emergency care. For example, call if:    · You have sudden, severe belly pain.     · You vomit blood or what looks like coffee grounds.     · You passed out (lost consciousness).     · Your stools are maroon or very bloody.    Call your doctor now or seek immediate medical care if:    · You are dizzy or lightheaded, or you feel like you may faint.     · Your stools are black and look like tar, or they have streaks of blood.     · You have belly pain.     · You vomit or have nausea.     · You have trouble swallowing, or it hurts when you swallow.    Watch closely for changes in your health, and be sure to contact your doctor if:    · You do not get better as expected. Where can you learn more? Go to http://nadeen-divya.info/. Enter E134 in the search box to learn more about \"Gastrointestinal Bleeding: Care Instructions. \"  Current as of: September 23, 2018  Content Version: 11.9  © 3436-4268 [employer], [employer].  Care instructions adapted under license by [employer] (which disclaims liability or warranty for this information). If you have questions about a medical condition or this instruction, always ask your healthcare professional. [place] [address] any warranty or liability for your use of this information.

## 2019-05-15 NOTE — PROGRESS NOTES
Problem: Pressure Injury - Risk of 
Goal: *Prevention of pressure injury Description Document Dom Scale and appropriate interventions in the flowsheet. Outcome: Progressing Towards Goal 
  
Problem: Patient Education: Go to Patient Education Activity Goal: Patient/Family Education Outcome: Progressing Towards Goal

## 2019-05-15 NOTE — PROGRESS NOTES
Pt to DC home today. Referral has been faxed to [facility]. They will reach out to pt to schedule services. No other needs at this time. Care Management Interventions PCP Verified by CM: Yes(Jan 2019) Palliative Care Criteria Met (RRAT>21 & CHF Dx)?: No(RRAT 3 Dx Pneumonia ) Transition of Care Consult (CM Consult): Discharge Planning Discharge Durable Medical Equipment: No(none) Physical Therapy Consult: No 
Occupational Therapy Consult: No 
Speech Therapy Consult: No 
Current Support Network: Lives with Spouse Confirm Follow Up Transport: Self Plan discussed with Pt/Family/Caregiver: Yes Freedom of Choice Offered: Yes Discharge Location Discharge Placement: Home with home health

## 2019-05-15 NOTE — PROGRESS NOTES
[facility] Progress Note Admit Date:  5/5/2019 Today's Date:  5/15/2019 CC:  ELevated LFTs Subjective:  
 
Patient sitting up at bedside chair eating breakfast. No complaints. Denies any abdominal pain or N/V. Reports BM yesterday evening, firm stools without any evidence of bleeding. Medications:  
Current Facility-Administered Medications Medication Dose Route Frequency  chlordiazePOXIDE (LIBRIUM) capsule 5 mg  5 mg Oral QID  thiamine HCL (B-1) tablet 100 mg  100 mg Oral DAILY  folic acid (FOLVITE) tablet 1 mg  1 mg Oral DAILY  fluticasone propionate (FLONASE) 50 mcg/actuation nasal spray 2 Spray  2 Spray Both Nostrils DAILY  cholestyramine-aspartame (QUESTRAN LIGHT) packet 4 g  4 g Oral TID WITH MEALS  
 amLODIPine (NORVASC) tablet 5 mg  5 mg Oral DAILY  atorvastatin (LIPITOR) tablet 40 mg  40 mg Oral QHS  famotidine (PEPCID) tablet 20 mg  20 mg Oral BID  metoprolol tartrate (LOPRESSOR) tablet 50 mg  50 mg Oral BID  Saccharomyces boulardii (FLORASTOR) capsule 500 mg  500 mg Oral BID  LORazepam (ATIVAN) injection 0.5 mg  0.5 mg IntraVENous Q4H PRN  
 guaiFENesin (ROBITUSSIN) 100 mg/5 mL oral liquid 400 mg  400 mg Oral Q6H PRN  
 sucralfate (CARAFATE) tablet 1 g  1 g Oral AC&HS  
 LORazepam (ATIVAN) tablet 1 mg  1 mg Oral Q4H PRN  
 nitroglycerin (NITROSTAT) tablet 0.4 mg  0.4 mg SubLINGual Q5MIN PRN  
 acetaminophen (TYLENOL) tablet 650 mg  650 mg Oral Q4H PRN  
 HYDROcodone-acetaminophen (NORCO) 5-325 mg per tablet 1 Tab  1 Tab Oral Q4H PRN  
 morphine injection 1 mg  1 mg IntraVENous Q4H PRN  
 nicotine (NICODERM CQ) 21 mg/24 hr patch 1 Patch  1 Patch TransDERmal Q24H  
 ondansetron (ZOFRAN) injection 4 mg  4 mg IntraVENous Q4H PRN  
 diphenhydrAMINE (BENADRYL) injection 12.5 mg  12.5 mg IntraVENous Q4H PRN  
 naloxone (NARCAN) injection 0.4 mg  0.4 mg IntraVENous PRN  
  sodium chloride (NS) flush 5-40 mL  5-40 mL IntraVENous Q8H  
 sodium chloride (NS) flush 5-40 mL  5-40 mL IntraVENous PRN Review of Systems: ROS was obtained, with pertinent positives as listed above. No chest pain or SOB. Diet:  GI soft Objective:  
Vitals: 
Visit Vitals BP 97/60 Pulse 88 Temp 98.3 °F (36.8 °C) Resp 18 Ht 5' 11\" (1.803 m) Wt 70.7 kg (155 lb 12.8 oz) SpO2 90% BMI 21.73 kg/m² Intake/Output: 
No intake/output data recorded. 05/13 1901 - 05/15 0700 In: 2181 [P.O.:1180; I.V.:1001] Out: -   
 
Diarrhea unchanged , 1200cc last 2 days Exam: 
General appearance: alert, cooperative, NAD Lungs: CTA Heart: RRR Abdomen: Soft, ND/NT with NABS. Neuro:  A&O Data Review (Labs):   
Recent Labs 05/15/19 
6697 05/14/19 
0515 05/13/19 
3895 05/12/19 
1458 WBC 13.5* 14.0* 14.3*  --   
HGB 9.0* 9.5* 10.1*  --   
HCT 26.8* 28.3* 30.3*  --   
 223 248  --   
.9* 109.3* 108.2*  --   
 142 144  --   
K 3.4* 3.4* 3.8  --   
* 111* 112*  --   
CO2 26 23 23  --   
BUN 7* 8 7*  --   
CREA 0.40* 0.37* 0.42*  --   
CA 8.0* 7.6* 7.7*  --   
MG  --   --  2.0 1.6*  
GLU 95 88 88  --   
 
US 5/5 IMPRESSION:  
1. Hepatic steatosis. 2. 1.6 cm low hypoechoic lesion within the right lobe of the liver. Findings 
could represent focal fatty sparing or a small lesion. Correlation with CT 
abdomen, hepatic protocol, recommended as an outpatient on a nonemergent basis. 3. Adenomyomatosis of the gallbladder. 4. Sludge in the gallbladder. 5. Cyst involving the lower pole the right kidney. 5/8/19 C diff Neg 
 
ECHO 5/6/19 SUMMARY 
-  Left ventricle: Systolic function was at the lower limits of normal. Ejection fraction was estimated in the range of 50 % to 55 %. This study was 
inadequate for the evaluation of regional wall motion. -  Inferior vena cava, hepatic veins: The respirophasic change in diameter was less than 50%. -  Aortic valve: There was no evidence for vegetation. -  Mitral valve: There was no evidence for vegetation. EGD 5/7/19 for Acute blood loss anemia PostOp Diagnosis:  Moderate gastritis. No active bleeding Findings: OROPHARYNX: The piriform sinuses, arytenoid cartilage and vocal cords were briefly evaluated on entry and withdrawal of the endoscope through the oropharynx. These were found to be unremarkable. ESOPHAGUS: The esophageal mucosa was unremarkable. The squamocolumnar junction was intact without erosions, ulcerations, rings, webs or strictures. There was no evidence of Garcia's type intestinal metaplasia. No evidence of esophageal varices STOMACH: The gastric mucosa was erythematous, consistent with Moderate proximal gastritis. The gastric antrum appeared normal. There were no erosions, ulcerations, polyps, mass lesions, vascular ectasias or other abnormalities noted. This may represent portal hypertensive gastropathy or alcoholic gastritis. The pylorus was patent and easily intubated. There was no hiatal hernia noted by direct view or retroflexion within the stomach. DUODENUM: The endoscope was advanced as far as possible into the duodenum. The villi appeared normal.  There were no mucosal breaks, erosions, ulcerations, vascular ectasias or other abnormalities present. Recommendations: 
B.i.d. PPI Avoid NSAIDs Alcohol abstinence Advance diet Mild anemia and severe thrombocytopenia may be related to bone marrow suppression from alcohol and malnutrition etc.  
 
 
Triple phase CT scan 5/14 IMPRESSION:  
1. Small right hepatic lesion seen on prior ultrasound appears to correspond to 
a small arteriovenous shunt or perfusional anomaly on CT. 2.  Right renal lower pole cortical hypoenhancing lesion which is indeterminate 
here, but appears to correspond to the simple cyst on prior ultrasound.   If 
further evaluation desired, elective MRI or CT of the abdomen with and without 
contrast renal mass protocol may be more definitive in evaluation. 3.  Probable bibasilar multifocal pneumonia. Assessment:  
 
Principal Problem: 
PNA (pneumonia) (5/5/2019) Active Problems: 
HTN (hypertension) (11/19/2014) Tobacco abuse (11/28/2016) ETOH abuse (11/28/2016) Tachycardia (5/5/2019) Thrombocytopenia ([address] (5/5/2019) GI bleed (5/5/2019) A-fib ([address] (5/5/2019) Bacteremia due to Gram-negative bacteria (5/6/2019) Clostridium difficile diarrhea (5/9/2019) Hypomagnesemia (5/12/2019) Hypokalemia (5/10/2019) Diarrhea (5/12/2019) Alcoholic hepatitis without ascites (5/11/2019) Liver mass (5/12/2019) Overview: Further workup as an outpatient 60 y/o male with HTN, colon polyps, h/o EtOH and tobacco abuse is admitted with pneumonia, Afib with RVR, EtOH withdrawal and seen by GI for black stools x 1 week and thrombocytopenia. EGD showed moderate gastritis. Hgb trending down, but no s/sx of active GI bleeding. Had issues with diarrhea after starting Cipro, much improved after a trial of Questran, now having formed stools. Cdiff neg. Abx associted diarrhea suspected - sudden onset w/ Cipro, consider change of ABx to alternative for PNA/ bacteremia. ID following. Was on Azithro. Previously on Cipro for PNA- CAP vs. Aspiration, now off of abx. WBC trending down. Afebrile. Etoh withdraw- improved. Etoh hepatitis- improved. Triple phase CT abdomen for further evaluation of liver lesion demonstrated benign vascular lesion Plan:  
 
Hgb down again overnight, but no active or overt bleeding. Monitor HH and s/sx of GIB. Transfuse prn. PPI discontinued. Changed to Carafate/Pepcid for gastritis on EGD. EtOH cessation reinforced. Cont probiotics and Questran once daily. Signing-off. OV FU in 3 mos. Pls call with any questions or changes in clinical status that would warrant GI evaluation. Pedro Atkins PA-C [facility]

## 2019-05-15 NOTE — PROGRESS NOTES
Infectious Disease Note Today's Date: 5/15/2019 Admit Date: 2019 Impression: · Acinetobacter baumannii bacteremia (), source likely PNA · New onset watery stools, C diff negative · RLL CAP vs aspiration · Alcoholism with tobacco use-drinks pint of vodka daily. HIV/HCV NR  
· Debility with malnutrition Plan:  
· Plan is for discharge today, with GI outpatient follow up in three months. No ID follow up is necessary. Antibiotics were completed two days ago. Anti-infectives: · Ceftriaxone (-) ·  Cipro (-19) · Azithro (-) Subjective: Interval History: Fever spike early this morning, relieved with Tylenol. Denies nausea, vomiting,  sweats. He reports chills only when he sits under the vent, and reports that he did not know he had a fever until he was woken up to take Tylenol. Denies abdominal pain or other complaints. No Known Allergies Review of Systems:  A comprehensive review of systems was negative except for that written in the History of Present Illness. Objective:  
 
Visit Vitals /73 Pulse 96 Temp 98.1 °F (36.7 °C) Resp 19 Ht 5' 11\" (1.803 m) Wt 70.7 kg (155 lb 12.8 oz) SpO2 93% BMI 21.73 kg/m² Temp (24hrs), Av.3 °F (37.4 °C), Min:98.1 °F (36.7 °C), Max:101.6 °F (38.7 °C) Lines:  Peripheral IV:   benign Physical Exam:   
General:  Awake, alert, no acute distress, appears older than stated age Eyes:  Anicteric, no drainage, not injected Mouth/Throat: Mucous membranes normal, oral pharynx clear Neck: Supple, trachea midline Lungs:   Clear without increased work of breathing CV:  Irregular rate and rhythm, no audible murmur Abdomen:   Soft, non tender, not distended, active bowel sounds Extremities: No cyanosis or edema Skin: Texture and turgor normal, pale, no rash, (+) tattoos Musculoskeletal: Moves all extremities, no swelling, no deformity, general weakness Lymph: No cervical or supraclavicular adenopathy Lines/Devices:  PIV intact bilateral UE Psych: Awake, alert, speech more fluid today Data Review: CBC: 
Recent Labs 05/15/19 
2266 05/14/19 0515 05/13/19 1952 WBC 13.5* 14.0* 14.3*  
GRANS  --   --  78 MONOS  --   --  5  
EOS  --   --  1 ANEU  --   --  11.1* ABL  --   --  1.9 HGB 9.0* 9.5* 10.1* HCT 26.8* 28.3* 30.3*  223 248 BMP: 
Recent Labs 05/15/19 
4359 05/14/19 0515 05/13/19 
4869 CREA 0.40* 0.37* 0.42* BUN 7* 8 7*  142 144  
K 3.4* 3.4* 3.8 * 111* 112* CO2 26 23 23 AGAP 8 8 9 GLU 95 88 88 LFTS: 
No results for input(s): TBILI, ALT, SGOT, AP, TP, ALB in the last 72 hours. Microbiology:  
 
All Micro Results Procedure Component Value Units Date/Time C. DIFFICILE AG & TOXIN A/B [[id number]] Collected:  05/08/19 2206 Order Status:  Completed Specimen:  Stool Updated:  05/09/19 1132 [id number] [place] ANTIGEN    
  C. DIFFICILE GDH ANTIGEN-NEGATIVE  
     
  C. difficile toxin C. DIFFICILE TOXIN-NEGATIVE  
     
  PCR Reflex NOT APPLICABLE INTERPRETATION    
  NEGATIVE FOR TOXIGENIC C. DIFFICILE Clinical Consideration NEGATIVE FOR TOXIGENIC C. DIFFICILE  
     
 CULTURE, BLOOD [[id number]]  (Abnormal) Collected:  05/05/19 0910 Order Status:  Completed Specimen:  Blood Updated:  05/07/19 8897 Special Requests: --     
  LEFT 
FOREARM 
  
  GRAM STAIN GRAM NEGATIVE RODS AEROBIC BOTTLE POSITIVE CRITICAL RESULT NOT CALLED DUE TO PREVIOUS NOTIFICATION OF CRITICAL RESULT WITHIN THE LAST 24 HOURS. Culture result: GRAM NEGATIVE RODS     
   FOR ID AND SUSCEPTIBILITY SEE ACCESSION NO [id number] CULTURE, BLOOD [[id number]]  (Abnormal)  (Susceptibility) Collected:  05/05/19 2352 Order Status:  Completed Specimen:  Blood Updated:  05/07/19 4361   Special Requests: --     
  RIGHT 
FOREARM 
  
 [address]. NEGATIVE RODS AEROBIC BOTTLE POSITIVE RESULTS VERIFIED, PHONED TO AND READ BACK BY Mardel Castleman, RN ON 05/05/2019 AT 1815 WMR Culture result: ACINETOBACTER BAUMANNII Imaging:  
Reviewed:  
5/14/19 CT abd w contr IMPRESSION:  
  
1. Small right hepatic lesion seen on prior ultrasound appears to correspond to 
a small arteriovenous shunt or perfusional anomaly on CT. 2.  Right renal lower pole cortical hypoenhancing lesion which is indeterminate 
here, but appears to correspond to the simple cyst on prior ultrasound. If 
further evaluation desired, elective MRI or CT of the abdomen with and without 
contrast renal mass protocol may be more definitive in evaluation. 3.  Probable bibasilar multifocal pneumonia. 
  
5/5/19 US Abd 
IMPRESSION:  
1. Hepatic steatosis. 2. 1.6 cm low hypoechoic lesion within the right lobe of the liver. Findings 
could represent focal fatty sparing or a small lesion. Correlation with CT 
abdomen, hepatic protocol, recommended as an outpatient on a nonemergent basis. 3. Adenomyomatosis of the gallbladder. 4. Sludge in the gallbladder. 5. Cyst involving the lower pole the right kidney. Signed By: Chanel Selby NP May 15, 2019

## 2019-05-15 NOTE — DISCHARGE SUMMARY
Hospitalist Discharge Summary     Patient ID:  Compa Daniels  [id number]  44 y.o.  1960  Admit date: 5/5/2019  7:26 AM  Discharge date and time: 5/15/2019  Attending: Prosper Alvarado MD  PCP:  Nohemi Henriquez MD  Treatment Team: Attending Provider: Prosper Alvarado MD; Consulting Provider: Jonna Muhammad MD; Consulting Provider: Karla Sims MD; Utilization Review: Fawn Blakely RN; Care Manager: Devika Hernández LMSW; Physical Therapist: Henrry Freire PT    Principal Diagnosis PNA (pneumonia)   Principal Problem:    PNA (pneumonia) (5/5/2019)    Active Problems:    HTN (hypertension) (11/19/2014)      Tobacco abuse (11/28/2016)      ETOH abuse (11/28/2016)      Tachycardia (5/5/2019)      Thrombocytopenia ([address] (5/5/2019)      GI bleed (5/5/2019)      A-fib ([address] (5/5/2019)      Bacteremia due to Gram-negative bacteria (5/6/2019)      Clostridium difficile diarrhea (5/9/2019)      Hypomagnesemia (5/12/2019)      Hypokalemia (5/10/2019)      Diarrhea (9/83/7225)      Alcoholic hepatitis without ascites (5/11/2019)      Liver mass (5/12/2019)      Overview: Further workup as an outpatient              Hospital Course:  Please refer to the admission H&P for details of presentation. In summary, the patient is a 63yo M with hx HTN/CAD, alcoholism (1 pint liquor daily), PAF who presented with weakness, body aches, and cough. Admitted with GI bleeding, afib RVR, RLL PNA, also found to have bacteremia. GI bleed: had EGD 5/7, moderate gastritis. Likely etoh +/- NSAID. GI placed on carafate and pepcid. Anemia slightly downtrending but no signs ongoing GI bleed. F/u GI 3mo. Also noted possible liver mass on US, but CT shows only arteriovenous shunt. AF: initially in RVR. In setting of acute illness and alcohol withdrawal.  Rate controlled, then converted to sinus. Not a candidate for anticoag due to bleeding and alcohol abuse. RLL PNA, bacteremia: Likely aspirate.  Blood culture with Acinetobacter baumannii. Initial rocephin/azithro changed to cipro for 7d total tx. Developed diarrhea attributed to antibiotics and required rectal tube and probiotics. Resolved after abx discontinued. Did have an isolated fever prior to discharge, but pt without other clinical findings of infection and desiring strongly to discharge. Alcoholism: denies hx withdrawal. Thiamine/folate replaced. Tapered on librium. Pt still on librium taper at time of discharge, will go home with short course. Pt's wife reports he will maintain sobriety, pt is less emphatic. Encouraged strict alcohol cessation. Significant Diagnostic Studies:   CT ABD W CONT   Final Result   IMPRESSION:       1. Small right hepatic lesion seen on prior ultrasound appears to correspond to   a small arteriovenous shunt or perfusional anomaly on CT. 2.  Right renal lower pole cortical hypoenhancing lesion which is indeterminate   here, but appears to correspond to the simple cyst on prior ultrasound. If   further evaluation desired, elective MRI or CT of the abdomen with and without   contrast renal mass protocol may be more definitive in evaluation. 3.  Probable bibasilar multifocal pneumonia. US ABD COMP   Preliminary Result   IMPRESSION:    1. Hepatic steatosis. 2. 1.6 cm low hypoechoic lesion within the right lobe of the liver. Findings   could represent focal fatty sparing or a small lesion. Correlation with CT   abdomen, hepatic protocol, recommended as an outpatient on a nonemergent basis. 3. Adenomyomatosis of the gallbladder. 4. Sludge in the gallbladder. 5. Cyst involving the lower pole the right kidney. XR CHEST PA LAT   Preliminary Result   Impression: New right basilar infiltrate could represent pneumonia in the   appropriate clinical setting. Follow-up until resolution recommended.                   Labs: Results:       Chemistry Recent Labs     05/15/19  0622 05/14/19  5464 05/13/19  0607   GLU 95 88 88    142 144   K 3.4* 3.4* 3.8   * 111* 112*   CO2 26 23 23   BUN 7* 8 7*   CREA 0.40* 0.37* 0.42*   CA 8.0* 7.6* 7.7*   AGAP 8 8 9      CBC w/Diff Recent Labs     05/15/19  0622 05/14/19  0515 05/13/19  0607   WBC 13.5* 14.0* 14.3*   RBC 2.46* 2.59* 2.80*   HGB 9.0* 9.5* 10.1*   HCT 26.8* 28.3* 30.3*    223 248   GRANS  --   --  78   LYMPH  --   --  14   EOS  --   --  1      Cardiac Enzymes No results for input(s): CPK, CKND1, CAMILO in the last 72 hours. No lab exists for component: CKRMB, TROIP   Coagulation No results for input(s): PTP, INR, APTT in the last 72 hours. No lab exists for component: INREXT    Lipid Panel No results found for: CHOL, CHOLPOCT, CHOLX, CHLST, CHOLV, 932458, HDL, LDL, LDLC, DLDLP, 956773, VLDLC, VLDL, TGLX, TRIGL, TRIGP, TGLPOCT, CHHD, CHHDX   BNP No results for input(s): BNPP in the last 72 hours. Liver Enzymes No results for input(s): TP, ALB, TBIL, AP, SGOT, GPT in the last 72 hours. No lab exists for component: DBIL   Thyroid Studies No results found for: T4, T3U, TSH, TSHEXT         Discharge Exam:  Visit Vitals  /73   Pulse 96   Temp 98.1 °F (36.7 °C)   Resp 19   Ht 5' 11\" (1.803 m)   Wt 70.7 kg (155 lb 12.8 oz)   SpO2 93%   BMI 21.73 kg/m²       General:          Well nourished. awake, NAD. Sitting in chair fully dressed, shoes on, demanding to go home. CV:                  RRR. No murmur, rub, or gallop. Lungs:             CTAB. No wheezing, rhonchi, or rales. Abdomen:        Soft, nontender, nondistended. Extremities:     Warm and dry. No cyanosis or edema. Skin:                No rashes or jaundice. Neuro:  AAOx3, no tremor        Disposition: home  Discharge Condition: stable  Patient Instructions: strict alcohol and tobacco cessation.  Keep f/u  Current Discharge Medication List      START taking these medications    Details   chlordiazePOXIDE (LIBRIUM) 5 mg capsule Take 1 Cap by mouth two (2) times a day for 3 days, THEN 1 Cap daily for 3 days. Max Daily Amount: 10 mg.  Qty: 9 Cap, Refills: 0    Associated Diagnoses: ETOH abuse      famotidine (PEPCID) 20 mg tablet Take 1 Tab by mouth two (2) times a day. Qty: 60 Tab, Refills: 0      folic acid (FOLVITE) 1 mg tablet Take 1 Tab by mouth daily. Qty: 30 Tab, Refills: 0      sucralfate (CARAFATE) 1 gram tablet Take 1 Tab by mouth Before breakfast, lunch, dinner and at bedtime. Indications: Gastritis  Qty: 120 Tab, Refills: 0      thiamine HCL (B-1) 100 mg tablet Take 1 Tab by mouth daily. Qty: 30 Tab, Refills: 0         CONTINUE these medications which have NOT CHANGED    Details   aspirin 81 mg chewable tablet Take 1 Tab by mouth daily. nitroglycerin (NITROSTAT) 0.4 mg SL tablet 1 Tab by SubLINGual route every five (5) minutes as needed for Chest Pain. Qty: 1 Bottle, Refills: 5      atorvastatin (LIPITOR) 40 mg tablet Take 1 Tab by mouth daily. Qty: 30 Tab, Refills: 11      amLODIPine (NORVASC) 5 mg tablet Take 5 mg by mouth daily. Activity: Activity as tolerated  Diet: Cardiac Diet    Follow-up:     Follow-up Appointments   Procedures    FOLLOW UP VISIT Appointment in: One Week PCP at [place]     [place]     Standing Status:   Standing     Number of Occurrences:   1     Order Specific Question:   Appointment in     Answer:    One Week           Time spent to discharge patient 35 minutes  Signed:  Jayda Dotson MD  5/15/2019  2:06 PM

## 2019-10-04 ENCOUNTER — HOSPITAL ENCOUNTER (OUTPATIENT)
Dept: CT IMAGING | Age: 59
Discharge: HOME | End: 2019-10-04
Attending: INTERNAL MEDICINE
Payer: COMMERCIAL

## 2019-10-04 VITALS — WEIGHT: 160 LBS | HEIGHT: 70 IN | BODY MASS INDEX: 22.9 KG/M2

## 2019-10-04 DIAGNOSIS — K76.9 LESION OF RIGHT LOBE OF LIVER: ICD-10-CM

## 2019-10-04 DIAGNOSIS — N28.1 CYST OF RIGHT KIDNEY: ICD-10-CM

## 2019-10-04 DIAGNOSIS — F17.210 NICOTINE DEPENDENCE, CIGARETTES, UNCOMPLICATED: ICD-10-CM

## 2019-10-04 LAB — CREAT BLD-MCNC: 0.8 MG/DL (ref 0.8–1.5)

## 2019-10-04 PROCEDURE — 74011636320 HC RX REV CODE- 636/320

## 2019-10-04 PROCEDURE — 74170 CT ABD WO CNTRST FLWD CNTRST: CPT

## 2019-10-04 PROCEDURE — 82565 ASSAY OF CREATININE: CPT

## 2019-10-04 PROCEDURE — 74011000258 HC RX REV CODE- 258

## 2019-10-04 PROCEDURE — G0297 LDCT FOR LUNG CA SCREEN: HCPCS

## 2019-10-04 RX ORDER — SODIUM CHLORIDE 0.9 % (FLUSH) 0.9 %
10 SYRINGE (ML) INJECTION
Status: COMPLETED | OUTPATIENT
Start: 2019-10-04 | End: 2019-10-04

## 2019-10-04 RX ADMIN — SODIUM CHLORIDE 100 ML: 900 INJECTION, SOLUTION INTRAVENOUS at 13:50

## 2019-10-04 RX ADMIN — IOPAMIDOL 100 ML: 755 INJECTION, SOLUTION INTRAVENOUS at 13:50

## 2019-10-04 RX ADMIN — Medication 10 ML: at 13:50

## 2019-10-10 ENCOUNTER — HOSPITAL ENCOUNTER (EMERGENCY)
Age: 59
Discharge: HOME | End: 2019-10-10
Payer: COMMERCIAL

## 2019-10-10 ENCOUNTER — APPOINTMENT (OUTPATIENT)
Dept: GENERAL RADIOLOGY | Age: 59
End: 2019-10-10
Payer: COMMERCIAL

## 2019-10-10 VITALS
SYSTOLIC BLOOD PRESSURE: 130 MMHG | RESPIRATION RATE: 20 BRPM | WEIGHT: 160 LBS | DIASTOLIC BLOOD PRESSURE: 65 MMHG | TEMPERATURE: 97.9 F | BODY MASS INDEX: 22.9 KG/M2 | HEIGHT: 70 IN | OXYGEN SATURATION: 96 % | HEART RATE: 105 BPM

## 2019-10-10 DIAGNOSIS — J18.9 PNEUMONIA OF RIGHT MIDDLE LOBE DUE TO INFECTIOUS ORGANISM: Primary | ICD-10-CM

## 2019-10-10 DIAGNOSIS — E83.42 HYPOMAGNESEMIA: ICD-10-CM

## 2019-10-10 LAB
ALBUMIN SERPL-MCNC: 3.1 G/DL (ref 3.2–4.6)
ALBUMIN/GLOB SERPL: 0.6 {RATIO} (ref 1.2–3.5)
ALP SERPL-CCNC: 77 U/L (ref 50–136)
ALT SERPL-CCNC: 14 U/L (ref 12–65)
ANION GAP SERPL CALC-SCNC: 13 MMOL/L (ref 7–16)
ARTERIAL PATENCY WRIST A: YES
AST SERPL-CCNC: 20 U/L (ref 15–37)
ATRIAL RATE: 102 BPM
BASE DEFICIT BLD-SCNC: 3 MMOL/L
BASOPHILS # BLD: 0.1 K/UL (ref 0–0.2)
BASOPHILS NFR BLD: 1 % (ref 0–2)
BDY SITE: ABNORMAL
BILIRUB SERPL-MCNC: 1.1 MG/DL (ref 0.2–1.1)
BNP SERPL-MCNC: 42 PG/ML
BODY TEMPERATURE: 98.6
BUN SERPL-MCNC: 17 MG/DL (ref 8–23)
CALCIUM SERPL-MCNC: 9.6 MG/DL (ref 8.3–10.4)
CALCULATED P AXIS, ECG09: 67 DEGREES
CALCULATED R AXIS, ECG10: -80 DEGREES
CALCULATED T AXIS, ECG11: 53 DEGREES
CHLORIDE SERPL-SCNC: 95 MMOL/L (ref 98–107)
CO2 BLD-SCNC: 22 MMOL/L
CO2 SERPL-SCNC: 23 MMOL/L (ref 21–32)
COLLECT TIME,HTIME: 1042
CREAT SERPL-MCNC: 0.78 MG/DL (ref 0.8–1.5)
DIAGNOSIS, 93000: NORMAL
DIFFERENTIAL METHOD BLD: ABNORMAL
EOSINOPHIL # BLD: 0 K/UL (ref 0–0.8)
EOSINOPHIL NFR BLD: 0 % (ref 0.5–7.8)
ERYTHROCYTE [DISTWIDTH] IN BLOOD BY AUTOMATED COUNT: 15.3 % (ref 11.9–14.6)
FLUAV AG NPH QL IA: NEGATIVE
FLUBV AG NPH QL IA: NEGATIVE
GAS FLOW.O2 O2 DELIVERY SYS: ABNORMAL L/MIN
GLOBULIN SER CALC-MCNC: 5.2 G/DL (ref 2.3–3.5)
GLUCOSE SERPL-MCNC: 164 MG/DL (ref 65–100)
HCO3 BLD-SCNC: 20.9 MMOL/L (ref 22–26)
HCT VFR BLD AUTO: 44.3 % (ref 41.1–50.3)
HGB BLD-MCNC: 15.4 G/DL (ref 13.6–17.2)
IMM GRANULOCYTES # BLD AUTO: 0.2 K/UL (ref 0–0.5)
IMM GRANULOCYTES NFR BLD AUTO: 2 % (ref 0–5)
LACTATE BLD-SCNC: 1.35 MMOL/L (ref 0.5–1.9)
LYMPHOCYTES # BLD: 0.5 K/UL (ref 0.5–4.6)
LYMPHOCYTES NFR BLD: 5 % (ref 13–44)
MAGNESIUM SERPL-MCNC: 1.4 MG/DL (ref 1.8–2.4)
MCH RBC QN AUTO: 33 PG (ref 26.1–32.9)
MCHC RBC AUTO-ENTMCNC: 34.8 G/DL (ref 31.4–35)
MCV RBC AUTO: 95.1 FL (ref 79.6–97.8)
MONOCYTES # BLD: 1.1 K/UL (ref 0.1–1.3)
MONOCYTES NFR BLD: 10 % (ref 4–12)
NEUTS SEG # BLD: 8.9 K/UL (ref 1.7–8.2)
NEUTS SEG NFR BLD: 82 % (ref 43–78)
NRBC # BLD: 0 K/UL (ref 0–0.2)
P-R INTERVAL, ECG05: 144 MS
PCO2 BLD: 31.7 MMHG (ref 35–45)
PH BLD: 7.43 [PH] (ref 7.35–7.45)
PLATELET # BLD AUTO: 125 K/UL (ref 150–450)
PLATELET COMMENTS,PCOM: SLIGHT
PMV BLD AUTO: 10.2 FL (ref 9.4–12.3)
PO2 BLD: 66 MMHG (ref 75–100)
POTASSIUM SERPL-SCNC: 3.3 MMOL/L (ref 3.5–5.1)
PROCALCITONIN SERPL-MCNC: 2.1 NG/ML
PROT SERPL-MCNC: 8.3 G/DL (ref 6.3–8.2)
Q-T INTERVAL, ECG07: 324 MS
QRS DURATION, ECG06: 80 MS
QTC CALCULATION (BEZET), ECG08: 422 MS
RBC # BLD AUTO: 4.66 M/UL (ref 4.23–5.6)
RBC MORPH BLD: ABNORMAL
SAO2 % BLD: 93 % (ref 95–98)
SERVICE CMNT-IMP: ABNORMAL
SERVICE CMNT-IMP: ABNORMAL
SODIUM SERPL-SCNC: 131 MMOL/L (ref 136–145)
SPECIMEN SOURCE: NORMAL
SPECIMEN TYPE: ABNORMAL
VENTRICULAR RATE, ECG03: 102 BPM
WBC # BLD AUTO: 10.8 K/UL (ref 4.3–11.1)
WBC MORPH BLD: ABNORMAL

## 2019-10-10 PROCEDURE — 84145 PROCALCITONIN (PCT): CPT

## 2019-10-10 PROCEDURE — 83605 ASSAY OF LACTIC ACID: CPT

## 2019-10-10 PROCEDURE — 82803 BLOOD GASES ANY COMBINATION: CPT

## 2019-10-10 PROCEDURE — 80053 COMPREHEN METABOLIC PANEL: CPT

## 2019-10-10 PROCEDURE — 96365 THER/PROPH/DIAG IV INF INIT: CPT

## 2019-10-10 PROCEDURE — 74011000258 HC RX REV CODE- 258

## 2019-10-10 PROCEDURE — 87150 DNA/RNA AMPLIFIED PROBE: CPT

## 2019-10-10 PROCEDURE — 87804 INFLUENZA ASSAY W/OPTIC: CPT

## 2019-10-10 PROCEDURE — 71045 X-RAY EXAM CHEST 1 VIEW: CPT

## 2019-10-10 PROCEDURE — 87040 BLOOD CULTURE FOR BACTERIA: CPT

## 2019-10-10 PROCEDURE — 87186 SC STD MICRODIL/AGAR DIL: CPT

## 2019-10-10 PROCEDURE — 96367 TX/PROPH/DG ADDL SEQ IV INF: CPT

## 2019-10-10 PROCEDURE — 87077 CULTURE AEROBIC IDENTIFY: CPT

## 2019-10-10 PROCEDURE — 85025 COMPLETE CBC W/AUTO DIFF WBC: CPT

## 2019-10-10 PROCEDURE — 99285 EMERGENCY DEPT VISIT HI MDM: CPT

## 2019-10-10 PROCEDURE — 83735 ASSAY OF MAGNESIUM: CPT

## 2019-10-10 PROCEDURE — 36600 WITHDRAWAL OF ARTERIAL BLOOD: CPT

## 2019-10-10 PROCEDURE — 83880 ASSAY OF NATRIURETIC PEPTIDE: CPT

## 2019-10-10 PROCEDURE — 74011250636 HC RX REV CODE- 250/636

## 2019-10-10 PROCEDURE — 93005 ELECTROCARDIOGRAM TRACING: CPT

## 2019-10-10 PROCEDURE — 87205 SMEAR GRAM STAIN: CPT

## 2019-10-10 PROCEDURE — 96375 TX/PRO/DX INJ NEW DRUG ADDON: CPT

## 2019-10-10 RX ORDER — MAGNESIUM SULFATE HEPTAHYDRATE 40 MG/ML
2 INJECTION, SOLUTION INTRAVENOUS
Status: COMPLETED | OUTPATIENT
Start: 2019-10-10 | End: 2019-10-10

## 2019-10-10 RX ORDER — HYDROCODONE BITARTRATE AND HOMATROPINE METHYLBROMIDE 1.5; 5 MG/5ML; MG/5ML
5 SYRUP ORAL
Qty: 45 ML | Refills: 0 | Status: SHIPPED | OUTPATIENT
Start: 2019-10-10 | End: 2019-10-11

## 2019-10-10 RX ORDER — LANOLIN ALCOHOL/MO/W.PET/CERES
400 CREAM (GRAM) TOPICAL DAILY
Qty: 7 TAB | Refills: 0 | Status: SHIPPED | OUTPATIENT
Start: 2019-10-10

## 2019-10-10 RX ORDER — ONDANSETRON 2 MG/ML
4 INJECTION INTRAMUSCULAR; INTRAVENOUS
Status: COMPLETED | OUTPATIENT
Start: 2019-10-10 | End: 2019-10-10

## 2019-10-10 RX ORDER — MORPHINE SULFATE 4 MG/ML
4 INJECTION INTRAVENOUS
Status: COMPLETED | OUTPATIENT
Start: 2019-10-10 | End: 2019-10-10

## 2019-10-10 RX ORDER — AZITHROMYCIN 250 MG/1
TABLET, FILM COATED ORAL
Qty: 6 TAB | Refills: 0 | Status: SHIPPED | OUTPATIENT
Start: 2019-10-10 | End: 2019-10-17

## 2019-10-10 RX ORDER — SODIUM CHLORIDE 9 MG/ML
1000 INJECTION, SOLUTION INTRAVENOUS ONCE
Status: COMPLETED | OUTPATIENT
Start: 2019-10-10 | End: 2019-10-10

## 2019-10-10 RX ORDER — CEFDINIR 300 MG/1
300 CAPSULE ORAL 2 TIMES DAILY
Qty: 20 CAP | Refills: 0 | Status: SHIPPED | OUTPATIENT
Start: 2019-10-10 | End: 2019-10-17

## 2019-10-10 RX ADMIN — MAGNESIUM SULFATE HEPTAHYDRATE 2 G: 40 INJECTION, SOLUTION INTRAVENOUS at 12:08

## 2019-10-10 RX ADMIN — ONDANSETRON 4 MG: 2 INJECTION INTRAMUSCULAR; INTRAVENOUS at 10:40

## 2019-10-10 RX ADMIN — AZITHROMYCIN 500 MG: 500 INJECTION, POWDER, LYOPHILIZED, FOR SOLUTION INTRAVENOUS at 08:20

## 2019-10-10 RX ADMIN — SODIUM CHLORIDE 1000 ML: 900 INJECTION, SOLUTION INTRAVENOUS at 07:48

## 2019-10-10 RX ADMIN — CEFTRIAXONE 1 G: 1 INJECTION, POWDER, FOR SOLUTION INTRAMUSCULAR; INTRAVENOUS at 07:48

## 2019-10-10 RX ADMIN — MORPHINE SULFATE 4 MG: 4 INJECTION INTRAVENOUS at 10:40

## 2019-10-10 NOTE — ED TRIAGE NOTES
Patient arrives to the ED from home. Patient has body aches, no appetite, no energy. Patient has had a productive cough with bright red sputum. Denies fevers. Patient states he has had chills. Patient states this feels the same as the last time he had pneumonia. Patient states he is SOB and it hurts on the right side of his chest when he takes a deep breath.

## 2019-10-10 NOTE — DISCHARGE INSTRUCTIONS
Patient Education        Pneumonia: Care Instructions  Your Care Instructions    Pneumonia is an infection of the lungs. Most cases are caused by infections from bacteria or viruses. Pneumonia may be mild or very severe. If it is caused by bacteria, you will be treated with antibiotics. It may take a few weeks to a few months to recover fully from pneumonia, depending on how sick you were and whether your overall health is good. Follow-up care is a key part of your treatment and safety. Be sure to make and go to all appointments, and call your doctor if you are having problems. It's also a good idea to know your test results and keep a list of the medicines you take. How can you care for yourself at home? · Take your antibiotics exactly as directed. Do not stop taking the medicine just because you are feeling better. You need to take the full course of antibiotics. · Take your medicines exactly as prescribed. Call your doctor if you think you are having a problem with your medicine. · Get plenty of rest and sleep. You may feel weak and tired for a while, but your energy level will improve with time. · To prevent dehydration, drink plenty of fluids, enough so that your urine is light yellow or clear like water. Choose water and other caffeine-free clear liquids until you feel better. If you have kidney, heart, or liver disease and have to limit fluids, talk with your doctor before you increase the amount of fluids you drink. · Take care of your cough so you can rest. A cough that brings up mucus from your lungs is common with pneumonia. It is one way your body gets rid of the infection. But if coughing keeps you from resting or causes severe fatigue and chest-wall pain, talk to your doctor. He or she may suggest that you take a medicine to reduce the cough. · Use a vaporizer or humidifier to add moisture to your bedroom. Follow the directions for cleaning the machine.   · Do not smoke or allow others to smoke around you. Smoke will make your cough last longer. If you need help quitting, talk to your doctor about stop-smoking programs and medicines. These can increase your chances of quitting for good. · Take an over-the-counter pain medicine, such as acetaminophen (Tylenol), ibuprofen (Advil, Motrin), or naproxen (Aleve). Read and follow all instructions on the label. · Do not take two or more pain medicines at the same time unless the doctor told you to. Many pain medicines have acetaminophen, which is Tylenol. Too much acetaminophen (Tylenol) can be harmful. · If you were given a spirometer to measure how well your lungs are working, use it as instructed. This can help your doctor tell how your recovery is going. · To prevent pneumonia in the future, talk to your doctor about getting a flu vaccine (once a year) and a pneumococcal vaccine (one time only for most people). When should you call for help? Call 911 anytime you think you may need emergency care. For example, call if:    · You have severe trouble breathing.    Call your doctor now or seek immediate medical care if:    · You cough up dark brown or bloody mucus (sputum).     · You have new or worse trouble breathing.     · You are dizzy or lightheaded, or you feel like you may faint.    Watch closely for changes in your health, and be sure to contact your doctor if:    · You have a new or higher fever.     · You are coughing more deeply or more often.     · You are not getting better after 2 days (48 hours).     · You do not get better as expected. Where can you learn more? Go to http://nadeen-divya.info/. Enter [id number] in the search box to learn more about \"Pneumonia: Care Instructions. \"  Current as of: June 9, 2019  Content Version: 12.2  © 6229-0298 [employer]. Care instructions adapted under license by [employer] (which disclaims liability or warranty for this information).  If you have questions about a medical condition or this instruction, always ask your healthcare professional. [place] [address] any warranty or liability for your use of this information.

## 2019-10-10 NOTE — ED NOTES
I have reviewed discharge instructions with the patient and spouse. The patient and spouse verbalized understanding. Patient left ED via Discharge Method: ambulatory to Home with spouse. Opportunity for questions and clarification provided. Patient given 4 scripts. To continue your aftercare when you leave the hospital, you may receive an automated call from our care team to check in on how you are doing. This is a free service and part of our promise to provide the best care and service to meet your aftercare needs.  If you have questions, or wish to unsubscribe from this service please call [phone]. Thank you for Choosing our [facility].

## 2019-10-10 NOTE — ED PROVIDER NOTES
59-year-old male complaining of generalized body aches and fatigue cough with hemoptysis. Patient is a longtime smoker. Patient feels he might have pneumonia. Onset of symptoms 1 week      Cough   This is a recurrent problem. The current episode started more than 1 week ago. The problem occurs constantly. The problem has been gradually worsening. The cough is productive of blood-tinged sputum. Patient reports a subjective fever - was not measured. Associated symptoms include chills, myalgias and shortness of breath. He has tried nothing for the symptoms. Generalized Body Aches   Associated symptoms include shortness of breath. Past Medical History:   Diagnosis Date    A-fib [facility]) 3/2/7812    Alcoholic hepatitis without ascites 5/11/2019    Hypertension        No past surgical history on file.       Family History:   Problem Relation Age of Onset    Cancer Neg Hx        Social History     Socioeconomic History    Marital status:      Spouse name: Not on file    Number of children: Not on file    Years of education: Not on file    Highest education level: Not on file   Occupational History    Not on file   Social Needs    Financial resource strain: Not on file    Food insecurity:     Worry: Not on file     Inability: Not on file    Transportation needs:     Medical: Not on file     Non-medical: Not on file   Tobacco Use    Smoking status: Current Every Day Smoker     Packs/day: 2.00    Smokeless tobacco: Never Used   Substance and Sexual Activity    Alcohol use: Yes     Comment: approx 1 pint per day    Drug use: Not on file    Sexual activity: Yes     Partners: Female   Lifestyle    Physical activity:     Days per week: Not on file     Minutes per session: Not on file    Stress: Not on file   Relationships    Social connections:     Talks on phone: Not on file     Gets together: Not on file     Attends Holiness service: Not on file     Active member of club or organization: Not on file     Attends meetings of clubs or organizations: Not on file     Relationship status: Not on file    Intimate partner violence:     Fear of current or ex partner: Not on file     Emotionally abused: Not on file     Physically abused: Not on file     Forced sexual activity: Not on file   Other Topics Concern    Not on file   Social History Narrative    Not on file         ALLERGIES: Patient has no known allergies. Review of Systems   Constitutional: Positive for chills. Negative for activity change. HENT: Negative. Eyes: Negative. Respiratory: Positive for cough and shortness of breath. Cardiovascular: Negative. Gastrointestinal: Negative. Genitourinary: Negative. Musculoskeletal: Positive for myalgias. Skin: Negative. Neurological: Negative. Psychiatric/Behavioral: Negative. All other systems reviewed and are negative. Vitals:    10/10/19 0659   BP: 120/75   Pulse: (!) 121   Resp: 20   Temp: 97.9 °F (36.6 °C)   SpO2: 97%   Weight: 72.6 kg (160 lb)   Height: 5' 10\" (1.778 m)            Physical Exam   Constitutional: He is oriented to person, place, and time. He appears well-developed and well-nourished. No distress. HENT:   Head: Normocephalic and atraumatic. Right Ear: External ear normal.   Left Ear: External ear normal.   Nose: Nose normal.   Mouth/Throat: Oropharynx is clear and moist. No oropharyngeal exudate. Eyes: Pupils are equal, round, and reactive to light. Conjunctivae and EOM are normal. Right eye exhibits no discharge. Left eye exhibits no discharge. No scleral icterus. Neck: Normal range of motion. Neck supple. No JVD present. No tracheal deviation present. Cardiovascular: Normal rate, regular rhythm and intact distal pulses. Pulmonary/Chest: Effort normal. No stridor. No respiratory distress. He has no wheezes. He has rhonchi in the right middle field. He exhibits no tenderness. Abdominal: Soft.  Bowel sounds are normal. He exhibits no distension and no mass. There is no tenderness. Musculoskeletal: Normal range of motion. He exhibits no edema or tenderness. Neurological: He is alert and oriented to person, place, and time. No cranial nerve deficit. Skin: Skin is warm and dry. No rash noted. He is not diaphoretic. No erythema. No pallor. Psychiatric: He has a normal mood and affect. His behavior is normal. Thought content normal.   Nursing note and vitals reviewed.        MDM  Number of Diagnoses or Management Options  Diagnosis management comments: Pneumonia without hypoxia but tachycardic       Amount and/or Complexity of Data Reviewed  Clinical lab tests: ordered and reviewed  Tests in the radiology section of CPT®: ordered and reviewed  Tests in the medicine section of CPT®: ordered and reviewed    Risk of Complications, Morbidity, and/or Mortality  Presenting problems: high  Diagnostic procedures: high  Management options: high           Procedures

## 2019-10-11 ENCOUNTER — APPOINTMENT (OUTPATIENT)
Dept: GENERAL RADIOLOGY | Age: 59
DRG: 871 | End: 2019-10-11
Attending: EMERGENCY MEDICINE
Payer: COMMERCIAL

## 2019-10-11 ENCOUNTER — HOSPITAL ENCOUNTER (INPATIENT)
Age: 59
LOS: 6 days | Discharge: HOME | DRG: 871 | End: 2019-10-17
Attending: EMERGENCY MEDICINE | Admitting: FAMILY MEDICINE
Payer: COMMERCIAL

## 2019-10-11 DIAGNOSIS — A41.52 SEPSIS DUE TO PSEUDOMONAS SPECIES, UNSPECIFIED WHETHER ACUTE ORGAN DYSFUNCTION PRESENT (HCC): ICD-10-CM

## 2019-10-11 DIAGNOSIS — J18.9 COMMUNITY ACQUIRED PNEUMONIA, UNSPECIFIED LATERALITY: Primary | ICD-10-CM

## 2019-10-11 DIAGNOSIS — B96.5 BACTEREMIA DUE TO PSEUDOMONAS: ICD-10-CM

## 2019-10-11 DIAGNOSIS — R78.81 BACTEREMIA DUE TO PSEUDOMONAS: ICD-10-CM

## 2019-10-11 LAB
ACC. NO. FROM MICRO ORDER, ACCP: ABNORMAL
ALBUMIN SERPL-MCNC: 2.7 G/DL (ref 3.2–4.6)
ALBUMIN/GLOB SERPL: 0.6 {RATIO} (ref 1.2–3.5)
ALP SERPL-CCNC: 72 U/L (ref 50–136)
ALT SERPL-CCNC: 17 U/L (ref 12–65)
ANION GAP SERPL CALC-SCNC: 13 MMOL/L (ref 7–16)
ARTERIAL PATENCY WRIST A: YES
ARTERIAL PATENCY WRIST A: YES
AST SERPL-CCNC: 49 U/L (ref 15–37)
ATRIAL RATE: 121 BPM
ATRIAL RATE: 234 BPM
BACTERIA SPEC CULT: NORMAL
BASE DEFICIT BLD-SCNC: 6 MMOL/L
BASOPHILS # BLD: 0 K/UL (ref 0–0.2)
BASOPHILS NFR BLD: 0 % (ref 0–2)
BDY SITE: ABNORMAL
BDY SITE: ABNORMAL
BILIRUB SERPL-MCNC: 0.5 MG/DL (ref 0.2–1.1)
BODY TEMPERATURE: 98.6
BODY TEMPERATURE: 98.6
BUN SERPL-MCNC: 16 MG/DL (ref 8–23)
CALCIUM SERPL-MCNC: 8.6 MG/DL (ref 8.3–10.4)
CALCULATED P AXIS, ECG09: 70 DEGREES
CALCULATED R AXIS, ECG10: -79 DEGREES
CALCULATED R AXIS, ECG10: -95 DEGREES
CALCULATED T AXIS, ECG11: 66 DEGREES
CALCULATED T AXIS, ECG11: 67 DEGREES
CHLORIDE SERPL-SCNC: 96 MMOL/L (ref 98–107)
CO2 BLD-SCNC: 19 MMOL/L
CO2 BLD-SCNC: 20 MMOL/L
CO2 SERPL-SCNC: 23 MMOL/L (ref 21–32)
COLLECT TIME,HTIME: 1309
COLLECT TIME,HTIME: 1318
CREAT SERPL-MCNC: 0.61 MG/DL (ref 0.8–1.5)
DIAGNOSIS, 93000: NORMAL
DIAGNOSIS, 93000: NORMAL
DIFFERENTIAL METHOD BLD: ABNORMAL
EOSINOPHIL # BLD: 0 K/UL (ref 0–0.8)
EOSINOPHIL NFR BLD: 0 % (ref 0.5–7.8)
ERYTHROCYTE [DISTWIDTH] IN BLOOD BY AUTOMATED COUNT: 15.6 % (ref 11.9–14.6)
GAS FLOW.O2 O2 DELIVERY SYS: ABNORMAL L/MIN
GAS FLOW.O2 O2 DELIVERY SYS: ABNORMAL L/MIN
GLOBULIN SER CALC-MCNC: 4.9 G/DL (ref 2.3–3.5)
GLUCOSE SERPL-MCNC: 91 MG/DL (ref 65–100)
HCO3 BLD-SCNC: 18.1 MMOL/L (ref 22–26)
HCO3 BLDV-SCNC: 19.1 MMOL/L (ref 23–28)
HCT VFR BLD AUTO: 40.9 % (ref 41.1–50.3)
HGB BLD-MCNC: 14.3 G/DL (ref 13.6–17.2)
IMM GRANULOCYTES # BLD AUTO: 0 K/UL (ref 0–0.5)
IMM GRANULOCYTES NFR BLD AUTO: 0 % (ref 0–5)
INR PPP: 1.1
INTERPRETATION: ABNORMAL
KPC (CARBAPENEM RESISTANCE GENE): NOT DETECTED
LACTATE BLD-SCNC: 1.94 MMOL/L (ref 0.5–1.9)
LACTATE SERPL-SCNC: 1.9 MMOL/L (ref 0.4–2)
LACTATE SERPL-SCNC: 2.3 MMOL/L (ref 0.4–2)
LYMPHOCYTES # BLD: 0.5 K/UL (ref 0.5–4.6)
LYMPHOCYTES NFR BLD: 4 % (ref 13–44)
MAGNESIUM SERPL-MCNC: 1.9 MG/DL (ref 1.8–2.4)
MCH RBC QN AUTO: 33.4 PG (ref 26.1–32.9)
MCHC RBC AUTO-ENTMCNC: 35 G/DL (ref 31.4–35)
MCV RBC AUTO: 95.6 FL (ref 79.6–97.8)
MONOCYTES # BLD: 1.3 K/UL (ref 0.1–1.3)
MONOCYTES NFR BLD: 11 % (ref 4–12)
NEUTS SEG # BLD: 9.6 K/UL (ref 1.7–8.2)
NEUTS SEG NFR BLD: 85 % (ref 43–78)
NRBC # BLD: 0 K/UL (ref 0–0.2)
O2/TOTAL GAS SETTING VFR VENT: 0.21 %
O2/TOTAL GAS SETTING VFR VENT: 0.21 %
P-R INTERVAL, ECG05: 152 MS
PCO2 BLD: 30.1 MMHG (ref 35–45)
PCO2 BLDV: 26.4 MMHG (ref 41–51)
PH BLD: 7.39 [PH] (ref 7.35–7.45)
PH BLDV: 7.47 [PH] (ref 7.32–7.42)
PLATELET # BLD AUTO: 133 K/UL (ref 150–450)
PLATELET COMMENTS,PCOM: SLIGHT
PMV BLD AUTO: 10.3 FL (ref 9.4–12.3)
PO2 BLD: 73 MMHG (ref 75–100)
PO2 BLDV: 36 MMHG
POTASSIUM SERPL-SCNC: 3.1 MMOL/L (ref 3.5–5.1)
PROCALCITONIN SERPL-MCNC: 9 NG/ML
PROT SERPL-MCNC: 7.6 G/DL (ref 6.3–8.2)
PROTHROMBIN TIME: 14.7 SEC (ref 11.7–14.5)
PSEUDOMONAS AERUGINOSA: DETECTED
Q-T INTERVAL, ECG07: 274 MS
Q-T INTERVAL, ECG07: 292 MS
QRS DURATION, ECG06: 82 MS
QRS DURATION, ECG06: 82 MS
QTC CALCULATION (BEZET), ECG08: 414 MS
QTC CALCULATION (BEZET), ECG08: 462 MS
RBC # BLD AUTO: 4.28 M/UL (ref 4.23–5.6)
RBC MORPH BLD: ABNORMAL
SAO2 % BLD: 95 % (ref 95–98)
SAO2 % BLDV: 74 % (ref 65–88)
SERVICE CMNT-IMP: ABNORMAL
SERVICE CMNT-IMP: NORMAL
SODIUM SERPL-SCNC: 132 MMOL/L (ref 136–145)
SPECIMEN TYPE: ABNORMAL
SPECIMEN TYPE: ABNORMAL
VENTRICULAR RATE, ECG03: 121 BPM
VENTRICULAR RATE, ECG03: 171 BPM
WBC # BLD AUTO: 11.4 K/UL (ref 4.3–11.1)
WBC MORPH BLD: ABNORMAL

## 2019-10-11 PROCEDURE — 77030019605

## 2019-10-11 PROCEDURE — 77030020255 HC SOL INJ LR 1000ML BG

## 2019-10-11 PROCEDURE — 74011000302 HC RX REV CODE- 302: Performed by: FAMILY MEDICINE

## 2019-10-11 PROCEDURE — 74011000250 HC RX REV CODE- 250: Performed by: FAMILY MEDICINE

## 2019-10-11 PROCEDURE — 74011250636 HC RX REV CODE- 250/636: Performed by: INTERNAL MEDICINE

## 2019-10-11 PROCEDURE — 96361 HYDRATE IV INFUSION ADD-ON: CPT | Performed by: EMERGENCY MEDICINE

## 2019-10-11 PROCEDURE — 93005 ELECTROCARDIOGRAM TRACING: CPT | Performed by: FAMILY MEDICINE

## 2019-10-11 PROCEDURE — 84145 PROCALCITONIN (PCT): CPT

## 2019-10-11 PROCEDURE — 74011250636 HC RX REV CODE- 250/636: Performed by: FAMILY MEDICINE

## 2019-10-11 PROCEDURE — 87389 HIV-1 AG W/HIV-1&-2 AB AG IA: CPT

## 2019-10-11 PROCEDURE — 83605 ASSAY OF LACTIC ACID: CPT

## 2019-10-11 PROCEDURE — 87040 BLOOD CULTURE FOR BACTERIA: CPT

## 2019-10-11 PROCEDURE — 85025 COMPLETE CBC W/AUTO DIFF WBC: CPT

## 2019-10-11 PROCEDURE — 74011250637 HC RX REV CODE- 250/637: Performed by: EMERGENCY MEDICINE

## 2019-10-11 PROCEDURE — 65620000000 HC RM CCU GENERAL

## 2019-10-11 PROCEDURE — 87077 CULTURE AEROBIC IDENTIFY: CPT

## 2019-10-11 PROCEDURE — 74011000258 HC RX REV CODE- 258: Performed by: FAMILY MEDICINE

## 2019-10-11 PROCEDURE — 87641 MR-STAPH DNA AMP PROBE: CPT

## 2019-10-11 PROCEDURE — 74011000258 HC RX REV CODE- 258: Performed by: INTERNAL MEDICINE

## 2019-10-11 PROCEDURE — 87070 CULTURE OTHR SPECIMN AEROBIC: CPT

## 2019-10-11 PROCEDURE — 82803 BLOOD GASES ANY COMBINATION: CPT

## 2019-10-11 PROCEDURE — 99284 EMERGENCY DEPT VISIT MOD MDM: CPT | Performed by: EMERGENCY MEDICINE

## 2019-10-11 PROCEDURE — 83735 ASSAY OF MAGNESIUM: CPT

## 2019-10-11 PROCEDURE — 87186 SC STD MICRODIL/AGAR DIL: CPT

## 2019-10-11 PROCEDURE — 80074 ACUTE HEPATITIS PANEL: CPT

## 2019-10-11 PROCEDURE — 36600 WITHDRAWAL OF ARTERIAL BLOOD: CPT

## 2019-10-11 PROCEDURE — 74011250636 HC RX REV CODE- 250/636: Performed by: EMERGENCY MEDICINE

## 2019-10-11 PROCEDURE — 96365 THER/PROPH/DIAG IV INF INIT: CPT | Performed by: EMERGENCY MEDICINE

## 2019-10-11 PROCEDURE — 85610 PROTHROMBIN TIME: CPT

## 2019-10-11 PROCEDURE — 85730 THROMBOPLASTIN TIME PARTIAL: CPT

## 2019-10-11 PROCEDURE — 93005 ELECTROCARDIOGRAM TRACING: CPT | Performed by: EMERGENCY MEDICINE

## 2019-10-11 PROCEDURE — 74011250637 HC RX REV CODE- 250/637: Performed by: FAMILY MEDICINE

## 2019-10-11 PROCEDURE — 86580 TB INTRADERMAL TEST: CPT | Performed by: FAMILY MEDICINE

## 2019-10-11 PROCEDURE — 71046 X-RAY EXAM CHEST 2 VIEWS: CPT

## 2019-10-11 PROCEDURE — 80053 COMPREHEN METABOLIC PANEL: CPT

## 2019-10-11 PROCEDURE — 86592 SYPHILIS TEST NON-TREP QUAL: CPT

## 2019-10-11 PROCEDURE — 36415 COLL VENOUS BLD VENIPUNCTURE: CPT

## 2019-10-11 RX ORDER — ESMOLOL HYDROCHLORIDE 10 MG/ML
0-200 INJECTION, SOLUTION INTRAVENOUS
Status: DISCONTINUED | OUTPATIENT
Start: 2019-10-11 | End: 2019-10-11

## 2019-10-11 RX ORDER — LANOLIN ALCOHOL/MO/W.PET/CERES
100 CREAM (GRAM) TOPICAL DAILY
Status: DISCONTINUED | OUTPATIENT
Start: 2019-10-11 | End: 2019-10-11

## 2019-10-11 RX ORDER — KETOROLAC TROMETHAMINE 30 MG/ML
15 INJECTION, SOLUTION INTRAMUSCULAR; INTRAVENOUS EVERY 6 HOURS
Status: DISCONTINUED | OUTPATIENT
Start: 2019-10-11 | End: 2019-10-12

## 2019-10-11 RX ORDER — PREDNISONE 10 MG/1
40 TABLET ORAL
Status: COMPLETED | OUTPATIENT
Start: 2019-10-12 | End: 2019-10-16

## 2019-10-11 RX ORDER — PANTOPRAZOLE SODIUM 40 MG/1
40 TABLET, DELAYED RELEASE ORAL 2 TIMES DAILY
Status: DISCONTINUED | OUTPATIENT
Start: 2019-10-11 | End: 2019-10-15

## 2019-10-11 RX ORDER — LORAZEPAM 2 MG/ML
1 INJECTION INTRAMUSCULAR
Status: DISCONTINUED | OUTPATIENT
Start: 2019-10-11 | End: 2019-10-17 | Stop reason: HOSPADM

## 2019-10-11 RX ORDER — SODIUM CHLORIDE, SODIUM LACTATE, POTASSIUM CHLORIDE, CALCIUM CHLORIDE 600; 310; 30; 20 MG/100ML; MG/100ML; MG/100ML; MG/100ML
100 INJECTION, SOLUTION INTRAVENOUS CONTINUOUS
Status: DISCONTINUED | OUTPATIENT
Start: 2019-10-11 | End: 2019-10-14

## 2019-10-11 RX ORDER — IBUPROFEN 200 MG
1 TABLET ORAL EVERY 24 HOURS
Status: DISCONTINUED | OUTPATIENT
Start: 2019-10-11 | End: 2019-10-17 | Stop reason: HOSPADM

## 2019-10-11 RX ORDER — LORAZEPAM 1 MG/1
1 TABLET ORAL
Status: DISCONTINUED | OUTPATIENT
Start: 2019-10-11 | End: 2019-10-11

## 2019-10-11 RX ORDER — LANOLIN ALCOHOL/MO/W.PET/CERES
100 CREAM (GRAM) TOPICAL DAILY
Status: DISCONTINUED | OUTPATIENT
Start: 2019-10-12 | End: 2019-10-17 | Stop reason: HOSPADM

## 2019-10-11 RX ORDER — SODIUM CHLORIDE 0.9 % (FLUSH) 0.9 %
5-40 SYRINGE (ML) INJECTION EVERY 8 HOURS
Status: DISCONTINUED | OUTPATIENT
Start: 2019-10-11 | End: 2019-10-17 | Stop reason: HOSPADM

## 2019-10-11 RX ORDER — DILTIAZEM HYDROCHLORIDE 5 MG/ML
5 INJECTION INTRAVENOUS ONCE
Status: DISCONTINUED | OUTPATIENT
Start: 2019-10-11 | End: 2019-10-11

## 2019-10-11 RX ORDER — ACETAMINOPHEN 500 MG
1000 TABLET ORAL
Status: DISCONTINUED | OUTPATIENT
Start: 2019-10-11 | End: 2019-10-17 | Stop reason: HOSPADM

## 2019-10-11 RX ORDER — SODIUM CHLORIDE FOR INHALATION 3 %
4 VIAL, NEBULIZER (ML) INHALATION
Status: DISCONTINUED | OUTPATIENT
Start: 2019-10-11 | End: 2019-10-13 | Stop reason: SINTOL

## 2019-10-11 RX ORDER — HEPARIN SODIUM 5000 [USP'U]/100ML
12-25 INJECTION, SOLUTION INTRAVENOUS
Status: DISCONTINUED | OUTPATIENT
Start: 2019-10-11 | End: 2019-10-13

## 2019-10-11 RX ORDER — HEPARIN SODIUM 5000 [USP'U]/ML
35 INJECTION, SOLUTION INTRAVENOUS; SUBCUTANEOUS ONCE
Status: COMPLETED | OUTPATIENT
Start: 2019-10-11 | End: 2019-10-11

## 2019-10-11 RX ORDER — ACETAMINOPHEN 500 MG
1000 TABLET ORAL
Status: COMPLETED | OUTPATIENT
Start: 2019-10-11 | End: 2019-10-11

## 2019-10-11 RX ORDER — LEVOFLOXACIN 5 MG/ML
750 INJECTION, SOLUTION INTRAVENOUS
Status: COMPLETED | OUTPATIENT
Start: 2019-10-11 | End: 2019-10-11

## 2019-10-11 RX ORDER — ESMOLOL HYDROCHLORIDE 10 MG/ML
0-200 INJECTION, SOLUTION INTRAVENOUS
Status: DISCONTINUED | OUTPATIENT
Start: 2019-10-11 | End: 2019-10-13

## 2019-10-11 RX ORDER — SODIUM CHLORIDE 0.9 % (FLUSH) 0.9 %
5-40 SYRINGE (ML) INJECTION AS NEEDED
Status: DISCONTINUED | OUTPATIENT
Start: 2019-10-11 | End: 2019-10-17 | Stop reason: HOSPADM

## 2019-10-11 RX ORDER — POTASSIUM CHLORIDE 20 MEQ/1
40 TABLET, EXTENDED RELEASE ORAL
Status: COMPLETED | OUTPATIENT
Start: 2019-10-11 | End: 2019-10-11

## 2019-10-11 RX ORDER — ALBUTEROL SULFATE 0.83 MG/ML
2.5 SOLUTION RESPIRATORY (INHALATION)
Status: DISCONTINUED | OUTPATIENT
Start: 2019-10-11 | End: 2019-10-13

## 2019-10-11 RX ORDER — DIAZEPAM 5 MG/1
2.5 TABLET ORAL EVERY 8 HOURS
Status: DISCONTINUED | OUTPATIENT
Start: 2019-10-11 | End: 2019-10-13

## 2019-10-11 RX ORDER — ALBUTEROL SULFATE 0.83 MG/ML
2.5 SOLUTION RESPIRATORY (INHALATION)
Status: DISCONTINUED | OUTPATIENT
Start: 2019-10-11 | End: 2019-10-13 | Stop reason: SDUPTHER

## 2019-10-11 RX ORDER — ENOXAPARIN SODIUM 100 MG/ML
40 INJECTION SUBCUTANEOUS EVERY 24 HOURS
Status: DISCONTINUED | OUTPATIENT
Start: 2019-10-11 | End: 2019-10-11

## 2019-10-11 RX ORDER — ONDANSETRON 2 MG/ML
4 INJECTION INTRAMUSCULAR; INTRAVENOUS
Status: DISCONTINUED | OUTPATIENT
Start: 2019-10-11 | End: 2019-10-17 | Stop reason: HOSPADM

## 2019-10-11 RX ORDER — KETOROLAC TROMETHAMINE 30 MG/ML
15 INJECTION, SOLUTION INTRAMUSCULAR; INTRAVENOUS EVERY 6 HOURS
Status: DISCONTINUED | OUTPATIENT
Start: 2019-10-11 | End: 2019-10-11

## 2019-10-11 RX ADMIN — POTASSIUM CHLORIDE 40 MEQ: 20 TABLET, EXTENDED RELEASE ORAL at 15:23

## 2019-10-11 RX ADMIN — DIAZEPAM 2.5 MG: 5 TABLET ORAL at 15:22

## 2019-10-11 RX ADMIN — AMIODARONE HYDROCHLORIDE 1 MG/MIN: 50 INJECTION, SOLUTION INTRAVENOUS at 17:45

## 2019-10-11 RX ADMIN — ACETAMINOPHEN 1000 MG: 500 TABLET, FILM COATED ORAL at 11:11

## 2019-10-11 RX ADMIN — LEVOFLOXACIN 750 MG: 5 INJECTION, SOLUTION INTRAVENOUS at 11:13

## 2019-10-11 RX ADMIN — TUBERCULIN PURIFIED PROTEIN DERIVATIVE 5 UNITS: 5 INJECTION, SOLUTION INTRADERMAL at 16:06

## 2019-10-11 RX ADMIN — PIPERACILLIN SODIUM AND TAZOBACTAM SODIUM 4.5 G: 4; .5 INJECTION, POWDER, LYOPHILIZED, FOR SOLUTION INTRAVENOUS at 15:25

## 2019-10-11 RX ADMIN — DIAZEPAM 2.5 MG: 5 TABLET ORAL at 21:43

## 2019-10-11 RX ADMIN — KETOROLAC TROMETHAMINE 15 MG: 30 INJECTION, SOLUTION INTRAMUSCULAR; INTRAVENOUS at 21:43

## 2019-10-11 RX ADMIN — ESMOLOL HYDROCHLORIDE 50 MCG/KG/MIN: 10 INJECTION INTRAVENOUS at 15:00

## 2019-10-11 RX ADMIN — Medication 10 ML: at 15:25

## 2019-10-11 RX ADMIN — SODIUM CHLORIDE, SODIUM LACTATE, POTASSIUM CHLORIDE, AND CALCIUM CHLORIDE 1000 ML: 600; 310; 30; 20 INJECTION, SOLUTION INTRAVENOUS at 13:33

## 2019-10-11 RX ADMIN — PIPERACILLIN SODIUM AND TAZOBACTAM SODIUM 4.5 G: 4; .5 INJECTION, POWDER, LYOPHILIZED, FOR SOLUTION INTRAVENOUS at 21:44

## 2019-10-11 RX ADMIN — THIAMINE HYDROCHLORIDE: 100 INJECTION, SOLUTION INTRAMUSCULAR; INTRAVENOUS at 14:35

## 2019-10-11 RX ADMIN — SODIUM CHLORIDE 1000 ML: 900 INJECTION, SOLUTION INTRAVENOUS at 11:13

## 2019-10-11 RX ADMIN — Medication 10 ML: at 22:55

## 2019-10-11 RX ADMIN — SODIUM CHLORIDE 1000 ML: 900 INJECTION, SOLUTION INTRAVENOUS at 12:43

## 2019-10-11 RX ADMIN — LORAZEPAM 1 MG: 2 INJECTION INTRAMUSCULAR; INTRAVENOUS at 22:57

## 2019-10-11 RX ADMIN — DEXTROSE MONOHYDRATE 150 MG: 5 INJECTION, SOLUTION INTRAVENOUS at 17:33

## 2019-10-11 RX ADMIN — PANTOPRAZOLE SODIUM 40 MG: 40 TABLET, DELAYED RELEASE ORAL at 15:23

## 2019-10-11 RX ADMIN — SODIUM CHLORIDE, SODIUM LACTATE, POTASSIUM CHLORIDE, AND CALCIUM CHLORIDE 100 ML/HR: 600; 310; 30; 20 INJECTION, SOLUTION INTRAVENOUS at 14:35

## 2019-10-11 RX ADMIN — HEPARIN SODIUM 2650 UNITS: 5000 INJECTION INTRAVENOUS; SUBCUTANEOUS at 17:16

## 2019-10-11 RX ADMIN — KETOROLAC TROMETHAMINE 15 MG: 30 INJECTION, SOLUTION INTRAMUSCULAR; INTRAVENOUS at 15:33

## 2019-10-11 RX ADMIN — HEPARIN SODIUM 12 UNITS/KG/HR: 5000 INJECTION, SOLUTION INTRAVENOUS at 17:17

## 2019-10-11 RX ADMIN — ENOXAPARIN SODIUM 40 MG: 40 INJECTION SUBCUTANEOUS at 15:24

## 2019-10-11 NOTE — CONSULTS
Infectious Disease Consult    Today's Date: 10/11/2019   Admit Date: 10/11/2019    Impression:   · Pseudomonas aeruginosa bacteremia related to RML PNA  · Hx of Acinetobacter bacteremia related RLL PNA  · Alcohol abuse     Plan:   ·  Continue Zosyn while pseudomonas susceptibilities pending. · Expect 10-14d duration. · Recommend speech therapy as this is second PNA in 4 months. Known alcoholism. · Plan to see Monday unless clinically changes. Anti-infectives:   · Zosyn (10/11-    Subjective:   Date of Consultation:  October 11, 2019  Referring Physician: Radha Lyon     Patient is a 59 y.o. male with significant medical hx for alcoholism, different GNR (Klebsiella, Acinetobacter)  bacteremias related to PNA, who originally presented to ED on 10/10 for progressive cough, fever, chills, and malaise. He known to ID--last seen in 5/2019 for Acinetobacter bacteremia related to PNA. Yesterday ED visit,  CXR with RML PNA, prescription for Azithro given but not filled. BC 1/2 + pseudomonas. Influenza swab negative. ED called patient back today due to [facility]. While in ED, Tm 99.5 with mild leukocytosis. Zosyn started. Denies diarrhea, N/V, choking while eating, abdominal pain, teeth/mouth pain. + productive cough, fever, chills. Wife present. HIV/HCV NR in May.      Patient Active Problem List   Diagnosis Code    Cellulitis L03.90    HTN (hypertension) I10    Chest pain R07.9    Shortness of breath R06.02    Tobacco abuse Z72.0    ETOH abuse F10.10    PNA (pneumonia) J18.9    Tachycardia R00.0    Thrombocytopenia (HCC) D69.6    GI bleed K92.2    A-fib ([facility]) I48.91    Bacteremia due to Gram-negative bacteria R78.81    Clostridium difficile diarrhea A04.72    Hypomagnesemia E83.42    Hypokalemia E87.6    Diarrhea N75.8    Alcoholic hepatitis without ascites K70.10    Liver mass R16.0    Pseudomonas septicemia (HCC) A41.52     Past Medical History:   Diagnosis Date    A-fib ([facility] [address] 8/0/1308    Alcoholic hepatitis without ascites 2019    Hypertension       Family History   Problem Relation Age of Onset    Cancer Neg Hx       Social History     Tobacco Use    Smoking status: Current Every Day Smoker     Packs/day: 2.00    Smokeless tobacco: Never Used   Substance Use Topics    Alcohol use: Yes     Comment: approx 1 pint per day     History reviewed. No pertinent surgical history. Prior to Admission medications    Medication Sig Start Date End Date Taking? Authorizing Provider   azithromycin (ZITHROMAX) 250 mg tablet Take two tablets today then one tablet daily 10/10/19   Rodriguez Mejia MD   cefdinir (OMNICEF) 300 mg capsule Take 1 Cap by mouth two (2) times a day. 10/10/19   Rodriguez Mejia MD   magnesium oxide (MAG-OX) 400 mg tablet Take 1 Tab by mouth daily. 10/10/19   Rodriguez Mejia MD   famotidine (PEPCID) 20 mg tablet Take 1 Tab by mouth two (2) times a day. 5/15/19   Stefano Cherry MD   folic acid (FOLVITE) 1 mg tablet Take 1 Tab by mouth daily. 19   Stefano Cherry MD   sucralfate (CARAFATE) 1 gram tablet Take 1 Tab by mouth Before breakfast, lunch, dinner and at bedtime. Indications: Gastritis 5/15/19   Stefano Cherry MD   thiamine HCL (B-1) 100 mg tablet Take 1 Tab by mouth daily. 19   Stefano Cherry MD   nitroglycerin (NITROSTAT) 0.4 mg SL tablet 1 Tab by SubLINGual route every five (5) minutes as needed for Chest Pain. 16   Aramis CARCAMO NP   atorvastatin (LIPITOR) 40 mg tablet Take 1 Tab by mouth daily. 16   Aramis CARCAMO NP   amLODIPine (NORVASC) 5 mg tablet Take 5 mg by mouth daily. Other, MD Sully       No Known Allergies     Review of Systems:  A comprehensive review of systems was negative except for that written in the History of Present Illness.     Objective:     Visit Vitals  /70   Pulse (!) 114   Temp 99.5 °F (37.5 °C)   Resp 24   Ht 5' 10\" (1.778 m)   Wt 72.6 kg (160 lb)   SpO2 94%   BMI 22.96 kg/m²     Temp (24hrs), Av.5 °F (37.5 °C), Min:99.5 °F (37.5 °C), Max:99.5 °F (37.5 °C)       Lines:  Peripheral IV:       Physical Exam:    General:  Alert, cooperative,  appears stated age   Eyes:  Sclera anicteric. Pupils equally round and reactive to light. Mouth/Throat: Mucous membranes normal, oral pharynx clear   Neck: Supple   Lungs:   R lobes with rhonchi and scattered wheezing. CV:  Regular rate and rhythm,no murmur, click, rub or gallop   Abdomen:   Soft, non-tender.  bowel sounds normal. non-distended   Extremities: No cyanosis or edema   Skin: Skin color, texture, turgor normal. no acute rash or lesions   Lymph nodes: Cervical and supraclavicular normal   Musculoskeletal: No swelling or deformity   Lines/Devices:  Intact, no erythema, drainage or tenderness   Psych: Alert and oriented, normal mood affect given the setting       Data Review:     CBC:  Recent Labs     10/11/19  1028 10/10/19  0702   WBC 11.4* 10.8   GRANS 85* 82*   MONOS 11 10   EOS 0* 0*   ANEU 9.6* 8.9*   ABL 0.5 0.5   HGB 14.3 15.4   HCT 40.9* 44.3   * 125*       BMP:  Recent Labs     10/11/19  1028 10/10/19  0702   CREA 0.61* 0.78*   BUN 16 17   * 131*   K 3.1* 3.3*   CL 96* 95*   CO2 23 23   AGAP 13 13   GLU 91 164*       LFTS:  Recent Labs     10/11/19  1028 10/10/19  0702   TBILI 0.5 1.1   ALT 17 14   SGOT 49* 20   AP 72 77   TP 7.6 8.3*   ALB 2.7* 3.1*       Microbiology:     All Micro Results     Procedure Component Value Units Date/Time    MSSA/MRSA SC BY PCR, NASAL SWAB [[id number]]     Order Status:  Sent Specimen:  Nasal swab     CULTURE, BLOOD [[id number]]     Order Status:  Sent Specimen:  Blood     CULTURE, RESPIRATORY/SPUTUM/BRONCH Haze Akiko STAIN [[id number]]     Order Status:  Sent Specimen:  Sputum     CULTURE, BLOOD [[id number]] Collected:  10/11/19 1028    Order Status:  Completed Specimen:  Blood Updated:  10/11/19 1056    CULTURE, BLOOD [[id number]]     Order Status:  Canceled Specimen:  Blood           Imaging:   See HPI/EPIC     Signed By: Alexander Purcell Vicky Fair, LEANNA     October 11, 2019

## 2019-10-11 NOTE — PROGRESS NOTES
TRANSFER - OUT REPORT:    Verbal report given to LEENA Patel on [facility]  being transferred to CCU for routine progression of care       Report consisted of patients Situation, Background, Assessment and Recommendations(SBAR). Information from the following report(s) SBAR, Kardex and MAR was reviewed with the receiving nurse. Opportunity for questions and clarification was provided.

## 2019-10-11 NOTE — PROGRESS NOTES
Noted that patient in a fib RVR rates 170-190. Pt asymptomatic. No distress noted, presently eating a cookie. Confirmed on stat EKG. MD notified. Needs esmolol gtt in ICU. Confirmed patient was coughing up blood. Sputum sent.

## 2019-10-11 NOTE — PROGRESS NOTES
TRANSFER - IN REPORT:    Verbal report received from [facility] RN(name) on Nkechi Tapia  being received from tele (unit) for routine progression of care      Report consisted of patients Situation, Background, Assessment and   Recommendations(SBAR). Information from the following report(s) SBAR, Kardex, ED Summary, [facility] and Cardiac Rhythm SVT was reviewed with the receiving nurse. Opportunity for questions and clarification was provided. Assessment completed upon patients arrival to unit and care assumed.

## 2019-10-11 NOTE — PROGRESS NOTES
Called by bedside nurse that patient went into atrial fibrillation with rapid ventricular response to a rate of 190. Patient started on esmolol gtt. and transferred to the ICU. Cardiology consulted as patient is not on outpatient medications for atrial fibrillation. Patient does have a history of atrial fibrillation during last episode of pneumonia in May 2019.

## 2019-10-11 NOTE — PROGRESS NOTES
Patient to tele room via stretcher from ER. Patient transferred to bed via ambulation with steady gait noted. Patient placed in gown and monitor leads applied. Pt is alert and oriented x4, calm, cooperative and follows commands appropriately. Patient presently without CP, SOB, or n/v. No present complaints reported from patient. LS clear. Breathing even, regular, and non-labored. A fib via telemetry, without ectopy. Trace for edema in extremities. VSS. No distress noted. gtts running: none. Banana bag and fluids to be started    Dual skin assessment with secondary RN. Skin is as follows  1. Brent ble with trace swelling. 2. Intact sacrum  3. Intact heels  4. +varicose veins bilaterally. 5. Pt repositioned in bed and turns self appropriately. Patient oriented to room and floor, no questions voiced at this time. Plan of care reviewed. Patient voiced understanding to use call light to communicate needs. 10/11/19 1402   Dual Skin Pressure Injury Assessment   Dual Skin Pressure Injury Assessment WDL   Second Care Provider (Based on Facility Policy) Tyree   Skin Integumentary   Skin Integumentary (WDL) X    Pressure  Injury Documentation No Pressure Injury Noted-Pressure Ulcer Prevention Initiated   Skin Color Appropriate for ethnicity;Brent   Skin Condition/Temp Warm;Dry   Skin Integrity Intact   Turgor Epidermis thin w/ loss of subcut tissue   Varicosities Present   Wound Prevention and Protection Methods   Orientation of Wound Prevention Mid;Posterior   Location of Wound Prevention Sacrum/Coccyx   Dressing Present  No   Wound Offloading (Prevention Methods) Repositioning;Pillows; Turning

## 2019-10-11 NOTE — ED PROVIDER NOTES
Dodie Coy is a 59 y.o. male seen on 10/11/2019 at 10:23 AM in the [facility] EMERGENCY DEPT in room Room/bed info not found. Chief Complaint   Patient presents with    Abnormal Lab Results     HPI: 68-year-old male presenting to the emergency department after having been called by myself to return to the ER. He was seen in the emergency department yesterday diagnosed with pneumonia and sent home on azithromycin. I received a call from the lab today stating that he had one blood culture growing pseudomonas aeruginosa. Upon contacting the patient he stated that he was feeling significantly worse, his cough was worsening he was having chills and sweats. He had not been able to get his prescription filled due to financial restrictions and did not know when he would be able to obtain the antibiotic. In addition he was going to require a different antibiotic that was likely to be more expensive. As a result I asked him to return to the ER for further evaluation. In triage she is noted to be tachycardic with a rate in the 140s and febrile. He states he feels significantly worse, generally weak. He has a productive cough. He has been having sweats and chills at home. Historian: Patient    REVIEW OF SYSTEMS     Review of Systems   Constitutional: Positive for chills, diaphoresis and fatigue. Negative for fever. HENT: Negative. Eyes: Negative. Respiratory: Positive for cough and shortness of breath. Negative for chest tightness and wheezing. Cardiovascular: Negative for chest pain. Gastrointestinal: Negative for abdominal distention, abdominal pain, diarrhea, nausea and vomiting. Genitourinary: Negative for dysuria, flank pain, frequency, genital sores and urgency. Musculoskeletal: Negative. Skin: Negative for rash. Neurological: Negative for dizziness, syncope and headaches. All other systems reviewed and are negative.       PAST MEDICAL HISTORY     Past Medical History:   Diagnosis Date    A-fib [facility]) 7/8/7592    Alcoholic hepatitis without ascites 5/11/2019    Hypertension      No past surgical history on file. Social History     Socioeconomic History    Marital status:      Spouse name: Not on file    Number of children: Not on file    Years of education: Not on file    Highest education level: Not on file   Tobacco Use    Smoking status: Current Every Day Smoker     Packs/day: 2.00    Smokeless tobacco: Never Used   Substance and Sexual Activity    Alcohol use: Yes     Comment: approx 1 pint per day    Sexual activity: Yes     Partners: Female     Cannot display prior to admission medications because the patient has not been admitted in this contact. No Known Allergies     PHYSICAL EXAM       Vitals:    10/11/19 1022   BP: 105/67   Pulse: (!) 147   Resp: 24   Temp: 99.5 °F (37.5 °C)   SpO2: 94%    Vital signs were reviewed. Physical Exam   Constitutional: He is oriented to person, place, and time. He appears well-developed and well-nourished. He appears ill. HENT:   Head: Normocephalic and atraumatic. Eyes: Pupils are equal, round, and reactive to light. EOM are normal.   Neck: Normal range of motion. Neck supple. Cardiovascular: Regular rhythm, normal heart sounds and intact distal pulses. Tachycardia present. Exam reveals no gallop and no friction rub. No murmur heard. Pulmonary/Chest: Effort normal. No respiratory distress. He has wheezes (R>L). He has rales. Tachypnea   Abdominal: Soft. Bowel sounds are normal. He exhibits no distension and no mass. There is no tenderness. There is no rebound and no guarding. Musculoskeletal: Normal range of motion. He exhibits no edema, tenderness or deformity. Neurological: He is alert and oriented to person, place, and time. No cranial nerve deficit or sensory deficit. He exhibits normal muscle tone.  Coordination normal.   No focal deficits   Skin: Skin is warm. Capillary refill takes less than 2 seconds. No rash noted. He is not diaphoretic. No erythema. Psychiatric: He has a normal mood and affect. His behavior is normal. Thought content normal.   Vitals reviewed. MEDICAL DECISION MAKING     MDM  Number of Diagnoses or Management Options  Bacteremia due to Pseudomonas:   Community acquired pneumonia, unspecified laterality:   Sepsis due to Pseudomonas species, unspecified whether acute organ dysfunction present [facility]):      Amount and/or Complexity of Data Reviewed  Clinical lab tests: ordered and reviewed  Tests in the radiology section of CPT®: ordered and reviewed  Review and summarize past medical records: yes    Risk of Complications, Morbidity, and/or Mortality  Presenting problems: high  Diagnostic procedures: high  Management options: high      Procedures    ED Course: Patient noted to have elevated lactic acid, tachycardic, concerning for sepsis. He does not have resources to obtain antibiotics. Ggiven IV Levaquin and the case was discussed with the hospitalist who will admit for further treatment and care. Disposition: admission to the hospital  Diagnosis: Pneumonia, Pseudomonas bacteremia, sepsis  ____________________________________________________________________  A portion of this note was generated using voice recognition dictation software. While the note has been reviewed for accuracy, please note certain words and phrases may not be transcribed as intended and some grammatical and/or typographical errors may be present.

## 2019-10-11 NOTE — ED NOTES
TRANSFER - OUT REPORT:    Verbal report given to [facility]) on Leonel Akbar  being transferred to 306(unit) for routine progression of care       Report consisted of patients Situation, Background, Assessment and   Recommendations(SBAR). Information from the following report(s) ED Summary was reviewed with the receiving nurse. Lines:   Peripheral IV 10/11/19 Left Forearm (Active)   Site Assessment Clean, dry, & intact 10/11/2019 10:25 AM   Phlebitis Assessment 0 10/11/2019 10:25 AM   Infiltration Assessment 0 10/11/2019 10:25 AM   Dressing Status Clean, dry, & intact 10/11/2019 10:25 AM   Dressing Type Transparent 10/11/2019 10:25 AM   Hub Color/Line Status Green 10/11/2019 10:25 AM        Opportunity for questions and clarification was provided.       Patient transported with:  Monitor  RN

## 2019-10-11 NOTE — ED NOTES
9: 17 AM  I received a phone call from the lab reporting 1 blood culture positive for pseudomonas aeruginosa. On review of the patient's chart he was seen in the emergency department for respiratory symptoms and was prescribed azithromycin. I contacted the patient and spoke to him over the phone. He states that he is still feeling very poorly. He has not had any improvement. He also reports that he has not gotten his prescription of antibiotics filled as he is been unable to get in touch with the [place]. He reports having no money and will be unable to afford any medication without complete assistance from the [place]. I notified him of his BCx result. As a result I think is unlikely that he would be able to afford a fluoroquinolone which she will likely require given his blood cultures. I have asked him to return to the emergency department for reevaluation. He states he will come to the emergency department as soon as he is able.

## 2019-10-11 NOTE — PROGRESS NOTES
Visit with patient in ER. Calm.     Boyd Meier, staff   C: [phone] /  [email or web address]

## 2019-10-11 NOTE — PROGRESS NOTES
Patient arrived to CCU, transferred to bed, placed on monitor. -170, irregular, BP stable at this time- esmolol gtt to be started. Patient A&Ox4. Breathing even and unlabored on room air, strong productive cough. Peripheral vascular WDL. Reports last drink was yesterday afternoon. Dual skin assessment: skin intact, no allevyn placed, patient turns self, patient requesting to leave pants in place under gown.

## 2019-10-11 NOTE — ED TRIAGE NOTES
Pt ambulatory to triage without complications. Pt states he went to MD yesterday and was dx pna and the MD office called him today and told him to come in because there were some labs that were not right but he doesn't know what they are. Pt has folder with prescriptions from yesterdays visit, including ABT. Pt states not able to purchase the prescriptions r/t financial issues. Verbal order from Dr. Mack Lee for basic labs and poc lactic acid. Dr. Mack Lee to triage regarding SIRS criteria.  Verbal order for full sepsis panel

## 2019-10-11 NOTE — PROGRESS NOTES
Bedside and Verbal shift change report given to [facility] (oncoming nurse) by Leslie Pepe and Brittany Mcduffie (offgoing nurse). Report included the following information SBAR, Kardex, ED Summary, Intake/Output, MAR, Recent Results, Med Rec Status and Cardiac Rhythm Afib. Heparin gtt rate verified with [facility].

## 2019-10-11 NOTE — H&P
History and Physical    Patient: Carol Guadalupe MRN: [id number]  SSN: [id number]    YOB: 1960  Age: 59 y.o. Sex: male      Subjective:      Carol Guadalupe is a 59 y.o. male who presented to the emergency department on 10/10/2019 with the complaint of generalized malaise, right-sided pleuritic chest pain with cough, cough productive of thick blood-tinged sputum, dyspnea on exertion, subjective fevers, subjective chills. Patient was diagnosed with community-acquired pneumonia in the emergency department on 10/10/2019 and discharged home with a course of oral antibiotics. Patient did not fill this prescription. Blood cultures were drawn on 10/10/2019 and resulted on 10/11/2019 showing the patient had pseudomonas aeruginosa bacteremia. Patient was called back to the emergency department. PMH EtOH abuse (1/2 pint of vodka per day), tobacco abuse (1.5 packs/day), HTN, atrial fibrillation not on [address], history of alcoholic hepatitis, history of C. difficile, history of aspiration pneumonia in May 2019. Patient presented with worsening symptoms on 10/11/2019 after being called by the emergency physician. Patient states that he has all of the above symptoms just worse. Patient states he has not had anything to eat in 1 week. Patient states that he had his last drink of alcohol yesterday which was 1/2 pint of vodka to \"help with the pain\". Patient has had delirium tremens requiring hospitalization in the past.  No prior alcohol withdrawal seizures. Patient continues to have blood-tinged sputum. Patient has not been taking any of his home medications as he does not feel he needs to take them. History reviewed. No pertinent surgical history.    Family History   Problem Relation Age of Onset    Cancer Neg Hx      Social History     Tobacco Use    Smoking status: Current Every Day Smoker     Packs/day: 2.00    Smokeless tobacco: Never Used   Substance Use Topics    Alcohol use: Yes     Comment: approx 1 pint per day      Prior to Admission medications    Medication Sig Start Date End Date Taking? Authorizing Provider   azithromycin (ZITHROMAX) 250 mg tablet Take two tablets today then one tablet daily 10/10/19   Sanket Chew MD   cefdinir (OMNICEF) 300 mg capsule Take 1 Cap by mouth two (2) times a day. 10/10/19   Sanket Chew MD   magnesium oxide (MAG-OX) 400 mg tablet Take 1 Tab by mouth daily. 10/10/19   Sanket Chew MD   famotidine (PEPCID) 20 mg tablet Take 1 Tab by mouth two (2) times a day. 5/15/19   Dalton Bryant MD   folic acid (FOLVITE) 1 mg tablet Take 1 Tab by mouth daily. 5/16/19   Dalton Bryant MD   sucralfate (CARAFATE) 1 gram tablet Take 1 Tab by mouth Before breakfast, lunch, dinner and at bedtime. Indications: Gastritis 5/15/19   Dalton Bryant MD   thiamine HCL (B-1) 100 mg tablet Take 1 Tab by mouth daily. 5/16/19   Dalton Bryant MD   nitroglycerin (NITROSTAT) 0.4 mg SL tablet 1 Tab by SubLINGual route every five (5) minutes as needed for Chest Pain. 11/28/16   Saida CARCAMO NP   atorvastatin (LIPITOR) 40 mg tablet Take 1 Tab by mouth daily. 11/28/16   Saida CARCAMO NP   amLODIPine (NORVASC) 5 mg tablet Take 5 mg by mouth daily. Other, MD Sully        No Known Allergies    Review of Systems:  A comprehensive review of systems was negative except for that written in the History of Present Illness. Objective:     Vitals:    10/11/19 1022 10/11/19 1201 10/11/19 1221 10/11/19 1241   BP: 105/67 101/63 104/67 96/61   Pulse: (!) 147 (!) 119 (!) 116 (!) 110   Resp: 24      Temp: 99.5 °F (37.5 °C)      SpO2: 94% 92% 93% 94%   Weight: 72.6 kg (160 lb)      Height: 5' 10\" (1.778 m)           Physical Exam:  General: Ill-appearing middle-aged male, sitting up in bed, no acute distress  HEENT: NCAT, moist mucous membranes  Skin: No rash noted  Cardio: Tachycardic, regular rhythm, normal S1/S2, no rubs, no gallops, no murmurs  Pulm:  Moderately labored respirations, satting well on room air, diminished breath sounds in right midlung field, coarse rales and rhonchi in all right lung fields, all left lung fields clear  GI: Soft, Nt, Nd, Nml bowel sounds, no masses noted  Extremity: Atraumatic, no deformities, no edema  Neuro: Alert, oriented, moving all extremities, no focal deficits noted  Psych: Pleasant, cooperative, normal range of affect    Assessment:     Hospital Problems  Never Reviewed          Codes Class Noted POA    Pseudomonas septicemia ([facility] ICD-10-CM: A41.52  ICD-9-CM: 038.43, 995.91  10/11/2019         Hypokalemia ICD-10-CM: E87.6  ICD-9-CM: 276.8  5/10/2019 Yes        Tachycardia ICD-10-CM: R00.0  ICD-9-CM: 785.0  5/5/2019 Yes        Thrombocytopenia ([facility] ICD-10-CM: D69.6  ICD-9-CM: 287.5  5/5/2019 Yes        A-fib ([facility] ICD-10-CM: I48.91  ICD-9-CM: 427.31  5/5/2019 Yes        Tobacco abuse ICD-10-CM: Z72.0  ICD-9-CM: 305.1  11/28/2016 Yes        ETOH abuse ICD-10-CM: F10.10  ICD-9-CM: 305.00  11/28/2016 Yes        HTN (hypertension) (Chronic) ICD-10-CM: I10  ICD-9-CM: 401.9  11/19/2014 Yes              Plan:     #Pseudomonas aeruginosa bacteremia, sepsis: Secondary to right middle lobe pneumonia. Blood cultures drawn on 10/10/2019+ for pseudomonas aeruginosa.  -Consult infectious disease for duration of therapy recommendations  -Piperacillin/tazobactam IV  -Repeat blood cultures to be drawn on 10/12/2019    #Right middle lobe community-acquired pneumonia, probable aspiration pneumonia: Patient is an alcohol abuser and has had a prior history of aspiration pneumonia. Likely aspiration again given location. Patient has signs and symptoms of pneumonia. Chest x-ray with right middle lobe pneumonia. Initial procalcitonin elevated. Initial lactic acid elevated. Patient is out of supplemental oxygen requirement.   -IV antibiotics as above  -LR bolus  -LR IVF  -Nebulized hypertonic saline, incentive spirometry, flutter valve  -Continuous pulse oximetry  -Sputum culture  -MRSA swab  -ABG  -Daily labs    #EtOH abuse: Long-standing, heavy alcohol use. Last drink 1/2 pint of vodka on 10/10/2019. History of delirium tremens. No reported history of alcohol withdrawal seizures. No signs of alcoholic hepatitis on LFTs. -Banana bag  -Thiamine 100 mg daily  -Diazepam 2.5 mg  -As needed lorazepam per [facility] protocol  -INR pending  -HIV, RPR, acute hepatitis panel    #Atrial fibrillation?:  History of atrial fibrillation. Patient has acute pulmonary pathology with may have precipitated atrial fibrillation as the patient's admission heart rate was in the 140s. By the time EKG was performed patient was in sinus tachycardia at 120.  -Admit to telemetry  -Bedside telemetry    #Emphysema?:  Long-standing history of heavy tobacco abuse. Patient had wheezing on exam and all right lung fields. Patient has scarring on prior CT chest  -Prednisone oral x5 days  -Nebulized albuterol scheduled and as needed    #Hypokalemia: Likely secondary to alcoholism. Magnesium normal.  -Trend magnesium  -Replace and trend    #Thrombocytopenia: Possibly indicative of alcoholic cirrhosis. -Daily labs  -INR pending  -Enoxaparin for VTE prevention, may need to discontinue if progressive thrombocytopenia    #HTN: Not taking home antihypertensives.   Borderline low blood pressure in ED  -Hold antihypertensives    #Tobacco abuse:  -Nicotine patch    Full code    Inpatient for above    Enoxaparin for VTE prevention      Signed By: Christian Birmingham MD     October 11, 2019

## 2019-10-11 NOTE — PROGRESS NOTES
1601: Patient BP 79/55, -160 and in A fib, esmolol gtt infusing at 50 mcg/kg/min. [facility] and orders received to stop esmolol gtt at this time. 1700: patient remains in A fib 140-170 and hypotensive. Orders received from Dr. Uriel Fisher at bedside for heparin bolus and gtt and amio bolus and gtt. CBC to be obtained q12.  Pharmacy reviewed lovenox admin prior to heparin gtt with Dr. Uriel Fisher- ok to administer gtt now per MD.

## 2019-10-11 NOTE — CONSULTS
[facility] Cardiology Consult                Date of  Admission: 10/11/2019 10:50 AM     Primary Care Physician: Cheikh Rodrigez MD  Primary Cardiologist: None  Referring Physician: Dr Stephany Marroquin Physician: Dr Luiz Mclaughlin    CC/Reason for consult: A Fib RVR      Caleb Garcia is a 59 y.o. male with hx of ETOH abuse, tobacco abuse, A Fib not on [address], HTN, thrombocytopenia, PNA, bacteremia, GI bleeding, cellulitis, alcoholic hepatitis, and liver mass. The patient was admitted by [facility] after presenting to the ED with cough, fever, chills, and malaise. He is well known to ID and was last seen 5/2019 for Acinetobacter bacteremia related to PNA. CXL this visit showed RML PNA and prescription for azithromycin was given but not filled. BC 1/2 showed pseudomonas and the pt came back to the ED due to [facility] findings. His initial temp was noted to be 99.5 with mild leukocytosis, and he was started on IV ABX. While in the ED the patient started to have A Fib RVR with  and was started on an esmolol gtt and transferred to the ICU. Review of his past admissions he was not placed on [address] due to risk of bleeding given co morbidities. The patient currently denies ha, loc, ams, vomiting, loc, weakness, dizziness, current GIB, cp, palpitations, or racing HR. During his exam this HR was at 140. Recent Cardiac Synopsis w/ Labs  LHC/NST: 2016 IMPRESSION   1. Normal left ventricular function. 2. Diffuse nonobstructive coronary artery disease as described   above. Echo: 5/2019 SUMMARY:  -  Left ventricle: Systolic function was at the lower limits of normal.  Ejection fraction was estimated in the range of 50 % to 55 %. This study was  inadequate for the evaluation of regional wall motion. -  Inferior vena cava, hepatic veins: The respirophasic change in diameter   Was less than 50%. -  Aortic valve: There was no evidence for vegetation. -  Mitral valve: There was no evidence for vegetation.   EKG: A Fib RVR      Lab Results   Component Value Date     (L) 10/11/2019    K 3.1 (L) 10/11/2019    MG 1.9 10/11/2019    BUN 16 10/11/2019    CREA 0.61 (L) 10/11/2019    WBC 11.4 (H) 10/11/2019    HGB 14.3 10/11/2019     (L) 10/11/2019    INR 1.1 10/11/2019    TROIQ <0.02 (L) 05/05/2019    TROIQ <0.02 (L) 11/28/2016    TROIQ <0.04 11/28/2016          Patient Active Problem List   Diagnosis Code    Cellulitis L03.90    HTN (hypertension) I10    Chest pain R07.9    Shortness of breath R06.02    Tobacco abuse Z72.0    ETOH abuse F10.10    PNA (pneumonia) J18.9    Tachycardia R00.0    Thrombocytopenia (HCC) D69.6    GI bleed K92.2    A-fib (HCC) I48.91    Bacteremia due to Gram-negative bacteria R78.81    Clostridium difficile diarrhea A04.72    Hypomagnesemia E83.42    Hypokalemia E87.6    Diarrhea O34.2    Alcoholic hepatitis without ascites K70.10    Liver mass R16.0    Pseudomonas septicemia (HCC) A41.52    Right middle lobe pneumonia (HCC) J18.1       Past Medical History:   Diagnosis Date    [facility]    Alcoholic hepatitis without ascites 5/11/2019    Hypertension       History reviewed. No pertinent surgical history.   No Known Allergies   Family History   Problem Relation Age of Onset    Cancer Neg Hx       Social History     Socioeconomic History    Marital status:      Spouse name: Not on file    Number of children: Not on file    Years of education: Not on file    Highest education level: Not on file   Occupational History    Not on file   Social Needs    Financial resource strain: Not on file    Food insecurity:     Worry: Not on file     Inability: Not on file    Transportation needs:     Medical: Not on file     Non-medical: Not on file   Tobacco Use    Smoking status: Current Every Day Smoker     Packs/day: 2.00    Smokeless tobacco: Never Used   Substance and Sexual Activity    Alcohol use: Yes     Comment: approx 1 pint per day    Drug use: Not on file    Sexual activity: Yes     Partners: Female   Lifestyle    Physical activity:     Days per week: Not on file     Minutes per session: Not on file    Stress: Not on file   Relationships    Social connections:     Talks on phone: Not on file     Gets together: Not on file     Attends Baptism service: Not on file     Active member of club or organization: Not on file     Attends meetings of clubs or organizations: Not on file     Relationship status: Not on file    Intimate partner violence:     Fear of current or ex partner: Not on file     Emotionally abused: Not on file     Physically abused: Not on file     Forced sexual activity: Not on file   Other Topics Concern    Not on file   Social History Narrative    Not on file       Current Facility-Administered Medications   Medication Dose Route Frequency    piperacillin-tazobactam (ZOSYN) 4.5 g in 0.9% sodium chloride (MBP/ADV) 100 mL  4.5 g IntraVENous Q8H    lactated Ringers infusion  100 mL/hr IntraVENous CONTINUOUS    sodium chloride (NS) flush 5-40 mL  5-40 mL IntraVENous Q8H    sodium chloride (NS) flush 5-40 mL  5-40 mL IntraVENous PRN    tuberculin injection 5 Units  5 Units IntraDERMal ONCE    acetaminophen (TYLENOL) tablet 1,000 mg  1,000 mg Oral Q6H PRN    ondansetron (ZOFRAN) injection 4 mg  4 mg IntraVENous Q4H PRN    nicotine (NICODERM CQ) 21 mg/24 hr patch 1 Patch  1 Patch TransDERmal Q24H    enoxaparin (LOVENOX) injection 40 mg  40 mg SubCUTAneous Q24H    diazePAM (VALIUM) tablet 2.5 mg  2.5 mg Oral Q8H    albuterol (PROVENTIL VENTOLIN) nebulizer solution 2.5 mg  2.5 mg Nebulization Q6H RT    albuterol (PROVENTIL VENTOLIN) nebulizer solution 2.5 mg  2.5 mg Nebulization Q3H PRN    sodium chloride 3% hypertonic nebulizer soln  4 mL Nebulization QID RT    [START ON 10/12/2019] predniSONE (DELTASONE) tablet 40 mg  40 mg Oral DAILY WITH BREAKFAST    pantoprazole (PROTONIX) tablet 40 mg  40 mg Oral BID    ketorolac (TORADOL) injection 15 mg  15 mg IntraVENous Q6H    LORazepam (ATIVAN) tablet 1 mg  1 mg Oral Q1H PRN    [START ON 10/12/2019] thiamine HCL (B-1) tablet 100 mg  100 mg Oral DAILY    esmolol 10mg/mL (BREVIBLOC) infusion  0-200 mcg/kg/min IntraVENous TITRATE       Review of Systems   Constitution: Positive for chills, fever and malaise/fatigue. Negative for weight gain and weight loss. HENT: Negative. Eyes: Negative. Cardiovascular: Positive for dyspnea on exertion. Negative for chest pain (currently pain free), claudication, cyanosis, irregular heartbeat, leg swelling, near-syncope, orthopnea, palpitations, paroxysmal nocturnal dyspnea and syncope. Respiratory: Positive for cough and shortness of breath. Negative for wheezing. Endocrine: Negative. Skin: Negative. Musculoskeletal: Negative. Gastrointestinal: Negative for nausea and vomiting. Genitourinary: Negative. Neurological: Negative for dizziness. Psychiatric/Behavioral: Negative. Allergic/Immunologic: Negative.          Physical Exam  Vitals:    10/11/19 1402 10/11/19 1448 10/11/19 1513 10/11/19 1528   BP: 104/66 130/62 102/55 103/56   Pulse: (!) 115 (!) 171 (!) 154 (!) 144   Resp: 22  28    Temp: 98 °F (36.7 °C)      SpO2: 96% 93% 93% 93%   Weight: 76.2 kg (168 lb)      Height: 5' 10\" (1.778 m)          Physical Exam:  General: Well Developed, Well Nourished, No Acute Distress  HEENT: pupils equal and round, no abnormalities noted  Neck: supple, no JVD, no carotid bruits  Heart: S1S2 with RRR without murmurs or gallops  Lungs: Rhonchi with decrease lung sounds upper right and mid right, rhonchi upper left field, clear bases  Abd: soft, nontender, nondistended, with good bowel sounds  Ext: warm, no edema, calves supple/nontender, pulses 2+ bilaterally  Skin: warm and dry  Psychiatric: Normal mood and affect  Neurologic: Alert and oriented X 3      Labs:   Recent Labs     10/11/19  1438 10/11/19  1029 10/11/19  1028 10/10/19  0702   NA  --   -- 132* 131*   K  --   --  3.1* 3.3*   MG  --  1.9  --  1.4*   BUN  --   --  16 17   CREA  --   --  0.61* 0.78*   GLU  --   --  91 164*   WBC  --   --  11.4* 10.8   HGB  --   --  14.3 15.4   HCT  --   --  40.9* 44.3   PLT  --   --  133* 125*   INR 1.1  --   --   --        Echo Results  (Last 48 hours)    None        Xr Chest Pa Lat    Result Date: 10/11/2019  IMPRESSION: Right pneumonia. Assessment/Plan:     Assessment:      Active Problems:    HTN (hypertension) (11/19/2014)      Tobacco abuse (11/28/2016)      ETOH abuse (11/28/2016)      Tachycardia (5/5/2019)      Thrombocytopenia ([address] (5/5/2019)      A-fib ([address] (5/5/2019)      Hypokalemia (5/10/2019)      Pseudomonas septicemia ([address] (10/11/2019)      Right middle lobe pneumonia ([address] (10/11/2019)    Note: Pt is not a good long term [address] candidate with ETOH abuse, liver problems, and hx thrombocytopenia. The patient was not tolerating Esmolol drip with BP dropping into the 70's with HR in the 140-150s. With PNA and bacterimia the patient will be difficult to control and keep out of A Fib with last known sinus rythum seen on 10/11/2019 at 1111 hrs. Will switch to Cardizem drip and 5 mg bolus once BP is greater than 789 systolic. If the patient fails this therapy may need to consider FELIX/CVN vrs Amiodarone with short term anticoagulation with increase risk of CVA. Further recommendations pending clinical course and attending recommendations. Thank you very much for this referral. We appreciate the opportunity to participate in this patient's care. We will follow along with above stated plan.     Esme Perez NP  Consulting MD: Dr Elle Orosco

## 2019-10-12 LAB
ANION GAP SERPL CALC-SCNC: 7 MMOL/L (ref 7–16)
APTT PPP: 31.6 SEC (ref 24.7–39.8)
APTT PPP: 33 SEC (ref 24.7–39.8)
APTT PPP: 33.4 SEC (ref 24.7–39.8)
APTT PPP: >200 SEC (ref 24.7–39.8)
BASOPHILS # BLD: 0 K/UL (ref 0–0.2)
BASOPHILS NFR BLD: 0 % (ref 0–2)
BUN SERPL-MCNC: 18 MG/DL (ref 8–23)
CALCIUM SERPL-MCNC: 8.4 MG/DL (ref 8.3–10.4)
CHLORIDE SERPL-SCNC: 103 MMOL/L (ref 98–107)
CO2 SERPL-SCNC: 27 MMOL/L (ref 21–32)
CREAT SERPL-MCNC: 0.51 MG/DL (ref 0.8–1.5)
DIFFERENTIAL METHOD BLD: ABNORMAL
EOSINOPHIL # BLD: 0 K/UL (ref 0–0.8)
EOSINOPHIL NFR BLD: 0 % (ref 0.5–7.8)
ERYTHROCYTE [DISTWIDTH] IN BLOOD BY AUTOMATED COUNT: 15.9 % (ref 11.9–14.6)
ERYTHROCYTE [DISTWIDTH] IN BLOOD BY AUTOMATED COUNT: 16.1 % (ref 11.9–14.6)
GLUCOSE SERPL-MCNC: 128 MG/DL (ref 65–100)
HAV IGM SERPL QL IA: NEGATIVE
HBV CORE IGM SERPL QL IA: NEGATIVE
HBV SURFACE AG SERPL QL IA: NEGATIVE
HCT VFR BLD AUTO: 33.9 % (ref 41.1–50.3)
HCT VFR BLD AUTO: 37 % (ref 41.1–50.3)
HCV AB S/CO SERPL IA: <0.1 S/CO RATIO (ref 0–0.9)
HGB BLD-MCNC: 12 G/DL (ref 13.6–17.2)
HGB BLD-MCNC: 12.6 G/DL (ref 13.6–17.2)
HIV 1+2 AB+HIV1 P24 AG SERPL QL IA: NON REACTIVE
IMM GRANULOCYTES # BLD AUTO: 0.1 K/UL (ref 0–0.5)
IMM GRANULOCYTES NFR BLD AUTO: 1 % (ref 0–5)
LACTATE SERPL-SCNC: 1.5 MMOL/L (ref 0.4–2)
LACTATE SERPL-SCNC: 2.1 MMOL/L (ref 0.4–2)
LYMPHOCYTES # BLD: 0.8 K/UL (ref 0.5–4.6)
LYMPHOCYTES NFR BLD: 8 % (ref 13–44)
MAGNESIUM SERPL-MCNC: 1.7 MG/DL (ref 1.8–2.4)
MCH RBC QN AUTO: 32.8 PG (ref 26.1–32.9)
MCH RBC QN AUTO: 33.7 PG (ref 26.1–32.9)
MCHC RBC AUTO-ENTMCNC: 34.1 G/DL (ref 31.4–35)
MCHC RBC AUTO-ENTMCNC: 35.4 G/DL (ref 31.4–35)
MCV RBC AUTO: 95.2 FL (ref 79.6–97.8)
MCV RBC AUTO: 96.4 FL (ref 79.6–97.8)
MM INDURATION POC: 0 MM (ref 0–5)
MONOCYTES # BLD: 1.3 K/UL (ref 0.1–1.3)
MONOCYTES NFR BLD: 13 % (ref 4–12)
NEUTS SEG # BLD: 7.9 K/UL (ref 1.7–8.2)
NEUTS SEG NFR BLD: 78 % (ref 43–78)
NRBC # BLD: 0 K/UL (ref 0–0.2)
NRBC # BLD: 0 K/UL (ref 0–0.2)
PLATELET # BLD AUTO: 115 K/UL (ref 150–450)
PLATELET # BLD AUTO: 124 K/UL (ref 150–450)
PLATELET COMMENTS,PCOM: SLIGHT
PMV BLD AUTO: 11 FL (ref 9.4–12.3)
PMV BLD AUTO: 11.1 FL (ref 9.4–12.3)
POTASSIUM SERPL-SCNC: 3.4 MMOL/L (ref 3.5–5.1)
PPD POC: NEGATIVE NEGATIVE
PROCALCITONIN SERPL-MCNC: 6.9 NG/ML
RBC # BLD AUTO: 3.56 M/UL (ref 4.23–5.6)
RBC # BLD AUTO: 3.84 M/UL (ref 4.23–5.6)
RBC MORPH BLD: ABNORMAL
SODIUM SERPL-SCNC: 137 MMOL/L (ref 136–145)
WBC # BLD AUTO: 10.1 K/UL (ref 4.3–11.1)
WBC # BLD AUTO: 10.6 K/UL (ref 4.3–11.1)
WBC MORPH BLD: ABNORMAL

## 2019-10-12 PROCEDURE — 77030020255 HC SOL INJ LR 1000ML BG

## 2019-10-12 PROCEDURE — 83605 ASSAY OF LACTIC ACID: CPT

## 2019-10-12 PROCEDURE — 85730 THROMBOPLASTIN TIME PARTIAL: CPT

## 2019-10-12 PROCEDURE — 74011250637 HC RX REV CODE- 250/637: Performed by: FAMILY MEDICINE

## 2019-10-12 PROCEDURE — 94640 AIRWAY INHALATION TREATMENT: CPT

## 2019-10-12 PROCEDURE — 65660000000 HC RM CCU STEPDOWN

## 2019-10-12 PROCEDURE — 84145 PROCALCITONIN (PCT): CPT

## 2019-10-12 PROCEDURE — 87040 BLOOD CULTURE FOR BACTERIA: CPT

## 2019-10-12 PROCEDURE — 74011250636 HC RX REV CODE- 250/636: Performed by: INTERNAL MEDICINE

## 2019-10-12 PROCEDURE — 74011250636 HC RX REV CODE- 250/636: Performed by: FAMILY MEDICINE

## 2019-10-12 PROCEDURE — 85025 COMPLETE CBC W/AUTO DIFF WBC: CPT

## 2019-10-12 PROCEDURE — 74011250637 HC RX REV CODE- 250/637: Performed by: INTERNAL MEDICINE

## 2019-10-12 PROCEDURE — 36415 COLL VENOUS BLD VENIPUNCTURE: CPT

## 2019-10-12 PROCEDURE — 77010033678 HC OXYGEN DAILY

## 2019-10-12 PROCEDURE — 83735 ASSAY OF MAGNESIUM: CPT

## 2019-10-12 PROCEDURE — 80048 BASIC METABOLIC PNL TOTAL CA: CPT

## 2019-10-12 PROCEDURE — 74011000258 HC RX REV CODE- 258: Performed by: INTERNAL MEDICINE

## 2019-10-12 PROCEDURE — 74011000258 HC RX REV CODE- 258: Performed by: FAMILY MEDICINE

## 2019-10-12 PROCEDURE — 85027 COMPLETE CBC AUTOMATED: CPT

## 2019-10-12 PROCEDURE — 94760 N-INVAS EAR/PLS OXIMETRY 1: CPT

## 2019-10-12 PROCEDURE — 74011636637 HC RX REV CODE- 636/637: Performed by: FAMILY MEDICINE

## 2019-10-12 PROCEDURE — 74011000250 HC RX REV CODE- 250: Performed by: FAMILY MEDICINE

## 2019-10-12 RX ORDER — ACETAMINOPHEN 325 MG/1
650 TABLET ORAL
Status: DISCONTINUED | OUTPATIENT
Start: 2019-10-12 | End: 2019-10-17 | Stop reason: HOSPADM

## 2019-10-12 RX ORDER — POTASSIUM CHLORIDE 20 MEQ/1
40 TABLET, EXTENDED RELEASE ORAL 2 TIMES DAILY
Status: COMPLETED | OUTPATIENT
Start: 2019-10-12 | End: 2019-10-12

## 2019-10-12 RX ORDER — HYDROCODONE BITARTRATE AND ACETAMINOPHEN 5; 325 MG/1; MG/1
1 TABLET ORAL
Status: DISCONTINUED | OUTPATIENT
Start: 2019-10-12 | End: 2019-10-17 | Stop reason: HOSPADM

## 2019-10-12 RX ORDER — AMIODARONE HYDROCHLORIDE 200 MG/1
200 TABLET ORAL 2 TIMES DAILY
Status: DISCONTINUED | OUTPATIENT
Start: 2019-10-12 | End: 2019-10-16

## 2019-10-12 RX ORDER — HEPARIN SODIUM 5000 [USP'U]/ML
35 INJECTION, SOLUTION INTRAVENOUS; SUBCUTANEOUS ONCE
Status: COMPLETED | OUTPATIENT
Start: 2019-10-12 | End: 2019-10-12

## 2019-10-12 RX ORDER — LANOLIN ALCOHOL/MO/W.PET/CERES
400 CREAM (GRAM) TOPICAL DAILY
Status: DISCONTINUED | OUTPATIENT
Start: 2019-10-12 | End: 2019-10-13

## 2019-10-12 RX ORDER — POTASSIUM CHLORIDE 20 MEQ/1
40 TABLET, EXTENDED RELEASE ORAL
Status: DISCONTINUED | OUTPATIENT
Start: 2019-10-12 | End: 2019-10-12

## 2019-10-12 RX ORDER — MAGNESIUM SULFATE HEPTAHYDRATE 40 MG/ML
4 INJECTION, SOLUTION INTRAVENOUS ONCE
Status: COMPLETED | OUTPATIENT
Start: 2019-10-12 | End: 2019-10-12

## 2019-10-12 RX ORDER — HEPARIN SODIUM 5000 [USP'U]/ML
35 INJECTION, SOLUTION INTRAVENOUS; SUBCUTANEOUS ONCE
Status: COMPLETED | OUTPATIENT
Start: 2019-10-13 | End: 2019-10-12

## 2019-10-12 RX ORDER — MORPHINE SULFATE 2 MG/ML
2 INJECTION, SOLUTION INTRAMUSCULAR; INTRAVENOUS
Status: DISCONTINUED | OUTPATIENT
Start: 2019-10-12 | End: 2019-10-17 | Stop reason: HOSPADM

## 2019-10-12 RX ORDER — MAGNESIUM SULFATE HEPTAHYDRATE 40 MG/ML
2 INJECTION, SOLUTION INTRAVENOUS ONCE
Status: COMPLETED | OUTPATIENT
Start: 2019-10-12 | End: 2019-10-12

## 2019-10-12 RX ADMIN — MAGNESIUM SULFATE HEPTAHYDRATE 2 G: 40 INJECTION, SOLUTION INTRAVENOUS at 05:11

## 2019-10-12 RX ADMIN — SODIUM CHLORIDE 30 MG/ML INHALATION SOLUTION 4 ML: 30 SOLUTION INHALANT at 11:18

## 2019-10-12 RX ADMIN — Medication 10 ML: at 14:13

## 2019-10-12 RX ADMIN — ALBUTEROL SULFATE 2.5 MG: 2.5 SOLUTION RESPIRATORY (INHALATION) at 20:38

## 2019-10-12 RX ADMIN — ALBUTEROL SULFATE 2.5 MG: 2.5 SOLUTION RESPIRATORY (INHALATION) at 11:17

## 2019-10-12 RX ADMIN — SODIUM CHLORIDE, SODIUM LACTATE, POTASSIUM CHLORIDE, AND CALCIUM CHLORIDE 100 ML/HR: 600; 310; 30; 20 INJECTION, SOLUTION INTRAVENOUS at 11:46

## 2019-10-12 RX ADMIN — SODIUM CHLORIDE 30 MG/ML INHALATION SOLUTION 4 ML: 30 SOLUTION INHALANT at 08:25

## 2019-10-12 RX ADMIN — AMIODARONE HYDROCHLORIDE 200 MG: 200 TABLET ORAL at 21:12

## 2019-10-12 RX ADMIN — HEPARIN SODIUM 2600 UNITS: 5000 INJECTION INTRAVENOUS; SUBCUTANEOUS at 16:58

## 2019-10-12 RX ADMIN — Medication 5 ML: at 21:11

## 2019-10-12 RX ADMIN — PIPERACILLIN SODIUM AND TAZOBACTAM SODIUM 4.5 G: 4; .5 INJECTION, POWDER, LYOPHILIZED, FOR SOLUTION INTRAVENOUS at 05:19

## 2019-10-12 RX ADMIN — PANTOPRAZOLE SODIUM 40 MG: 40 TABLET, DELAYED RELEASE ORAL at 14:11

## 2019-10-12 RX ADMIN — Medication 100 MG: at 08:21

## 2019-10-12 RX ADMIN — LORAZEPAM 1 MG: 2 INJECTION INTRAMUSCULAR; INTRAVENOUS at 21:55

## 2019-10-12 RX ADMIN — KETOROLAC TROMETHAMINE 15 MG: 30 INJECTION, SOLUTION INTRAMUSCULAR; INTRAVENOUS at 09:18

## 2019-10-12 RX ADMIN — KETOROLAC TROMETHAMINE 15 MG: 30 INJECTION, SOLUTION INTRAMUSCULAR; INTRAVENOUS at 05:14

## 2019-10-12 RX ADMIN — HEPARIN SODIUM 3050 UNITS: 5000 INJECTION INTRAVENOUS; SUBCUTANEOUS at 23:50

## 2019-10-12 RX ADMIN — SODIUM CHLORIDE 30 MG/ML INHALATION SOLUTION 4 ML: 30 SOLUTION INHALANT at 15:41

## 2019-10-12 RX ADMIN — DIAZEPAM 2.5 MG: 5 TABLET ORAL at 14:11

## 2019-10-12 RX ADMIN — Medication 10 ML: at 05:15

## 2019-10-12 RX ADMIN — PIPERACILLIN SODIUM AND TAZOBACTAM SODIUM 4.5 G: 4; .5 INJECTION, POWDER, LYOPHILIZED, FOR SOLUTION INTRAVENOUS at 14:11

## 2019-10-12 RX ADMIN — PREDNISONE 40 MG: 10 TABLET ORAL at 10:39

## 2019-10-12 RX ADMIN — MAGNESIUM SULFATE HEPTAHYDRATE 4 G: 40 INJECTION, SOLUTION INTRAVENOUS at 08:22

## 2019-10-12 RX ADMIN — SODIUM CHLORIDE, SODIUM LACTATE, POTASSIUM CHLORIDE, AND CALCIUM CHLORIDE 1000 ML: 600; 310; 30; 20 INJECTION, SOLUTION INTRAVENOUS at 04:46

## 2019-10-12 RX ADMIN — PANTOPRAZOLE SODIUM 40 MG: 40 TABLET, DELAYED RELEASE ORAL at 05:06

## 2019-10-12 RX ADMIN — SODIUM CHLORIDE, SODIUM LACTATE, POTASSIUM CHLORIDE, AND CALCIUM CHLORIDE 100 ML/HR: 600; 310; 30; 20 INJECTION, SOLUTION INTRAVENOUS at 01:22

## 2019-10-12 RX ADMIN — POTASSIUM CHLORIDE 40 MEQ: 20 TABLET, EXTENDED RELEASE ORAL at 17:55

## 2019-10-12 RX ADMIN — AMIODARONE HYDROCHLORIDE 1 MG/MIN: 50 INJECTION, SOLUTION INTRAVENOUS at 09:31

## 2019-10-12 RX ADMIN — DIAZEPAM 2.5 MG: 5 TABLET ORAL at 21:12

## 2019-10-12 RX ADMIN — AMIODARONE HYDROCHLORIDE 200 MG: 200 TABLET ORAL at 12:23

## 2019-10-12 RX ADMIN — AMIODARONE HYDROCHLORIDE 1 MG/MIN: 50 INJECTION, SOLUTION INTRAVENOUS at 01:39

## 2019-10-12 RX ADMIN — MAGNESIUM GLUCONATE 500 MG ORAL TABLET 400 MG: 500 TABLET ORAL at 08:21

## 2019-10-12 RX ADMIN — POTASSIUM CHLORIDE 40 MEQ: 20 TABLET, EXTENDED RELEASE ORAL at 08:21

## 2019-10-12 RX ADMIN — HEPARIN SODIUM 2600 UNITS: 5000 INJECTION INTRAVENOUS; SUBCUTANEOUS at 08:21

## 2019-10-12 RX ADMIN — ALBUTEROL SULFATE 2.5 MG: 2.5 SOLUTION RESPIRATORY (INHALATION) at 08:25

## 2019-10-12 RX ADMIN — PIPERACILLIN SODIUM AND TAZOBACTAM SODIUM 4.5 G: 4; .5 INJECTION, POWDER, LYOPHILIZED, FOR SOLUTION INTRAVENOUS at 21:11

## 2019-10-12 RX ADMIN — SODIUM CHLORIDE, SODIUM LACTATE, POTASSIUM CHLORIDE, AND CALCIUM CHLORIDE 100 ML/HR: 600; 310; 30; 20 INJECTION, SOLUTION INTRAVENOUS at 23:51

## 2019-10-12 RX ADMIN — HEPARIN SODIUM 17 UNITS/KG/HR: 5000 INJECTION, SOLUTION INTRAVENOUS at 19:45

## 2019-10-12 RX ADMIN — DIAZEPAM 2.5 MG: 5 TABLET ORAL at 05:06

## 2019-10-12 NOTE — PROGRESS NOTES
Received bedside report from [facility] and LEENA Kolb. Pt in bed resting quietly - a/o x4. Dual skin assessment complete - scars and varicose veins present. +2 edema present in BLEs. Pulses palpable and present in all extremities. O2 sats in the 90s via 2 L NC. Pt tachypneic at rest. Abdomen intact with active bowel sounds. Pt has not voided yet but claims he peed earlier today and doesn't pee that often. HR = Afib with amio gtt infusing. Heparin gtt dual verified with offgoing nurse. All other VS stable at this time. Will continue at this time.

## 2019-10-12 NOTE — PROGRESS NOTES
Hospitalist Progress Note     Admit Date:  10/11/2019 10:50 AM   Name:  Griffin Pinedo   Age:  59 y.o.  :  1960   MRN:  [id number]   PCP:  Latasha Benoit MD  Treatment Team: Attending Provider: Mariya Rogers DO; Consulting Provider: Yasmine Luna; Consulting Provider: Tommy Jain MD    Subjective:   HPI and or CC:  From h and p hpi:    Griffin Pinedo is a 59 y.o. male who presented to the emergency department on 10/10/2019 with the complaint of generalized malaise, right-sided pleuritic chest pain with cough, cough productive of thick blood-tinged sputum, dyspnea on exertion, subjective fevers, subjective chills. Patient was diagnosed with community-acquired pneumonia in the emergency department on 10/10/2019 and discharged home with a course of oral antibiotics. Patient did not fill this prescription. Blood cultures were drawn on 10/10/2019 and resulted on 10/11/2019 showing the patient had pseudomonas aeruginosa bacteremia. Patient was called back to the emergency department.     PMH EtOH abuse (1/2 pint of vodka per day), tobacco abuse (1.5 packs/day), HTN, atrial fibrillation not on [facility] [place], history of alcoholic hepatitis, history of C. difficile, history of aspiration pneumonia in May 2019. Patient presented with worsening symptoms on 10/11/2019 after being called by the emergency physician. Patient states that he has all of the above symptoms just worse. Patient states he has not had anything to eat in 1 week. Patient states that he had his last drink of alcohol yesterday which was 1/2 pint of vodka to \"help with the pain\". Patient has had delirium tremens requiring hospitalization in the past.  No prior alcohol withdrawal seizures. Patient continues to have blood-tinged sputum. Patient has not been taking any of his home medications as he does not feel he needs to take them. Cc: dyspnea cough   feeling better.  Chemical cardioversion afib to nsr with amiodarone drip.   rml pseudomonal pneumonia hx prior acinetobacter pneumonia. Last etoh was 48hr ago on valium prophylaxis. Mg 1.7 k 3. 4.lactate now 1.5.       Objective:     Patient Vitals for the past 24 hrs:   Temp Pulse Resp BP SpO2   10/12/19 1543  81  119/71 99 %   10/12/19 1528  79  112/65 98 %   10/12/19 1513 98.4 °F (36.9 °C) 86 20 113/63 97 %   10/12/19 1500  (!) 133   97 %   10/12/19 1459  87  122/73 97 %   10/12/19 1443  81 29 123/73 98 %   10/12/19 1428  85  118/72 97 %   10/12/19 1413  86 15 116/68 99 %   10/12/19 1358  83 18 120/69 97 %   10/12/19 1343  80 21 118/68 95 %   10/12/19 1328  82 27 111/61 94 %   10/12/19 1313  80 24 115/63 96 %   10/12/19 1258  86  121/69 95 %   10/12/19 1243  (!) 114 28 136/77 95 %   10/12/19 1228  91  138/75 95 %   10/12/19 1213  79  113/61 97 %   10/12/19 1143  83  124/71 100 %   10/12/19 1128  77 29 109/63 100 %   10/12/19 1120     95 %   10/12/19 1113 98.6 °F (37 °C) 76  109/63 98 %   10/12/19 1110  78   97 %   10/12/19 1100  84 14  97 %   10/12/19 1058  79 14 104/59 97 %   10/12/19 1043  85 11 112/65 95 %   10/12/19 1028  80  110/60 97 %   10/12/19 1013  80  104/58 97 %   10/12/19 0959  92  118/60 96 %   10/12/19 0948   20     10/12/19 0943  83  109/57 96 %   10/12/19 0928  95 18 111/57 96 %   10/12/19 0913  86 17 113/59 96 %   10/12/19 0859  88  115/60 96 %   10/12/19 0843  96 19 122/63 96 %   10/12/19 0828  91 27 122/63 97 %   10/12/19 0825     95 %   10/12/19 0813  94  131/68 96 %   10/12/19 0759  91 28 139/71 96 %   10/12/19 0743  92  120/67 94 %   10/12/19 0728  84 25 111/62 92 %   10/12/19 0715  84   93 %   10/12/19 0713 98.3 °F (36.8 °C) 84 20 114/61 92 %   10/12/19 0700  86   92 %   10/12/19 0659  90  117/63 93 %   10/12/19 0643  94  116/61 94 %   10/12/19 0628  87 (!) 43 114/59 91 %   10/12/19 0613  (!) 171 25 114/59 92 %   10/12/19 0559  90 (!) 45 113/56 91 %   10/12/19 0543  97 (!) 34 110/59 93 %   10/12/19 0528  92 (!) 41 115/57 92 %   10/12/19 0513  97 (!) 40 122/60 93 %   10/12/19 0511  99  119/59    10/12/19 0459  97 (!) 43 119/59 97 %   10/12/19 0443  96 (!) 40 121/57 96 %   10/12/19 0428  91 (!) 40 112/56 95 %   10/12/19 0413  95 (!) 38 109/59 96 %   10/12/19 0358  93 (!) 41 115/57 98 %   10/12/19 0344  94 (!) 44 117/58 97 %   10/12/19 0328  (!) 101 (!) 33 145/70 96 %   10/12/19 0313  87 (!) 39 114/57 99 %   10/12/19 0300 99.9 °F (37.7 °C) 86 (!) 40 111/69 100 %   10/12/19 0258  86 (!) 42 111/63 100 %   10/12/19 0243  86 (!) 41 109/59 99 %   10/12/19 0228  85 (!) 41 119/64 (!) 85 %   10/12/19 0213  94 (!) 40 106/63 95 %   10/12/19 0158  87 21 102/60 97 %   10/12/19 0143  92 27 102/59 98 %   10/12/19 0128  87 (!) 43 100/57 94 %   10/12/19 0113  92 (!) 33 106/69 96 %   10/12/19 0102  (!) 140 (!) 42 95/61 98 %   10/11/19 2358  (!) 139  102/62 97 %   10/11/19 2344  (!) 150 (!) 41 93/65 98 %   10/11/19 2328  (!) 147  90/64 97 %   10/11/19 2313  (!) 146 (!) 32 91/64 97 %   10/11/19 2300 97.8 °F (36.6 °C) (!) 136 22 91/64 98 %   10/11/19 2259  (!) 136 27  96 %   10/11/19 2258  (!) 135 (!) 34 (!) 89/71 97 %   10/11/19 2244  (!) 155 (!) 41 103/68 98 %   10/11/19 2228  (!) 141 (!) 33 112/67 98 %   10/11/19 2213  (!) 142 30 103/60 97 %   10/11/19 2159  (!) 146 (!) 39 122/61 98 %   10/11/19 2144  (!) 144 (!) 42 143/68 90 %   10/11/19 2128  (!) 150 16 113/64 97 %   10/11/19 2113  (!) 143 (!) 38 98/62 98 %   10/11/19 2058  (!) 150 29 118/74 98 %   10/11/19 2044  (!) 166 25 103/56 (!) 88 %   10/11/19 2029  (!) 162  101/61 99 %   10/11/19 2013  (!) 132 (!) 39 100/64 98 %   10/1960  (!) 133 (!) 38 99/65 98 %   10/11/19 1944  (!) 168 (!) 33 92/64 98 %   10/11/19 1928  (!) 137 (!) 37 106/63 98 %   10/11/19 1913  (!) 138 25 104/64 96 %   10/11/19 1900 97.7 °F (36.5 °C) (!) 137 22 103/65 95 %   10/11/19 1859  (!) 140  103/65 95 %   10/11/19 1844  (!) 134  97/66 95 %   10/11/19 1828  (!) 126  101/69 94 %   10/11/19 1822  (!) 149  96/65 93 %   10/11/19 1813  (!) 160  91/62 93 %   10/11/19 1758  (!) 145 26 94/64 94 %   10/11/19 1744  (!) 148  (!) 74/51 93 %   10/11/19 1732  (!) 128  97/56 92 %   10/11/19 1716  (!) 155  94/57 95 %   10/11/19 1710  (!) 140   93 %   10/11/19 1658  (!) 148  95/68 95 %   10/11/19 1657  (!) 135 16  96 %   10/11/19 1644  (!) 165  92/71 95 %   10/11/19 1635  (!) 148   95 %   10/11/19 1628  (!) 151  (!) 81/61 95 %   10/11/19 1626  (!) 170 23  96 %   10/11/19 1623  (!) 158  (!) 82/53 94 %   10/11/19 1622   22     10/11/19 1619    (!) 85/52    10/11/19 1613  (!) 165  98/58 93 %   10/11/19 1608  (!) 169 16  95 %   10/11/19 1604  (!) 140 (!) 32 91/51 95 %   10/11/19 1558  (!) 157  (!) 79/55 94 %     Oxygen Therapy  O2 Sat (%): 99 %(Simultaneous filing. User may not have seen previous data.) (10/12/19 1543)  Pulse via Oximetry: 81 beats per minute (10/12/19 1543)  O2 Device: Nasal cannula (10/12/19 1543)  O2 Flow Rate (L/min): 2 l/min (10/12/19 1543)    Intake/Output Summary (Last 24 hours) at 10/12/2019 1550  Last data filed at 10/12/2019 1549  Gross per 24 hour   Intake 3983.49 ml   Output 1475 ml   Net 2508.49 ml         REVIEW OF SYSTEMS: Comprehensive ROS performed and negative except as stated in HPI. Physical Examination:  General:    Well nourished. No gross distress. Head:  Normocephalic, atraumatic, nares patent  Eyes:  Extraocular movements intact, normal sclera  CV:   RRR. No  Murmurs, clicks, or gallops, distal pulses intact  Lungs:   rhonchi bilateral, no wheeze, right midzone rales  Abdomen:   Soft, nondistended, nontender. Extremities: Warm and dry. No cyanosis or edema. Skin:     No rashes or jaundice.    Neuro:  No gross focal deficits, no tremor  Psych:  Mood and affect appropriate    Data Review:  I have reviewed all labs, meds, telemetry events, and studies from the last 24 hours. Recent Results (from the past 24 hour(s))   MSSA/MRSA SC BY PCR, NASAL SWAB    Collection Time: 10/11/19  4:48 PM   Result Value Ref Range    Special Requests: NO SPECIAL REQUESTS      Culture result:        SA target not detected. A MRSA NEGATIVE, SA NEGATIVE test result does not preclude MRSA or SA nasal colonization. LACTIC ACID    Collection Time: 10/11/19  9:09 PM   Result Value Ref Range    Lactic acid 1.9 0.4 - 2.0 MMOL/L   PTT    Collection Time: 10/11/19 10:53 PM   Result Value Ref Range    aPTT >200.0 (HH) 24.7 - 18.6 SEC   METABOLIC PANEL, BASIC    Collection Time: 10/12/19  3:32 AM   Result Value Ref Range    Sodium 137 136 - 145 mmol/L    Potassium 3.4 (L) 3.5 - 5.1 mmol/L    Chloride 103 98 - 107 mmol/L    CO2 27 21 - 32 mmol/L    Anion gap 7 7 - 16 mmol/L    Glucose 128 (H) 65 - 100 mg/dL    BUN 18 8 - 23 MG/DL    Creatinine 0.51 (L) 0.8 - 1.5 MG/DL    GFR est AA >60 >60 ml/min/1.73m2    GFR est non-AA >60 >60 ml/min/1.73m2    Calcium 8.4 8.3 - 10.4 MG/DL   CBC WITH AUTOMATED DIFF    Collection Time: 10/12/19  3:32 AM   Result Value Ref Range    WBC 10.1 4.3 - 11.1 K/uL    RBC 3.84 (L) 4.23 - 5.6 M/uL    HGB 12.6 (L) 13.6 - 17.2 g/dL    HCT 37.0 (L) 41.1 - 50.3 %    MCV 96.4 79.6 - 97.8 FL    MCH 32.8 26.1 - 32.9 PG    MCHC 34.1 31.4 - 35.0 g/dL    RDW 15.9 (H) 11.9 - 14.6 %    PLATELET 271 (L) 395 - 450 K/uL    MPV 11.0 9.4 - 12.3 FL    ABSOLUTE NRBC 0.00 0.0 - 0.2 K/uL    NEUTROPHILS 78 43 - 78 %    LYMPHOCYTES 8 (L) 13 - 44 %    MONOCYTES 13 (H) 4.0 - 12.0 %    EOSINOPHILS 0 (L) 0.5 - 7.8 %    BASOPHILS 0 0.0 - 2.0 %    IMMATURE GRANULOCYTES 1 0.0 - 5.0 %    ABS. NEUTROPHILS 7.9 1.7 - 8.2 K/UL    ABS. LYMPHOCYTES 0.8 0.5 - 4.6 K/UL    ABS. MONOCYTES 1.3 0.1 - 1.3 K/UL    ABS. EOSINOPHILS 0.0 0.0 - 0.8 K/UL    ABS. BASOPHILS 0.0 0.0 - 0.2 K/UL    ABS. IMM.  GRANS. 0.1 0.0 - 0.5 K/UL    RBC COMMENTS NORMOCYTIC/NORMOCHROMIC      WBC COMMENTS Result Confirmed By Smear      PLATELET COMMENTS SLIGHT      DF AUTOMATED     PROCALCITONIN    Collection Time: 10/12/19  3:32 AM   Result Value Ref Range    Procalcitonin 6.9 ng/mL   MAGNESIUM    Collection Time: 10/12/19  3:32 AM   Result Value Ref Range    Magnesium 1.7 (L) 1.8 - 2.4 mg/dL   LACTIC ACID    Collection Time: 10/12/19  3:32 AM   Result Value Ref Range    Lactic acid 2.1 (HH) 0.4 - 2.0 MMOL/L   PTT    Collection Time: 10/12/19  6:37 AM   Result Value Ref Range    aPTT 33.0 24.7 - 39.8 SEC   LACTIC ACID    Collection Time: 10/12/19  9:09 AM   Result Value Ref Range    Lactic acid 1.5 0.4 - 2.0 MMOL/L        All Micro Results     Procedure Component Value Units Date/Time    CULTURE, BLOOD [[id number]] Collected:  10/12/19 1506    Order Status:  Completed Specimen:  Blood Updated:  10/12/19 1521    CULTURE, BLOOD [[id number]] Collected:  10/12/19 1509    Order Status:  Completed Specimen:  Blood Updated:  10/12/19 1521    CULTURE, RESPIRATORY/SPUTUM/BRONCH W Precious Chanel STAIN [[id number]]  (Abnormal) Collected:  10/11/19 1441    Order Status:  Completed Specimen:  Sputum Updated:  10/12/19 1128     Special Requests: NO SPECIAL REQUESTS        GRAM STAIN 3 TO 45 WBC'S/OIF      0 TO 2 EPITHELIAL CELLS SEEN /OIF            MODERATE GRAM NEGATIVE RODS                  FEW GRAM POSITIVE COCCI IN PAIRS CHAINS AND CLUSTERS            FEW YEAST         4+ MUCUS PRESENT        Culture result:       MODERATE GRAM NEGATIVE RODS IDENTIFICATION AND SUSCEPTIBILITY TO FOLLOW          CULTURE, BLOOD [[id number]] Collected:  10/11/19 1438    Order Status:  Completed Specimen:  Blood Updated:  10/12/19 0755     Special Requests: --        RIGHT  HAND       Culture result: NO GROWTH AFTER 16 HOURS       CULTURE, BLOOD [[id number]] Collected:  10/11/19 1028    Order Status:  Completed Specimen:  Blood Updated:  10/12/19 0755     Special Requests: --        LEFT  FOREARM       Culture result: NO GROWTH AFTER 20 HOURS       MSSA/MRSA SC BY PCR, NASAL Juan Atkins [[id number]] Collected:  10/11/19 1648    Order Status:  Completed Specimen:  Nasal swab Updated:  10/11/19 2038     Special Requests: NO SPECIAL REQUESTS        Culture result:       SA target not detected. A MRSA NEGATIVE, SA NEGATIVE test result does not preclude MRSA or SA nasal colonization.           CULTURE, BLOOD [[id number]]     Order Status:  Canceled Specimen:  Blood           Current Meds:  Current Facility-Administered Medications   Medication Dose Route Frequency    potassium chloride (K-DUR, KLOR-CON) SR tablet 40 mEq  40 mEq Oral BID    magnesium oxide (MAG-OX) tablet 400 mg  400 mg Oral DAILY    amiodarone (CORDARONE) tablet 200 mg  200 mg Oral BID    piperacillin-tazobactam (ZOSYN) 4.5 g in 0.9% sodium chloride (MBP/ADV) 100 mL  4.5 g IntraVENous Q8H    lactated Ringers infusion  100 mL/hr IntraVENous CONTINUOUS    sodium chloride (NS) flush 5-40 mL  5-40 mL IntraVENous Q8H    sodium chloride (NS) flush 5-40 mL  5-40 mL IntraVENous PRN    tuberculin injection 5 Units  5 Units IntraDERMal ONCE    acetaminophen (TYLENOL) tablet 1,000 mg  1,000 mg Oral Q6H PRN    ondansetron (ZOFRAN) injection 4 mg  4 mg IntraVENous Q4H PRN    nicotine (NICODERM CQ) 21 mg/24 hr patch 1 Patch  1 Patch TransDERmal Q24H    diazePAM (VALIUM) tablet 2.5 mg  2.5 mg Oral Q8H    albuterol (PROVENTIL VENTOLIN) nebulizer solution 2.5 mg  2.5 mg Nebulization Q6H RT    albuterol (PROVENTIL VENTOLIN) nebulizer solution 2.5 mg  2.5 mg Nebulization Q3H PRN    sodium chloride 3% hypertonic nebulizer soln  4 mL Nebulization QID RT    predniSONE (DELTASONE) tablet 40 mg  40 mg Oral DAILY WITH BREAKFAST    pantoprazole (PROTONIX) tablet 40 mg  40 mg Oral BID    ketorolac (TORADOL) injection 15 mg  15 mg IntraVENous Q6H    thiamine HCL (B-1) tablet 100 mg  100 mg Oral DAILY    esmolol 10mg/mL (BREVIBLOC) infusion  0-200 mcg/kg/min IntraVENous TITRATE    heparin 25,000 units in dextrose 500 mL infusion  12-25 Units/kg/hr IntraVENous TITRATE    LORazepam (ATIVAN) injection 1 mg  1 mg IntraVENous Q1H PRN       Diet:  DIET CARDIAC    Other Studies (last 24 hours):  No results found. Assessment and Plan:     Hospital Problems as of 10/12/2019 Never Reviewed          Codes Class Noted - Resolved POA    * (Principal) Pseudomonas septicemia ([facility] ICD-10-CM: A41.52  ICD-9-CM: 038.43, 995.91  10/11/2019 - Present         Right middle lobe pneumonia ([facility] ICD-10-CM: J18.1  ICD-9-CM: 986  10/11/2019 - Present Unknown        Hypokalemia ICD-10-CM: E87.6  ICD-9-CM: 276.8  5/10/2019 - Present Yes        Tachycardia ICD-10-CM: R00.0  ICD-9-CM: 785.0  5/5/2019 - Present Yes        Thrombocytopenia ([place] [address] ICD-10-CM: D69.6  ICD-9-CM: 287.5  5/5/2019 - Present Yes        A-fib ([facility] ICD-10-CM: I48.91  ICD-9-CM: 427.31  5/5/2019 - Present Yes        Tobacco abuse ICD-10-CM: Z72.0  ICD-9-CM: 305.1  11/28/2016 - Present Yes        ETOH abuse ICD-10-CM: F10.10  ICD-9-CM: 305.00  11/28/2016 - Present Yes        HTN (hypertension) (Chronic) ICD-10-CM: I10  ICD-9-CM: 401.9  11/19/2014 - Present Yes              A/P:    #Pseudomonas aeruginosa bacteremia, sepsis: Secondary to right middle lobe pneumonia. suspect aspiration given location/etoh hx:  Blood cultures drawn on 10/10/2019+ for pseudomonas aeruginosa.  -zosyn pend sens. Results, appreciate ID recommendations/consultation          #EtOH abuse: Long-standing, heavy alcohol use. Last drink 1/2 pint of vodka on 10/10/2019. History of delirium tremens. Thiamine, benzodiaz. prophylaxis. Correct hypomagnesemia and daily supplement. Counseled. He was etoh counselor    #hypomagnesemia   Given total 6gm iv mgsulfate today (did not see order for 2gm 5am)   Follow up mg am , may need hold oral if overcorrected.     #Atrial fibrillation rvr:     Chem cardioversion amiodarone.  On heparin drip -- risk of oac with his etoh abuse.     # copd:  Long-standing history of heavy tobacco abuse. Patient had wheezing on exam and all right lung fields. Patient has scarring on prior CT chest  -Prednisone oral x5 days re: pneumonia       #Hypokalemia: goal k 4 mg 2 correct /supplement as need     #Thrombocytopenia:-etoh, monitor       #HTN: Not taking home antihypertensives. Borderline low blood pressure in ED  -Hold antihypertensives-unchanged- monitor     #Tobacco abuse:  -Nicotine patch     Full code     Inpatient for above     Enoxaparin for VTE prevention      Signed:  Douglas LOVE

## 2019-10-12 NOTE — CONSULTS
LEAPFROG PROTOCOL NOTE    Rosalia Fairchildu  10/12/2019    The patient is currently in the critical care setting managed by Dr. Kelvin Summers with Pseudomonas bacteremia and Afib with RVR. The patient's chart is reviewed and the patient is discussed with the staff. Patient is currently hemodynamically stable. Patient has no needs identified for Intensivist management in the critical care setting at this time. Please notify us if can be of assistance. No charge billed to the patient. Thank you.     Visit Vitals  /57   Pulse 92   Temp 99.9 °F (37.7 °C)   Resp (!) 41   Ht 5' 10\" (1.778 m)   Wt 162 lb 14.7 oz (73.9 kg)   SpO2 92%   BMI 23.38 kg/m²     Nico Palomino MD

## 2019-10-12 NOTE — PROGRESS NOTES
[facility] CARDIOLOGY PROGRESS NOTE           10/12/2019 11:07 AM    Admit Date: 10/11/2019      Subjective:    breathing better now in sinus rhythm     Review of Systems   Constitutional: Negative for fever. Cardiovascular: Negative for chest pain. Musculoskeletal: Negative for myalgias. Skin: Negative for rash. Neurological: Negative for dizziness. Psychiatric/Behavioral: Negative for depression. Objective:      Vitals:    10/12/19 1013 10/12/19 1028 10/12/19 1043 10/12/19 1058   BP: 104/58 110/60 112/65 104/59   Pulse: 80 80 85 79   Resp:   11 14   Temp:       SpO2: 97% 97% 95% 97%   Weight:       Height:             Physical Exam   Constitutional: He is oriented to person, place, and time. He appears well-developed. HENT:   Head: Normocephalic and atraumatic. Eyes: Pupils are equal, round, and reactive to light. Neck: Normal range of motion. Cardiovascular: Normal rate. No murmur heard. Pulmonary/Chest: Effort normal.   Abdominal: Soft. He exhibits no distension. There is no tenderness. Musculoskeletal: He exhibits no edema. Neurological: He is alert and oriented to person, place, and time. No cranial nerve deficit. Skin: Skin is warm and dry. No rash noted. No erythema. Psychiatric: He has a normal mood and affect.  His behavior is normal.       Data Review:   Recent Labs     10/12/19  0332 10/11/19  1438 10/11/19  1029 10/11/19  1028     --   --  132*   K 3.4*  --   --  3.1*   MG 1.7*  --  1.9  --    BUN 18  --   --  16   CREA 0.51*  --   --  0.61*   *  --   --  91   WBC 10.1  --   --  11.4*   HGB 12.6*  --   --  14.3   HCT 37.0*  --   --  40.9*   *  --   --  133*   INR  --  1.1  --   --          Intake/Output Summary (Last 24 hours) at 10/12/2019 1107  Last data filed at 10/12/2019 0713  Gross per 24 hour   Intake 5362.59 ml   Output 600 ml   Net 4762.59 ml     Current Facility-Administered Medications   Medication Dose Route Frequency    potassium chloride (K-DUR, KLOR-CON) SR tablet 40 mEq  40 mEq Oral BID    magnesium sulfate 4 g/100 mL IVPB  4 g IntraVENous ONCE    magnesium oxide (MAG-OX) tablet 400 mg  400 mg Oral DAILY    amiodarone (CORDARONE) tablet 200 mg  200 mg Oral BID    piperacillin-tazobactam (ZOSYN) 4.5 g in 0.9% sodium chloride (MBP/ADV) 100 mL  4.5 g IntraVENous Q8H    lactated Ringers infusion  100 mL/hr IntraVENous CONTINUOUS    sodium chloride (NS) flush 5-40 mL  5-40 mL IntraVENous Q8H    sodium chloride (NS) flush 5-40 mL  5-40 mL IntraVENous PRN    tuberculin injection 5 Units  5 Units IntraDERMal ONCE    acetaminophen (TYLENOL) tablet 1,000 mg  1,000 mg Oral Q6H PRN    ondansetron (ZOFRAN) injection 4 mg  4 mg IntraVENous Q4H PRN    nicotine (NICODERM CQ) 21 mg/24 hr patch 1 Patch  1 Patch TransDERmal Q24H    diazePAM (VALIUM) tablet 2.5 mg  2.5 mg Oral Q8H    albuterol (PROVENTIL VENTOLIN) nebulizer solution 2.5 mg  2.5 mg Nebulization Q6H RT    albuterol (PROVENTIL VENTOLIN) nebulizer solution 2.5 mg  2.5 mg Nebulization Q3H PRN    sodium chloride 3% hypertonic nebulizer soln  4 mL Nebulization QID RT    predniSONE (DELTASONE) tablet 40 mg  40 mg Oral DAILY WITH BREAKFAST    pantoprazole (PROTONIX) tablet 40 mg  40 mg Oral BID    ketorolac (TORADOL) injection 15 mg  15 mg IntraVENous Q6H    thiamine HCL (B-1) tablet 100 mg  100 mg Oral DAILY    esmolol 10mg/mL (BREVIBLOC) infusion  0-200 mcg/kg/min IntraVENous TITRATE    heparin 25,000 units in dextrose 500 mL infusion  12-25 Units/kg/hr IntraVENous TITRATE    LORazepam (ATIVAN) injection 1 mg  1 mg IntraVENous Q1H PRN           Assessment/Plan:     70-year-old male with history of EtOH and tobacco abuse admitted for pneumonia A. fib with RVR now in sinus rhythm on amiodarone.      IV amiodarone will be transition to oral amiodarone therapy continue heparin drip for now does not appear that procedures are planned as patient's clinical course improves he will be transition to novel anticoagulation therapy    Principal Problem:    Pseudomonas septicemia ([address] (10/11/2019)    Active Problems:    HTN (hypertension) (11/19/2014)      Tobacco abuse (11/28/2016)      ETOH abuse (11/28/2016)      Tachycardia (5/5/2019)      Thrombocytopenia ([address] (5/5/2019)      A-fib ([address] (5/5/2019)      Hypokalemia (5/10/2019)      Right middle lobe pneumonia ([address] (10/11/2019)          Anmol Hartmann MD  10/12/2019 11:07 AM

## 2019-10-12 NOTE — PROGRESS NOTES
Cardiology NP notified of high HR in the 160s regardless of amio gtt. Verbal orders received to keep amio at 1mg. Will continue to monitor pt closely.

## 2019-10-12 NOTE — PROGRESS NOTES
TRANSFER - OUT REPORT:    Verbal report given to LEENA Bettencourt (name) on Robel Garcia  being transferred to tele (unit) for routine progression of care       Report consisted of patients Situation, Background, Assessment and   Recommendations(SBAR). Information from the following report(s) SBAR, Kardex, ED Summary, Intake/Output, MAR, Recent Results, Cardiac Rhythm SR and Alarm Parameters  was reviewed with the receiving nurse. Lines:   Peripheral IV 10/11/19 Left Forearm (Active)   Site Assessment Clean, dry, & intact 10/12/2019  7:13 AM   Phlebitis Assessment 0 10/12/2019  7:13 AM   Infiltration Assessment 0 10/12/2019  7:13 AM   Dressing Status Clean, dry, & intact 10/12/2019  7:13 AM   Dressing Type Transparent;Tape 10/12/2019  7:13 AM   Hub Color/Line Status Green; Infusing;Patent 10/12/2019  7:13 AM   Alcohol Cap Used No 10/12/2019  7:13 AM       Peripheral IV 10/11/19 Left Hand (Active)   Site Assessment Clean, dry, & intact 10/12/2019  7:13 AM   Phlebitis Assessment 0 10/12/2019  7:13 AM   Infiltration Assessment 0 10/12/2019  7:13 AM   Dressing Status Clean, dry, & intact 10/12/2019  7:13 AM   Dressing Type Transparent;Tape 10/12/2019  7:13 AM   Hub Color/Line Status Blue; Infusing;Patent 10/12/2019  7:13 AM   Alcohol Cap Used No 10/12/2019  7:13 AM       Peripheral IV 10/11/19 Right Forearm (Active)   Site Assessment Clean, dry, & intact 10/12/2019  7:13 AM   Phlebitis Assessment 0 10/12/2019  7:13 AM   Infiltration Assessment 0 10/12/2019  7:13 AM   Dressing Status Clean, dry, & intact 10/12/2019  7:13 AM   Dressing Type Transparent;Tape 10/12/2019  7:13 AM   Hub Color/Line Status Pink; Infusing;Patent 10/12/2019  7:13 AM   Alcohol Cap Used No 10/12/2019  7:13 AM        Opportunity for questions and clarification was provided.       Patient transported with:   Monitor  Registered Nurse  Tech

## 2019-10-12 NOTE — PROGRESS NOTES
Bedside and Verbal shift change report given to Amanda STERN and Nancy (oncoming nurse) by Sojern (offgoing nurse). Report included the following information SBAR, Kardex, Intake/Output, MAR, Recent Results, Med Rec Status and Cardiac Rhythm NSR. Heparin gtt rate verified with offgoing nurse.

## 2019-10-12 NOTE — PROGRESS NOTES
Bedside and Verbal shift change report given to [facility] (oncoming nurse) by Marleen Oneil and Brigette Edmondson (offgoing nurse). Report included the following information SBAR, Kardex, Intake/Output, MAR, Recent Results, Med Rec Status and Cardiac Rhythm NSR. Heparin gtt verified with ongoing nurse.

## 2019-10-12 NOTE — PROGRESS NOTES
Order to give magnesium verified with Dr. Porsha Rey.  Per MD, patient ok to receive PO and IV mag in addition to previous 2 g given this am.

## 2019-10-13 LAB
ALBUMIN SERPL-MCNC: 1.8 G/DL (ref 3.2–4.6)
ALBUMIN/GLOB SERPL: 0.4 {RATIO} (ref 1.2–3.5)
ALP SERPL-CCNC: 56 U/L (ref 50–136)
ALT SERPL-CCNC: 33 U/L (ref 12–65)
ANION GAP SERPL CALC-SCNC: 5 MMOL/L (ref 7–16)
APTT PPP: 29 SEC (ref 24.7–39.8)
AST SERPL-CCNC: 83 U/L (ref 15–37)
BACTERIA SPEC CULT: ABNORMAL
BASOPHILS # BLD: 0 K/UL (ref 0–0.2)
BASOPHILS NFR BLD: 0 % (ref 0–2)
BILIRUB SERPL-MCNC: 0.5 MG/DL (ref 0.2–1.1)
BUN SERPL-MCNC: 10 MG/DL (ref 8–23)
CALCIUM SERPL-MCNC: 8.7 MG/DL (ref 8.3–10.4)
CHLORIDE SERPL-SCNC: 102 MMOL/L (ref 98–107)
CO2 SERPL-SCNC: 30 MMOL/L (ref 21–32)
CREAT SERPL-MCNC: 0.48 MG/DL (ref 0.8–1.5)
DIFFERENTIAL METHOD BLD: ABNORMAL
EOSINOPHIL # BLD: 0 K/UL (ref 0–0.8)
EOSINOPHIL NFR BLD: 0 % (ref 0.5–7.8)
ERYTHROCYTE [DISTWIDTH] IN BLOOD BY AUTOMATED COUNT: 15.9 % (ref 11.9–14.6)
ERYTHROCYTE [DISTWIDTH] IN BLOOD BY AUTOMATED COUNT: 16.2 % (ref 11.9–14.6)
GLOBULIN SER CALC-MCNC: 4.2 G/DL (ref 2.3–3.5)
GLUCOSE SERPL-MCNC: 116 MG/DL (ref 65–100)
GRAM STN SPEC: ABNORMAL
HCT VFR BLD AUTO: 32.1 % (ref 41.1–50.3)
HCT VFR BLD AUTO: 34 % (ref 41.1–50.3)
HGB BLD-MCNC: 11.4 G/DL (ref 13.6–17.2)
HGB BLD-MCNC: 11.6 G/DL (ref 13.6–17.2)
IMM GRANULOCYTES # BLD AUTO: 0.1 K/UL (ref 0–0.5)
IMM GRANULOCYTES NFR BLD AUTO: 1 % (ref 0–5)
LYMPHOCYTES # BLD: 1.8 K/UL (ref 0.5–4.6)
LYMPHOCYTES NFR BLD: 14 % (ref 13–44)
MAGNESIUM SERPL-MCNC: 1.9 MG/DL (ref 1.8–2.4)
MCH RBC QN AUTO: 32.9 PG (ref 26.1–32.9)
MCH RBC QN AUTO: 33 PG (ref 26.1–32.9)
MCHC RBC AUTO-ENTMCNC: 34.1 G/DL (ref 31.4–35)
MCHC RBC AUTO-ENTMCNC: 35.5 G/DL (ref 31.4–35)
MCV RBC AUTO: 92.5 FL (ref 79.6–97.8)
MCV RBC AUTO: 96.9 FL (ref 79.6–97.8)
MM INDURATION POC: 0 MM (ref 0–5)
MONOCYTES # BLD: 1.3 K/UL (ref 0.1–1.3)
MONOCYTES NFR BLD: 10 % (ref 4–12)
NEUTS SEG # BLD: 9.4 K/UL (ref 1.7–8.2)
NEUTS SEG NFR BLD: 75 % (ref 43–78)
NRBC # BLD: 0 K/UL (ref 0–0.2)
NRBC # BLD: 0 K/UL (ref 0–0.2)
PLATELET # BLD AUTO: 145 K/UL (ref 150–450)
PLATELET # BLD AUTO: 157 K/UL (ref 150–450)
PLATELET COMMENTS,PCOM: ADEQUATE
PMV BLD AUTO: 11 FL (ref 9.4–12.3)
PMV BLD AUTO: 11.2 FL (ref 9.4–12.3)
POTASSIUM SERPL-SCNC: 3.1 MMOL/L (ref 3.5–5.1)
PPD POC: NEGATIVE NEGATIVE
PROCALCITONIN SERPL-MCNC: 3.7 NG/ML
PROT SERPL-MCNC: 6 G/DL (ref 6.3–8.2)
RBC # BLD AUTO: 3.47 M/UL (ref 4.23–5.6)
RBC # BLD AUTO: 3.51 M/UL (ref 4.23–5.6)
RBC MORPH BLD: ABNORMAL
RPR SER QL: NONREACTIVE
SERVICE CMNT-IMP: ABNORMAL
SERVICE CMNT-IMP: ABNORMAL
SODIUM SERPL-SCNC: 137 MMOL/L (ref 136–145)
WBC # BLD AUTO: 12.6 K/UL (ref 4.3–11.1)
WBC # BLD AUTO: 12.6 K/UL (ref 4.3–11.1)
WBC MORPH BLD: ABNORMAL

## 2019-10-13 PROCEDURE — 85027 COMPLETE CBC AUTOMATED: CPT

## 2019-10-13 PROCEDURE — 83735 ASSAY OF MAGNESIUM: CPT

## 2019-10-13 PROCEDURE — 74011000258 HC RX REV CODE- 258: Performed by: FAMILY MEDICINE

## 2019-10-13 PROCEDURE — 65660000000 HC RM CCU STEPDOWN

## 2019-10-13 PROCEDURE — 74011000250 HC RX REV CODE- 250: Performed by: INTERNAL MEDICINE

## 2019-10-13 PROCEDURE — 94760 N-INVAS EAR/PLS OXIMETRY 1: CPT

## 2019-10-13 PROCEDURE — 74011636637 HC RX REV CODE- 636/637: Performed by: FAMILY MEDICINE

## 2019-10-13 PROCEDURE — 84145 PROCALCITONIN (PCT): CPT

## 2019-10-13 PROCEDURE — 74011250636 HC RX REV CODE- 250/636: Performed by: FAMILY MEDICINE

## 2019-10-13 PROCEDURE — 85025 COMPLETE CBC W/AUTO DIFF WBC: CPT

## 2019-10-13 PROCEDURE — 77030020255 HC SOL INJ LR 1000ML BG

## 2019-10-13 PROCEDURE — 85730 THROMBOPLASTIN TIME PARTIAL: CPT

## 2019-10-13 PROCEDURE — 36415 COLL VENOUS BLD VENIPUNCTURE: CPT

## 2019-10-13 PROCEDURE — 74011250637 HC RX REV CODE- 250/637: Performed by: INTERNAL MEDICINE

## 2019-10-13 PROCEDURE — 80053 COMPREHEN METABOLIC PANEL: CPT

## 2019-10-13 PROCEDURE — 74011000250 HC RX REV CODE- 250: Performed by: FAMILY MEDICINE

## 2019-10-13 PROCEDURE — 94640 AIRWAY INHALATION TREATMENT: CPT

## 2019-10-13 PROCEDURE — 74011250637 HC RX REV CODE- 250/637: Performed by: FAMILY MEDICINE

## 2019-10-13 PROCEDURE — 74011250636 HC RX REV CODE- 250/636: Performed by: INTERNAL MEDICINE

## 2019-10-13 RX ORDER — POTASSIUM CHLORIDE 20 MEQ/1
40 TABLET, EXTENDED RELEASE ORAL
Status: COMPLETED | OUTPATIENT
Start: 2019-10-13 | End: 2019-10-13

## 2019-10-13 RX ORDER — SODIUM CHLORIDE FOR INHALATION 3 %
4 VIAL, NEBULIZER (ML) INHALATION
Status: DISCONTINUED | OUTPATIENT
Start: 2019-10-13 | End: 2019-10-17 | Stop reason: HOSPADM

## 2019-10-13 RX ORDER — LANOLIN ALCOHOL/MO/W.PET/CERES
400 CREAM (GRAM) TOPICAL 2 TIMES DAILY
Status: DISCONTINUED | OUTPATIENT
Start: 2019-10-13 | End: 2019-10-17 | Stop reason: HOSPADM

## 2019-10-13 RX ORDER — HYDROCODONE BITARTRATE AND HOMATROPINE METHYLBROMIDE 1.5; 5 MG/5ML; MG/5ML
5 SYRUP ORAL
Status: DISCONTINUED | OUTPATIENT
Start: 2019-10-13 | End: 2019-10-17 | Stop reason: HOSPADM

## 2019-10-13 RX ORDER — ALBUTEROL SULFATE 0.83 MG/ML
2.5 SOLUTION RESPIRATORY (INHALATION)
Status: DISCONTINUED | OUTPATIENT
Start: 2019-10-13 | End: 2019-10-17 | Stop reason: HOSPADM

## 2019-10-13 RX ORDER — DIAZEPAM 5 MG/1
2.5 TABLET ORAL 2 TIMES DAILY
Status: DISCONTINUED | OUTPATIENT
Start: 2019-10-13 | End: 2019-10-15

## 2019-10-13 RX ORDER — HEPARIN SODIUM 5000 [USP'U]/ML
35 INJECTION, SOLUTION INTRAVENOUS; SUBCUTANEOUS ONCE
Status: COMPLETED | OUTPATIENT
Start: 2019-10-13 | End: 2019-10-13

## 2019-10-13 RX ADMIN — ALBUTEROL SULFATE 2.5 MG: 2.5 SOLUTION RESPIRATORY (INHALATION) at 19:17

## 2019-10-13 RX ADMIN — PIPERACILLIN SODIUM AND TAZOBACTAM SODIUM 4.5 G: 4; .5 INJECTION, POWDER, LYOPHILIZED, FOR SOLUTION INTRAVENOUS at 14:22

## 2019-10-13 RX ADMIN — Medication 10 ML: at 21:06

## 2019-10-13 RX ADMIN — AMIODARONE HYDROCHLORIDE 200 MG: 200 TABLET ORAL at 08:19

## 2019-10-13 RX ADMIN — AMIODARONE HYDROCHLORIDE 200 MG: 200 TABLET ORAL at 21:06

## 2019-10-13 RX ADMIN — PANTOPRAZOLE SODIUM 40 MG: 40 TABLET, DELAYED RELEASE ORAL at 17:00

## 2019-10-13 RX ADMIN — HEPARIN SODIUM 2850 UNITS: 5000 INJECTION INTRAVENOUS; SUBCUTANEOUS at 08:20

## 2019-10-13 RX ADMIN — DIAZEPAM 2.5 MG: 5 TABLET ORAL at 06:06

## 2019-10-13 RX ADMIN — SODIUM CHLORIDE, SODIUM LACTATE, POTASSIUM CHLORIDE, AND CALCIUM CHLORIDE 100 ML/HR: 600; 310; 30; 20 INJECTION, SOLUTION INTRAVENOUS at 18:02

## 2019-10-13 RX ADMIN — ALBUTEROL SULFATE 2.5 MG: 2.5 SOLUTION RESPIRATORY (INHALATION) at 01:53

## 2019-10-13 RX ADMIN — HYDROCODONE BITARTRATE AND HOMATROPINE METHYLBROMIDE 5 ML: 5; 1.5 SOLUTION ORAL at 10:02

## 2019-10-13 RX ADMIN — Medication 10 ML: at 06:10

## 2019-10-13 RX ADMIN — POTASSIUM CHLORIDE 40 MEQ: 20 TABLET, EXTENDED RELEASE ORAL at 08:19

## 2019-10-13 RX ADMIN — Medication 100 MG: at 08:19

## 2019-10-13 RX ADMIN — HYDROCODONE BITARTRATE AND HOMATROPINE METHYLBROMIDE 5 ML: 5; 1.5 SOLUTION ORAL at 16:59

## 2019-10-13 RX ADMIN — MAGNESIUM GLUCONATE 500 MG ORAL TABLET 400 MG: 500 TABLET ORAL at 17:00

## 2019-10-13 RX ADMIN — APIXABAN 5 MG: 5 TABLET, FILM COATED ORAL at 21:06

## 2019-10-13 RX ADMIN — Medication 10 ML: at 14:22

## 2019-10-13 RX ADMIN — DIAZEPAM 2.5 MG: 5 TABLET ORAL at 14:22

## 2019-10-13 RX ADMIN — HYDROCODONE BITARTRATE AND HOMATROPINE METHYLBROMIDE 5 ML: 5; 1.5 SOLUTION ORAL at 21:06

## 2019-10-13 RX ADMIN — DIAZEPAM 2.5 MG: 5 TABLET ORAL at 17:00

## 2019-10-13 RX ADMIN — APIXABAN 5 MG: 5 TABLET, FILM COATED ORAL at 10:37

## 2019-10-13 RX ADMIN — SODIUM CHLORIDE, SODIUM LACTATE, POTASSIUM CHLORIDE, AND CALCIUM CHLORIDE 100 ML/HR: 600; 310; 30; 20 INJECTION, SOLUTION INTRAVENOUS at 08:17

## 2019-10-13 RX ADMIN — PANTOPRAZOLE SODIUM 40 MG: 40 TABLET, DELAYED RELEASE ORAL at 06:06

## 2019-10-13 RX ADMIN — ALBUTEROL SULFATE 2.5 MG: 2.5 SOLUTION RESPIRATORY (INHALATION) at 13:53

## 2019-10-13 RX ADMIN — PIPERACILLIN SODIUM AND TAZOBACTAM SODIUM 4.5 G: 4; .5 INJECTION, POWDER, LYOPHILIZED, FOR SOLUTION INTRAVENOUS at 06:05

## 2019-10-13 RX ADMIN — SODIUM CHLORIDE 30 MG/ML INHALATION SOLUTION 4 ML: 30 SOLUTION INHALANT at 19:17

## 2019-10-13 RX ADMIN — PREDNISONE 40 MG: 10 TABLET ORAL at 08:19

## 2019-10-13 RX ADMIN — PIPERACILLIN SODIUM AND TAZOBACTAM SODIUM 4.5 G: 4; .5 INJECTION, POWDER, LYOPHILIZED, FOR SOLUTION INTRAVENOUS at 21:06

## 2019-10-13 RX ADMIN — ALBUTEROL SULFATE 2.5 MG: 2.5 SOLUTION RESPIRATORY (INHALATION) at 07:43

## 2019-10-13 RX ADMIN — MAGNESIUM GLUCONATE 500 MG ORAL TABLET 400 MG: 500 TABLET ORAL at 08:19

## 2019-10-13 RX ADMIN — SODIUM CHLORIDE 30 MG/ML INHALATION SOLUTION 4 ML: 30 SOLUTION INHALANT at 07:46

## 2019-10-13 NOTE — PROGRESS NOTES
TRANSFER - IN REPORT:    Verbal report received from [facility], [address] on [address] being received from CCU for routine progression of care      Report consisted of patients Situation, Background, Assessment and Recommendations(SBAR). Information from the following report(s) SBAR, Kardex, ED Summary, Procedure Summary, Intake/Output, MAR, Recent Results and Cardiac Rhythm NSR was reviewed with the receiving nurse. Opportunity for questions and clarification was provided. Assessment completed upon patients arrival to unit and care assumed. Dual skin assessment complete; Patient had on his own sweatpants upon arrival to floor. Pants and gown were soaked with urine. Offered the patient the urinal, he stood to use the urinal with minimal difficulties. Removed pants, pants were heavily soiled with stool. Cleaned patient thoroughly. Applied new gown and elastic hospital briefs. Encouraged patient to call for assistance to bathroom. Sacrum intact with no abnormalities. Lower legs bronze color, no edema or ulcerations noted. Nicotine patch on right shoulder.

## 2019-10-13 NOTE — PROGRESS NOTES
IV heparin rate has been adjusted based on the most recent PTT results. Lab Results   Component Value Date/Time    aPTT 31.6 10/12/2019 10:55 PM     Given 35 units/kg bolus.  Drip now infusing at 21 units/kg/hr

## 2019-10-13 NOTE — PROGRESS NOTES
[facility] CARDIOLOGY PROGRESS NOTE           10/13/2019 11:07 AM    Admit Date: 10/11/2019      Subjective:    breathing better now in sinus rhythm     Review of Systems   Constitutional: Negative for fever. Cardiovascular: Negative for chest pain. Musculoskeletal: Negative for myalgias. Skin: Negative for rash. Neurological: Negative for dizziness. Psychiatric/Behavioral: Negative for depression. Objective:      Vitals:    10/13/19 0153 10/13/19 0453 10/13/19 0748 10/13/19 0819   BP:  132/73  143/76   Pulse:  91  (!) 103   Resp:  20  18   Temp:  98 °F (36.7 °C)  99.4 °F (37.4 °C)   SpO2: 93% 90% 95% 92%   Weight:  81.3 kg (179 lb 4.8 oz)     Height:             Physical Exam   Constitutional: He is oriented to person, place, and time. He appears well-developed. HENT:   Head: Normocephalic and atraumatic. Eyes: Pupils are equal, round, and reactive to light. Neck: Normal range of motion. Cardiovascular: Normal rate. No murmur heard. Pulmonary/Chest: Effort normal.   Abdominal: Soft. He exhibits no distension. There is no tenderness. Musculoskeletal: He exhibits no edema. Neurological: He is alert and oriented to person, place, and time. No cranial nerve deficit. Skin: Skin is warm and dry. No rash noted. No erythema. Psychiatric: He has a normal mood and affect.  His behavior is normal.       Data Review:   Recent Labs     10/13/19  0623 10/12/19  1901 10/12/19  0332 10/11/19  1438     --  137  --    K 3.1*  --  3.4*  --    MG 1.9  --  1.7*  --    BUN 10  --  18  --    CREA 0.48*  --  0.51*  --    *  --  128*  --    WBC 12.6* 10.6 10.1  --    HGB 11.6* 12.0* 12.6*  --    HCT 34.0* 33.9* 37.0*  --    * 124* 115*  --    INR  --   --   --  1.1         Intake/Output Summary (Last 24 hours) at 10/13/2019 0942  Last data filed at 10/13/2019 0819  Gross per 24 hour   Intake 3794.41 ml   Output 1975 ml   Net 1819.41 ml     Current Facility-Administered Medications   Medication Dose Route Frequency    magnesium oxide (MAG-OX) tablet 400 mg  400 mg Oral BID    HYDROcodone-homatropine (HYCODAN) 5-1.5 mg/5 mL (5 mL) syrup 5 mL  5 mL Oral Q4H PRN    albuterol (PROVENTIL VENTOLIN) nebulizer solution 2.5 mg  2.5 mg Nebulization Q6H RT    apixaban (ELIQUIS) tablet 5 mg  5 mg Oral Q12H    amiodarone (CORDARONE) tablet 200 mg  200 mg Oral BID    morphine injection 2 mg  2 mg IntraVENous Q4H PRN    HYDROcodone-acetaminophen (NORCO) 5-325 mg per tablet 1 Tab  1 Tab Oral Q6H PRN    acetaminophen (TYLENOL) tablet 650 mg  650 mg Oral Q6H PRN    piperacillin-tazobactam (ZOSYN) 4.5 g in 0.9% sodium chloride (MBP/ADV) 100 mL  4.5 g IntraVENous Q8H    lactated Ringers infusion  100 mL/hr IntraVENous CONTINUOUS    sodium chloride (NS) flush 5-40 mL  5-40 mL IntraVENous Q8H    sodium chloride (NS) flush 5-40 mL  5-40 mL IntraVENous PRN    acetaminophen (TYLENOL) tablet 1,000 mg  1,000 mg Oral Q6H PRN    ondansetron (ZOFRAN) injection 4 mg  4 mg IntraVENous Q4H PRN    nicotine (NICODERM CQ) 21 mg/24 hr patch 1 Patch  1 Patch TransDERmal Q24H    diazePAM (VALIUM) tablet 2.5 mg  2.5 mg Oral Q8H    sodium chloride 3% hypertonic nebulizer soln  4 mL Nebulization QID RT    predniSONE (DELTASONE) tablet 40 mg  40 mg Oral DAILY WITH BREAKFAST    pantoprazole (PROTONIX) tablet 40 mg  40 mg Oral BID    thiamine HCL (B-1) tablet 100 mg  100 mg Oral DAILY    LORazepam (ATIVAN) injection 1 mg  1 mg IntraVENous Q1H PRN           Assessment/Plan:     25-year-old male with history of EtOH and tobacco abuse admitted for pneumonia A. fib with RVR now in sinus rhythm on amiodarone.        Principal Problem:    Pseudomonas septicemia ([facility] (10/11/2019) trolled    Active Problems:    HTN (hypertension) (11/19/2014)      Tobacco abuse (11/28/2016)      ETOH abuse (11/28/2016) watching for signs of withdrawal      Tachycardia (5/5/2019)      Thrombocytopenia ([facility] (5/5/2019) stable A-fib ([facility] (5/5/2019) IV amiodarone will be transition to oral amiodarone therapy continue heparin drip for now does not appear that procedures are planned today we will transition to novel anticoagulation therapy. platelts are low but stable at this time. Atrial fibrillation likely related to underlying lung infection and long-term use of anti-arrhythmic therapy will likely not be necessary. Hypokalemia (5/10/2019) replete.        Right middle lobe pneumonia ([facility] (10/11/2019)          Shonda Macdonald MD  10/13/2019 11:07 AM

## 2019-10-13 NOTE — PROGRESS NOTES
Bedside and Verbal shift change report given to  (oncoming nurse) by Letcher Councilman (offgoing nurse). Report included the following information SBAR, Kardex, MAR and Recent Results.

## 2019-10-13 NOTE — PROGRESS NOTES
Verbal bedside report received from LEENA Bettencourt. Assumed care of patient. Heparin IV drip verified at bedside with outgoing RN.

## 2019-10-13 NOTE — PROGRESS NOTES
Hospitalist Note     Admit Date:  10/11/2019 10:50 AM   Name:  Nicole Raphael   Age:  59 y.o.  :  1960   MRN:  [id number]   PCP:  Wilfrid Shah MD  Treatment Team: Attending Provider: Jordan Quiroz DO; Consulting Provider: Brenita Gottron; Consulting Provider: Shedrick Bumpers, MD; Primary Nurse: Nehemias Reyes RN    HPI/Subjective:     Mr. Orestes Sheridan is a 58 yo male with PMH of polysubstance use, AFIB admitted with RML pneumonia and found to have pseudomonas aeruginosa bacteremia. He has been managed with zosyn. Repeat BC NGTD. AFIB has been managed with IV heparin. He is being followed by , cardiology, ID. He is on valium for ETOH use history.    Discharge plans are to home once improved         10-13-19 family present , has cough keeping him awake, ate ok, had BM, no pain and no shakes         Objective:     Patient Vitals for the past 24 hrs:   Temp Pulse Resp BP SpO2   10/13/19 1456 98.7 °F (37.1 °C)       10/13/19 1353     90 %   10/13/19 1329 (!) 100.7 °F (38.2 °C) 95 18 128/74 95 %   10/13/19 0819 99.4 °F (37.4 °C) (!) 103 18 143/76 92 %   10/13/19 0748     95 %   10/13/19 0453 98 °F (36.7 °C) 91 20 132/73 90 %   10/13/19 0153     93 %   10/13/19 0049 98 °F (36.7 °C) 85 20 116/73 92 %   10/12/19 2038     94 %   10/12/19 2003 98.1 °F (36.7 °C) 88 24 128/76 94 %   10/12/19 1920  87 (!) 35  95 %   10/12/19 1919  86 (!) 40  95 %   10/12/19 1918  87 27  95 %   10/12/19 1917  86 (!) 44  95 %   10/12/19 1916  85 30  95 %   10/12/19 1915  (!) 166 (!) 34  95 %   10/12/19 1914  91 16 121/73 95 %   10/12/19 1913  90 25  95 %   10/12/19 191  85 30  95 %   10/12/19 191  89 (!) 34  95 %   10/12/19 191  89 10  95 %   10/12/19 190  88 (!) 41  95 %   10/12/19 190  91 29  96 %   10/12/19 1907  88 (!) 39  95 %   10/12/19 190  94 29  93 %   10/12/19 190  (!) 101 (!) 33  92 %   10/12/19 190  93 28  93 %   10/12/19 1903  90 (!) 33  95 % 10/12/19 1902  87 (!) 36  95 %   10/12/19 1901  85 (!) 37  95 %   10/12/19 1743  81  117/67 95 %   10/12/19 1728  88  134/73 96 %   10/12/19 1713  (!) 102  118/69 94 %   10/12/19 1701  79   97 %   10/12/19 1658  81  114/71 97 %   10/12/19 1645  80   98 %   10/12/19 1643  81  116/67 99 %   10/12/19 1628  75 23 106/62 98 %   10/12/19 1613  81  124/65 98 %   10/12/19 1558  87 27 126/70 97 %   10/12/19 1543  81  119/71 99 %   10/12/19 1528  79  112/65 98 %   10/12/19 1513 98.4 °F (36.9 °C) 86 20 113/63 97 %     Oxygen Therapy  O2 Sat (%): 90 % (10/13/19 1353)  Pulse via Oximetry: 89 beats per minute (10/13/19 1353)  O2 Device: Room air (10/13/19 1353)  O2 Flow Rate (L/min): 2 l/min (10/12/19 1543)    Estimated body mass index is 25.73 kg/m² as calculated from the following:    Height as of this encounter: 5' 10\" (1.778 m). Weight as of this encounter: 81.3 kg (179 lb 4.8 oz). Intake/Output Summary (Last 24 hours) at 10/13/2019 1503  Last data filed at 10/13/2019 1102  Gross per 24 hour   Intake 3143.51 ml   Output 1950 ml   Net 1193.51 ml       *Note that automatically entered I/Os may not be accurate; dependent on patient compliance with collection and accurate  by techs. General:    Well nourished. Alert. No distress   CV:   Irregular, no edema  Lungs:   Mild expiratory wheezing  Abdomen:   Soft, nontender, nondistended. Extremities: Warm and dry  Skin:     No rashes or jaundice.    Neuro:  No gross focal deficits    Data Review:  I have reviewed all labs, meds, and studies from the last 24 hours:    Recent Results (from the past 24 hour(s))   CULTURE, BLOOD    Collection Time: 10/12/19  3:06 PM   Result Value Ref Range    Special Requests: RIGHT  HAND        Culture result: NO GROWTH AFTER 15 HOURS     PTT    Collection Time: 10/12/19  3:06 PM   Result Value Ref Range    aPTT 33.4 24.7 - 39.8 SEC   CULTURE, BLOOD    Collection Time: 10/12/19  3:09 PM   Result Value Ref Range    Special Requests: RIGHT  HAND        Culture result: NO GROWTH AFTER 15 HOURS     PLEASE READ & DOCUMENT PPD TEST IN 24 HRS    Collection Time: 10/12/19  4:00 PM   Result Value Ref Range    PPD Negative Negative    mm Induration 0 0 - 5 mm   CBC W/O DIFF    Collection Time: 10/12/19  7:01 PM   Result Value Ref Range    WBC 10.6 4.3 - 11.1 K/uL    RBC 3.56 (L) 4.23 - 5.6 M/uL    HGB 12.0 (L) 13.6 - 17.2 g/dL    HCT 33.9 (L) 41.1 - 50.3 %    MCV 95.2 79.6 - 97.8 FL    MCH 33.7 (H) 26.1 - 32.9 PG    MCHC 35.4 (H) 31.4 - 35.0 g/dL    RDW 16.1 (H) 11.9 - 14.6 %    PLATELET 503 (L) 268 - 450 K/uL    MPV 11.1 9.4 - 12.3 FL    ABSOLUTE NRBC 0.00 0.0 - 0.2 K/uL   PTT    Collection Time: 10/12/19 10:55 PM   Result Value Ref Range    aPTT 31.6 24.7 - 39.8 SEC   PROCALCITONIN    Collection Time: 10/13/19  6:23 AM   Result Value Ref Range    Procalcitonin 3.7 ng/mL   MAGNESIUM    Collection Time: 10/13/19  6:23 AM   Result Value Ref Range    Magnesium 1.9 1.8 - 2.4 mg/dL   METABOLIC PANEL, COMPREHENSIVE    Collection Time: 10/13/19  6:23 AM   Result Value Ref Range    Sodium 137 136 - 145 mmol/L    Potassium 3.1 (L) 3.5 - 5.1 mmol/L    Chloride 102 98 - 107 mmol/L    CO2 30 21 - 32 mmol/L    Anion gap 5 (L) 7 - 16 mmol/L    Glucose 116 (H) 65 - 100 mg/dL    BUN 10 8 - 23 MG/DL    Creatinine 0.48 (L) 0.8 - 1.5 MG/DL    GFR est AA >60 >60 ml/min/1.73m2    GFR est non-AA >60 >60 ml/min/1.73m2    Calcium 8.7 8.3 - 10.4 MG/DL    Bilirubin, total 0.5 0.2 - 1.1 MG/DL    ALT (SGPT) 33 12 - 65 U/L    AST (SGOT) 83 (H) 15 - 37 U/L    Alk.  phosphatase 56 50 - 136 U/L    Protein, total 6.0 (L) 6.3 - 8.2 g/dL    Albumin 1.8 (L) 3.2 - 4.6 g/dL    Globulin 4.2 (H) 2.3 - 3.5 g/dL    A-G Ratio 0.4 (L) 1.2 - 3.5     CBC WITH AUTOMATED DIFF    Collection Time: 10/13/19  6:23 AM   Result Value Ref Range    WBC 12.6 (H) 4.3 - 11.1 K/uL    RBC 3.51 (L) 4.23 - 5.6 M/uL    HGB 11.6 (L) 13.6 - 17.2 g/dL    HCT 34.0 (L) 41.1 - 50.3 % MCV 96.9 79.6 - 97.8 FL    MCH 33.0 (H) 26.1 - 32.9 PG    MCHC 34.1 31.4 - 35.0 g/dL    RDW 16.2 (H) 11.9 - 14.6 %    PLATELET 963 (L) 490 - 450 K/uL    MPV 11.2 9.4 - 12.3 FL    ABSOLUTE NRBC 0.00 0.0 - 0.2 K/uL    NEUTROPHILS 75 43 - 78 %    LYMPHOCYTES 14 13 - 44 %    MONOCYTES 10 4.0 - 12.0 %    EOSINOPHILS 0 (L) 0.5 - 7.8 %    BASOPHILS 0 0.0 - 2.0 %    IMMATURE GRANULOCYTES 1 0.0 - 5.0 %    ABS. NEUTROPHILS 9.4 (H) 1.7 - 8.2 K/UL    ABS. LYMPHOCYTES 1.8 0.5 - 4.6 K/UL    ABS. MONOCYTES 1.3 0.1 - 1.3 K/UL    ABS. EOSINOPHILS 0.0 0.0 - 0.8 K/UL    ABS. BASOPHILS 0.0 0.0 - 0.2 K/UL    ABS. IMM.  GRANS. 0.1 0.0 - 0.5 K/UL    RBC COMMENTS NORMOCYTIC/NORMOCHROMIC      WBC COMMENTS Result Confirmed By Smear      PLATELET COMMENTS ADEQUATE      DF AUTOMATED     PTT    Collection Time: 10/13/19  6:23 AM   Result Value Ref Range    aPTT 29.0 24.7 - 39.8 SEC        All Micro Results     Procedure Component Value Units Date/Time    CULTURE, RESPIRATORY/SPUTUM/BRONCH Dee Franklin STAIN [[id number]]  (Abnormal)  (Susceptibility) Collected:  10/11/19 1441    Order Status:  Completed Specimen:  Sputum Updated:  10/13/19 0922     Special Requests: NO SPECIAL REQUESTS        GRAM STAIN 3 TO 45 WBC'S/OIF      0 TO 2 EPITHELIAL CELLS SEEN /OIF            MODERATE GRAM NEGATIVE RODS                  FEW GRAM POSITIVE COCCI IN PAIRS CHAINS AND CLUSTERS            FEW YEAST         4+ MUCUS PRESENT        Culture result:       MODERATE PSEUDOMONAS AERUGINOSA                  SCANT NORMAL RESPIRATORY MARZENA          CULTURE, BLOOD [[id number]] Collected:  10/12/19 1506    Order Status:  Completed Specimen:  Blood Updated:  10/13/19 0729     Special Requests: --        RIGHT  HAND       Culture result: NO GROWTH AFTER 15 HOURS       CULTURE, BLOOD [[id number]] Collected:  10/12/19 1509    Order Status:  Completed Specimen:  Blood Updated:  10/13/19 0729     Special Requests: --        RIGHT  HAND       Culture result: NO GROWTH AFTER 15 HOURS CULTURE, BLOOD [[id number]] Collected:  10/11/19 1028    Order Status:  Completed Specimen:  Blood Updated:  10/13/19 0729     Special Requests: --        LEFT  FOREARM       Culture result: NO GROWTH 2 DAYS       CULTURE, BLOOD [[id number]] Collected:  10/11/19 1438    Order Status:  Completed Specimen:  Blood Updated:  10/13/19 0729     Special Requests: --        RIGHT  HAND       Culture result: NO GROWTH 2 DAYS       MSSA/MRSA SC BY PCR, NASAL SWAB [[id number]] Collected:  10/11/19 1648    Order Status:  Completed Specimen:  Nasal swab Updated:  10/11/19 2038     Special Requests: NO SPECIAL REQUESTS        Culture result:       SA target not detected. A MRSA NEGATIVE, SA NEGATIVE test result does not preclude MRSA or SA nasal colonization. CULTURE, BLOOD [[id number]]     Order Status:  Canceled Specimen:  Blood           No results found for this visit on 10/11/19.     Current Meds:  Current Facility-Administered Medications   Medication Dose Route Frequency    magnesium oxide (MAG-OX) tablet 400 mg  400 mg Oral BID    HYDROcodone-homatropine (HYCODAN) 5-1.5 mg/5 mL (5 mL) syrup 5 mL  5 mL Oral Q4H PRN    albuterol (PROVENTIL VENTOLIN) nebulizer solution 2.5 mg  2.5 mg Nebulization Q6H RT    apixaban (ELIQUIS) tablet 5 mg  5 mg Oral Q12H    sodium chloride 3% hypertonic nebulizer soln  4 mL Nebulization BID RT    amiodarone (CORDARONE) tablet 200 mg  200 mg Oral BID    morphine injection 2 mg  2 mg IntraVENous Q4H PRN    HYDROcodone-acetaminophen (NORCO) 5-325 mg per tablet 1 Tab  1 Tab Oral Q6H PRN    acetaminophen (TYLENOL) tablet 650 mg  650 mg Oral Q6H PRN    piperacillin-tazobactam (ZOSYN) 4.5 g in 0.9% sodium chloride (MBP/ADV) 100 mL  4.5 g IntraVENous Q8H    lactated Ringers infusion  100 mL/hr IntraVENous CONTINUOUS    sodium chloride (NS) flush 5-40 mL  5-40 mL IntraVENous Q8H    sodium chloride (NS) flush 5-40 mL  5-40 mL IntraVENous PRN    acetaminophen (TYLENOL) tablet 1,000 mg  1,000 mg Oral Q6H PRN    ondansetron (ZOFRAN) injection 4 mg  4 mg IntraVENous Q4H PRN    nicotine (NICODERM CQ) 21 mg/24 hr patch 1 Patch  1 Patch TransDERmal Q24H    diazePAM (VALIUM) tablet 2.5 mg  2.5 mg Oral Q8H    predniSONE (DELTASONE) tablet 40 mg  40 mg Oral DAILY WITH BREAKFAST    pantoprazole (PROTONIX) tablet 40 mg  40 mg Oral BID    thiamine HCL (B-1) tablet 100 mg  100 mg Oral DAILY    LORazepam (ATIVAN) injection 1 mg  1 mg IntraVENous Q1H PRN       Other Studies (last 24 hours):  No results found.     Assessment and Plan:     Hospital Problems as of 10/13/2019 Never Reviewed          Codes Class Noted - Resolved POA    * (Principal) Pseudomonas septicemia ([facility] ICD-10-CM: A41.52  ICD-9-CM: 038.43, 995.91  10/11/2019 - Present         Right middle lobe pneumonia ([facility] ICD-10-CM: J18.1  ICD-9-CM: 767  10/11/2019 - Present Unknown        Hypokalemia ICD-10-CM: E87.6  ICD-9-CM: 276.8  5/10/2019 - Present Yes        Tachycardia ICD-10-CM: R00.0  ICD-9-CM: 785.0  5/5/2019 - Present Yes        Thrombocytopenia ([facility] ICD-10-CM: D69.6  ICD-9-CM: 287.5  5/5/2019 - Present Yes        A-fib ([facility] ICD-10-CM: I48.91  ICD-9-CM: 427.31  5/5/2019 - Present Yes        Tobacco abuse ICD-10-CM: Z72.0  ICD-9-CM: 305.1  11/28/2016 - Present Yes        ETOH abuse ICD-10-CM: F10.10  ICD-9-CM: 305.00  11/28/2016 - Present Yes        HTN (hypertension) (Chronic) ICD-10-CM: I10  ICD-9-CM: 401.9  11/19/2014 - Present Yes              Plan:  · RML pneumonia and pseudomonas bacteremia: continued zosyn, can wean to cipro/levaquin once afebrile, followup BC , on oral steroid taper, add prn albuterol and hycodan  · ETOH abuse: needs cessation, monitor for withdrawals , wean valium, has prn ativan, continued thiamine   · Hypokalemia and hypomagnesemia: replace and repeat labs  · AFIB:  IV heparin changed to eliquis and oral amiodarone started   · COPD: oral steroid burst, prn nebs     DC planning/Dispo:  Pending       Diet:  DIET CARDIAC  DVT ppx:  eliquis     Signed:  Lucio Troncoso MD

## 2019-10-13 NOTE — PROGRESS NOTES
Date of Outreach Update:  Nader Donohue was seen and assessed. Pt resting in bed. Pt states she is better since getting something for his cough. R/A sat 90%. Lungs are course. Sinus on bedside monitor. MEWS Score: 2 (10/13/19 9963)  Vitals:    10/13/19 0748 10/13/19 0819 10/13/19 1329 10/13/19 1353   BP:  143/76 128/74    Pulse:  (!) 103 95    Resp:  18 18    Temp:  99.4 °F (37.4 °C) (!) 100.7 °F (38.2 °C)    SpO2: 95% 92% 95% 90%   Weight:       Height:             Pain Assessment  Pain Intensity 1: 0 (10/13/19 1230)        Patient Stated Pain Goal: 0      Previous Outreach assessment has been reviewed. There have been no significant clinical changes since the completion of the last dated Outreach assessment. Will continue to follow up per outreach protocol.     Signed By:   Irvine Boxer, RN    October 13, 2019 2:33 PM

## 2019-10-13 NOTE — PROGRESS NOTES
Verbal bedside report given to Mamie Quiñones oncoming RN. Patient's situation, background, assessment and recommendations provided. Opportunity for questions provided. Oncoming RN assumed care of patient.

## 2019-10-13 NOTE — PROGRESS NOTES
Verbal bedside report given to [facility], [facility], oncoming RN. Patient's situation, background, assessment and recommendations provided. Opportunity for questions provided. Oncoming RN assumed care of patient. heparin IV drip verified at bedside with oncoming RN.

## 2019-10-14 LAB
ANION GAP SERPL CALC-SCNC: 4 MMOL/L (ref 7–16)
BASOPHILS # BLD: 0 K/UL (ref 0–0.2)
BASOPHILS NFR BLD: 0 % (ref 0–2)
BUN SERPL-MCNC: 8 MG/DL (ref 8–23)
CALCIUM SERPL-MCNC: 8.2 MG/DL (ref 8.3–10.4)
CHLORIDE SERPL-SCNC: 103 MMOL/L (ref 98–107)
CO2 SERPL-SCNC: 33 MMOL/L (ref 21–32)
CREAT SERPL-MCNC: 0.46 MG/DL (ref 0.8–1.5)
DIFFERENTIAL METHOD BLD: ABNORMAL
EOSINOPHIL # BLD: 0 K/UL (ref 0–0.8)
EOSINOPHIL NFR BLD: 0 % (ref 0.5–7.8)
ERYTHROCYTE [DISTWIDTH] IN BLOOD BY AUTOMATED COUNT: 16.1 % (ref 11.9–14.6)
ERYTHROCYTE [DISTWIDTH] IN BLOOD BY AUTOMATED COUNT: 16.2 % (ref 11.9–14.6)
GLUCOSE SERPL-MCNC: 114 MG/DL (ref 65–100)
HCT VFR BLD AUTO: 31 % (ref 41.1–50.3)
HCT VFR BLD AUTO: 32.2 % (ref 41.1–50.3)
HGB BLD-MCNC: 10.9 G/DL (ref 13.6–17.2)
HGB BLD-MCNC: 11.1 G/DL (ref 13.6–17.2)
IMM GRANULOCYTES # BLD AUTO: 0.2 K/UL (ref 0–0.5)
IMM GRANULOCYTES NFR BLD AUTO: 2 % (ref 0–5)
LYMPHOCYTES # BLD: 2.1 K/UL (ref 0.5–4.6)
LYMPHOCYTES NFR BLD: 17 % (ref 13–44)
MAGNESIUM SERPL-MCNC: 1.8 MG/DL (ref 1.8–2.4)
MCH RBC QN AUTO: 33.2 PG (ref 26.1–32.9)
MCH RBC QN AUTO: 33.2 PG (ref 26.1–32.9)
MCHC RBC AUTO-ENTMCNC: 34.5 G/DL (ref 31.4–35)
MCHC RBC AUTO-ENTMCNC: 35.2 G/DL (ref 31.4–35)
MCV RBC AUTO: 94.5 FL (ref 79.6–97.8)
MCV RBC AUTO: 96.4 FL (ref 79.6–97.8)
MM INDURATION POC: 0 MM (ref 0–5)
MONOCYTES # BLD: 1.9 K/UL (ref 0.1–1.3)
MONOCYTES NFR BLD: 15 % (ref 4–12)
NEUTS SEG # BLD: 8.2 K/UL (ref 1.7–8.2)
NEUTS SEG NFR BLD: 66 % (ref 43–78)
NRBC # BLD: 0 K/UL (ref 0–0.2)
NRBC # BLD: 0 K/UL (ref 0–0.2)
PLATELET # BLD AUTO: 154 K/UL (ref 150–450)
PLATELET # BLD AUTO: 172 K/UL (ref 150–450)
PLATELET COMMENTS,PCOM: ADEQUATE
PMV BLD AUTO: 10.6 FL (ref 9.4–12.3)
PMV BLD AUTO: 10.7 FL (ref 9.4–12.3)
POTASSIUM SERPL-SCNC: 3.4 MMOL/L (ref 3.5–5.1)
PPD POC: NEGATIVE NEGATIVE
RBC # BLD AUTO: 3.28 M/UL (ref 4.23–5.6)
RBC # BLD AUTO: 3.34 M/UL (ref 4.23–5.6)
RBC MORPH BLD: ABNORMAL
SODIUM SERPL-SCNC: 140 MMOL/L (ref 136–145)
WBC # BLD AUTO: 12.3 K/UL (ref 4.3–11.1)
WBC # BLD AUTO: 12.4 K/UL (ref 4.3–11.1)
WBC MORPH BLD: ABNORMAL

## 2019-10-14 PROCEDURE — 83735 ASSAY OF MAGNESIUM: CPT

## 2019-10-14 PROCEDURE — 74011000258 HC RX REV CODE- 258: Performed by: FAMILY MEDICINE

## 2019-10-14 PROCEDURE — 80048 BASIC METABOLIC PNL TOTAL CA: CPT

## 2019-10-14 PROCEDURE — 97161 PT EVAL LOW COMPLEX 20 MIN: CPT

## 2019-10-14 PROCEDURE — 74011000250 HC RX REV CODE- 250: Performed by: INTERNAL MEDICINE

## 2019-10-14 PROCEDURE — 77030020255 HC SOL INJ LR 1000ML BG

## 2019-10-14 PROCEDURE — 94640 AIRWAY INHALATION TREATMENT: CPT

## 2019-10-14 PROCEDURE — 65660000000 HC RM CCU STEPDOWN

## 2019-10-14 PROCEDURE — 74011250637 HC RX REV CODE- 250/637: Performed by: FAMILY MEDICINE

## 2019-10-14 PROCEDURE — 36415 COLL VENOUS BLD VENIPUNCTURE: CPT

## 2019-10-14 PROCEDURE — 85027 COMPLETE CBC AUTOMATED: CPT

## 2019-10-14 PROCEDURE — 74011250636 HC RX REV CODE- 250/636: Performed by: FAMILY MEDICINE

## 2019-10-14 PROCEDURE — 85025 COMPLETE CBC W/AUTO DIFF WBC: CPT

## 2019-10-14 PROCEDURE — 74011636637 HC RX REV CODE- 636/637: Performed by: FAMILY MEDICINE

## 2019-10-14 PROCEDURE — 97530 THERAPEUTIC ACTIVITIES: CPT

## 2019-10-14 PROCEDURE — 97165 OT EVAL LOW COMPLEX 30 MIN: CPT

## 2019-10-14 PROCEDURE — 74011250637 HC RX REV CODE- 250/637: Performed by: INTERNAL MEDICINE

## 2019-10-14 PROCEDURE — 94760 N-INVAS EAR/PLS OXIMETRY 1: CPT

## 2019-10-14 RX ORDER — POTASSIUM CHLORIDE 20 MEQ/1
40 TABLET, EXTENDED RELEASE ORAL
Status: COMPLETED | OUTPATIENT
Start: 2019-10-14 | End: 2019-10-14

## 2019-10-14 RX ADMIN — MAGNESIUM GLUCONATE 500 MG ORAL TABLET 400 MG: 500 TABLET ORAL at 08:23

## 2019-10-14 RX ADMIN — PANTOPRAZOLE SODIUM 40 MG: 40 TABLET, DELAYED RELEASE ORAL at 14:53

## 2019-10-14 RX ADMIN — AMIODARONE HYDROCHLORIDE 200 MG: 200 TABLET ORAL at 21:27

## 2019-10-14 RX ADMIN — ALBUTEROL SULFATE 2.5 MG: 2.5 SOLUTION RESPIRATORY (INHALATION) at 20:20

## 2019-10-14 RX ADMIN — Medication 10 ML: at 21:29

## 2019-10-14 RX ADMIN — Medication 10 ML: at 14:56

## 2019-10-14 RX ADMIN — DIAZEPAM 2.5 MG: 5 TABLET ORAL at 08:24

## 2019-10-14 RX ADMIN — PIPERACILLIN SODIUM AND TAZOBACTAM SODIUM 4.5 G: 4; .5 INJECTION, POWDER, LYOPHILIZED, FOR SOLUTION INTRAVENOUS at 14:53

## 2019-10-14 RX ADMIN — Medication 100 MG: at 08:23

## 2019-10-14 RX ADMIN — HYDROCODONE BITARTRATE AND HOMATROPINE METHYLBROMIDE 5 ML: 5; 1.5 SOLUTION ORAL at 05:59

## 2019-10-14 RX ADMIN — HYDROCODONE BITARTRATE AND HOMATROPINE METHYLBROMIDE 5 ML: 5; 1.5 SOLUTION ORAL at 15:09

## 2019-10-14 RX ADMIN — POTASSIUM CHLORIDE 40 MEQ: 20 TABLET, EXTENDED RELEASE ORAL at 09:44

## 2019-10-14 RX ADMIN — ALBUTEROL SULFATE 2.5 MG: 2.5 SOLUTION RESPIRATORY (INHALATION) at 01:37

## 2019-10-14 RX ADMIN — AMIODARONE HYDROCHLORIDE 200 MG: 200 TABLET ORAL at 08:23

## 2019-10-14 RX ADMIN — MAGNESIUM GLUCONATE 500 MG ORAL TABLET 400 MG: 500 TABLET ORAL at 17:36

## 2019-10-14 RX ADMIN — ALBUTEROL SULFATE 2.5 MG: 2.5 SOLUTION RESPIRATORY (INHALATION) at 14:15

## 2019-10-14 RX ADMIN — ALBUTEROL SULFATE 2.5 MG: 2.5 SOLUTION RESPIRATORY (INHALATION) at 08:27

## 2019-10-14 RX ADMIN — PIPERACILLIN SODIUM AND TAZOBACTAM SODIUM 4.5 G: 4; .5 INJECTION, POWDER, LYOPHILIZED, FOR SOLUTION INTRAVENOUS at 05:59

## 2019-10-14 RX ADMIN — PREDNISONE 40 MG: 10 TABLET ORAL at 08:23

## 2019-10-14 RX ADMIN — APIXABAN 5 MG: 5 TABLET, FILM COATED ORAL at 08:23

## 2019-10-14 RX ADMIN — HYDROCODONE BITARTRATE AND HOMATROPINE METHYLBROMIDE 5 ML: 5; 1.5 SOLUTION ORAL at 01:56

## 2019-10-14 RX ADMIN — DIAZEPAM 2.5 MG: 5 TABLET ORAL at 17:36

## 2019-10-14 RX ADMIN — HYDROCODONE BITARTRATE AND HOMATROPINE METHYLBROMIDE 5 ML: 5; 1.5 SOLUTION ORAL at 19:56

## 2019-10-14 RX ADMIN — Medication 10 ML: at 05:59

## 2019-10-14 RX ADMIN — HYDROCODONE BITARTRATE AND HOMATROPINE METHYLBROMIDE 5 ML: 5; 1.5 SOLUTION ORAL at 10:23

## 2019-10-14 RX ADMIN — APIXABAN 5 MG: 5 TABLET, FILM COATED ORAL at 21:26

## 2019-10-14 RX ADMIN — PIPERACILLIN SODIUM AND TAZOBACTAM SODIUM 4.5 G: 4; .5 INJECTION, POWDER, LYOPHILIZED, FOR SOLUTION INTRAVENOUS at 21:28

## 2019-10-14 RX ADMIN — PANTOPRAZOLE SODIUM 40 MG: 40 TABLET, DELAYED RELEASE ORAL at 06:00

## 2019-10-14 RX ADMIN — SODIUM CHLORIDE 30 MG/ML INHALATION SOLUTION 4 ML: 30 SOLUTION INHALANT at 08:27

## 2019-10-14 RX ADMIN — SODIUM CHLORIDE, SODIUM LACTATE, POTASSIUM CHLORIDE, AND CALCIUM CHLORIDE 100 ML/HR: 600; 310; 30; 20 INJECTION, SOLUTION INTRAVENOUS at 06:07

## 2019-10-14 NOTE — PROGRESS NOTES
Bedside shift change report given to paulina STERN (oncoming nurse) by Bobby Guerrero (offgoing nurse). Report included the following information SBAR, Kardex, Intake/Output, MAR and Recent Results.

## 2019-10-14 NOTE — PROGRESS NOTES
[facility] CARDIOLOGY PROGRESS NOTE           10/14/2019 11:07 AM    Admit Date: 10/11/2019      Subjective:    breathing better now in sinus rhythm     Review of Systems   Constitutional: Negative for fever. Cardiovascular: Negative for chest pain. Musculoskeletal: Negative for myalgias. Skin: Negative for rash. Neurological: Negative for dizziness. Psychiatric/Behavioral: Negative for depression. Objective:      Vitals:    10/14/19 0043 10/14/19 0137 10/14/19 0509 10/14/19 0827   BP: 119/67  133/75    Pulse: 85  87    Resp: 18  18    Temp: 98 °F (36.7 °C)  98.4 °F (36.9 °C)    SpO2: 94% 95% 91% 91%   Weight:   82.1 kg (181 lb)    Height:             Physical Exam   Constitutional: He appears well-developed. HENT:   Head: Normocephalic and atraumatic. Left Ear: External ear normal.   Nose: Nose normal.   Eyes: Pupils are equal, round, and reactive to light. Neck: Normal range of motion. Cardiovascular: Normal rate. No murmur heard. Pulmonary/Chest: Effort normal. He has no wheezes. He has no rales. He exhibits no tenderness. Abdominal: Soft. He exhibits no distension. There is no tenderness. Musculoskeletal: He exhibits no edema. Neurological: He is alert. No cranial nerve deficit. Skin: Skin is warm and dry. No rash noted. No erythema. Psychiatric: He has a normal mood and affect. His behavior is normal. Judgment normal.       Data Review:   Recent Labs     10/14/19  0416 10/13/19  1824 10/13/19  0623  10/11/19  1438     --  137   < >  --    K 3.4*  --  3.1*   < >  --    MG 1.8  --  1.9   < >  --    BUN 8  --  10   < >  --    CREA 0.46*  --  0.48*   < >  --    *  --  116*   < >  --    WBC 12.4* 12.6* 12.6*   < >  --    HGB 10.9* 11.4* 11.6*   < >  --    HCT 31.0* 32.1* 34.0*   < >  --     157 145*   < >  --    INR  --   --   --   --  1.1    < > = values in this interval not displayed.          Intake/Output Summary (Last 24 hours) at 10/14/2019 7186  Last data filed at 10/14/2019 2949  Gross per 24 hour   Intake 120 ml   Output 2590 ml   Net -2470 ml     Current Facility-Administered Medications   Medication Dose Route Frequency    magnesium oxide (MAG-OX) tablet 400 mg  400 mg Oral BID    HYDROcodone-homatropine (HYCODAN) 5-1.5 mg/5 mL (5 mL) syrup 5 mL  5 mL Oral Q4H PRN    albuterol (PROVENTIL VENTOLIN) nebulizer solution 2.5 mg  2.5 mg Nebulization Q6H RT    apixaban (ELIQUIS) tablet 5 mg  5 mg Oral Q12H    sodium chloride 3% hypertonic nebulizer soln  4 mL Nebulization BID RT    diazePAM (VALIUM) tablet 2.5 mg  2.5 mg Oral BID    amiodarone (CORDARONE) tablet 200 mg  200 mg Oral BID    morphine injection 2 mg  2 mg IntraVENous Q4H PRN    HYDROcodone-acetaminophen (NORCO) 5-325 mg per tablet 1 Tab  1 Tab Oral Q6H PRN    acetaminophen (TYLENOL) tablet 650 mg  650 mg Oral Q6H PRN    piperacillin-tazobactam (ZOSYN) 4.5 g in 0.9% sodium chloride (MBP/ADV) 100 mL  4.5 g IntraVENous Q8H    lactated Ringers infusion  100 mL/hr IntraVENous CONTINUOUS    sodium chloride (NS) flush 5-40 mL  5-40 mL IntraVENous Q8H    sodium chloride (NS) flush 5-40 mL  5-40 mL IntraVENous PRN    acetaminophen (TYLENOL) tablet 1,000 mg  1,000 mg Oral Q6H PRN    ondansetron (ZOFRAN) injection 4 mg  4 mg IntraVENous Q4H PRN    nicotine (NICODERM CQ) 21 mg/24 hr patch 1 Patch  1 Patch TransDERmal Q24H    predniSONE (DELTASONE) tablet 40 mg  40 mg Oral DAILY WITH BREAKFAST    pantoprazole (PROTONIX) tablet 40 mg  40 mg Oral BID    thiamine HCL (B-1) tablet 100 mg  100 mg Oral DAILY    LORazepam (ATIVAN) injection 1 mg  1 mg IntraVENous Q1H PRN           Assessment/Plan:     58-year-old male with history of EtOH and tobacco abuse admitted for pneumonia A. fib with RVR now in sinus rhythm on amiodarone. Principal Problem:  1. Pseudomonas septicemia ([address] (10/11/2019) trolled  2. HTN (hypertension) (11/19/2014)  3. Tobacco abuse (11/28/2016)  4.  ETOH abuse (11/28/2016) watching for signs of withdrawal  5. Thrombocytopenia ([address] (5/5/2019) stable  6. A-fib ([facility] (5/5/2019) IV amiodarone will be transition to oral amiodarone therapy continue heparin drip for now does not appear that procedures are planned today we will transition to novel anticoagulation therapy. platelts are low but stable at this time. Atrial fibrillation likely related to underlying lung infection and long-term use of anti-arrhythmic therapy will likely not be necessary. BRYANNA 1. AC for 1 month following restoration of sinus rhythm,   7. Hypokalemia (5/10/2019) replete.    8. Right middle lobe pneumonia ([facility] [address] [facility] (10/11/2019)          Db Kenyon MD  10/14/2019 11:07 AM

## 2019-10-14 NOTE — PROGRESS NOTES
Bedside and verbal shift report received from Gaurav Edwards, [address] [facility].

## 2019-10-14 NOTE — PROGRESS NOTES
Bedside shift change report given to Amanda Wiggins (oncoming nurse) by Chuy Rizo RN (offgoing nurse). Report included the following information SBAR, Kardex, Intake/Output, MAR, Recent Results and Cardiac Rhythm NSR.

## 2019-10-14 NOTE — PROGRESS NOTES
Bedside and Verbal shift change report given to [facility] (oncoming nurse) by  Michael subramanian). Report included the following information SBAR, Kardex, MAR and Recent Results.

## 2019-10-14 NOTE — PROGRESS NOTES
Care Management Interventions  PCP Verified by CM: Yes  Last Visit to PCP: 09/20/19  Mode of Transport at Discharge: Other (see comment)(Chelo Carolina Spouse [phone] )  Transition of Care Consult (CM Consult): Discharge Planning  Discharge Durable Medical Equipment: No  Physical Therapy Consult: Yes  Occupational Therapy Consult: Yes  Speech Therapy Consult: No  Current Support Network: Lives with Spouse, Own Home  Confirm Follow Up Transport: Family  Plan discussed with Pt/Family/Caregiver: Yes  Freedom of Choice Offered: Yes  Discharge Location  Discharge Placement: Home      Pt admitted to 3rd floor tele for bacteremia. CM met with pt to discuss CM needs & DCP. Pt is A&Ox4. Pt is indep at home with all ADLS. Pt lives with spouse. [place], [place]; no further DME needs. Pt has no difficulty with obtaining medications or transport. DCP home with spouse. CM to continue to monitor for dc needs.

## 2019-10-14 NOTE — PROGRESS NOTES
Problem: Mobility Impaired (Adult and Pediatric)  Goal: *Acute Goals and Plan of Care (Insert Text)  Description  LTG:  (1.)Mr. Carolina will move from supine to sit and sit to supine , scoot up and down and roll side to side in bed with INDEPENDENCE within 7 treatment day(s). (2.)Mr. Carolina will transfer from bed to chair and chair to bed with MODIFIED INDEPENDENCE using the least restrictive device within 7 treatment day(s). (3.)Mr. Carolina will ambulate with MODIFIED INDEPENDENCE for 500 feet with the least restrictive device within 7 treatment day(s). (4.)Mr. Carolina will participate in therapeutic activity/exercises x 23 minutes for increased activity tolerance within 7 treatment days. ________________________________________________________________________________________________     Outcome: Progressing Towards Goal     PHYSICAL THERAPY: Initial Assessment and AM 10/14/2019  INPATIENT: PT Visit Days : 1  Payor: Eliseo Espinoza / Plan: [place] [employer] / Product Type: Federal Funded Programs /       NAME/AGE/GENDER: Philipp Norton is a 59 y.o. male   PRIMARY DIAGNOSIS: Bacteremia [R78.81]  Bacteremia [R78.81] Pseudomonas septicemia ([facility] [address]   Pseudomonas septicemia ([facility]          ICD-10: Treatment Diagnosis:    Difficulty in walking, Not elsewhere classified (R26.2)  History of falling (Z91.81)   Precaution/Allergies:  Patient has no known allergies. ASSESSMENT:     Mr. Margie Yao is a 59 y.o. male in the hospital for the above who was up ad evens in room upon arrival.  Pt reports that he lives in a one story house with his wife that has 2 steps to enter. Pt also reported that PTA he was independent with ADLs and ambulated with independence. Pt admitted to one recent fall in the past year. Mr. Margie Yao presents to PT with [facility] and decreased B hip flexion strength. Pt performed STS transfers with supervision and good sitting balance.   He ambulated in rivera with CGA and decreased gait speed. Pt's SpO2 recorded at 81% during standing rest break with good to fair standing balance. Pt then educated on breathing techniques and SpO2 noted to rise to 87%. Pt then returned to room and SpO2 recorded above 90%. Pt performed seated exercises from EOB with good tolerance. Mr. Orestes Sheridan could benefit from skilled PT as he is currently functioning below his baseline. This section established at most recent assessment   PROBLEM LIST (Impairments causing functional limitations):  Decreased Strength  Decreased Ambulation Ability/Technique  Decreased Balance  Decreased Activity Tolerance  Increased Shortness of Breath   INTERVENTIONS PLANNED: (Benefits and precautions of physical therapy have been discussed with the patient.)  Balance Exercise  Bed Mobility  Family Education  Gait Training  Therapeutic Activites  Therapeutic Exercise/Strengthening  Transfer Training     TREATMENT PLAN: Frequency/Duration: 3 times a week for duration of hospital stay  Rehabilitation Potential For Stated Goals: Good     REHAB RECOMMENDATIONS (at time of discharge pending progress):    Placement: It is my opinion, based on this patient's performance to date, that Mr. Orestes Sheridan may benefit from being discharged with NO further skilled therapy due to the high likelihood of returning to baseline. Equipment:   None at this time              HISTORY:   History of Present Injury/Illness (Reason for Referral):  CAP  Past Medical History/Comorbidities:   Mr. Orestes Sheridan  has a past medical history of A-fib ([address] (2/8/6655), Alcoholic hepatitis without ascites (5/11/2019), and Hypertension.  He also has no past medical history of Aneurysm ([address] Arthritis, Asthma, Autoimmune disease ([address] CAD (coronary artery disease), Cancer ([address] Chronic kidney disease, Chronic obstructive pulmonary disease ([address] Chronic pain, Coagulation disorder ([address] Diabetes ([address] GERD (gastroesophageal reflux disease), Heart failure ([address] Ill-defined condition, Morbid obesity ([facility] Psychiatric disorder, PUD (peptic ulcer disease), Seizures ([facility] [address] Stroke ([facility] [address] [facility] Thromboembolus ([facility] [address] [address] Thyroid disease, or Unspecified sleep apnea. Mr. Sonia Aguirre  has no past surgical history on file. Social History/Living Environment:   Home Environment: Private residence  # Steps to Enter: 2  Rails to Enter: Yes  Hand Rails : Bilateral  One/Two Story Residence: One story  Living Alone: No  Support Systems: Spouse/Significant Other/Partner  Patient Expects to be Discharged to[de-identified] Private residence  Current DME Used/Available at Home: [address], rollator, [address] [employer] [address] chair  Tub or Shower Type: Tub/Shower combination  Prior Level of Function/Work/Activity:  Lives with wife and PTA pt was independent with ADLs and ambulation. One recent fall. Number of Personal Factors/Comorbidities that affect the Plan of Care: 1-2: MODERATE COMPLEXITY   EXAMINATION:   Most Recent Physical Functioning:   Gross Assessment:  AROM: Within functional limits  Strength: Generally decreased, functional(B hip flexion 4-/5)               Posture:     Balance:  Sitting: Intact  Standing: Impaired  Standing - Static: Good  Standing - Dynamic : Fair(+) Bed Mobility:     Wheelchair Mobility:     Transfers:  Sit to Stand: Stand-by assistance  Stand to Sit: Contact guard assistance  Gait:     Speed/Nelly: Slow  Gait Abnormalities: Trunk sway increased  Distance (ft): 250 Feet (ft)  Assistive Device: Other (comment)(HHA)  Ambulation - Level of Assistance: Contact guard assistance      Body Structures Involved:  Heart  Lungs  Muscles Body Functions Affected:  Cardio  Respiratory  Neuromusculoskeletal  Movement Related Activities and Participation Affected:   [facility], Social and [place]   Number of elements that affect the Plan of Care: 4+: HIGH COMPLEXITY   CLINICAL PRESENTATION:   Presentation: Stable and uncomplicated: LOW COMPLEXITY   CLINICAL DECISION MAKING:   [employer] AM-PAC 6 Clicks   Basic Mobility Inpatient Short Form  How much difficulty does the patient currently have. .. Unable A Lot A Little None   1. Turning over in bed (including adjusting bedclothes, sheets and blankets)? ? 1   ? 2   ? 3   ? 4   2. Sitting down on and standing up from a chair with arms ( e.g., wheelchair, bedside commode, etc.)   ? 1   ? 2   ? 3   ? 4   3. Moving from lying on back to sitting on the side of the bed?   ? 1   ? 2   ? 3   ? 4   How much help from another person does the patient currently need. .. Total A Lot A Little None   4. Moving to and from a bed to a chair (including a wheelchair)? ? 1   ? 2   ? 3   ? 4   5. Need to walk in hospital room? ? 1   ? 2   ? 3   ? 4   6. Climbing 3-5 steps with a railing? ? 1   ? 2   ? 3   ? 4   © 2007, [place] [address], under license to [employer]. All rights reserved      Score:  Initial: 22 Most Recent: X (Date: -- )    Interpretation of Tool:  Represents activities that are increasingly more difficult (i.e. Bed mobility, Transfers, Gait). Medical Necessity:     Patient demonstrates good   rehab potential due to higher previous functional level. Reason for Services/Other Comments:  Patient continues to require skilled intervention due to decreased activity tolerance and balance. .   Use of outcome tool(s) and clinical judgement create a POC that gives a: Clear prediction of patient's progress: LOW COMPLEXITY            TREATMENT:   (In addition to Assessment/Re-Assessment sessions the following treatments were rendered)   Pre-treatment Symptoms/Complaints: \"Ready to go home\"  Pain: Initial:   Pain Intensity 1: 0  Post Session:  0     Therapeutic Activity: (    8 minutes): Therapeutic activities including Ambulation on level ground, STS transfers, and seated exercises  to improve mobility, strength, balance and activity tolerance .   Required minimal cuing   to promote static and dynamic balance in standing and activity pacing with exertion . Date:  10/14/19 Date:   Date:     ACTIVITY/EXERCISE AM PM AM PM AM PM   Seated LAQ 2 x 10 B        Seated marching 1 x 10 B  1 x 15 B        Seated AP 2 x 20 B        Seated hip abd/add 2 x 10 B                                   B = bilateral; AA = active assistive; A = active; P = passive        Braces/Orthotics/Lines/Etc:   O2 Device: Room air  Treatment/Session Assessment:    Response to Treatment:  Desaturated on room air with ambulation. Interdisciplinary Collaboration:   Physical Therapist  Registered Nurse  After treatment position/precautions:   Bed/Chair-wheels locked  Bed in low position  Call light within reach  RN notified  Family at bedside  Sitting EOB    Compliance with Program/Exercises: Will assess as treatment progresses  Recommendations/Intent for next treatment session: \"Next visit will focus on advancements to more challenging activities and reduction in assistance provided\".   Total Treatment Duration:  PT Patient Time In/Time Out  Time In: 1032  Time Out: 1910 Tristen Khan, PT, DPT

## 2019-10-14 NOTE — PROGRESS NOTES
OCCUPATIONAL THERAPY: Initial Assessment 10/14/2019  INPATIENT:    Payor: Nasir Juarez / Plan: [place] [employer] / Product Type: Federal Funded Programs /      NAME/AGE/GENDER: Jyothi Gresham is a 59 y.o. male   PRIMARY DIAGNOSIS:  Bacteremia [R78.81]  Bacteremia [R78.81] Pseudomonas septicemia ([address]   Pseudomonas septicemia ([address]          ICD-10: Treatment Diagnosis:    Generalized Muscle Weakness (M62.81)   Precautions/Allergies:     Patient has no known allergies. ASSESSMENT:     Mr. Yumiko Gomez was admitted with pna. Pt lives with his wife and is independent with ADLs at baseline, does not use an AD for mobility. This session, pt greeted A&O X4, pleasant and agreeable to OT session, wife at bedside. Pt demonstrated independence with ADLs and mobility for ADLs. Pt endorsed feeling generally weak and mild fatigue with activity. Pt educated on energy conservation techniques and verbalized good understanding. Pt did not demonstrate any ADL deficits and does not require further skilled OT services at this time. No d/c needs. REHAB RECOMMENDATIONS (at time of discharge pending progress):    Placement: It is my opinion, based on this patient's performance to date, that Mr. Yumiko Gomez may benefit from being discharged with NO further skilled therapy due to a proven ability to function at baseline. Equipment:   None at this time              OCCUPATIONAL PROFILE AND HISTORY:   History of Present Injury/Illness (Reason for Referral):  See H&P  Past Medical History/Comorbidities:   Mr. Yumiko Gomez  has a past medical history of A-fib ([address] (9/6/3286), Alcoholic hepatitis without ascites (5/11/2019), and Hypertension.  He also has no past medical history of Aneurysm ([address] Arthritis, Asthma, Autoimmune disease ([address] CAD (coronary artery disease), Cancer ([address] Chronic kidney disease, Chronic obstructive pulmonary disease ([address] Chronic pain, Coagulation disorder ([address] Diabetes ([address] GERD (gastroesophageal reflux disease), Heart failure ([facility] Ill-defined condition, Morbid obesity ([facility] [address] Psychiatric disorder, PUD (peptic ulcer disease), Seizures ([facility] [address] Stroke ([facility] [address] Thromboembolus ([facility] [address] [facility] Thyroid disease, or Unspecified sleep apnea. Mr. Lorenzo White  has no past surgical history on file. Social History/Living Environment:   Home Environment: Private residence  # Steps to Enter: 2  Rails to Enter: Yes  Hand Rails : Bilateral  One/Two Story Residence: One story  Living Alone: No  Support Systems: Spouse/Significant Other/Partner  Patient Expects to be Discharged to[de-identified] Private residence  Current DME Used/Available at Home: sandra Bess, [address] [facility] [address] [address] chair  Tub or Shower Type: Tub/Shower combination  Prior Level of Function/Work/Activity:  Independent, lives w/ wife     Number of Personal Factors/Comorbidities that affect the Plan of Care: Brief history (0):  LOW COMPLEXITY   ASSESSMENT OF OCCUPATIONAL PERFORMANCE[de-identified]   Activities of Daily Living:   Basic ADLs (From Assessment) Complex ADLs (From Assessment)   Feeding: Independent  Oral Facial Hygiene/Grooming: Independent  Bathing: Independent  Upper Body Dressing: Independent  Lower Body Dressing: Independent  Toileting: Independent     Grooming/Bathing/Dressing Activities of Daily Living     Cognitive Retraining  Safety/Judgement: Fall prevention                       Bed/Mat Mobility  Supine to Sit: Independent  Sit to Supine: Independent  Sit to Stand: Supervision  Stand to Sit: Supervision     Most Recent Physical Functioning:   Gross Assessment:  AROM: Within functional limits  Strength:  Within functional limits               Posture:     Balance:  Sitting: Intact  Standing: Intact  Standing - Static: Good  Standing - Dynamic : Fair(+) Bed Mobility:  Supine to Sit: Independent  Sit to Supine: Independent  Wheelchair Mobility:     Transfers:  Sit to Stand: Supervision  Stand to Sit: Supervision            Patient Vitals for the past 6 hrs:   BP SpO2 Pulse 10/14/19 1100 -- 93 % --   10/14/19 1245 130/73 93 % 88   10/14/19 1415 -- 93 % --       Mental Status  Neurologic State: Alert  Orientation Level: Oriented X4  Cognition: Follows commands  Perception: Appears intact  Perseveration: No perseveration noted  Safety/Judgement: Fall prevention                          Physical Skills Involved: Activity Tolerance Cognitive Skills Affected (resulting in the inability to perform in a timely and safe manner):  none  Psychosocial Skills Affected:  Habits/Routines   Number of elements that affect the Plan of Care: 1-3:  LOW COMPLEXITY   CLINICAL DECISION MAKIN[address] [place], [place] [id number] AM-PAC 6 Clicks   Daily Activity Inpatient Short Form  How much help from another person does the patient currently need. .. Total A Lot A Little None   1. Putting on and taking off regular lower body clothing? ? 1   ? 2   ? 3   ? 4   2. Bathing (including washing, rinsing, drying)? ? 1   ? 2   ? 3   ? 4   3. Toileting, which includes using toilet, bedpan or urinal?   ? 1   ? 2   ? 3   ? 4   4. Putting on and taking off regular upper body clothing? ? 1   ? 2   ? 3   ? 4   5. Taking care of personal grooming such as brushing teeth? ? 1   ? 2   ? 3   ? 4   6. Eating meals? ? 1   ? 2   ? 3   ? 4   © , Trustees of [address] [place], [place] [address], under license to [employer]. All rights reserved      Score:  Initial: 24 Most Recent: X (Date: -- )    Interpretation of Tool:  Represents activities that are increasingly more difficult (i.e. Bed mobility, Transfers, Gait).        Use of outcome tool(s) and clinical judgement create a POC that gives a: LOW COMPLEXITY         TREATMENT:   (In addition to Assessment/Re-Assessment sessions the following treatments were rendered)     Pre-treatment Symptoms/Complaints:    Pain: Initial:   Pain Intensity 1: 0  Post Session:  0     Assessment/Reassessment only, no treatment provided today    Braces/Orthotics/Lines/Etc:   O2 Device: Room air  Treatment/Session Assessment:    Response to Treatment:  no adverse reaction   Interdisciplinary Collaboration:   Occupational Therapist  Registered Nurse  After treatment position/precautions:   Supine in bed  Bed/Chair-wheels locked  Bed in low position  Call light within reach  Family at bedside  Nurse at bedside     Total Treatment Duration:  OT Patient Time In/Time Out  Time In: 1447  Time Out: Cristobal Rocha OT

## 2019-10-14 NOTE — PROGRESS NOTES
Hospitalist Note     Admit Date:  10/11/2019 10:50 AM   Name:  Nicole Raphael   Age:  59 y.o.  :  1960   MRN:  [id number]   PCP:  Wilfrid Shah MD  Treatment Team: Attending Provider: Valentino Denton MD; Consulting Provider: Brenita Gottron; Consulting Provider: Shedrick Bumpers, MD; Primary Nurse: Nehemias Reyes, RN; Staff Nurse: Kasey Cartwright RN; Physical Therapist: Hawa Wilder PT, DPT; Occupational Therapist: Gil Schumacher OT; Care Manager: Liam Azevedo    HPI/Subjective:     Mr. Orestes Sheridan is a 58 yo male with PMH of polysubstance use, AFIB admitted with RML pneumonia and found to have pseudomonas aeruginosa bacteremia. He has been managed with zosyn. Repeat BC 10-12-19 NGTD. ID following. AFIB has been managed with IV heparin/esmolol then eliquis/ amiodarone. He is being followed by , cardiology. He is on valium taper  for ETOH use history and no ryan withdrawals noted.    Discharge plans are to home once improved     10-14-19 family present, some less cough, some sputum and dyspnea, ate ok, denies shakes, had BM, has some edema and weight gain        Objective:     Patient Vitals for the past 24 hrs:   Temp Pulse Resp BP SpO2   10/14/19 0833 99.2 °F (37.3 °C) 90 18 141/76 91 %   10/14/19 0827     91 %   10/14/19 0509 98.4 °F (36.9 °C) 87 18 133/75 91 %   10/14/19 0137     95 %   10/14/19 0043 98 °F (36.7 °C) 85 18 119/67 94 %   10/13/19 2143 98.4 °F (36.9 °C) 86 18 125/69 92 %   10/13/19 1918     94 %   10/13/19 1654 98.6 °F (37 °C) 88 18 134/75 92 %   10/13/19 1456 98.7 °F (37.1 °C)       10/13/19 1353     90 %   10/13/19 1329 (!) 100.7 °F (38.2 °C) 95 18 128/74 95 %     Oxygen Therapy  O2 Sat (%): 91 % (10/14/19 0833)  Pulse via Oximetry: 90 beats per minute (10/14/19 0827)  O2 Device: Room air (10/14/19 0833)  O2 Flow Rate (L/min): 2 l/min (10/12/19 1543)    Estimated body mass index is 25.97 kg/m² as calculated from the following: Height as of this encounter: 5' 10\" (1.778 m). Weight as of this encounter: 82.1 kg (181 lb). Intake/Output Summary (Last 24 hours) at 10/14/2019 1119  Last data filed at 10/14/2019 8856  Gross per 24 hour   Intake 120 ml   Output 2140 ml   Net -2020 ml       *Note that automatically entered I/Os may not be accurate; dependent on patient compliance with collection and accurate  by techs. General:    Well nourished. Alert. No distress   CV:   Regular rate and rhythm, trace edema  Lungs:   Rhonchi right lung  Abdomen:   Soft, nontender, nondistended. Decreased BS  Extremities: Warm and dry  Skin:     No rashes or jaundice.    Neuro:  No gross focal deficits    Data Review:  I have reviewed all labs, meds, and studies from the last 24 hours:    Recent Results (from the past 24 hour(s))   PLEASE READ & DOCUMENT PPD TEST IN 48 HRS    Collection Time: 10/13/19  4:00 PM   Result Value Ref Range    PPD Negative Negative    mm Induration 0 0 - 5 mm   CBC W/O DIFF    Collection Time: 10/13/19  6:24 PM   Result Value Ref Range    WBC 12.6 (H) 4.3 - 11.1 K/uL    RBC 3.47 (L) 4.23 - 5.6 M/uL    HGB 11.4 (L) 13.6 - 17.2 g/dL    HCT 32.1 (L) 41.1 - 50.3 %    MCV 92.5 79.6 - 97.8 FL    MCH 32.9 26.1 - 32.9 PG    MCHC 35.5 (H) 31.4 - 35.0 g/dL    RDW 15.9 (H) 11.9 - 14.6 %    PLATELET 209 270 - 424 K/uL    MPV 11.0 9.4 - 12.3 FL    ABSOLUTE NRBC 0.00 0.0 - 0.2 K/uL   METABOLIC PANEL, BASIC    Collection Time: 10/14/19  4:16 AM   Result Value Ref Range    Sodium 140 136 - 145 mmol/L    Potassium 3.4 (L) 3.5 - 5.1 mmol/L    Chloride 103 98 - 107 mmol/L    CO2 33 (H) 21 - 32 mmol/L    Anion gap 4 (L) 7 - 16 mmol/L    Glucose 114 (H) 65 - 100 mg/dL    BUN 8 8 - 23 MG/DL    Creatinine 0.46 (L) 0.8 - 1.5 MG/DL    GFR est AA >60 >60 ml/min/1.73m2    GFR est non-AA >60 >60 ml/min/1.73m2    Calcium 8.2 (L) 8.3 - 10.4 MG/DL   CBC WITH AUTOMATED DIFF    Collection Time: 10/14/19  4:16 AM   Result Value Ref Range    WBC 12.4 (H) 4.3 - 11.1 K/uL    RBC 3.28 (L) 4.23 - 5.6 M/uL    HGB 10.9 (L) 13.6 - 17.2 g/dL    HCT 31.0 (L) 41.1 - 50.3 %    MCV 94.5 79.6 - 97.8 FL    MCH 33.2 (H) 26.1 - 32.9 PG    MCHC 35.2 (H) 31.4 - 35.0 g/dL    RDW 16.1 (H) 11.9 - 14.6 %    PLATELET 272 864 - 089 K/uL    MPV 10.6 9.4 - 12.3 FL    ABSOLUTE NRBC 0.00 0.0 - 0.2 K/uL    NEUTROPHILS 66 43 - 78 %    LYMPHOCYTES 17 13 - 44 %    MONOCYTES 15 (H) 4.0 - 12.0 %    EOSINOPHILS 0 (L) 0.5 - 7.8 %    BASOPHILS 0 0.0 - 2.0 %    IMMATURE GRANULOCYTES 2 0.0 - 5.0 %    ABS. NEUTROPHILS 8.2 1.7 - 8.2 K/UL    ABS. LYMPHOCYTES 2.1 0.5 - 4.6 K/UL    ABS. MONOCYTES 1.9 (H) 0.1 - 1.3 K/UL    ABS. EOSINOPHILS 0.0 0.0 - 0.8 K/UL    ABS. BASOPHILS 0.0 0.0 - 0.2 K/UL    ABS. IMM.  GRANS. 0.2 0.0 - 0.5 K/UL    RBC COMMENTS NORMOCYTIC/NORMOCHROMIC      WBC COMMENTS Result Confirmed By Smear      PLATELET COMMENTS ADEQUATE      DF AUTOMATED     MAGNESIUM    Collection Time: 10/14/19  4:16 AM   Result Value Ref Range    Magnesium 1.8 1.8 - 2.4 mg/dL        All Micro Results     Procedure Component Value Units Date/Time    CULTURE, BLOOD [[id number]] Collected:  10/12/19 1509    Order Status:  Completed Specimen:  Blood Updated:  10/14/19 0648     Special Requests: --        RIGHT  HAND       Culture result: NO GROWTH 2 DAYS       CULTURE, BLOOD [[id number]] Collected:  10/12/19 1506    Order Status:  Completed Specimen:  Blood Updated:  10/14/19 0648     Special Requests: --        RIGHT  HAND       Culture result: NO GROWTH 2 DAYS       CULTURE, BLOOD [[id number]] Collected:  10/11/19 1028    Order Status:  Completed Specimen:  Blood Updated:  10/14/19 0648     Special Requests: --        LEFT  FOREARM       Culture result: NO GROWTH 3 DAYS       CULTURE, BLOOD [[id number]] Collected:  10/11/19 1438    Order Status:  Completed Specimen:  Blood Updated:  10/14/19 0648     Special Requests: --        RIGHT  HAND       Culture result: NO GROWTH 3 DAYS       CULTURE, RESPIRATORY/SPUTUM/BRONCH Lia Knock STAIN [[id number]]  (Abnormal)  (Susceptibility) Collected:  10/11/19 1441    Order Status:  Completed Specimen:  Sputum Updated:  10/13/19 6068     Special Requests: NO SPECIAL REQUESTS        GRAM STAIN 3 TO 45 WBC'S/OIF      0 TO 2 EPITHELIAL CELLS SEEN /OIF            MODERATE GRAM NEGATIVE RODS                  FEW GRAM POSITIVE COCCI IN PAIRS CHAINS AND CLUSTERS            FEW YEAST         4+ MUCUS PRESENT        Culture result:       MODERATE PSEUDOMONAS AERUGINOSA                  SCANT NORMAL RESPIRATORY MARZENA          MSSA/MRSA SC BY PCR, NASAL SWAB [[id number]] Collected:  10/11/19 1648    Order Status:  Completed Specimen:  Nasal swab Updated:  10/11/19 2038     Special Requests: NO SPECIAL REQUESTS        Culture result:       SA target not detected. A MRSA NEGATIVE, SA NEGATIVE test result does not preclude MRSA or SA nasal colonization. CULTURE, BLOOD [[id number]]     Order Status:  Canceled Specimen:  Blood           No results found for this visit on 10/11/19.     Current Meds:  Current Facility-Administered Medications   Medication Dose Route Frequency    magnesium oxide (MAG-OX) tablet 400 mg  400 mg Oral BID    HYDROcodone-homatropine (HYCODAN) 5-1.5 mg/5 mL (5 mL) syrup 5 mL  5 mL Oral Q4H PRN    albuterol (PROVENTIL VENTOLIN) nebulizer solution 2.5 mg  2.5 mg Nebulization Q6H RT    apixaban (ELIQUIS) tablet 5 mg  5 mg Oral Q12H    sodium chloride 3% hypertonic nebulizer soln  4 mL Nebulization BID RT    diazePAM (VALIUM) tablet 2.5 mg  2.5 mg Oral BID    amiodarone (CORDARONE) tablet 200 mg  200 mg Oral BID    morphine injection 2 mg  2 mg IntraVENous Q4H PRN    HYDROcodone-acetaminophen (NORCO) 5-325 mg per tablet 1 Tab  1 Tab Oral Q6H PRN    acetaminophen (TYLENOL) tablet 650 mg  650 mg Oral Q6H PRN    piperacillin-tazobactam (ZOSYN) 4.5 g in 0.9% sodium chloride (MBP/ADV) 100 mL  4.5 g IntraVENous Q8H    sodium chloride (NS) flush 5-40 mL  5-40 mL IntraVENous Q8H    sodium chloride (NS) flush 5-40 mL  5-40 mL IntraVENous PRN    acetaminophen (TYLENOL) tablet 1,000 mg  1,000 mg Oral Q6H PRN    ondansetron (ZOFRAN) injection 4 mg  4 mg IntraVENous Q4H PRN    nicotine (NICODERM CQ) 21 mg/24 hr patch 1 Patch  1 Patch TransDERmal Q24H    predniSONE (DELTASONE) tablet 40 mg  40 mg Oral DAILY WITH BREAKFAST    pantoprazole (PROTONIX) tablet 40 mg  40 mg Oral BID    thiamine HCL (B-1) tablet 100 mg  100 mg Oral DAILY    LORazepam (ATIVAN) injection 1 mg  1 mg IntraVENous Q1H PRN       Other Studies (last 24 hours):  No results found.     Assessment and Plan:     Hospital Problems as of 10/14/2019 Never Reviewed          Codes Class Noted - Resolved POA    * (Principal) Pseudomonas septicemia ([facility] ICD-10-CM: A41.52  ICD-9-CM: 038.43, 995.91  10/11/2019 - Present         Right middle lobe pneumonia ([facility] ICD-10-CM: J18.1  ICD-9-CM: 359  10/11/2019 - Present Unknown        Hypokalemia ICD-10-CM: E87.6  ICD-9-CM: 276.8  5/10/2019 - Present Yes        Tachycardia ICD-10-CM: R00.0  ICD-9-CM: 785.0  5/5/2019 - Present Yes        Thrombocytopenia ([facility] ICD-10-CM: D69.6  ICD-9-CM: 287.5  5/5/2019 - Present Yes        A-fib ([facility] ICD-10-CM: I48.91  ICD-9-CM: 427.31  5/5/2019 - Present Yes        Tobacco abuse ICD-10-CM: Z72.0  ICD-9-CM: 305.1  11/28/2016 - Present Yes        ETOH abuse ICD-10-CM: F10.10  ICD-9-CM: 305.00  11/28/2016 - Present Yes        HTN (hypertension) (Chronic) ICD-10-CM: I10  ICD-9-CM: 401.9  11/19/2014 - Present Yes              Plan:  · RML pneumonia and pseudomonas bacteremia: continued zosyn, defer oral option to ID since is also on amiodarone per cardiology, on oral steroid taper, conitnue prn albuterol and hycodan  · Tobacco use/COPD: oral steroid burst, prn nebs  needs cessation, outpatient PFTS  · ETOH abuse: needs cessation, monitor for withdrawals , wean valium, has prn ativan, continued thiamine   · Hypokalemia and hypomagnesemia: replace and repeat labs  · AFIB:  continue eliquis and oral amiodarone   · Edema: stop IVF, ambulate with PT    DC planning/Dispo:  Pending to home, PT/OT      Diet:  DIET CARDIAC  DVT ppx:  eliquis     Signed:  Gill Torres MD

## 2019-10-15 LAB
ANION GAP SERPL CALC-SCNC: 6 MMOL/L (ref 7–16)
BACTERIA SPEC CULT: NORMAL
BUN SERPL-MCNC: 9 MG/DL (ref 8–23)
CALCIUM SERPL-MCNC: 8.3 MG/DL (ref 8.3–10.4)
CHLORIDE SERPL-SCNC: 102 MMOL/L (ref 98–107)
CO2 SERPL-SCNC: 29 MMOL/L (ref 21–32)
CREAT SERPL-MCNC: 0.47 MG/DL (ref 0.8–1.5)
ERYTHROCYTE [DISTWIDTH] IN BLOOD BY AUTOMATED COUNT: 16.3 % (ref 11.9–14.6)
ERYTHROCYTE [DISTWIDTH] IN BLOOD BY AUTOMATED COUNT: 16.6 % (ref 11.9–14.6)
GLUCOSE SERPL-MCNC: 90 MG/DL (ref 65–100)
HCT VFR BLD AUTO: 32.2 % (ref 41.1–50.3)
HCT VFR BLD AUTO: 32.5 % (ref 41.1–50.3)
HGB BLD-MCNC: 10.8 G/DL (ref 13.6–17.2)
HGB BLD-MCNC: 11.1 G/DL (ref 13.6–17.2)
MCH RBC QN AUTO: 32.4 PG (ref 26.1–32.9)
MCH RBC QN AUTO: 33 PG (ref 26.1–32.9)
MCHC RBC AUTO-ENTMCNC: 33.5 G/DL (ref 31.4–35)
MCHC RBC AUTO-ENTMCNC: 34.2 G/DL (ref 31.4–35)
MCV RBC AUTO: 96.7 FL (ref 79.6–97.8)
MCV RBC AUTO: 96.7 FL (ref 79.6–97.8)
NRBC # BLD: 0 K/UL (ref 0–0.2)
NRBC # BLD: 0 K/UL (ref 0–0.2)
PLATELET # BLD AUTO: 184 K/UL (ref 150–450)
PLATELET # BLD AUTO: 214 K/UL (ref 150–450)
PMV BLD AUTO: 10.1 FL (ref 9.4–12.3)
PMV BLD AUTO: 10.6 FL (ref 9.4–12.3)
POTASSIUM SERPL-SCNC: 3.2 MMOL/L (ref 3.5–5.1)
RBC # BLD AUTO: 3.33 M/UL (ref 4.23–5.6)
RBC # BLD AUTO: 3.36 M/UL (ref 4.23–5.6)
SERVICE CMNT-IMP: NORMAL
SODIUM SERPL-SCNC: 137 MMOL/L (ref 136–145)
WBC # BLD AUTO: 12.4 K/UL (ref 4.3–11.1)
WBC # BLD AUTO: 14 K/UL (ref 4.3–11.1)

## 2019-10-15 PROCEDURE — 85027 COMPLETE CBC AUTOMATED: CPT

## 2019-10-15 PROCEDURE — 74011250636 HC RX REV CODE- 250/636: Performed by: NURSE PRACTITIONER

## 2019-10-15 PROCEDURE — 74011250637 HC RX REV CODE- 250/637: Performed by: FAMILY MEDICINE

## 2019-10-15 PROCEDURE — 97530 THERAPEUTIC ACTIVITIES: CPT

## 2019-10-15 PROCEDURE — 92610 EVALUATE SWALLOWING FUNCTION: CPT

## 2019-10-15 PROCEDURE — 74011636637 HC RX REV CODE- 636/637: Performed by: FAMILY MEDICINE

## 2019-10-15 PROCEDURE — 94760 N-INVAS EAR/PLS OXIMETRY 1: CPT

## 2019-10-15 PROCEDURE — 74011250636 HC RX REV CODE- 250/636: Performed by: FAMILY MEDICINE

## 2019-10-15 PROCEDURE — 94761 N-INVAS EAR/PLS OXIMETRY MLT: CPT

## 2019-10-15 PROCEDURE — 80048 BASIC METABOLIC PNL TOTAL CA: CPT

## 2019-10-15 PROCEDURE — 94640 AIRWAY INHALATION TREATMENT: CPT

## 2019-10-15 PROCEDURE — 74011250637 HC RX REV CODE- 250/637: Performed by: INTERNAL MEDICINE

## 2019-10-15 PROCEDURE — 36415 COLL VENOUS BLD VENIPUNCTURE: CPT

## 2019-10-15 PROCEDURE — 74011000250 HC RX REV CODE- 250: Performed by: INTERNAL MEDICINE

## 2019-10-15 PROCEDURE — 74011000258 HC RX REV CODE- 258: Performed by: NURSE PRACTITIONER

## 2019-10-15 PROCEDURE — 65660000000 HC RM CCU STEPDOWN

## 2019-10-15 PROCEDURE — 74011000258 HC RX REV CODE- 258: Performed by: FAMILY MEDICINE

## 2019-10-15 RX ORDER — PANTOPRAZOLE SODIUM 40 MG/1
40 TABLET, DELAYED RELEASE ORAL
Status: DISCONTINUED | OUTPATIENT
Start: 2019-10-15 | End: 2019-10-17 | Stop reason: HOSPADM

## 2019-10-15 RX ORDER — DIAZEPAM 5 MG/1
2.5 TABLET ORAL DAILY
Status: COMPLETED | OUTPATIENT
Start: 2019-10-15 | End: 2019-10-16

## 2019-10-15 RX ORDER — POTASSIUM CHLORIDE 20 MEQ/1
40 TABLET, EXTENDED RELEASE ORAL
Status: COMPLETED | OUTPATIENT
Start: 2019-10-15 | End: 2019-10-15

## 2019-10-15 RX ADMIN — PANTOPRAZOLE SODIUM 40 MG: 40 TABLET, DELAYED RELEASE ORAL at 05:50

## 2019-10-15 RX ADMIN — CEFEPIME HYDROCHLORIDE 2 G: 2 INJECTION, POWDER, FOR SOLUTION INTRAVENOUS at 14:59

## 2019-10-15 RX ADMIN — HYDROCODONE BITARTRATE AND HOMATROPINE METHYLBROMIDE 5 ML: 5; 1.5 SOLUTION ORAL at 00:03

## 2019-10-15 RX ADMIN — SODIUM CHLORIDE 30 MG/ML INHALATION SOLUTION 4 ML: 30 SOLUTION INHALANT at 19:51

## 2019-10-15 RX ADMIN — ALBUTEROL SULFATE 2.5 MG: 2.5 SOLUTION RESPIRATORY (INHALATION) at 14:20

## 2019-10-15 RX ADMIN — Medication 100 MG: at 10:02

## 2019-10-15 RX ADMIN — ALBUTEROL SULFATE 2.5 MG: 2.5 SOLUTION RESPIRATORY (INHALATION) at 07:51

## 2019-10-15 RX ADMIN — APIXABAN 5 MG: 5 TABLET, FILM COATED ORAL at 20:52

## 2019-10-15 RX ADMIN — HYDROCODONE BITARTRATE AND HOMATROPINE METHYLBROMIDE 5 ML: 5; 1.5 SOLUTION ORAL at 05:50

## 2019-10-15 RX ADMIN — ALBUTEROL SULFATE 2.5 MG: 2.5 SOLUTION RESPIRATORY (INHALATION) at 02:03

## 2019-10-15 RX ADMIN — PANTOPRAZOLE SODIUM 40 MG: 40 TABLET, DELAYED RELEASE ORAL at 18:25

## 2019-10-15 RX ADMIN — Medication 5 ML: at 15:00

## 2019-10-15 RX ADMIN — POTASSIUM CHLORIDE 40 MEQ: 20 TABLET, EXTENDED RELEASE ORAL at 10:03

## 2019-10-15 RX ADMIN — Medication 5 ML: at 21:13

## 2019-10-15 RX ADMIN — AMIODARONE HYDROCHLORIDE 200 MG: 200 TABLET ORAL at 20:52

## 2019-10-15 RX ADMIN — PREDNISONE 40 MG: 10 TABLET ORAL at 10:02

## 2019-10-15 RX ADMIN — HYDROCODONE BITARTRATE AND HOMATROPINE METHYLBROMIDE 5 ML: 5; 1.5 SOLUTION ORAL at 18:30

## 2019-10-15 RX ADMIN — AMIODARONE HYDROCHLORIDE 200 MG: 200 TABLET ORAL at 10:03

## 2019-10-15 RX ADMIN — ALBUTEROL SULFATE 2.5 MG: 2.5 SOLUTION RESPIRATORY (INHALATION) at 19:51

## 2019-10-15 RX ADMIN — MAGNESIUM GLUCONATE 500 MG ORAL TABLET 400 MG: 500 TABLET ORAL at 10:02

## 2019-10-15 RX ADMIN — Medication 5 ML: at 05:51

## 2019-10-15 RX ADMIN — SODIUM CHLORIDE 30 MG/ML INHALATION SOLUTION 4 ML: 30 SOLUTION INHALANT at 07:51

## 2019-10-15 RX ADMIN — PIPERACILLIN SODIUM AND TAZOBACTAM SODIUM 4.5 G: 4; .5 INJECTION, POWDER, LYOPHILIZED, FOR SOLUTION INTRAVENOUS at 05:50

## 2019-10-15 RX ADMIN — APIXABAN 5 MG: 5 TABLET, FILM COATED ORAL at 10:03

## 2019-10-15 RX ADMIN — MAGNESIUM GLUCONATE 500 MG ORAL TABLET 400 MG: 500 TABLET ORAL at 18:25

## 2019-10-15 RX ADMIN — CEFEPIME HYDROCHLORIDE 2 G: 2 INJECTION, POWDER, FOR SOLUTION INTRAVENOUS at 21:08

## 2019-10-15 RX ADMIN — DIAZEPAM 2.5 MG: 5 TABLET ORAL at 10:02

## 2019-10-15 NOTE — PROGRESS NOTES
SPEECH LANGUAGE PATHOLOGY: DYSPHAGIA- Initial Assessment and Discharge    NAME/AGE/GENDER: Delvin Freeman is a 59 y.o. male  DATE: 10/15/2019  PRIMARY DIAGNOSIS: Bacteremia [R78.81]  Bacteremia [R78.81]      ICD-10: Treatment Diagnosis: R13.12 Dysphagia, Oropharyngeal Phase    INTERDISCIPLINARY COLLABORATION: Registered Nurse  PRECAUTIONS/ALLERGIES: Patient has no known allergies. SUBJECTIVE   Alert, cooperative. States he is ready to go home. Denies history of dysphagia     Diet Prior to Evaluation: Regular/thin     History of Present Injury/Illness: Mr. Sonia Aguirre  has a past medical history of A-fib ([facility] (9/8/6379), Alcoholic hepatitis without ascites (5/11/2019), and Hypertension. He also has no past medical history of Aneurysm ([address] Arthritis, Asthma, Autoimmune disease ([address] CAD (coronary artery disease), Cancer ([address] Chronic kidney disease, Chronic obstructive pulmonary disease ([address] Chronic pain, Coagulation disorder ([address] Diabetes ([address] GERD (gastroesophageal reflux disease), Heart failure ([address] Ill-defined condition, Morbid obesity ([address] Psychiatric disorder, PUD (peptic ulcer disease), Seizures ([address] Stroke ([address] Thromboembolus ([address] Thyroid disease, or Unspecified sleep apnea. . He also  has no past surgical history on file. Previous Dysphagia: NONE REPORTED    Problem List:  (Impairments causing functional limitations):  1. Oropharyngeal dysphagia- No symptoms identified  2. Orientation:   Person  Place  Time  Situation     Pain: Pain Scale 1: Numeric (0 - 10)  Pain Intensity 1: 0         OBJECTIVE   Oral Motor Assessment:  · Labial: No impairment  · Dentition: Intact  · Oral Hygiene: Adequate  · Lingual: No impairment     Swallow assessment:   Patient presented with thin liquid via cup and straw, puree, mixed, and solid consistencies. Appropriate oral prep with all textures. Timely swallow initiation, and single swallows upon palpation. Adequate oral clearing.  No overt signs or symptoms of airway compromise observed with liquid or solid textures. ASSESSMENT   Patient presents with oropharyngeal swallow function that is within normal limits. No overt s/sx of dysphagia identified. Recommend continue regular diet/thin liquids. Medications whole with liquid wash. No further ST indicated at this time. Please consider re-consult if additional services are indicated at this time. Tool Used: Dysphagia Outcome and Severity Scale (RADHA)    Score Comments   Normal Diet  [] 7 With no strategies or extra time needed   Functional Swallow  [] 6 May have mild oral or pharyngeal delay   Mild Dysphagia  [] 5 Which may require one diet consistency restricted    Mild-Moderate Dysphagia  [] 4 With 1-2 diet consistencies restricted   Moderate Dysphagia  [] 3 With 2 or more diet consistencies restricted   Moderate-Severe Dysphagia  [] 2 With partial PO strategies (trials with ST only)   Severe Dysphagia  [] 1 With inability to tolerate any PO safely      Score:  Initial: 7 Most Recent: x (Date 10/15/19 )   Interpretation of Tool: The Dysphagia Outcome and Severity Scale (RADHA) is a simple, easy-to-use, 7-point scale developed to systematically rate the functional severity of dysphagia based on objective assessment and make recommendations for diet level, independence level, and type of nutrition. Current Medications:   No current facility-administered medications on file prior to encounter. Current Outpatient Medications on File Prior to Encounter   Medication Sig Dispense Refill    azithromycin (ZITHROMAX) 250 mg tablet Take two tablets today then one tablet daily 6 Tab 0    cefdinir (OMNICEF) 300 mg capsule Take 1 Cap by mouth two (2) times a day. 20 Cap 0    magnesium oxide (MAG-OX) 400 mg tablet Take 1 Tab by mouth daily. 7 Tab 0    famotidine (PEPCID) 20 mg tablet Take 1 Tab by mouth two (2) times a day.  60 Tab 0    folic acid (FOLVITE) 1 mg tablet Take 1 Tab by mouth daily. 30 Tab 0    sucralfate (CARAFATE) 1 gram tablet Take 1 Tab by mouth Before breakfast, lunch, dinner and at bedtime. Indications: Gastritis 120 Tab 0    thiamine HCL (B-1) 100 mg tablet Take 1 Tab by mouth daily. 30 Tab 0    nitroglycerin (NITROSTAT) 0.4 mg SL tablet 1 Tab by SubLINGual route every five (5) minutes as needed for Chest Pain. 1 Bottle 5    atorvastatin (LIPITOR) 40 mg tablet Take 1 Tab by mouth daily. 30 Tab 11    amLODIPine (NORVASC) 5 mg tablet Take 5 mg by mouth daily. PLAN    FREQUENCY/DURATION: No further speech therapy indicated at this time as oropharyngeal swallow function is within normal limits. - Recommendations for next treatment session: No additional speech therapy indicated at this time. REHABILITATION POTENTIAL FOR STATED GOALS: Excellent         RECOMMENDATIONS   DIET:    PO:  Regular   Liquids:  regular thin    MEDICATIONS: With liquid     ASPIRATION PRECAUTIONS  · Slow rate of intake  · Small bites/sips  · Upright at 90 degrees during meal     COMPENSATORY STRATEGIES/MODIFICATIONS  · None     EDUCATION:  · Recommendations discussed with Family  · Patient     RECOMMENDATIONS for CONTINUED SPEECH THERAPY:   No further speech therapy indicated at this time.        SAFETY:  After treatment position/precautions:  · RN notified  · Family at bedside  · Up on side of bed      Total Treatment Duration:   Time In: 1311  Time Out: [address] [facility] [address] [facility]

## 2019-10-15 NOTE — PROGRESS NOTES
Bedside and Verbal shift change report given to self (oncoming nurse) by Guillermo Vital (offgoing nurse). Report included the following information SBAR, Kardex, MAR and Recent Results.

## 2019-10-15 NOTE — PROGRESS NOTES
Verbal bedside report given to Nancy Galindo oncoming RN. Patient's situation, background, assessment and recommendations provided. Opportunity for questions provided. Oncoming RN assumed care of patient.

## 2019-10-15 NOTE — PROGRESS NOTES
Infectious Disease Progress Note    Today's Date: 10/15/2019   Admit Date: 10/11/2019    Impression:   · Pseudomonas aeruginosa bacteremia related to RML PNA  · Hx of Acinetobacter bacteremia related RLL PNA  · Alcohol abuse     Plan:   · Pseudomonas respiratory culture is intermediate to zosyn, susceptible to cefepime, meropenem and levofloxacin. · Start cefepime. Interactions between cipro and amiodarone. · Speech evaluation pending. · Discharge pending coordination of care. Patient goes to the [place] and is without private insurance. Anti-infectives:   · Zosyn (10/11-    Subjective: Interval History:   Tmax 100. WBCs 12.4k. Feeling better, has some cough this morning. Denies nausea, vomiting, diarrhea, fevers, chills, sweats. Would like to go home. No Known Allergies     Review of Systems:  A comprehensive review of systems was negative except for that written in the History of Present Illness. Objective:     Visit Vitals  /76 (BP 1 Location: Right arm, BP Patient Position: Sitting)   Pulse 96   Temp 100 °F (37.8 °C)   Resp 20   Ht 5' 10\" (1.778 m)   Wt 81.3 kg (179 lb 3.2 oz)   SpO2 95%   BMI 25.71 kg/m²     Temp (24hrs), Av.8 °F (37.1 °C), Min:97.9 °F (36.6 °C), Max:100 °F (37.8 °C)       Lines:  Peripheral IV:       Physical Exam:    General:  Alert, cooperative, no acute distress, appears stated age   Head:  atraumatic, normocephalic   Eyes:  Sclera anicteric. Pupils equally round and reactive to light. Mouth/Throat: Mucous membranes normal, oral pharynx clear   Neck: Supple, symmetrical, no JVD   Lungs:   Posterior lung fields with rhonchi/rubs and scattered wheezes, room air, no increased work of breathing   CV:  Irregular rate and rhythm, distant heart sounds   Abdomen:   Soft, non-tender.  bowel sounds normal. non-distended   Extremities: No cyanosis or edema   Skin: No rash or lesions   Lymph nodes: Cervical and supraclavicular normal   Musculoskeletal: No swelling or deformity Lines/Devices:  Intact, no erythema, drainage or tenderness   Psych: Alert, oriented, appropriate       Data Review:     CBC:  Recent Labs     10/15/19  0427 10/14/19  1935 10/14/19  0416  10/13/19  0623   WBC 12.4* 12.3* 12.4*   < > 12.6*   GRANS  --   --  66  --  75   MONOS  --   --  15*  --  10   EOS  --   --  0*  --  0*   ANEU  --   --  8.2  --  9.4*   ABL  --   --  2.1  --  1.8   HGB 10.8* 11.1* 10.9*   < > 11.6*   HCT 32.2* 32.2* 31.0*   < > 34.0*    172 154   < > 145*    < > = values in this interval not displayed.        BMP:  Recent Labs     10/15/19  0427 10/14/19  0416 10/13/19  0623   CREA 0.47* 0.46* 0.48*   BUN 9 8 10    140 137   K 3.2* 3.4* 3.1*    103 102   CO2 29 33* 30   AGAP 6* 4* 5*   GLU 90 114* 116*       LFTS:  Recent Labs     10/13/19  0623   TBILI 0.5   ALT 33   SGOT 83*   AP 56   TP 6.0*   ALB 1.8*       Microbiology:     All Micro Results     Procedure Component Value Units Date/Time    CULTURE, BLOOD [[id number]] Collected:  10/12/19 1509    Order Status:  Completed Specimen:  Blood Updated:  10/14/19 0648     Special Requests: --        RIGHT  HAND       Culture result: NO GROWTH 2 DAYS       CULTURE, BLOOD [[id number]] Collected:  10/12/19 1506    Order Status:  Completed Specimen:  Blood Updated:  10/14/19 0648     Special Requests: --        RIGHT  HAND       Culture result: NO GROWTH 2 DAYS       CULTURE, BLOOD [[id number]] Collected:  10/11/19 1028    Order Status:  Completed Specimen:  Blood Updated:  10/14/19 0648     Special Requests: --        LEFT  FOREARM       Culture result: NO GROWTH 3 DAYS       CULTURE, BLOOD [[id number]] Collected:  10/11/19 1438    Order Status:  Completed Specimen:  Blood Updated:  10/14/19 0648     Special Requests: --        RIGHT  HAND       Culture result: NO GROWTH 3 DAYS       CULTURE, RESPIRATORY/SPUTUM/BRONCH W GRAM STAIN [[id number]]  (Abnormal)  (Susceptibility) Collected:  10/11/19 1440    Order Status:  Completed Specimen: Sputum Updated:  10/13/19 5370     Special Requests: NO SPECIAL REQUESTS        GRAM STAIN 3 TO 45 WBC'S/OIF      0 TO 2 EPITHELIAL CELLS SEEN /OIF            MODERATE GRAM NEGATIVE RODS                  FEW GRAM POSITIVE COCCI IN PAIRS CHAINS AND CLUSTERS            FEW YEAST         4+ MUCUS PRESENT        Culture result:       MODERATE PSEUDOMONAS AERUGINOSA                  SCANT NORMAL RESPIRATORY MARZENA          MSSA/MRSA SC BY PCR, NASAL SWAB [[id number]] Collected:  10/11/19 1648    Order Status:  Completed Specimen:  Nasal swab Updated:  10/11/19 2038     Special Requests: NO SPECIAL REQUESTS        Culture result:       SA target not detected. A MRSA NEGATIVE, SA NEGATIVE test result does not preclude MRSA or SA nasal colonization. CULTURE, BLOOD [[id number]]     Order Status:  Canceled Specimen:  Blood           Imaging:   10/11/19 CXR   IMPRESSION: Right pneumonia.     Reviewed and none new  Signed By: Tarah Paris NP     October 15, 2019

## 2019-10-15 NOTE — PROGRESS NOTES
Oxygen Qualifier       Room air: SpO2 with O2 and liter flow   Resting SpO2 92% Not needed   Ambulating SpO2  88% 93% on 3 L       Completed by:    Lizeth Silvestre, RT

## 2019-10-15 NOTE — PROGRESS NOTES
Problem: Mobility Impaired (Adult and Pediatric)  Goal: *Acute Goals and Plan of Care (Insert Text)  Description  LTG:  (1.)Mr. Carolina will move from supine to sit and sit to supine , scoot up and down and roll side to side in bed with INDEPENDENCE within 7 treatment day(s). (2.)Mr. Carolina will transfer from bed to chair and chair to bed with MODIFIED INDEPENDENCE using the least restrictive device within 7 treatment day(s). (3.)Mr. Carolina will ambulate with MODIFIED INDEPENDENCE for 500 feet with the least restrictive device within 7 treatment day(s). (4.)Mr. Carolina will participate in therapeutic activity/exercises x 23 minutes for increased activity tolerance within 7 treatment days. ________________________________________________________________________________________________     Outcome: Progressing Towards Goal     PHYSICAL THERAPY: Daily Note, Discharge and PM 10/15/2019  INPATIENT: PT Visit Days : 2  Payor: Victorina Samples / Plan: [place] [employer] / Product Type: Federal Funded Programs /       NAME/AGE/GENDER: Amando Hernandez is a 59 y.o. male   PRIMARY DIAGNOSIS: Bacteremia [R78.81]  Bacteremia [R78.81] Pseudomonas septicemia ([facility]   Pseudomonas septicemia ([facility]         ICD-10: Treatment Diagnosis:    · Difficulty in walking, Not elsewhere classified (R26.2)  · History of falling (Z91.81)   Precaution/Allergies:  Patient has no known allergies. ASSESSMENT:     Mr. Yancy López was agreeable to PT today and eager to go home. He performed STS transfers with independence and good standing balance. Pt ambulated in rivera with Yared and numerous standing breaks taken to measure SpO2. Pt noted to have SpO2 above 90% throughout with exception of post ambulatory SpO2 of 89%. Pt recovered quickly with rest to 94% on room air. Mr. Yancy López has met 3/4 goals and is functioning close to his baseline. Will discharge from PT caseload at this time.       This section established at most recent assessment   PROBLEM LIST (Impairments causing functional limitations):  1. Decreased Strength  2. Decreased Ambulation Ability/Technique  3. Decreased Balance  4. Decreased Activity Tolerance  5. Increased Shortness of Breath   INTERVENTIONS PLANNED: (Benefits and precautions of physical therapy have been discussed with the patient.)  1. Balance Exercise  2. Bed Mobility  3. Family Education  4. Gait Training  5. Therapeutic Activites  6. Therapeutic Exercise/Strengthening  7. Transfer Training     TREATMENT PLAN: Frequency/Duration: 3 times a week for duration of hospital stay  Rehabilitation Potential For Stated Goals: Good     REHAB RECOMMENDATIONS (at time of discharge pending progress):    Placement: It is my opinion, based on this patient's performance to date, that Mr. Mary Ling may benefit from being discharged with NO further skilled therapy due to the high likelihood of returning to baseline. Equipment:    None at this time              HISTORY:   History of Present Injury/Illness (Reason for Referral):  CAP  Past Medical History/Comorbidities:   Mr. Mary Ling  has a past medical history of A-fib ([address] (2/2/0104), Alcoholic hepatitis without ascites (5/11/2019), and Hypertension. He also has no past medical history of Aneurysm ([address] Arthritis, Asthma, Autoimmune disease ([address] CAD (coronary artery disease), Cancer ([address] Chronic kidney disease, Chronic obstructive pulmonary disease ([address] Chronic pain, Coagulation disorder ([address] Diabetes ([address] GERD (gastroesophageal reflux disease), Heart failure ([address] Ill-defined condition, Morbid obesity ([address] Psychiatric disorder, PUD (peptic ulcer disease), Seizures ([address] Stroke ([address] Thromboembolus ([address] Thyroid disease, or Unspecified sleep apnea. Mr. Mary Ling  has no past surgical history on file.   Social History/Living Environment:   Home Environment: Private residence  # Steps to Enter: 2  Rails to Enter: Yes  [employer] : Bilateral  One/Two Story Residence: One story  Living Alone: No  Support Systems: Family member(s)  Patient Expects to be Discharged to[de-identified] Private residence  Current DME Used/Available at Home: None  Tub or Shower Type: Tub/Shower combination  Prior Level of Function/Work/Activity:  Lives with wife and PTA pt was independent with ADLs and ambulation. One recent fall. Number of Personal Factors/Comorbidities that affect the Plan of Care: 1-2: MODERATE COMPLEXITY   EXAMINATION:   Most Recent Physical Functioning:   Gross Assessment:  AROM: Within functional limits  Strength: Generally decreased, functional(B hip flexion 4-/5)               Posture:     Balance:  Sitting: Intact  Standing: Intact Bed Mobility:     Wheelchair Mobility:     Transfers:  Sit to Stand: Independent  Stand to Sit: Independent  Gait:     Speed/Nelly: Slow  Gait Abnormalities: Trunk sway increased  Distance (ft): 500 Feet (ft)  Assistive Device: Other (comment)(none)  Ambulation - Level of Assistance: Modified independent      Body Structures Involved:  1. Heart  2. Lungs  3. Muscles Body Functions Affected:  1. Cardio  2. Respiratory  3. Neuromusculoskeletal  4. Movement Related Activities and Participation Affected:  1. Mobility  2. Domestic Life  3. Community, Social and [place]   Number of elements that affect the Plan of Care: 4+: HIGH COMPLEXITY   CLINICAL PRESENTATION:   Presentation: Stable and uncomplicated: LOW COMPLEXITY   CLINICAL DECISION MAKIN [facility] Mobility Inpatient Short Form  How much difficulty does the patient currently have. .. Unable A Lot A Little None   1. Turning over in bed (including adjusting bedclothes, sheets and blankets)? ? 1   ? 2   ? 3   ? 4   2. Sitting down on and standing up from a chair with arms ( e.g., wheelchair, bedside commode, etc.)   ? 1   ? 2   ? 3   ? 4   3.   Moving from lying on back to sitting on the side of the bed?   ? 1   ? 2   ? 3   ? 4 How much help from another person does the patient currently need. .. Total A Lot A Little None   4. Moving to and from a bed to a chair (including a wheelchair)? ? 1   ? 2   ? 3   ? 4   5. Need to walk in hospital room? ? 1   ? 2   ? 3   ? 4   6. Climbing 3-5 steps with a railing? ? 1   ? 2   ? 3   ? 4   © 2007, Trustees of [address] [place] [address] [address] [id number], under license to [employer]. All rights reserved      Score:  Initial: 22 Most Recent: X (Date: -- )    Interpretation of Tool:  Represents activities that are increasingly more difficult (i.e. Bed mobility, Transfers, Gait). Medical Necessity:     · Patient demonstrates good  ·  rehab potential due to higher previous functional level. Reason for Services/Other Comments:  · Patient continues to require skilled intervention due to decreased activity tolerance and balance. · .   Use of outcome tool(s) and clinical judgement create a POC that gives a: Clear prediction of patient's progress: LOW COMPLEXITY            TREATMENT:   (In addition to Assessment/Re-Assessment sessions the following treatments were rendered)   Pre-treatment Symptoms/Complaints: \"Ready to go home\"  Pain: Initial:   Pain Intensity 1: 0  Post Session:  0     Therapeutic Activity: (    8 minutes): Therapeutic activities including Ambulation on level ground and STS transfers to improve mobility, balance and actiivty tolerance. Required minimal cuing   to promote activity pacing with exertion. Date:  10/14/19 Date:   Date:     ACTIVITY/EXERCISE AM PM AM PM AM PM   Seated LAQ 2 x 10 B        Seated marching 1 x 10 B  1 x 15 B        Seated AP 2 x 20 B        Seated hip abd/add 2 x 10 B                                   B = bilateral; AA = active assistive; A = active; P = passive        Braces/Orthotics/Lines/Etc:   · O2 Device: Room air  Treatment/Session Assessment:    · Response to Treatment:  See above.   · Interdisciplinary Collaboration:   o Physical Therapist  o Registered Nurse  · After treatment position/precautions:   o Bed/Chair-wheels locked  o Bed in low position  o Call light within reach  o Family at bedside  o Sitting EOB   Recommendations/Intent for next treatment session:  Will discharge pt from caseload at this time as he appears to be functioning close to his baseline.   Total Treatment Duration:  PT Patient Time In/Time Out  Time In: 1542  Time Out: [address] [facility],[address] Bill PT, DPT

## 2019-10-15 NOTE — PROGRESS NOTES
[facility] CARDIOLOGY PROGRESS NOTE           10/15/2019 10:55 AM    Admit Date: 10/11/2019      Subjective: In SR. Still some cough but improved. ROS:  Cardiovascular:  As noted above    Objective:      Vitals:    10/15/19 0541 10/15/19 0751 10/15/19 0820 10/15/19 1016   BP: 148/80  143/76    Pulse: 86  96    Resp: 18  20    Temp: 98.5 °F (36.9 °C)  100 °F (37.8 °C) 99 °F (37.2 °C)   SpO2: 90% 95% 95%    Weight: 81.3 kg (179 lb 3.2 oz)      Height:           Physical Exam:  General-No Acute Distress  Neck- supple, no JVD  CV- regular rate and rhythm no MRG  Lung- rt basilar rales  Abd- soft, nontender, nondistended  Ext- no edema bilaterally. Skin- warm and dry    Data Review:   Recent Labs     10/15/19  0427 10/14/19  1935 10/14/19  0416  10/13/19  0623     --  140  --  137   K 3.2*  --  3.4*  --  3.1*   MG  --   --  1.8  --  1.9   BUN 9  --  8  --  10   CREA 0.47*  --  0.46*  --  0.48*   GLU 90  --  114*  --  116*   WBC 12.4* 12.3* 12.4*   < > 12.6*   HGB 10.8* 11.1* 10.9*   < > 11.6*   HCT 32.2* 32.2* 31.0*   < > 34.0*    172 154   < > 145*    < > = values in this interval not displayed. Assessment/Plan:     Principal Problem:    Pseudomonas septicemia ([facility] [address] (10/11/2019)    Active Problems:    HTN (hypertension) (11/19/2014)      Tobacco abuse (11/28/2016)      ETOH abuse (11/28/2016)      Tachycardia (5/5/2019)      Thrombocytopenia ([address] (5/5/2019)      A-fib ([address] (5/5/2019)      Hypokalemia (5/10/2019)      Right middle lobe pneumonia ([facility] [address] (10/11/2019)    AF likely triggered in the setting of PNA/bacteremia/hypomag/hypokalemia (would not be indicative of long term recurrence); back in SR on po amiodarone. Not a good amiodarone candidate long term and can plan stopping as outpt. Will need short term AC with chemical cardioversion (4 weeks). CHADVASc score of 1  Hx of alcohol abuse and stressed moderation/cessation.    Abx for RML PNA/bacteremia per ID    Bhumi Sawyer MD  10/15/2019 10:55 AM

## 2019-10-15 NOTE — PROGRESS NOTES
Hospitalist Note     Admit Date:  10/11/2019 10:50 AM   Name:  Joni Cooley   Age:  59 y.o.  :  1960   MRN:  [id number]   PCP:  Holli Raines MD  Treatment Team: Attending Provider: Tin Padilla MD; Consulting Provider: Katie Fournier; Consulting Provider: Ashley Garcia MD; Care Manager: Luis Manuel Delacruz; Physical Therapist: Susi Browne DPT; Consulting Provider: Andrea Bishop MD    HPI/Subjective:     Mr. Eyal Calhoun is a 60 yo male with PMH of polysubstance use, AFIB admitted with RML pneumonia and found to have pseudomonas aeruginosa bacteremia. He has been managed with zosyn. Repeat BC 10-12-19 NGTD. Sputum cx shows intermediate sensitivity to zosyn. ID following. AFIB has been managed with IV heparin/esmolol then eliquis/ amiodarone. He is being followed by cardiology. He is on valium taper  for ETOH use history and no ryan withdrawals noted. Discharge plans are to home once improved     10-15-19 wants to go home, some shortness of breath and cough with sputum, ate ok, had BM, no shakes      Objective:     Patient Vitals for the past 24 hrs:   Temp Pulse Resp BP SpO2   10/15/19 1137     93 %   10/15/19 1016 99 °F (37.2 °C)       10/15/19 0820 100 °F (37.8 °C) 96 20 143/76 95 %   10/15/19 0751     95 %   10/15/19 0541 98.5 °F (36.9 °C) 86 18 148/80 90 %   10/15/19 0204     93 %   10/15/19 0052 97.9 °F (36.6 °C) 76 18 134/74 95 %   10/14/19 2127 98.7 °F (37.1 °C) 90 19 125/69 94 %   10/14/19 2022     93 %   10/14/19 1657 98.8 °F (37.1 °C) 89 18 133/77 94 %   10/14/19 1415     93 %     Oxygen Therapy  O2 Sat (%): 93 % (10/15/19 1137)  Pulse via Oximetry: 96 beats per minute (10/15/19 0751)  O2 Device: Room air (10/15/19 0820)  O2 Flow Rate (L/min): 2 l/min (10/12/19 1543)    Estimated body mass index is 25.71 kg/m² as calculated from the following:    Height as of this encounter: 5' 10\" (1.778 m).     Weight as of this encounter: 81.3 kg (179 lb 3.2 oz). Intake/Output Summary (Last 24 hours) at 10/15/2019 1333  Last data filed at 10/15/2019 3331  Gross per 24 hour   Intake 240 ml   Output 2980 ml   Net -2740 ml       *Note that automatically entered I/Os may not be accurate; dependent on patient compliance with collection and accurate  by techs. General:    Well nourished. Alert. No distress   CV:   Regular rate and rhythm, trace edema  Lungs:   Rhonchi right lung/ mild wheezing  Abdomen:   Soft, nontender, nondistended. Decreased BS  Extremities: Warm and dry  Skin:     No rashes or jaundice.    Neuro:  No gross focal deficits    Data Review:  I have reviewed all labs, meds, and studies from the last 24 hours:    Recent Results (from the past 24 hour(s))   PLEASE READ & DOCUMENT PPD TEST IN 72 HRS    Collection Time: 10/14/19  4:00 PM   Result Value Ref Range    PPD Negative Negative    mm Induration 0 0 - 5 mm   CBC W/O DIFF    Collection Time: 10/14/19  7:35 PM   Result Value Ref Range    WBC 12.3 (H) 4.3 - 11.1 K/uL    RBC 3.34 (L) 4.23 - 5.6 M/uL    HGB 11.1 (L) 13.6 - 17.2 g/dL    HCT 32.2 (L) 41.1 - 50.3 %    MCV 96.4 79.6 - 97.8 FL    MCH 33.2 (H) 26.1 - 32.9 PG    MCHC 34.5 31.4 - 35.0 g/dL    RDW 16.2 (H) 11.9 - 14.6 %    PLATELET 522 862 - 811 K/uL    MPV 10.7 9.4 - 12.3 FL    ABSOLUTE NRBC 0.00 0.0 - 0.2 K/uL   METABOLIC PANEL, BASIC    Collection Time: 10/15/19  4:27 AM   Result Value Ref Range    Sodium 137 136 - 145 mmol/L    Potassium 3.2 (L) 3.5 - 5.1 mmol/L    Chloride 102 98 - 107 mmol/L    CO2 29 21 - 32 mmol/L    Anion gap 6 (L) 7 - 16 mmol/L    Glucose 90 65 - 100 mg/dL    BUN 9 8 - 23 MG/DL    Creatinine 0.47 (L) 0.8 - 1.5 MG/DL    GFR est AA >60 >60 ml/min/1.73m2    GFR est non-AA >60 >60 ml/min/1.73m2    Calcium 8.3 8.3 - 10.4 MG/DL   CBC W/O DIFF    Collection Time: 10/15/19  4:27 AM   Result Value Ref Range    WBC 12.4 (H) 4.3 - 11.1 K/uL    RBC 3.33 (L) 4.23 - 5.6 M/uL    HGB 10.8 (L) 13.6 - 17.2 g/dL    HCT 32.2 (L) 41.1 - 50.3 %    MCV 96.7 79.6 - 97.8 FL    MCH 32.4 26.1 - 32.9 PG    MCHC 33.5 31.4 - 35.0 g/dL    RDW 16.6 (H) 11.9 - 14.6 %    PLATELET 610 978 - 607 K/uL    MPV 10.6 9.4 - 12.3 FL    ABSOLUTE NRBC 0.00 0.0 - 0.2 K/uL        All Micro Results     Procedure Component Value Units Date/Time    CULTURE, BLOOD [[id number]] Collected:  10/12/19 1506    Order Status:  Completed Specimen:  Blood Updated:  10/15/19 1139     Special Requests: --        RIGHT  HAND       Culture result: NO GROWTH 3 DAYS       CULTURE, BLOOD [[id number]] Collected:  10/12/19 1509    Order Status:  Completed Specimen:  Blood Updated:  10/15/19 1139     Special Requests: --        RIGHT  HAND       Culture result: NO GROWTH 3 DAYS       CULTURE, BLOOD [[id number]] Collected:  10/11/19 1438    Order Status:  Completed Specimen:  Blood Updated:  10/15/19 1139     Special Requests: --        RIGHT  HAND       Culture result: NO GROWTH 4 DAYS       CULTURE, BLOOD [[id number]] Collected:  10/11/19 1028    Order Status:  Completed Specimen:  Blood Updated:  10/15/19 1139     Special Requests: --        LEFT  FOREARM       Culture result: NO GROWTH 4 DAYS       CULTURE, RESPIRATORY/SPUTUM/BRONCH W GRAM STAIN [[id number]]  (Abnormal)  (Susceptibility) Collected:  10/11/19 1441    Order Status:  Completed Specimen:  Sputum Updated:  10/13/19 0094     Special Requests: NO SPECIAL REQUESTS        GRAM STAIN 3 TO 45 WBC'S/OIF      0 TO 2 EPITHELIAL CELLS SEEN /OIF            MODERATE GRAM NEGATIVE RODS                  FEW GRAM POSITIVE COCCI IN PAIRS CHAINS AND CLUSTERS            FEW YEAST         4+ MUCUS PRESENT        Culture result:       MODERATE PSEUDOMONAS AERUGINOSA                  SCANT NORMAL RESPIRATORY MARZENA          MSSA/MRSA SC BY PCR, NASAL SWAB [[id number]] Collected:  10/11/19 1648    Order Status:  Completed Specimen:  Nasal swab Updated:  10/11/19 2038     Special Requests: NO SPECIAL REQUESTS        Culture result:       SA target not detected. A MRSA NEGATIVE, SA NEGATIVE test result does not preclude MRSA or SA nasal colonization. CULTURE, BLOOD [[id number]]     Order Status:  Canceled Specimen:  Blood           No results found for this visit on 10/11/19. Current Meds:  Current Facility-Administered Medications   Medication Dose Route Frequency    diazePAM (VALIUM) tablet 2.5 mg  2.5 mg Oral DAILY    cefepime (MAXIPIME) 2 g in 0.9% sodium chloride (MBP/ADV) 100 mL  2 g IntraVENous Q8H    pantoprazole (PROTONIX) tablet 40 mg  40 mg Oral ACB&D    magnesium oxide (MAG-OX) tablet 400 mg  400 mg Oral BID    HYDROcodone-homatropine (HYCODAN) 5-1.5 mg/5 mL (5 mL) syrup 5 mL  5 mL Oral Q4H PRN    albuterol (PROVENTIL VENTOLIN) nebulizer solution 2.5 mg  2.5 mg Nebulization Q6H RT    apixaban (ELIQUIS) tablet 5 mg  5 mg Oral Q12H    sodium chloride 3% hypertonic nebulizer soln  4 mL Nebulization BID RT    amiodarone (CORDARONE) tablet 200 mg  200 mg Oral BID    morphine injection 2 mg  2 mg IntraVENous Q4H PRN    HYDROcodone-acetaminophen (NORCO) 5-325 mg per tablet 1 Tab  1 Tab Oral Q6H PRN    acetaminophen (TYLENOL) tablet 650 mg  650 mg Oral Q6H PRN    sodium chloride (NS) flush 5-40 mL  5-40 mL IntraVENous Q8H    sodium chloride (NS) flush 5-40 mL  5-40 mL IntraVENous PRN    acetaminophen (TYLENOL) tablet 1,000 mg  1,000 mg Oral Q6H PRN    ondansetron (ZOFRAN) injection 4 mg  4 mg IntraVENous Q4H PRN    nicotine (NICODERM CQ) 21 mg/24 hr patch 1 Patch  1 Patch TransDERmal Q24H    predniSONE (DELTASONE) tablet 40 mg  40 mg Oral DAILY WITH BREAKFAST    thiamine HCL (B-1) tablet 100 mg  100 mg Oral DAILY    LORazepam (ATIVAN) injection 1 mg  1 mg IntraVENous Q1H PRN       Other Studies (last 24 hours):  No results found.     Assessment and Plan:     Hospital Problems as of 10/15/2019 Never Reviewed          Codes Class Noted - Resolved POA    * (Principal) Pseudomonas septicemia ([facility] ICD-10-CM: A41.52  ICD-9-CM: 038.43, 995.91  10/11/2019 - Present         Right middle lobe pneumonia ([facility] ICD-10-CM: J18.1  ICD-9-CM: 271  10/11/2019 - Present Unknown        Hypokalemia ICD-10-CM: E87.6  ICD-9-CM: 276.8  5/10/2019 - Present Yes        Tachycardia ICD-10-CM: R00.0  ICD-9-CM: 785.0  5/5/2019 - Present Yes        Thrombocytopenia ([facility] ICD-10-CM: D69.6  ICD-9-CM: 287.5  5/5/2019 - Present Yes        A-fib ([facility] ICD-10-CM: I48.91  ICD-9-CM: 427.31  5/5/2019 - Present Yes        Tobacco abuse ICD-10-CM: Z72.0  ICD-9-CM: 305.1  11/28/2016 - Present Yes        ETOH abuse ICD-10-CM: F10.10  ICD-9-CM: 305.00  11/28/2016 - Present Yes        HTN (hypertension) (Chronic) ICD-10-CM: I10  ICD-9-CM: 401.9  11/19/2014 - Present Yes              Plan:  · RML pneumonia and pseudomonas bacteremia: discussed antibiotics with lD Dr. Kayla Dawkins since is also on amiodarone per cardiology would not be good candidate for cipro, ID starting maxipime, on oral steroid taper, conitnue prn albuterol and hycodan  · Tobacco use/COPD: oral steroid burst, prn nebs,  needs cessation, and outpatient PFTS, on room air  · ETOH abuse: needs cessation, monitor for withdrawals , wean valium, has prn ativan, continued thiamine   · Hypokalemia and hypomagnesemia: replace and repeat labs  · AFIB:  continue eliquis and oral amiodarone   · Edema: stopped IVF, ambulate with PT    DC planning/Dispo:  Pending to home, PT/OT      Diet:  DIET CARDIAC  DVT ppx:  eliquis     Signed:  Brooke Mackenzie MD

## 2019-10-15 NOTE — PROGRESS NOTES
Problem: Falls - Risk of  Goal: *Absence of Falls  Description  Document Valentina Chaya Fall Risk and appropriate interventions in the flowsheet. Outcome: Progressing Towards Goal  Note:   Fall Risk Interventions:            Medication Interventions: Patient to call before getting OOB, Teach patient to arise slowly                   Problem: Patient Education: Go to Patient Education Activity  Goal: Patient/Family Education  Outcome: Progressing Towards Goal     Problem: Pressure Injury - Risk of  Goal: *Prevention of pressure injury  Description  Document Dom Scale and appropriate interventions in the flowsheet. Outcome: Progressing Towards Goal  Note:   Pressure Injury Interventions:             Activity Interventions: Increase time out of bed, Pressure redistribution bed/mattress(bed type)    Mobility Interventions: Pressure redistribution bed/mattress (bed type)    Nutrition Interventions: Document food/fluid/supplement intake                     Problem: Patient Education: Go to Patient Education Activity  Goal: Patient/Family Education  Outcome: Progressing Towards Goal

## 2019-10-16 LAB
ANION GAP SERPL CALC-SCNC: 5 MMOL/L (ref 7–16)
BACTERIA SPEC CULT: NORMAL
BACTERIA SPEC CULT: NORMAL
BUN SERPL-MCNC: 10 MG/DL (ref 8–23)
CALCIUM SERPL-MCNC: 8.7 MG/DL (ref 8.3–10.4)
CHLORIDE SERPL-SCNC: 103 MMOL/L (ref 98–107)
CO2 SERPL-SCNC: 30 MMOL/L (ref 21–32)
CREAT SERPL-MCNC: 0.48 MG/DL (ref 0.8–1.5)
ERYTHROCYTE [DISTWIDTH] IN BLOOD BY AUTOMATED COUNT: 16.2 % (ref 11.9–14.6)
ERYTHROCYTE [DISTWIDTH] IN BLOOD BY AUTOMATED COUNT: 16.4 % (ref 11.9–14.6)
GLUCOSE SERPL-MCNC: 93 MG/DL (ref 65–100)
HCT VFR BLD AUTO: 32.8 % (ref 41.1–50.3)
HCT VFR BLD AUTO: 33.7 % (ref 41.1–50.3)
HGB BLD-MCNC: 11.2 G/DL (ref 13.6–17.2)
HGB BLD-MCNC: 11.3 G/DL (ref 13.6–17.2)
MCH RBC QN AUTO: 33 PG (ref 26.1–32.9)
MCH RBC QN AUTO: 33.3 PG (ref 26.1–32.9)
MCHC RBC AUTO-ENTMCNC: 33.5 G/DL (ref 31.4–35)
MCHC RBC AUTO-ENTMCNC: 34.1 G/DL (ref 31.4–35)
MCV RBC AUTO: 97.6 FL (ref 79.6–97.8)
MCV RBC AUTO: 98.5 FL (ref 79.6–97.8)
NRBC # BLD: 0 K/UL (ref 0–0.2)
NRBC # BLD: 0 K/UL (ref 0–0.2)
PLATELET # BLD AUTO: 231 K/UL (ref 150–450)
PLATELET # BLD AUTO: 259 K/UL (ref 150–450)
PMV BLD AUTO: 10.1 FL (ref 9.4–12.3)
PMV BLD AUTO: 9.7 FL (ref 9.4–12.3)
POTASSIUM SERPL-SCNC: 3.5 MMOL/L (ref 3.5–5.1)
RBC # BLD AUTO: 3.36 M/UL (ref 4.23–5.6)
RBC # BLD AUTO: 3.42 M/UL (ref 4.23–5.6)
SERVICE CMNT-IMP: NORMAL
SERVICE CMNT-IMP: NORMAL
SODIUM SERPL-SCNC: 138 MMOL/L (ref 136–145)
WBC # BLD AUTO: 13.9 K/UL (ref 4.3–11.1)
WBC # BLD AUTO: 14.2 K/UL (ref 4.3–11.1)

## 2019-10-16 PROCEDURE — 85027 COMPLETE CBC AUTOMATED: CPT

## 2019-10-16 PROCEDURE — 36415 COLL VENOUS BLD VENIPUNCTURE: CPT

## 2019-10-16 PROCEDURE — 65660000000 HC RM CCU STEPDOWN

## 2019-10-16 PROCEDURE — 74011250637 HC RX REV CODE- 250/637: Performed by: FAMILY MEDICINE

## 2019-10-16 PROCEDURE — 74011250637 HC RX REV CODE- 250/637: Performed by: NURSE PRACTITIONER

## 2019-10-16 PROCEDURE — 74011250636 HC RX REV CODE- 250/636: Performed by: INTERNAL MEDICINE

## 2019-10-16 PROCEDURE — 74011000250 HC RX REV CODE- 250: Performed by: INTERNAL MEDICINE

## 2019-10-16 PROCEDURE — 74011250637 HC RX REV CODE- 250/637: Performed by: INTERNAL MEDICINE

## 2019-10-16 PROCEDURE — 74011250636 HC RX REV CODE- 250/636: Performed by: NURSE PRACTITIONER

## 2019-10-16 PROCEDURE — 80048 BASIC METABOLIC PNL TOTAL CA: CPT

## 2019-10-16 PROCEDURE — 74011636637 HC RX REV CODE- 636/637: Performed by: FAMILY MEDICINE

## 2019-10-16 PROCEDURE — 74011000258 HC RX REV CODE- 258: Performed by: NURSE PRACTITIONER

## 2019-10-16 PROCEDURE — 94760 N-INVAS EAR/PLS OXIMETRY 1: CPT

## 2019-10-16 PROCEDURE — 94640 AIRWAY INHALATION TREATMENT: CPT

## 2019-10-16 RX ORDER — POTASSIUM CHLORIDE 20 MEQ/1
40 TABLET, EXTENDED RELEASE ORAL
Status: COMPLETED | OUTPATIENT
Start: 2019-10-16 | End: 2019-10-16

## 2019-10-16 RX ORDER — DILTIAZEM HYDROCHLORIDE 240 MG/1
240 CAPSULE, COATED, EXTENDED RELEASE ORAL DAILY
Status: DISCONTINUED | OUTPATIENT
Start: 2019-10-16 | End: 2019-10-17 | Stop reason: HOSPADM

## 2019-10-16 RX ORDER — DILTIAZEM HYDROCHLORIDE 240 MG/1
240 CAPSULE, COATED, EXTENDED RELEASE ORAL DAILY
Status: DISCONTINUED | OUTPATIENT
Start: 2019-10-17 | End: 2019-10-16

## 2019-10-16 RX ORDER — KETOROLAC TROMETHAMINE 30 MG/ML
15 INJECTION, SOLUTION INTRAMUSCULAR; INTRAVENOUS
Status: DISCONTINUED | OUTPATIENT
Start: 2019-10-16 | End: 2019-10-17 | Stop reason: HOSPADM

## 2019-10-16 RX ADMIN — APIXABAN 5 MG: 5 TABLET, FILM COATED ORAL at 08:34

## 2019-10-16 RX ADMIN — MAGNESIUM GLUCONATE 500 MG ORAL TABLET 400 MG: 500 TABLET ORAL at 08:34

## 2019-10-16 RX ADMIN — POTASSIUM CHLORIDE 40 MEQ: 20 TABLET, EXTENDED RELEASE ORAL at 11:22

## 2019-10-16 RX ADMIN — AMIODARONE HYDROCHLORIDE 200 MG: 200 TABLET ORAL at 08:36

## 2019-10-16 RX ADMIN — ALBUTEROL SULFATE 2.5 MG: 2.5 SOLUTION RESPIRATORY (INHALATION) at 01:06

## 2019-10-16 RX ADMIN — PANTOPRAZOLE SODIUM 40 MG: 40 TABLET, DELAYED RELEASE ORAL at 08:33

## 2019-10-16 RX ADMIN — DILTIAZEM HYDROCHLORIDE 240 MG: 240 CAPSULE, COATED, EXTENDED RELEASE ORAL at 14:58

## 2019-10-16 RX ADMIN — Medication 5 ML: at 21:36

## 2019-10-16 RX ADMIN — Medication 5 ML: at 06:00

## 2019-10-16 RX ADMIN — SODIUM CHLORIDE 30 MG/ML INHALATION SOLUTION 4 ML: 30 SOLUTION INHALANT at 20:27

## 2019-10-16 RX ADMIN — MAGNESIUM GLUCONATE 500 MG ORAL TABLET 400 MG: 500 TABLET ORAL at 16:52

## 2019-10-16 RX ADMIN — HYDROCODONE BITARTRATE AND HOMATROPINE METHYLBROMIDE 5 ML: 5; 1.5 SOLUTION ORAL at 05:45

## 2019-10-16 RX ADMIN — CIPROFLOXACIN HYDROCHLORIDE 750 MG: 500 TABLET, FILM COATED ORAL at 13:29

## 2019-10-16 RX ADMIN — CIPROFLOXACIN HYDROCHLORIDE 750 MG: 500 TABLET, FILM COATED ORAL at 21:35

## 2019-10-16 RX ADMIN — ALBUTEROL SULFATE 2.5 MG: 2.5 SOLUTION RESPIRATORY (INHALATION) at 14:35

## 2019-10-16 RX ADMIN — APIXABAN 5 MG: 5 TABLET, FILM COATED ORAL at 20:26

## 2019-10-16 RX ADMIN — Medication 100 MG: at 08:35

## 2019-10-16 RX ADMIN — PREDNISONE 40 MG: 10 TABLET ORAL at 08:33

## 2019-10-16 RX ADMIN — ALBUTEROL SULFATE 2.5 MG: 2.5 SOLUTION RESPIRATORY (INHALATION) at 20:26

## 2019-10-16 RX ADMIN — KETOROLAC TROMETHAMINE 15 MG: 30 INJECTION, SOLUTION INTRAMUSCULAR at 21:36

## 2019-10-16 RX ADMIN — Medication 10 ML: at 13:30

## 2019-10-16 RX ADMIN — PANTOPRAZOLE SODIUM 40 MG: 40 TABLET, DELAYED RELEASE ORAL at 16:52

## 2019-10-16 RX ADMIN — DIAZEPAM 2.5 MG: 5 TABLET ORAL at 08:34

## 2019-10-16 RX ADMIN — CEFEPIME HYDROCHLORIDE 2 G: 2 INJECTION, POWDER, FOR SOLUTION INTRAVENOUS at 05:46

## 2019-10-16 RX ADMIN — ALBUTEROL SULFATE 2.5 MG: 2.5 SOLUTION RESPIRATORY (INHALATION) at 10:13

## 2019-10-16 RX ADMIN — SODIUM CHLORIDE 30 MG/ML INHALATION SOLUTION 1 ML: 30 SOLUTION INHALANT at 10:13

## 2019-10-16 NOTE — PROGRESS NOTES
Infectious Disease Progress Note    Today's Date: 10/16/2019   Admit Date: 10/11/2019    Impression:   · Pseudomonas aeruginosa bacteremia related to RML PNA  · Hx of Acinetobacter bacteremia related RLL PNA  · Alcohol abuse     Plan:   · Will discontinue cefepime and start oral ciprofloxacin to complete 14 day course, EOT 10/25/19. Discussed  cipro from chelating agents. He has expressed understanding. · No ID follow up will be needed. Anti-infectives:   · Zosyn (10/11-    Subjective: Interval History:   Speech evaluation negative. Discharge is planned for later today. Cardiology has discontinued amiodarone. He is afebrile. Still with cough. Reports that his phlegm has begun to change in character and color, and that he is starting to feel better. Denies nausea, vomiting, diarrhea, fevers, chills, sweats. No Known Allergies     Review of Systems:  A comprehensive review of systems was negative except for that written in the History of Present Illness.     Objective:     Visit Vitals  /78 (BP 1 Location: Right arm, BP Patient Position: At rest)   Pulse 88   Temp 98.5 °F (36.9 °C)   Resp 18   Ht 5' 10\" (1.778 m)   Wt 78.3 kg (172 lb 11.2 oz)   SpO2 96%   BMI 24.78 kg/m²     Temp (24hrs), Av.6 °F (37 °C), Min:97.9 °F (36.6 °C), Max:98.8 °F (37.1 °C)       Lines:  Peripheral IV:       Physical Exam:    General:  Alert, cooperative, no acute distress, appears stated age   Head:  atraumatic, normocephalic   Eyes:  Anicteric sclerae, not injected, no drainage   Mouth/Throat: Moist    Neck: Supple, symmetrical, no JVD   Lungs:   Still with rhonchi, scattered wheezes, cough   CV:  RRR without audible murmur   Abdomen:   Soft, non tender, not distended   Extremities: No cyanosis or edema   Skin: No rash or lesions   Musculoskeletal: No swelling or deformity   Lines/Devices:  Intact, no erythema, drainage or tenderness   Psych: Alert, oriented, appropriate       Data Review:     CBC:  Recent Labs     10/16/19  0504 10/15/19  1950 10/15/19  0427  10/14/19  0416   WBC 14.2* 14.0* 12.4*   < > 12.4*   GRANS  --   --   --   --  66   MONOS  --   --   --   --  15*   EOS  --   --   --   --  0*   ANEU  --   --   --   --  8.2   ABL  --   --   --   --  2.1   HGB 11.3* 11.1* 10.8*   < > 10.9*   HCT 33.7* 32.5* 32.2*   < > 31.0*    214 184   < > 154    < > = values in this interval not displayed. BMP:  Recent Labs     10/16/19  0504 10/15/19  0427 10/14/19  0416   CREA 0.48* 0.47* 0.46*   BUN 10 9 8    137 140   K 3.5 3.2* 3.4*    102 103   CO2 30 29 33*   AGAP 5* 6* 4*   GLU 93 90 114*       LFTS:  No results for input(s): TBILI, ALT, SGOT, AP, TP, ALB in the last 72 hours.     Microbiology:     All Micro Results     Procedure Component Value Units Date/Time    CULTURE, BLOOD [[id number]] Collected:  10/12/19 1506    Order Status:  Completed Specimen:  Blood Updated:  10/16/19 0942     Special Requests: --        RIGHT  HAND       Culture result: NO GROWTH 4 DAYS       CULTURE, BLOOD [[id number]] Collected:  10/12/19 1509    Order Status:  Completed Specimen:  Blood Updated:  10/16/19 0942     Special Requests: --        RIGHT  HAND       Culture result: NO GROWTH 4 DAYS       CULTURE, BLOOD [[id number]] Collected:  10/11/19 1438    Order Status:  Completed Specimen:  Blood Updated:  10/16/19 0941     Special Requests: --        RIGHT  HAND       Culture result: NO GROWTH 5 DAYS       CULTURE, BLOOD [[id number]] Collected:  10/11/19 1028    Order Status:  Completed Specimen:  Blood Updated:  10/16/19 0941     Special Requests: --        LEFT  FOREARM       Culture result: NO GROWTH 5 DAYS       CULTURE, RESPIRATORY/SPUTUM/BRONCH W GRAM STAIN [[id number]]  (Abnormal)  (Susceptibility) Collected:  10/11/19 1441    Order Status:  Completed Specimen:  Sputum Updated:  10/13/19 0922     Special Requests: NO SPECIAL REQUESTS        GRAM STAIN 3 TO 45 WBC'S/OIF      0 TO 2 EPITHELIAL CELLS SEEN Karen Oliver MODERATE GRAM NEGATIVE RODS                  FEW GRAM POSITIVE COCCI IN PAIRS CHAINS AND CLUSTERS            FEW YEAST         4+ MUCUS PRESENT        Culture result:       MODERATE PSEUDOMONAS AERUGINOSA                  SCANT NORMAL RESPIRATORY MARZENA          MSSA/MRSA SC BY PCR, NASAL SWAB [[id number]] Collected:  10/11/19 1648    Order Status:  Completed Specimen:  Nasal swab Updated:  10/11/19 2038     Special Requests: NO SPECIAL REQUESTS        Culture result:       SA target not detected. A MRSA NEGATIVE, SA NEGATIVE test result does not preclude MRSA or SA nasal colonization. CULTURE, BLOOD [[id number]]     Order Status:  Canceled Specimen:  Blood           Imaging:   10/11/19 CXR   IMPRESSION: Right pneumonia.     Reviewed and none new  Signed By: Ara Chawla NP     October 16, 2019

## 2019-10-16 NOTE — PROGRESS NOTES
[facility] CARDIOLOGY PROGRESS NOTE           10/16/2019 11:07 AM    Admit Date: 10/11/2019      Subjective:    breathing better now in sinus rhythm     Review of Systems   Constitutional: Negative for fever. Cardiovascular: Negative for chest pain. Musculoskeletal: Negative for myalgias. Skin: Negative for rash. Neurological: Negative for dizziness. Psychiatric/Behavioral: Negative for depression. Objective:      Vitals:    10/15/19 2044 10/16/19 0059 10/16/19 0106 10/16/19 0458   BP: 122/68 112/70  138/72   Pulse: 75 67  76   Resp: 16 17  17   Temp: 97.9 °F (36.6 °C) 98.8 °F (37.1 °C)  98.8 °F (37.1 °C)   SpO2: 94% 95% 93% 94%   Weight:    78.3 kg (172 lb 11.2 oz)   Height:             Physical Exam   Constitutional: He appears well-developed. HENT:   Head: Normocephalic and atraumatic. Left Ear: External ear normal.   Nose: Nose normal.   Eyes: Pupils are equal, round, and reactive to light. Neck: Normal range of motion. Cardiovascular: Normal rate. No murmur heard. Pulmonary/Chest: Effort normal. He has no wheezes. He has no rales. He exhibits no tenderness. Abdominal: Soft. He exhibits no distension. There is no tenderness. Musculoskeletal: He exhibits no edema. Neurological: He is alert. No cranial nerve deficit. Skin: Skin is warm and dry. No rash noted. No erythema. Psychiatric: He has a normal mood and affect. His behavior is normal. Judgment normal.       Data Review:   Recent Labs     10/16/19  0504 10/15/19  1950 10/15/19  0427  10/14/19  0416     --  137  --  140   K 3.5  --  3.2*  --  3.4*   MG  --   --   --   --  1.8   BUN 10  --  9  --  8   CREA 0.48*  --  0.47*  --  0.46*   GLU 93  --  90  --  114*   WBC  --  14.0* 12.4*   < > 12.4*   HGB  --  11.1* 10.8*   < > 10.9*   HCT  --  32.5* 32.2*   < > 31.0*   PLT  --  214 184   < > 154    < > = values in this interval not displayed.          Intake/Output Summary (Last 24 hours) at 10/16/2019 0527  Last data filed at 10/15/2019 2053  Gross per 24 hour   Intake    Output 1625 ml   Net -1625 ml     Current Facility-Administered Medications   Medication Dose Route Frequency    cefepime (MAXIPIME) 2 g in 0.9% sodium chloride (MBP/ADV) 100 mL  2 g IntraVENous Q8H    pantoprazole (PROTONIX) tablet 40 mg  40 mg Oral ACB&D    magnesium oxide (MAG-OX) tablet 400 mg  400 mg Oral BID    HYDROcodone-homatropine (HYCODAN) 5-1.5 mg/5 mL (5 mL) syrup 5 mL  5 mL Oral Q4H PRN    albuterol (PROVENTIL VENTOLIN) nebulizer solution 2.5 mg  2.5 mg Nebulization Q6H RT    apixaban (ELIQUIS) tablet 5 mg  5 mg Oral Q12H    sodium chloride 3% hypertonic nebulizer soln  4 mL Nebulization BID RT    amiodarone (CORDARONE) tablet 200 mg  200 mg Oral BID    morphine injection 2 mg  2 mg IntraVENous Q4H PRN    HYDROcodone-acetaminophen (NORCO) 5-325 mg per tablet 1 Tab  1 Tab Oral Q6H PRN    acetaminophen (TYLENOL) tablet 650 mg  650 mg Oral Q6H PRN    sodium chloride (NS) flush 5-40 mL  5-40 mL IntraVENous Q8H    sodium chloride (NS) flush 5-40 mL  5-40 mL IntraVENous PRN    acetaminophen (TYLENOL) tablet 1,000 mg  1,000 mg Oral Q6H PRN    ondansetron (ZOFRAN) injection 4 mg  4 mg IntraVENous Q4H PRN    nicotine (NICODERM CQ) 21 mg/24 hr patch 1 Patch  1 Patch TransDERmal Q24H    thiamine HCL (B-1) tablet 100 mg  100 mg Oral DAILY    LORazepam (ATIVAN) injection 1 mg  1 mg IntraVENous Q1H PRN           Assessment/Plan:     25-year-old male with history of EtOH and tobacco abuse admitted for pneumonia A. fib with RVR now in sinus rhythm on amiodarone. Principal Problem:  1. Pseudomonas septicemia ([facility] [address] (10/11/2019) trolled  2. HTN (hypertension) (11/19/2014)  3. Tobacco abuse (11/28/2016)  4. ETOH abuse (11/28/2016) watching for signs of withdrawal  5. Thrombocytopenia ([address] (5/5/2019) stable  6. A-fib ([address] (5/5/2019)  oral amiodarone eliquis  platelts are low but stable at this time.   Atrial fibrillation likely related to underlying lung infection and long-term use of anti-arrhythmic therapy will likely not be necessary. BRYANNA 1. AC for 1 month following restoration of sinus rhythm. Plan to stop amiodarone in the near future at follow up. 7. Hypokalemia (5/10/2019) replete.    8. Right middle lobe pneumonia ([facility] (10/11/2019)          Johnny Ocampo MD  10/16/2019 11:07 AM

## 2019-10-16 NOTE — PROGRESS NOTES
Verbal bedside report given to joel Victor RN. Patient's situation, background, assessment and recommendations provided. Opportunity for questions provided. Oncoming RN assumed care of patient.

## 2019-10-16 NOTE — PROGRESS NOTES
Hospitalist Note     Admit Date:  10/11/2019 10:50 AM   Name:  Mathew Avendano   Age:  59 y.o.  :  1960   MRN:  [id number]   PCP:  Marek Acuna MD  Treatment Team: Attending Provider: Sunni Browning MD; Consulting Provider: Andrea Alvarado; Consulting Provider: Malathi Blackburn MD; Care Manager: Simba Gonzalez; Consulting Provider: Sean Regalado MD    HPI/Subjective:     Mr. Mary Ling is a 58 yo male with PMH of polysubstance use, AFIB admitted with RML pneumonia and found to have pseudomonas aeruginosa bacteremia. He has been managed with zosyn. Repeat BC 10-12-19 NGTD. Sputum cx shows intermediate sensitivity to zosyn. ID following. AFIB has been managed with IV heparin/esmolol then eliquis/ amiodarone. He is being followed by cardiology. He is on valium taper  for ETOH use history and no ryan withdrawals noted. Discharge plans are to home once improved     10-16-19 ready for home,ate ok, feels better less short of breath, had BM, some cough, no pain, still some edema      Objective:     Patient Vitals for the past 24 hrs:   Temp Pulse Resp BP SpO2   10/16/19 1026     96 %   10/16/19 0857 98.5 °F (36.9 °C) 88 18 133/78 95 %   10/16/19 0458 98.8 °F (37.1 °C) 76 17 138/72 94 %   10/16/19 0106     93 %   10/16/19 0059 98.8 °F (37.1 °C) 67 17 112/70 95 %   10/15/19 2044 97.9 °F (36.6 °C) 75 16 122/68 94 %   10/15/19 1951     98 %   10/15/19 1634 98.8 °F (37.1 °C) 83 18 141/79 95 %   10/15/19 1500     94 %   10/15/19 1420     93 %     Oxygen Therapy  O2 Sat (%): 96 % (10/16/19 1026)  Pulse via Oximetry: 89 beats per minute (10/16/19 1026)  O2 Device: Room air (10/16/19 1026)  O2 Flow Rate (L/min): 2 l/min (10/12/19 7343)    Estimated body mass index is 24.78 kg/m² as calculated from the following:    Height as of this encounter: 5' 10\" (1.778 m). Weight as of this encounter: 78.3 kg (172 lb 11.2 oz).       Intake/Output Summary (Last 24 hours) at 10/16/2019 1358  Last data filed at 10/15/2019 2053  Gross per 24 hour   Intake    Output 1175 ml   Net -1175 ml       *Note that automatically entered I/Os may not be accurate; dependent on patient compliance with collection and accurate  by techs. General:    Well nourished. Alert. No distress   CV:   Regular rate and rhythm, trace edema  Lungs:   Rhonchi right lung/ mild wheezing  Abdomen:   Soft, nontender, nondistended. Decreased BS  Extremities: Warm and dry  Skin:     No rashes or jaundice.    Neuro:  No gross focal deficits    Data Review:  I have reviewed all labs, meds, and studies from the last 24 hours:    Recent Results (from the past 24 hour(s))   CBC W/O DIFF    Collection Time: 10/15/19  7:50 PM   Result Value Ref Range    WBC 14.0 (H) 4.3 - 11.1 K/uL    RBC 3.36 (L) 4.23 - 5.6 M/uL    HGB 11.1 (L) 13.6 - 17.2 g/dL    HCT 32.5 (L) 41.1 - 50.3 %    MCV 96.7 79.6 - 97.8 FL    MCH 33.0 (H) 26.1 - 32.9 PG    MCHC 34.2 31.4 - 35.0 g/dL    RDW 16.3 (H) 11.9 - 14.6 %    PLATELET 903 439 - 313 K/uL    MPV 10.1 9.4 - 12.3 FL    ABSOLUTE NRBC 0.00 0.0 - 0.2 K/uL   METABOLIC PANEL, BASIC    Collection Time: 10/16/19  5:04 AM   Result Value Ref Range    Sodium 138 136 - 145 mmol/L    Potassium 3.5 3.5 - 5.1 mmol/L    Chloride 103 98 - 107 mmol/L    CO2 30 21 - 32 mmol/L    Anion gap 5 (L) 7 - 16 mmol/L    Glucose 93 65 - 100 mg/dL    BUN 10 8 - 23 MG/DL    Creatinine 0.48 (L) 0.8 - 1.5 MG/DL    GFR est AA >60 >60 ml/min/1.73m2    GFR est non-AA >60 >60 ml/min/1.73m2    Calcium 8.7 8.3 - 10.4 MG/DL   CBC W/O DIFF    Collection Time: 10/16/19  5:04 AM   Result Value Ref Range    WBC 14.2 (H) 4.3 - 11.1 K/uL    RBC 3.42 (L) 4.23 - 5.6 M/uL    HGB 11.3 (L) 13.6 - 17.2 g/dL    HCT 33.7 (L) 41.1 - 50.3 %    MCV 98.5 (H) 79.6 - 97.8 FL    MCH 33.0 (H) 26.1 - 32.9 PG    MCHC 33.5 31.4 - 35.0 g/dL    RDW 16.4 (H) 11.9 - 14.6 %    PLATELET 463 924 - 767 K/uL    MPV 10.1 9.4 - 12.3 FL    ABSOLUTE NRBC 0.00 0.0 - 0.2 K/uL        All Micro Results     Procedure Component Value Units Date/Time    CULTURE, BLOOD [[id number]] Collected:  10/12/19 1506    Order Status:  Completed Specimen:  Blood Updated:  10/16/19 0942     Special Requests: --        RIGHT  HAND       Culture result: NO GROWTH 4 DAYS       CULTURE, BLOOD [[id number]] Collected:  10/12/19 1509    Order Status:  Completed Specimen:  Blood Updated:  10/16/19 0942     Special Requests: --        RIGHT  HAND       Culture result: NO GROWTH 4 DAYS       CULTURE, BLOOD [[id number]] Collected:  10/11/19 1438    Order Status:  Completed Specimen:  Blood Updated:  10/16/19 0941     Special Requests: --        RIGHT  HAND       Culture result: NO GROWTH 5 DAYS       CULTURE, BLOOD [[id number]] Collected:  10/11/19 1028    Order Status:  Completed Specimen:  Blood Updated:  10/16/19 0941     Special Requests: --        LEFT  FOREARM       Culture result: NO GROWTH 5 DAYS       CULTURE, RESPIRATORY/SPUTUM/BRONCH W GRAM STAIN [[id number]]  (Abnormal)  (Susceptibility) Collected:  10/11/19 1441    Order Status:  Completed Specimen:  Sputum Updated:  10/13/19 0922     Special Requests: NO SPECIAL REQUESTS        GRAM STAIN 3 TO 45 WBC'S/OIF      0 TO 2 EPITHELIAL CELLS SEEN /OIF            MODERATE GRAM NEGATIVE RODS                  FEW GRAM POSITIVE COCCI IN PAIRS CHAINS AND CLUSTERS            FEW YEAST         4+ MUCUS PRESENT        Culture result:       MODERATE PSEUDOMONAS AERUGINOSA                  SCANT NORMAL RESPIRATORY MARZENA          MSSA/MRSA SC BY PCR, NASAL SWAB [[id number]] Collected:  10/11/19 1648    Order Status:  Completed Specimen:  Nasal swab Updated:  10/11/19 2038     Special Requests: NO SPECIAL REQUESTS        Culture result:       SA target not detected. A MRSA NEGATIVE, SA NEGATIVE test result does not preclude MRSA or SA nasal colonization.           CULTURE, BLOOD [[id number]]     Order Status:  Canceled Specimen: Blood           No results found for this visit on 10/11/19. Current Meds:  Current Facility-Administered Medications   Medication Dose Route Frequency    ciprofloxacin HCl (CIPRO) tablet 750 mg  750 mg Oral Q12H    dilTIAZem CD (CARDIZEM CD) capsule 240 mg  240 mg Oral DAILY    pantoprazole (PROTONIX) tablet 40 mg  40 mg Oral ACB&D    magnesium oxide (MAG-OX) tablet 400 mg  400 mg Oral BID    HYDROcodone-homatropine (HYCODAN) 5-1.5 mg/5 mL (5 mL) syrup 5 mL  5 mL Oral Q4H PRN    albuterol (PROVENTIL VENTOLIN) nebulizer solution 2.5 mg  2.5 mg Nebulization Q6H RT    apixaban (ELIQUIS) tablet 5 mg  5 mg Oral Q12H    sodium chloride 3% hypertonic nebulizer soln  4 mL Nebulization BID RT    morphine injection 2 mg  2 mg IntraVENous Q4H PRN    HYDROcodone-acetaminophen (NORCO) 5-325 mg per tablet 1 Tab  1 Tab Oral Q6H PRN    acetaminophen (TYLENOL) tablet 650 mg  650 mg Oral Q6H PRN    sodium chloride (NS) flush 5-40 mL  5-40 mL IntraVENous Q8H    sodium chloride (NS) flush 5-40 mL  5-40 mL IntraVENous PRN    acetaminophen (TYLENOL) tablet 1,000 mg  1,000 mg Oral Q6H PRN    ondansetron (ZOFRAN) injection 4 mg  4 mg IntraVENous Q4H PRN    nicotine (NICODERM CQ) 21 mg/24 hr patch 1 Patch  1 Patch TransDERmal Q24H    thiamine HCL (B-1) tablet 100 mg  100 mg Oral DAILY    LORazepam (ATIVAN) injection 1 mg  1 mg IntraVENous Q1H PRN       Other Studies (last 24 hours):  No results found.     Assessment and Plan:     Hospital Problems as of 10/16/2019 Never Reviewed          Codes Class Noted - Resolved POA    * (Principal) Pseudomonas septicemia ([facility] ICD-10-CM: A41.52  ICD-9-CM: 038.43, 995.91  10/11/2019 - Present         Right middle lobe pneumonia ([facility] [address] ICD-10-CM: J18.1  ICD-9-CM: 556  10/11/2019 - Present Unknown        Hypokalemia ICD-10-CM: E87.6  ICD-9-CM: 276.8  5/10/2019 - Present Yes        Tachycardia ICD-10-CM: R00.0  ICD-9-CM: 785.0  5/5/2019 - Present Yes        Thrombocytopenia ([facility] ICD-10-CM: D69.6  ICD-9-CM: 287.5  5/5/2019 - Present Yes        A-fib ([address] ICD-10-CM: I48.91  ICD-9-CM: 427.31  5/5/2019 - Present Yes        Tobacco abuse ICD-10-CM: Z72.0  ICD-9-CM: 305.1  11/28/2016 - Present Yes        ETOH abuse ICD-10-CM: F10.10  ICD-9-CM: 305.00  11/28/2016 - Present Yes        HTN (hypertension) (Chronic) ICD-10-CM: I10  ICD-9-CM: 401.9  11/19/2014 - Present Yes              Plan:    · RML pneumonia and pseudomonas bacteremia: discussed antibiotics with lD Dr. Grey Diez and Dr. Anuradha Kahn of cardiology, will adjust amiodarone so can use cipro, compelted oral steroid taper, conitnue prn albuterol and hycodan  · Tobacco use/COPD: finished steroids, has prn nebs,  needs cessation, and outpatient PFTS, on room air  · ETOH abuse: needs cessation, monitor for withdrawals , weaned off valium, has prn ativan, continued thiamine   · Hypokalemia and hypomagnesemia: replace and repeat labs  · AFIB:  continue eliquis and oral amiodarone   · Edema: stopped IVF, ambulate with PT    DC planning/Dispo:  Pending to home, PT/OT      Diet:  DIET CARDIAC  DVT ppx:  eliquis     Signed:  Aneta Ervin MD

## 2019-10-16 NOTE — PROGRESS NOTES
Problem: Falls - Risk of  Goal: *Absence of Falls  Description  Document Devere Camilla Fall Risk and appropriate interventions in the flowsheet. Outcome: Progressing Towards Goal  Note:   Fall Risk Interventions:            Medication Interventions: Assess postural VS orthostatic hypotension, Patient to call before getting OOB, Teach patient to arise slowly, Evaluate medications/consider consulting pharmacy                   Problem: Patient Education: Go to Patient Education Activity  Goal: Patient/Family Education  Outcome: Progressing Towards Goal     Problem: Pressure Injury - Risk of  Goal: *Prevention of pressure injury  Description  Document Dom Scale and appropriate interventions in the flowsheet. Outcome: Progressing Towards Goal  Note:   Pressure Injury Interventions:             Activity Interventions: Increase time out of bed, Pressure redistribution bed/mattress(bed type)    Mobility Interventions: Pressure redistribution bed/mattress (bed type)    Nutrition Interventions: Document food/fluid/supplement intake                     Problem: Patient Education: Go to Patient Education Activity  Goal: Patient/Family Education  Outcome: Progressing Towards Goal     Problem: Patient Education: Go to Patient Education Activity  Goal: Patient/Family Education  Outcome: Progressing Towards Goal     Problem: Patient Education: Go to Patient Education Activity  Goal: Patient/Family Education  Outcome: Progressing Towards Goal     Problem: Breathing Pattern - Ineffective  Goal: *Absence of hypoxia  Outcome: Progressing Towards Goal  Goal: *Use of effective breathing techniques  Outcome: Progressing Towards Goal  Goal: *PALLIATIVE CARE:  Alleviation of Dyspnea  Outcome: Progressing Towards Goal

## 2019-10-16 NOTE — PROGRESS NOTES
Bedside and Verbal shift change report given to self (oncoming nurse) by Nayely Nieves (offgoing nurse). Report included the following information SBAR, Kardex, MAR and Recent Results.

## 2019-10-17 VITALS
OXYGEN SATURATION: 99 % | DIASTOLIC BLOOD PRESSURE: 74 MMHG | HEART RATE: 66 BPM | TEMPERATURE: 97.7 F | WEIGHT: 171.7 LBS | RESPIRATION RATE: 18 BRPM | HEIGHT: 70 IN | BODY MASS INDEX: 24.58 KG/M2 | SYSTOLIC BLOOD PRESSURE: 147 MMHG

## 2019-10-17 LAB
ANION GAP SERPL CALC-SCNC: 7 MMOL/L (ref 7–16)
BACTERIA SPEC CULT: NORMAL
BACTERIA SPEC CULT: NORMAL
BUN SERPL-MCNC: 12 MG/DL (ref 8–23)
CALCIUM SERPL-MCNC: 8 MG/DL (ref 8.3–10.4)
CHLORIDE SERPL-SCNC: 104 MMOL/L (ref 98–107)
CO2 SERPL-SCNC: 28 MMOL/L (ref 21–32)
CREAT SERPL-MCNC: 0.45 MG/DL (ref 0.8–1.5)
ERYTHROCYTE [DISTWIDTH] IN BLOOD BY AUTOMATED COUNT: 16.7 % (ref 11.9–14.6)
GLUCOSE SERPL-MCNC: 105 MG/DL (ref 65–100)
HCT VFR BLD AUTO: 32.5 % (ref 41.1–50.3)
HGB BLD-MCNC: 10.9 G/DL (ref 13.6–17.2)
MCH RBC QN AUTO: 33.1 PG (ref 26.1–32.9)
MCHC RBC AUTO-ENTMCNC: 33.5 G/DL (ref 31.4–35)
MCV RBC AUTO: 98.8 FL (ref 79.6–97.8)
NRBC # BLD: 0 K/UL (ref 0–0.2)
PLATELET # BLD AUTO: 257 K/UL (ref 150–450)
PMV BLD AUTO: 10.1 FL (ref 9.4–12.3)
POTASSIUM SERPL-SCNC: 3.8 MMOL/L (ref 3.5–5.1)
RBC # BLD AUTO: 3.29 M/UL (ref 4.23–5.6)
SERVICE CMNT-IMP: NORMAL
SERVICE CMNT-IMP: NORMAL
SODIUM SERPL-SCNC: 139 MMOL/L (ref 136–145)
WBC # BLD AUTO: 13 K/UL (ref 4.3–11.1)

## 2019-10-17 PROCEDURE — 36415 COLL VENOUS BLD VENIPUNCTURE: CPT

## 2019-10-17 PROCEDURE — 74011250637 HC RX REV CODE- 250/637: Performed by: INTERNAL MEDICINE

## 2019-10-17 PROCEDURE — 74011000250 HC RX REV CODE- 250: Performed by: INTERNAL MEDICINE

## 2019-10-17 PROCEDURE — 74011250637 HC RX REV CODE- 250/637: Performed by: NURSE PRACTITIONER

## 2019-10-17 PROCEDURE — 94760 N-INVAS EAR/PLS OXIMETRY 1: CPT

## 2019-10-17 PROCEDURE — 80048 BASIC METABOLIC PNL TOTAL CA: CPT

## 2019-10-17 PROCEDURE — 74011250637 HC RX REV CODE- 250/637: Performed by: FAMILY MEDICINE

## 2019-10-17 PROCEDURE — 85027 COMPLETE CBC AUTOMATED: CPT

## 2019-10-17 PROCEDURE — 94640 AIRWAY INHALATION TREATMENT: CPT

## 2019-10-17 RX ORDER — CIPROFLOXACIN 750 MG/1
750 TABLET, FILM COATED ORAL EVERY 12 HOURS
Qty: 26 TAB | Refills: 0 | Status: ON HOLD | OUTPATIENT
Start: 2019-10-17 | End: 2019-10-26 | Stop reason: SDUPTHER

## 2019-10-17 RX ORDER — PANTOPRAZOLE SODIUM 40 MG/1
40 TABLET, DELAYED RELEASE ORAL
Qty: 60 TAB | Refills: 0 | Status: SHIPPED | OUTPATIENT
Start: 2019-10-17 | End: 2019-10-26

## 2019-10-17 RX ORDER — ALBUTEROL SULFATE 0.83 MG/ML
2.5 SOLUTION RESPIRATORY (INHALATION)
Qty: 30 NEBULE | Refills: 0 | Status: SHIPPED | OUTPATIENT
Start: 2019-10-17

## 2019-10-17 RX ORDER — DILTIAZEM HYDROCHLORIDE 240 MG/1
240 CAPSULE, COATED, EXTENDED RELEASE ORAL DAILY
Qty: 90 CAP | Refills: 0 | Status: SHIPPED | OUTPATIENT
Start: 2019-10-18 | End: 2019-10-23 | Stop reason: ALTCHOICE

## 2019-10-17 RX ADMIN — CIPROFLOXACIN HYDROCHLORIDE 750 MG: 500 TABLET, FILM COATED ORAL at 10:46

## 2019-10-17 RX ADMIN — Medication 100 MG: at 08:04

## 2019-10-17 RX ADMIN — ALBUTEROL SULFATE 2.5 MG: 2.5 SOLUTION RESPIRATORY (INHALATION) at 01:35

## 2019-10-17 RX ADMIN — MAGNESIUM GLUCONATE 500 MG ORAL TABLET 400 MG: 500 TABLET ORAL at 08:04

## 2019-10-17 RX ADMIN — Medication 5 ML: at 05:37

## 2019-10-17 RX ADMIN — ALBUTEROL SULFATE 2.5 MG: 2.5 SOLUTION RESPIRATORY (INHALATION) at 08:00

## 2019-10-17 RX ADMIN — APIXABAN 5 MG: 5 TABLET, FILM COATED ORAL at 08:04

## 2019-10-17 RX ADMIN — DILTIAZEM HYDROCHLORIDE 240 MG: 240 CAPSULE, COATED, EXTENDED RELEASE ORAL at 08:04

## 2019-10-17 RX ADMIN — SODIUM CHLORIDE 30 MG/ML INHALATION SOLUTION 4 ML: 30 SOLUTION INHALANT at 08:00

## 2019-10-17 RX ADMIN — PANTOPRAZOLE SODIUM 40 MG: 40 TABLET, DELAYED RELEASE ORAL at 08:04

## 2019-10-17 NOTE — PROGRESS NOTES
Care Management Interventions  PCP Verified by CM: Yes  Last Visit to PCP: 09/20/19  Mode of Transport at Discharge: Other (see comment)([place] Spouse [phone] )  Transition of Care Consult (CM Consult): Discharge Planning  Discharge Durable Medical Equipment: No  Physical Therapy Consult: Yes  Occupational Therapy Consult: Yes  Speech Therapy Consult: No  Current Support Network: Lives with Spouse, Own Home  Confirm Follow Up Transport: Family  Plan discussed with Pt/Family/Caregiver: Yes  Freedom of Choice Offered: Yes  Discharge Location  Discharge Placement: Home        CM met with pt to discuss DC needs. Pt uses the [place] for all medications and PCP. Per pt request, information faxed to [facility] to arrange for medications to be filled. CM spoke with Parish Ochoa [phone] ext [phone] to coordinate pt care. [facility] but Eliquis- CM provided 30 day free card. Appointment arranged for pt to go to [place] [facility] after dc for PCP appointment. [facility] and [facility] Nebul[facility]. Pt verbalized understanding. No further CM needs.

## 2019-10-17 NOTE — PROGRESS NOTES
[facility] CARDIOLOGY PROGRESS NOTE           10/17/2019 9:55 AM    Admit Date: 10/11/2019      Subjective: In SR. Still some cough but improved. Wants to go home    ROS:  Cardiovascular:  As noted above    Objective:      Vitals:    10/17/19 0136 10/17/19 0448 10/17/19 0800 10/17/19 0806   BP:  128/74  147/74   Pulse:  62  66   Resp:  19  18   Temp:  98.2 °F (36.8 °C)  97.7 °F (36.5 °C)   SpO2: 96% 94% 96% 99%   Weight:  171 lb 11.2 oz (77.9 kg)     Height:           Physical Exam:  General-No Acute Distress  Neck- supple, no JVD  CV- regular rate and rhythm no MRG  Lung- rt basilar rales  Abd- soft, nontender, nondistended  Ext- no edema bilaterally. Skin- warm and dry    Data Review:   Recent Labs     10/17/19  0404 10/17/19  0403 10/16/19  1834 10/16/19  0504     --   --  138   K 3.8  --   --  3.5   BUN 12  --   --  10   CREA 0.45*  --   --  0.48*   *  --   --  93   WBC  --  13.0* 13.9* 14.2*   HGB  --  10.9* 11.2* 11.3*   HCT  --  32.5* 32.8* 33.7*   PLT  --  257 259 231       Assessment/Plan:     Principal Problem:    Pseudomonas septicemia ([facility] [address] (10/11/2019)    Active Problems:    HTN (hypertension) (11/19/2014)      Tobacco abuse (11/28/2016)      ETOH abuse (11/28/2016)      Tachycardia (5/5/2019)      Thrombocytopenia (HCC) (5/5/2019)      A-fib ([address] (5/5/2019)      Hypokalemia (5/10/2019)      Right middle lobe pneumonia ([facility] [address] (10/11/2019)    AF likely triggered in the setting of PNA/bacteremia/hypomag/hypokalemia (would not be indicative of long term recurrence); back in SR; appears po amiodarone stopped yesterday. Not a good amiodarone candidate long term and will hold off at this time. Will need short term AC with chemical cardioversion (4 weeks) and plan ASA after. CHADVASc score of 1  Hx of alcohol abuse and stressed moderation/cessation.    Abx for RML PNA/bacteremia per ID    Pavan Garcia MD  10/17/2019 9:55 AM

## 2019-10-17 NOTE — PROGRESS NOTES
Patient requesting ketorolac for his chronic leg pain. Verbally spoke to Dr. Josh Nickerson about patients request order to give ketorolac 15 mg IVP q6h.  Creatinine level is 0.48

## 2019-10-17 NOTE — PROGRESS NOTES
Bedside and Verbal shift change report given to Macy Jorgensen and Jaimee Gonzalez (oncoming nurse) by self Katerine Boyle nurse). Report included the following information SBAR, Kardex, MAR and Recent Results.

## 2019-10-17 NOTE — DISCHARGE INSTRUCTIONS
Patient Education        Pneumonia: Care Instructions  Your Care Instructions    Pneumonia is an infection of the lungs. Most cases are caused by infections from bacteria or viruses. Pneumonia may be mild or very severe. If it is caused by bacteria, you will be treated with antibiotics. It may take a few weeks to a few months to recover fully from pneumonia, depending on how sick you were and whether your overall health is good. Follow-up care is a key part of your treatment and safety. Be sure to make and go to all appointments, and call your doctor if you are having problems. It's also a good idea to know your test results and keep a list of the medicines you take. How can you care for yourself at home? · Take your antibiotics exactly as directed. Do not stop taking the medicine just because you are feeling better. You need to take the full course of antibiotics. · Take your medicines exactly as prescribed. Call your doctor if you think you are having a problem with your medicine. · Get plenty of rest and sleep. You may feel weak and tired for a while, but your energy level will improve with time. · To prevent dehydration, drink plenty of fluids, enough so that your urine is light yellow or clear like water. Choose water and other caffeine-free clear liquids until you feel better. If you have kidney, heart, or liver disease and have to limit fluids, talk with your doctor before you increase the amount of fluids you drink. · Take care of your cough so you can rest. A cough that brings up mucus from your lungs is common with pneumonia. It is one way your body gets rid of the infection. But if coughing keeps you from resting or causes severe fatigue and chest-wall pain, talk to your doctor. He or she may suggest that you take a medicine to reduce the cough. · Use a vaporizer or humidifier to add moisture to your bedroom. Follow the directions for cleaning the machine.   · Do not smoke or allow others to smoke around you. Smoke will make your cough last longer. If you need help quitting, talk to your doctor about stop-smoking programs and medicines. These can increase your chances of quitting for good. · Take an over-the-counter pain medicine, such as acetaminophen (Tylenol), ibuprofen (Advil, Motrin), or naproxen (Aleve). Read and follow all instructions on the label. · Do not take two or more pain medicines at the same time unless the doctor told you to. Many pain medicines have acetaminophen, which is Tylenol. Too much acetaminophen (Tylenol) can be harmful. · If you were given a spirometer to measure how well your lungs are working, use it as instructed. This can help your doctor tell how your recovery is going. · To prevent pneumonia in the future, talk to your doctor about getting a flu vaccine (once a year) and a pneumococcal vaccine (one time only for most people). When should you call for help? Call 911 anytime you think you may need emergency care. For example, call if:    · You have severe trouble breathing.    Call your doctor now or seek immediate medical care if:    · You cough up dark brown or bloody mucus (sputum).     · You have new or worse trouble breathing.     · You are dizzy or lightheaded, or you feel like you may faint.    Watch closely for changes in your health, and be sure to contact your doctor if:    · You have a new or higher fever.     · You are coughing more deeply or more often.     · You are not getting better after 2 days (48 hours).     · You do not get better as expected. Where can you learn more? Go to http://nadeen-divya.info/. Enter [id number] in the search box to learn more about \"Pneumonia: Care Instructions. \"  Current as of: June 9, 2019  Content Version: 12.2  © 3587-4018 [employer]. Care instructions adapted under license by [employer] (which disclaims liability or warranty for this information).  If you have questions about a medical condition or this instruction, always ask your healthcare professional. [place] [address] any warranty or liability for your use of this information. Patient Education      Albuterol (AccuNeb, Proventil, Proventil HFA, Ventolin HFA) - (By breathing)   Why this medicine is used:   Treats or prevents bronchospasm. Contact a nurse or doctor right away if you have:  · Trouble breathing, increased wheezing or cough, chest tightness  · Fast, pounding, or uneven heartbeat; chest pain  · Muscle cramps, nausea, vomiting  · Dry mouth, increased thirst     Common side effects:  · Tremors, nervousness  · Cough, sore throat, runny or stuffy nose  · Headache  © 2017 [facility] Information is for End User's use only and may not be sold, redistributed or otherwise used for commercial purposes. Patient Education      Ciprofloxacin (Cipro) - (By mouth)   Why this medicine is used:   Treats infections. Contact a nurse or doctor right away if you have:  · Blistering, peeling, or red skin rash  · Fast, slow, or uneven heartbeat; lightheadedness or fainting  · Severe or bloody diarrhea  · Pain, stiffness, swelling, or bruises around your ankle, leg, shoulder, or other joint     Common side effects:  · Nausea  · Headache  © 2017 [facility] Information is for End User's use only and may not be sold, redistributed or otherwise used for commercial purposes. Patient Education      Apixaban (Eliquis) - (By mouth)   Why this medicine is used:   Treats and prevents blood clots.   Contact a nurse or doctor right away if you have:  · Sudden or severe headache  · Back pain, numbness, tingling, weakness in your legs or feet  · Loss of bladder or bowel control  · Bloody vomit or vomit that looks like coffee grounds; bloody or black, tarry stools  · Bleeding that does not stop or bruises that do not heal     Common side effects:  · Minor bleeding or bruising  © 2017 [facility] [address] is for End User's use only and may not be sold, redistributed or otherwise used for commercial purposes. Patient Education      Pantoprazole (Protonix) - (By mouth)   Why this medicine is used:   Treats gastroesophageal reflux disease (GERD), a damaged esophagus, and high levels of stomach acid. Contact a nurse or doctor right away if you have:  · Blistering, peeling, red skin rash  · Swelling, muscle pain, stiffness, cramps, or twitching  · Joint pain, rash on your cheeks or arms that gets worse in the sun  · Dark-colored urine, change in how much or how often you urinate  · Seizures, dizziness, uneven heartbeat  · Severe diarrhea, stomach cramps, fever, weight gain     Common side effects:  · Mild diarrhea, stomach pain  · Headache, tiredness  © 2017 [facility] Information is for End User's use only and may not be sold, redistributed or otherwise used for commercial purposes. Patient Education      Diltiazem (Cardizem, Cardizem CD, Cardizem LA, Cardizem SR) - (By mouth)   Why this medicine is used:   Treats high blood pressure and angina (chest pain). Contact a nurse or doctor right away if you have:  · Fast, slow, uneven, or pounding heartbeat  · Rapid weight gain; swelling in your hands, ankles, or feet  · Dark urine or pale stools  · Nausea, vomiting, loss of appetite, stomach pain,  · Yellow skin or eyes     Common side effects:  · Dizziness  · Tiredness  © 2017 [facility] Information is for End User's use only and may not be sold, redistributed or otherwise used for commercial purposes.

## 2019-10-23 ENCOUNTER — HOSPITAL ENCOUNTER (INPATIENT)
Age: 59
LOS: 1 days | Discharge: HOME | DRG: 205 | End: 2019-10-26
Attending: EMERGENCY MEDICINE | Admitting: INTERNAL MEDICINE
Payer: COMMERCIAL

## 2019-10-23 ENCOUNTER — APPOINTMENT (OUTPATIENT)
Dept: GENERAL RADIOLOGY | Age: 59
DRG: 205 | End: 2019-10-23
Attending: EMERGENCY MEDICINE
Payer: COMMERCIAL

## 2019-10-23 ENCOUNTER — APPOINTMENT (OUTPATIENT)
Dept: CT IMAGING | Age: 59
DRG: 205 | End: 2019-10-23
Attending: EMERGENCY MEDICINE
Payer: COMMERCIAL

## 2019-10-23 DIAGNOSIS — J85.0 NECROTIC PNEUMONIA (HCC): ICD-10-CM

## 2019-10-23 DIAGNOSIS — F10.10 ETOH ABUSE: ICD-10-CM

## 2019-10-23 DIAGNOSIS — T46.1X5A ADVERSE EFFECT OF CALCIUM-CHANNEL BLOCKER, INITIAL ENCOUNTER: ICD-10-CM

## 2019-10-23 DIAGNOSIS — J98.4 CAVITARY LESION OF LUNG: ICD-10-CM

## 2019-10-23 DIAGNOSIS — R60.9 PERIPHERAL EDEMA: ICD-10-CM

## 2019-10-23 DIAGNOSIS — I26.94 MULTIPLE SUBSEGMENTAL PULMONARY EMBOLI WITHOUT ACUTE COR PULMONALE (HCC): ICD-10-CM

## 2019-10-23 DIAGNOSIS — R78.81 BACTEREMIA DUE TO GRAM-NEGATIVE BACTERIA: ICD-10-CM

## 2019-10-23 DIAGNOSIS — I48.91 ATRIAL FIBRILLATION, UNSPECIFIED TYPE (HCC): ICD-10-CM

## 2019-10-23 DIAGNOSIS — J98.4 PULMONARY CAVITARY LESION: Primary | ICD-10-CM

## 2019-10-23 LAB
ALBUMIN SERPL-MCNC: 2.5 G/DL (ref 3.2–4.6)
ALBUMIN/GLOB SERPL: 0.4 {RATIO} (ref 1.2–3.5)
ALP SERPL-CCNC: 71 U/L (ref 50–136)
ALT SERPL-CCNC: 25 U/L (ref 12–65)
ANION GAP SERPL CALC-SCNC: 7 MMOL/L (ref 7–16)
AST SERPL-CCNC: 21 U/L (ref 15–37)
BASOPHILS # BLD: 0.1 K/UL (ref 0–0.2)
BASOPHILS NFR BLD: 1 % (ref 0–2)
BASOPHILS NFR FLD: 4 %
BILIRUB SERPL-MCNC: 0.4 MG/DL (ref 0.2–1.1)
BNP SERPL-MCNC: 35 PG/ML
BRONCH. LAVAGE DIFF.,BR: NORMAL
BRONCH. LAVAGE DIFF.,BR: NORMAL
BUN SERPL-MCNC: 6 MG/DL (ref 8–23)
CALCIUM SERPL-MCNC: 9 MG/DL (ref 8.3–10.4)
CHLORIDE SERPL-SCNC: 105 MMOL/L (ref 98–107)
CO2 SERPL-SCNC: 28 MMOL/L (ref 21–32)
CREAT SERPL-MCNC: 0.57 MG/DL (ref 0.8–1.5)
DIFFERENTIAL METHOD BLD: ABNORMAL
EOSINOPHIL # BLD: 0.1 K/UL (ref 0–0.8)
EOSINOPHIL NFR BLD: 1 % (ref 0.5–7.8)
EOSINOPHIL NFR BRONCH MANUAL: 0 %
EOSINOPHIL NFR BRONCH MANUAL: 0 %
ERYTHROCYTE [DISTWIDTH] IN BLOOD BY AUTOMATED COUNT: 15.9 % (ref 11.9–14.6)
GLOBULIN SER CALC-MCNC: 5.6 G/DL (ref 2.3–3.5)
GLUCOSE SERPL-MCNC: 100 MG/DL (ref 65–100)
HCT VFR BLD AUTO: 39 % (ref 41.1–50.3)
HGB BLD-MCNC: 12.9 G/DL (ref 13.6–17.2)
IMM GRANULOCYTES # BLD AUTO: 0 K/UL (ref 0–0.5)
IMM GRANULOCYTES NFR BLD AUTO: 1 % (ref 0–5)
LYMPHOCYTES # BLD: 2.5 K/UL (ref 0.5–4.6)
LYMPHOCYTES NFR BLD: 35 % (ref 13–44)
LYMPHOCYTES NFR BRONCH MANUAL: 20 %
LYMPHOCYTES NFR BRONCH MANUAL: 4 %
MACROPHAGES NFR BRONCH MANUAL: 0 %
MACROPHAGES NFR BRONCH MANUAL: 7 %
MCH RBC QN AUTO: 33.4 PG (ref 26.1–32.9)
MCHC RBC AUTO-ENTMCNC: 33.1 G/DL (ref 31.4–35)
MCV RBC AUTO: 101 FL (ref 79.6–97.8)
MONOCYTES # BLD: 0.8 K/UL (ref 0.1–1.3)
MONOCYTES NFR BLD: 11 % (ref 4–12)
NEUTROPHILS NFR BRONCH MANUAL: 76 %
NEUTROPHILS NFR BRONCH MANUAL: 89 %
NEUTS SEG # BLD: 3.7 K/UL (ref 1.7–8.2)
NEUTS SEG NFR BLD: 51 % (ref 43–78)
NRBC # BLD: 0 K/UL (ref 0–0.2)
PLATELET # BLD AUTO: 437 K/UL (ref 150–450)
PMV BLD AUTO: 8.9 FL (ref 9.4–12.3)
POTASSIUM SERPL-SCNC: 4.2 MMOL/L (ref 3.5–5.1)
PROCALCITONIN SERPL-MCNC: 0.1 NG/ML
PROT SERPL-MCNC: 8.1 G/DL (ref 6.3–8.2)
RBC # BLD AUTO: 3.86 M/UL (ref 4.23–5.6)
SODIUM SERPL-SCNC: 140 MMOL/L (ref 136–145)
WBC # BLD AUTO: 7.3 K/UL (ref 4.3–11.1)

## 2019-10-23 PROCEDURE — 77030012699 HC VLV SUC CNTRL OCOA -A: Performed by: INTERNAL MEDICINE

## 2019-10-23 PROCEDURE — 87116 MYCOBACTERIA CULTURE: CPT

## 2019-10-23 PROCEDURE — 74011250636 HC RX REV CODE- 250/636: Performed by: INTERNAL MEDICINE

## 2019-10-23 PROCEDURE — 87077 CULTURE AEROBIC IDENTIFY: CPT

## 2019-10-23 PROCEDURE — 31624 DX BRONCHOSCOPE/LAVAGE: CPT | Performed by: INTERNAL MEDICINE

## 2019-10-23 PROCEDURE — 88305 TISSUE EXAM BY PATHOLOGIST: CPT

## 2019-10-23 PROCEDURE — 99223 1ST HOSP IP/OBS HIGH 75: CPT | Performed by: INTERNAL MEDICINE

## 2019-10-23 PROCEDURE — 74011250637 HC RX REV CODE- 250/637: Performed by: INTERNAL MEDICINE

## 2019-10-23 PROCEDURE — 87075 CULTR BACTERIA EXCEPT BLOOD: CPT

## 2019-10-23 PROCEDURE — 74011000250 HC RX REV CODE- 250: Performed by: INTERNAL MEDICINE

## 2019-10-23 PROCEDURE — 74011250637 HC RX REV CODE- 250/637: Performed by: EMERGENCY MEDICINE

## 2019-10-23 PROCEDURE — 84145 PROCALCITONIN (PCT): CPT

## 2019-10-23 PROCEDURE — 83880 ASSAY OF NATRIURETIC PEPTIDE: CPT

## 2019-10-23 PROCEDURE — 87070 CULTURE OTHR SPECIMN AEROBIC: CPT

## 2019-10-23 PROCEDURE — 0B9C8ZX DRAINAGE OF RIGHT UPPER LUNG LOBE, VIA NATURAL OR ARTIFICIAL OPENING ENDOSCOPIC, DIAGNOSTIC: ICD-10-PCS | Performed by: INTERNAL MEDICINE

## 2019-10-23 PROCEDURE — 89051 BODY FLUID CELL COUNT: CPT

## 2019-10-23 PROCEDURE — 99218 HC RM OBSERVATION: CPT

## 2019-10-23 PROCEDURE — 87102 FUNGUS ISOLATION CULTURE: CPT

## 2019-10-23 PROCEDURE — 80053 COMPREHEN METABOLIC PANEL: CPT

## 2019-10-23 PROCEDURE — 85025 COMPLETE CBC W/AUTO DIFF WBC: CPT

## 2019-10-23 PROCEDURE — 71046 X-RAY EXAM CHEST 2 VIEWS: CPT

## 2019-10-23 PROCEDURE — 88112 CYTOPATH CELL ENHANCE TECH: CPT

## 2019-10-23 PROCEDURE — 71260 CT THORAX DX C+: CPT

## 2019-10-23 PROCEDURE — 76040000025: Performed by: INTERNAL MEDICINE

## 2019-10-23 PROCEDURE — 87186 SC STD MICRODIL/AGAR DIL: CPT

## 2019-10-23 PROCEDURE — 0B9J8ZZ DRAINAGE OF LEFT LOWER LUNG LOBE, VIA NATURAL OR ARTIFICIAL OPENING ENDOSCOPIC: ICD-10-PCS | Performed by: INTERNAL MEDICINE

## 2019-10-23 PROCEDURE — 99152 MOD SED SAME PHYS/QHP 5/>YRS: CPT | Performed by: INTERNAL MEDICINE

## 2019-10-23 PROCEDURE — 74011000258 HC RX REV CODE- 258: Performed by: EMERGENCY MEDICINE

## 2019-10-23 PROCEDURE — 74011636320 HC RX REV CODE- 636/320: Performed by: EMERGENCY MEDICINE

## 2019-10-23 PROCEDURE — 99285 EMERGENCY DEPT VISIT HI MDM: CPT | Performed by: EMERGENCY MEDICINE

## 2019-10-23 RX ORDER — SUCRALFATE 1 G/1
1 TABLET ORAL
Status: DISCONTINUED | OUTPATIENT
Start: 2019-10-23 | End: 2019-10-26 | Stop reason: HOSPADM

## 2019-10-23 RX ORDER — NALOXONE HYDROCHLORIDE 0.4 MG/ML
0.4 INJECTION, SOLUTION INTRAMUSCULAR; INTRAVENOUS; SUBCUTANEOUS
Status: DISCONTINUED | OUTPATIENT
Start: 2019-10-23 | End: 2019-10-23 | Stop reason: HOSPADM

## 2019-10-23 RX ORDER — PROMETHAZINE HYDROCHLORIDE 25 MG/1
12.5 TABLET ORAL
Qty: 10 TAB | Refills: 0 | Status: SHIPPED | OUTPATIENT
Start: 2019-10-23

## 2019-10-23 RX ORDER — DIPHENHYDRAMINE HYDROCHLORIDE 50 MG/ML
50 INJECTION, SOLUTION INTRAMUSCULAR; INTRAVENOUS ONCE
Status: DISCONTINUED | OUTPATIENT
Start: 2019-10-23 | End: 2019-10-23 | Stop reason: HOSPADM

## 2019-10-23 RX ORDER — ATORVASTATIN CALCIUM 40 MG/1
40 TABLET, FILM COATED ORAL DAILY
Status: DISCONTINUED | OUTPATIENT
Start: 2019-10-23 | End: 2019-10-26 | Stop reason: HOSPADM

## 2019-10-23 RX ORDER — FUROSEMIDE 10 MG/ML
40 INJECTION INTRAMUSCULAR; INTRAVENOUS ONCE
Status: COMPLETED | OUTPATIENT
Start: 2019-10-23 | End: 2019-10-23

## 2019-10-23 RX ORDER — SODIUM CHLORIDE 9 MG/ML
1000 INJECTION, SOLUTION INTRAVENOUS CONTINUOUS
Status: DISCONTINUED | OUTPATIENT
Start: 2019-10-23 | End: 2019-10-23 | Stop reason: HOSPADM

## 2019-10-23 RX ORDER — FAMOTIDINE 20 MG/1
20 TABLET, FILM COATED ORAL 2 TIMES DAILY
Status: DISCONTINUED | OUTPATIENT
Start: 2019-10-23 | End: 2019-10-24

## 2019-10-23 RX ORDER — FUROSEMIDE 40 MG/1
40 TABLET ORAL
Status: COMPLETED | OUTPATIENT
Start: 2019-10-23 | End: 2019-10-23

## 2019-10-23 RX ORDER — SODIUM CHLORIDE 0.9 % (FLUSH) 0.9 %
10 SYRINGE (ML) INJECTION
Status: COMPLETED | OUTPATIENT
Start: 2019-10-23 | End: 2019-10-23

## 2019-10-23 RX ORDER — TRAZODONE HYDROCHLORIDE 50 MG/1
50 TABLET ORAL
Status: DISCONTINUED | OUTPATIENT
Start: 2019-10-23 | End: 2019-10-26 | Stop reason: HOSPADM

## 2019-10-23 RX ORDER — SODIUM CHLORIDE 0.9 % (FLUSH) 0.9 %
5-40 SYRINGE (ML) INJECTION AS NEEDED
Status: DISCONTINUED | OUTPATIENT
Start: 2019-10-23 | End: 2019-10-26 | Stop reason: HOSPADM

## 2019-10-23 RX ORDER — PANTOPRAZOLE SODIUM 40 MG/1
40 TABLET, DELAYED RELEASE ORAL
Status: DISCONTINUED | OUTPATIENT
Start: 2019-10-23 | End: 2019-10-26 | Stop reason: HOSPADM

## 2019-10-23 RX ORDER — FLUMAZENIL 0.1 MG/ML
0.2 INJECTION INTRAVENOUS
Status: DISCONTINUED | OUTPATIENT
Start: 2019-10-23 | End: 2019-10-23 | Stop reason: HOSPADM

## 2019-10-23 RX ORDER — METOPROLOL TARTRATE 50 MG/1
50 TABLET ORAL 2 TIMES DAILY
Qty: 60 TAB | Refills: 0 | Status: SHIPPED | OUTPATIENT
Start: 2019-10-23 | End: 2019-11-22

## 2019-10-23 RX ORDER — MIDAZOLAM HYDROCHLORIDE 1 MG/ML
.25-5 INJECTION, SOLUTION INTRAMUSCULAR; INTRAVENOUS
Status: DISCONTINUED | OUTPATIENT
Start: 2019-10-23 | End: 2019-10-23 | Stop reason: HOSPADM

## 2019-10-23 RX ORDER — LANOLIN ALCOHOL/MO/W.PET/CERES
400 CREAM (GRAM) TOPICAL DAILY
Status: DISCONTINUED | OUTPATIENT
Start: 2019-10-23 | End: 2019-10-26 | Stop reason: HOSPADM

## 2019-10-23 RX ORDER — FENTANYL CITRATE 50 UG/ML
100 INJECTION, SOLUTION INTRAMUSCULAR; INTRAVENOUS
Status: DISCONTINUED | OUTPATIENT
Start: 2019-10-23 | End: 2019-10-23 | Stop reason: HOSPADM

## 2019-10-23 RX ORDER — FUROSEMIDE 20 MG/1
20 TABLET ORAL DAILY
Qty: 15 TAB | Refills: 0 | Status: SHIPPED | OUTPATIENT
Start: 2019-10-23 | End: 2019-11-12

## 2019-10-23 RX ORDER — FUROSEMIDE 10 MG/ML
40 INJECTION INTRAMUSCULAR; INTRAVENOUS
Status: DISCONTINUED | OUTPATIENT
Start: 2019-10-23 | End: 2019-10-23

## 2019-10-23 RX ORDER — LIDOCAINE HYDROCHLORIDE 20 MG/ML
JELLY TOPICAL ONCE
Status: COMPLETED | OUTPATIENT
Start: 2019-10-23 | End: 2019-10-23

## 2019-10-23 RX ORDER — LANOLIN ALCOHOL/MO/W.PET/CERES
100 CREAM (GRAM) TOPICAL DAILY
Status: DISCONTINUED | OUTPATIENT
Start: 2019-10-23 | End: 2019-10-26 | Stop reason: HOSPADM

## 2019-10-23 RX ORDER — LIDOCAINE HYDROCHLORIDE 40 MG/ML
SOLUTION TOPICAL ONCE
Status: COMPLETED | OUTPATIENT
Start: 2019-10-23 | End: 2019-10-23

## 2019-10-23 RX ORDER — FOLIC ACID 1 MG/1
1 TABLET ORAL DAILY
Status: DISCONTINUED | OUTPATIENT
Start: 2019-10-23 | End: 2019-10-26 | Stop reason: HOSPADM

## 2019-10-23 RX ORDER — SODIUM CHLORIDE 0.9 % (FLUSH) 0.9 %
5-40 SYRINGE (ML) INJECTION EVERY 8 HOURS
Status: DISCONTINUED | OUTPATIENT
Start: 2019-10-23 | End: 2019-10-26 | Stop reason: HOSPADM

## 2019-10-23 RX ORDER — CIPROFLOXACIN 500 MG/1
750 TABLET ORAL EVERY 12 HOURS
Status: CANCELLED | OUTPATIENT
Start: 2019-10-23

## 2019-10-23 RX ORDER — ALBUTEROL SULFATE 0.83 MG/ML
2.5 SOLUTION RESPIRATORY (INHALATION)
Status: DISCONTINUED | OUTPATIENT
Start: 2019-10-23 | End: 2019-10-26 | Stop reason: HOSPADM

## 2019-10-23 RX ORDER — ACETAMINOPHEN 325 MG/1
650 TABLET ORAL
Status: DISCONTINUED | OUTPATIENT
Start: 2019-10-23 | End: 2019-10-26 | Stop reason: HOSPADM

## 2019-10-23 RX ORDER — PROMETHAZINE HYDROCHLORIDE 25 MG/1
12.5 TABLET ORAL
Status: COMPLETED | OUTPATIENT
Start: 2019-10-23 | End: 2019-10-23

## 2019-10-23 RX ADMIN — MIDAZOLAM 1 MG: 1 INJECTION INTRAMUSCULAR; INTRAVENOUS at 14:32

## 2019-10-23 RX ADMIN — SUCRALFATE 1 G: 1 TABLET ORAL at 10:37

## 2019-10-23 RX ADMIN — FENTANYL CITRATE 50 MCG: 50 INJECTION, SOLUTION INTRAMUSCULAR; INTRAVENOUS at 14:30

## 2019-10-23 RX ADMIN — SUCRALFATE 1 G: 1 TABLET ORAL at 17:05

## 2019-10-23 RX ADMIN — SODIUM CHLORIDE 100 ML: 900 INJECTION, SOLUTION INTRAVENOUS at 07:26

## 2019-10-23 RX ADMIN — TRAZODONE HYDROCHLORIDE 50 MG: 50 TABLET ORAL at 23:32

## 2019-10-23 RX ADMIN — SUCRALFATE 1 G: 1 TABLET ORAL at 22:29

## 2019-10-23 RX ADMIN — FAMOTIDINE 20 MG: 20 TABLET, FILM COATED ORAL at 10:37

## 2019-10-23 RX ADMIN — Medication 10 ML: at 07:26

## 2019-10-23 RX ADMIN — FENTANYL CITRATE 50 MCG: 50 INJECTION, SOLUTION INTRAMUSCULAR; INTRAVENOUS at 14:34

## 2019-10-23 RX ADMIN — PROMETHAZINE HYDROCHLORIDE 12.5 MG: 25 TABLET ORAL at 05:47

## 2019-10-23 RX ADMIN — APIXABAN 5 MG: 5 TABLET, FILM COATED ORAL at 22:29

## 2019-10-23 RX ADMIN — FUROSEMIDE 40 MG: 10 INJECTION, SOLUTION INTRAMUSCULAR; INTRAVENOUS at 10:38

## 2019-10-23 RX ADMIN — FENTANYL CITRATE 25 MCG: 50 INJECTION, SOLUTION INTRAMUSCULAR; INTRAVENOUS at 14:43

## 2019-10-23 RX ADMIN — IOPAMIDOL 80 ML: 755 INJECTION, SOLUTION INTRAVENOUS at 07:27

## 2019-10-23 RX ADMIN — LIDOCAINE HYDROCHLORIDE: 40 SOLUTION TOPICAL at 13:39

## 2019-10-23 RX ADMIN — FAMOTIDINE 20 MG: 20 TABLET, FILM COATED ORAL at 17:05

## 2019-10-23 RX ADMIN — FUROSEMIDE 40 MG: 40 TABLET ORAL at 06:44

## 2019-10-23 RX ADMIN — FENTANYL CITRATE 50 MCG: 50 INJECTION, SOLUTION INTRAMUSCULAR; INTRAVENOUS at 14:36

## 2019-10-23 RX ADMIN — MIDAZOLAM 2 MG: 1 INJECTION INTRAMUSCULAR; INTRAVENOUS at 14:30

## 2019-10-23 RX ADMIN — MIDAZOLAM 1 MG: 1 INJECTION INTRAMUSCULAR; INTRAVENOUS at 14:36

## 2019-10-23 RX ADMIN — PANTOPRAZOLE SODIUM 40 MG: 40 TABLET, DELAYED RELEASE ORAL at 17:05

## 2019-10-23 RX ADMIN — MAGNESIUM GLUCONATE 500 MG ORAL TABLET 400 MG: 500 TABLET ORAL at 10:37

## 2019-10-23 RX ADMIN — ATORVASTATIN CALCIUM 40 MG: 40 TABLET, FILM COATED ORAL at 10:37

## 2019-10-23 RX ADMIN — APIXABAN 5 MG: 5 TABLET, FILM COATED ORAL at 10:37

## 2019-10-23 RX ADMIN — Medication 100 MG: at 10:37

## 2019-10-23 RX ADMIN — FENTANYL CITRATE 50 MCG: 50 INJECTION, SOLUTION INTRAMUSCULAR; INTRAVENOUS at 14:33

## 2019-10-23 RX ADMIN — FOLIC ACID 1 MG: 1 TABLET ORAL at 10:37

## 2019-10-23 RX ADMIN — LIDOCAINE HYDROCHLORIDE: 20 JELLY TOPICAL at 13:38

## 2019-10-23 NOTE — PROCEDURES
PROCEDURE    Bronchoscopy with airway inspection [facility]. INDICATION   cavitary lesion of lung    EQUIPMENT:  Olympus Q 180 Bronchoscope    ANESTHESIA    Concious sedation with: Fentanyl 225 mcg IV; Versed 4mg IV; Lidocaine 200 mg to tracheo-bronchial tree and vocal cords. IMAGING:  CT Chest 10/23/19        AIRWAY INSPECTION    After obtaining informed consent, orally with use of a bite block, an Olympus Q 180 Bronchoscope was introduced into the trachea without complication. RIGHT    LOCATION NORM/ABNORM DESCRIPTION   Larynx NL    VOCAL CORDS NL    TRACHEA NL    KAREY NL    RMSB NL    RUL ABNL Rolling amounts of purulent sputum that continued to recur even after completion of the procedure   BI NL    RML NL    RLL NL    SUP SEGM RLL NL    MED BASAL NL    ANTERIOR BASAL NL    LATERAL BASAL NL    POSTERIOR BASAL NL               LEFT    LOCATION NORM/ABNORM DESCRIPTION   LMSB NL    YUE NL    LINGULA NL    LLL ABNL Thick, ropey secretions, suctioned clear with use of saline. SUPERIOR SEG LLL NL    STEPHANIE-MEDIAL LLL NL    LATERAL LLL NL    POSTERIOR LLL NL      The following samples were obtained:    BAL: RUL - cell count, diff, routine culture, afb, fungus, anaerobes, cytology  Bronchial Wash: LLL - routine culture    The procedure was completed without complication and the patient tolerated the procedure well. EBL: None    Recommendations:  Patient will be admitted to the inpatient hospitalist service under TB precautions pending the above studies. Agree with abx coverage in the meantime with GNR and anaerobic coverage. Cipro/clinda recommended on previous consult note based on recent pseudomonas sensitivities.      Jud Rossi MD

## 2019-10-23 NOTE — ED PROVIDER NOTES
Patient is a 67-year-old male presenting to emerge department today complaining of swelling to the lower extremities. The patient said it started about 2 days ago and he has been wearing compression stockings but it seems to be just be getting worse. The patient notes that he was just recently in the hospital secondary to Pseudomonas in the atrial fibrillation. He states that he was started on several new medications and is been taking everything as prescribed. He is scheduled to follow-up with a cardiologist on the 14th but was told by the [place] that he is not allowed to go to the cardiologist and they will not cover it. He does not have outside insurance so he does not think he can follow-up with them. He denies any chest pressure. He does have a cough which is been present since his discharge and states that when the cough persists it causes some rib pain. Past Medical History:   Diagnosis Date    A-fib [facility]) 6/7/4218    Alcoholic hepatitis without ascites 5/11/2019    Hypertension        No past surgical history on file.       Family History:   Problem Relation Age of Onset    Cancer Neg Hx        Social History     Socioeconomic History    Marital status:      Spouse name: Not on file    Number of children: Not on file    Years of education: Not on file    Highest education level: Not on file   Occupational History    Not on file   Social Needs    Financial resource strain: Not on file    Food insecurity:     Worry: Not on file     Inability: Not on file    Transportation needs:     Medical: Not on file     Non-medical: Not on file   Tobacco Use    Smoking status: Current Every Day Smoker     Packs/day: 2.00    Smokeless tobacco: Never Used   Substance and Sexual Activity    Alcohol use: Yes     Comment: approx 1 pint per day    Drug use: Not on file    Sexual activity: Yes     Partners: Female   Lifestyle    Physical activity:     Days per week: Not on file     Minutes per session: Not on file    Stress: Not on file   Relationships    Social connections:     Talks on phone: Not on file     Gets together: Not on file     Attends L[employer] day service: Not on file     Active member of club or organization: Not on file     Attends meetings of clubs or organizations: Not on file     Relationship status: Not on file    Intimate partner violence:     Fear of current or ex partner: Not on file     Emotionally abused: Not on file     Physically abused: Not on file     Forced sexual activity: Not on file   Other Topics Concern    Not on file   Social History Narrative    Not on file         ALLERGIES: Patient has no known allergies. Review of Systems   All other systems reviewed and are negative. Vitals:    10/23/19 0511 10/23/19 0517 10/23/19 0519 10/23/19 0527   BP: 135/63 (!) 135/36  114/53   Pulse:  76  70   Resp:  20     Temp:  97.9 °F (36.6 °C)     SpO2:  96% 94% 94%   Weight:  77.1 kg (170 lb)     Height:  5' 10\" (1.778 m)              Physical Exam     GENERAL:The patient is well nourished, and well-hydrated. VITAL SIGNS: Heart rate, blood pressure, respiratory rate reviewed as recorded in  nurse's notes  EYES: Pupils reactive. Extraocular motion intact. No conjunctival redness or drainage. EARS: No external masses or lesions. NOSE: No nasal drainage or epistaxis. MOUTH/THROAT: Pharynx clear; airway patent. NECK: Supple, no meningeal signs. Trachea midline. No masses or thyromegaly. LUNGS: Breath sounds clear and equal bilaterally no accessory muscle use. Persistent cough -nonproductive  CARDIOVASCULAR: Regular rate and rhythm  EXTREMITIES: No clubbing or cyanosis. Normal muscle tone. No restricted range of motion appreciated. Significant pitting edema in the lower extremities bilaterally to the level of the tibial tuberosity  NEUROLOGIC: Sensation is grossly intact. Cranial nerve exam reveals face is  symmetrical, tongue is midline speech is clear.   SKIN: No rash or petechiae. Good skin turgor palpated. PSYCHIATRIC: Alert and oriented. Appropriate behavior and judgment. MDM  Number of Diagnoses or Management Options  Diagnosis management comments: CHF, COPD, pneumonia, PE,    MI, coronary artery disease, unstable angina, coronary artery disease,    Atrial fibrillation, cardiac arrhythmia, PVC, medication induced palpitations, heart block,  electrolyte induced palpitations,    Aortic dissection, aortic aneurysm,    GERD, musculoskeletal pain, costochondritis, rib fracture, pleurisy,         Amount and/or Complexity of Data Reviewed  Clinical lab tests: ordered and reviewed  Tests in the radiology section of CPT®: reviewed and ordered  Tests in the medicine section of CPT®: reviewed and ordered  Decide to obtain previous medical records or to obtain history from someone other than the patient: yes  Independent visualization of images, tracings, or specimens: yes      ED Course as of Oct 23 0714   Wed Oct 23, 2019   0627 Case was discussed with Dr. Deisi Leiva who says that the diltiazem can frequently cause the peripheral edema and he would like to stop the medication and change him to metoprolol 50 mg twice daily. Deb Macdonald   6812 Case discussed with pulmonology and they will come and see the patient here in the emergency department.   He requested that internal medicine be contacted for admission    [KH]   0713 Case discussed with the hospitalist who will come and see him here in the emergency department and plan on admission to the hospital    [KH]      ED Course User Index  [KH] Richar Haas, DO       Procedures

## 2019-10-23 NOTE — PROGRESS NOTES
TRANSFER - OUT REPORT:    Verbal report given to Olya Coelho RN(name) on Caleb Garcia  being recovered at ER bedside for routine post - op bronchoscopy. Report consisted of patients Situation, Background, Assessment and   Recommendations(SBAR). Information from the following report(s) SBAR, Procedure Summary and Recent Results was reviewed with the receiving nurse. Lines:   Peripheral IV Left Forearm (Active)   Site Assessment Clean, dry, & intact 10/23/2019  7:16 AM   Phlebitis Assessment 0 10/23/2019  7:16 AM   Infiltration Assessment 0 10/23/2019  7:16 AM   Dressing Status Clean, dry, & intact 10/23/2019  7:16 AM   Hub Color/Line Status Pink;Flushed 10/23/2019  7:16 AM    Opportunity for questions and clarification was provided.       Patient left with:   O2 @ 4 liters

## 2019-10-23 NOTE — ED TRIAGE NOTES
Pt sts \"I came in last Thursday and they told me I had pneumonia. They released me and Friday morning, Pablo Mckeon called me and they told me to come back for AFib. They admitted me to ICU and then CCU, I don't know if the medicine of what, but I don't have ankles anymore, just cankles\"    Pt presents ambulatory with difficulty to triage in moderate distress. Pt has strong productive cough and significant swelling to lower legs. Pt has +4 pitting edema to bilateral lower extremities. Lower extremities are warm and have decent color. Pt also complains of numbness to bilateral lower extremities. Onset of swelling 10/21. Pt sts he has worn compression socks for the past two days with no relief. Pt denies hx of CHF, but does have hx of AFib.

## 2019-10-23 NOTE — PROGRESS NOTES
No oral bleeding noted. Hourly rounds completed, all needs met this shift. Voices no complaints. Will continue to monitor until night shift nurse takes over.

## 2019-10-23 NOTE — H&P
Hospitalist H&P/Consult Note     Admit Date:  10/23/2019  5:07 AM   Name:  Dinh Zimmer   Age:  59 y.o.  :  1960   MRN:  [id number]   PCP:  Iron Lambert MD  Treatment Team: Physician: Benito Garcia MD; Primary Nurse: Peyman Camarillo, LEENA    HPI:   59years old M with PMH ETOH abuse, afib on eliquis presented to the hospital complaining of leg swelling for 2 days. Patient reported this is the first time having swelling, he denied history of CHF. Patient reported he was recently started on medication for afib. Patient was recently discharged from the hospital for Pseudomona PNA on 10/17. Patient developed afib with RVR requiring cardizem and eliquis. Patient also noted having SOB and R side back pain worse with coughing since previous admission. Patient denies history of cocaine intake. Patient denies history of incarceration, travel outside the country or being exposed to TB. In the ED CXR with new cavitary lesion. CT chest pending. 10 systems reviewed and negative except as noted in HPI. Past Medical History:   Diagnosis Date    A-fib [facility]) 3367    Alcoholic hepatitis without ascites 2019    Hypertension       No past surgical history on file. Prior to Admission Medications   Prescriptions Last Dose Informant Patient Reported? Taking? albuterol (PROVENTIL VENTOLIN) 2.5 mg /3 mL (0.083 %) nebu   No No   Sig: 3 mL by Nebulization route every four (4) hours as needed for Other (shortness of breath). apixaban (ELIQUIS) 5 mg tablet   No No   Sig: Take 1 Tab by mouth every twelve (12) hours. atorvastatin (LIPITOR) 40 mg tablet   No No   Sig: Take 1 Tab by mouth daily. ciprofloxacin HCl (CIPRO) 750 mg tablet   No No   Sig: Take 1 Tab by mouth every twelve (12) hours for 13 days. dilTIAZem CD (CARDIZEM CD) 240 mg ER capsule   No No   Sig: Take 1 Cap by mouth daily. famotidine (PEPCID) 20 mg tablet   No No   Sig: Take 1 Tab by mouth two (2) times a day.    folic acid (FOLVITE) 1 mg tablet   No No   Sig: Take 1 Tab by mouth daily. magnesium oxide (MAG-OX) 400 mg tablet   No No   Sig: Take 1 Tab by mouth daily. nitroglycerin (NITROSTAT) 0.4 mg SL tablet   No No   Si Tab by SubLINGual route every five (5) minutes as needed for Chest Pain.   pantoprazole (PROTONIX) 40 mg tablet   No No   Sig: Take 1 Tab by mouth Before breakfast and dinner. sucralfate (CARAFATE) 1 gram tablet   No No   Sig: Take 1 Tab by mouth Before breakfast, lunch, dinner and at bedtime. Indications: Gastritis   thiamine HCL (B-1) 100 mg tablet   No No   Sig: Take 1 Tab by mouth daily. Facility-Administered Medications: None     No Known Allergies   Social History     Tobacco Use    Smoking status: Current Every Day Smoker     Packs/day: 2.00    Smokeless tobacco: Never Used   Substance Use Topics    Alcohol use: Yes     Comment: approx 1 pint per day      Family History   Problem Relation Age of Onset    Cancer Neg Hx       Immunization History   Administered Date(s) Administered    TB Skin Test (PPD) Intradermal 2019, 10/11/2019       Objective:     Patient Vitals for the past 24 hrs:   Temp Pulse Resp BP SpO2   10/23/19 0713  69 18 116/57 93 %   10/23/19 0527  70  114/53 94 %   10/23/19 0519     94 %   10/23/19 0517 97.9 °F (36.6 °C) 76 20 (!) 135/36 96 %   10/23/19 0511    135/63      Oxygen Therapy  O2 Sat (%): 93 % (10/23/19 0713)  Pulse via Oximetry: 70 beats per minute (10/23/19 0527)  O2 Device: Room air (10/23/19 0713)  No intake or output data in the 24 hours ending 10/23/19 0857    Physical Exam:  General:    Well nourished. Alert. Eyes:   Normal sclera. Extraocular movements intact. ENT:  Normocephalic. Moist mucous membranes  CV:   RRR. No murmur, rub, or gallop. Lungs: Few rhonchi on R lung. No wheezing b/l. Abdomen: Soft, nontender, nondistended. Bowel sounds normal.   Extremities: Warm and dry. No cyanosis or edema.   Neurologic: CN II-XII grossly intact. No gross focal deficits  Skin:     No rashes or jaundice. No wounds. Psych:  Normal mood and affect. I reviewed the labs and other studies done this admission. Data Review:   Recent Results (from the past 24 hour(s))   CBC WITH AUTOMATED DIFF    Collection Time: 10/23/19  5:22 AM   Result Value Ref Range    WBC 7.3 4.3 - 11.1 K/uL    RBC 3.86 (L) 4.23 - 5.6 M/uL    HGB 12.9 (L) 13.6 - 17.2 g/dL    HCT 39.0 (L) 41.1 - 50.3 %    .0 (H) 79.6 - 97.8 FL    MCH 33.4 (H) 26.1 - 32.9 PG    MCHC 33.1 31.4 - 35.0 g/dL    RDW 15.9 (H) 11.9 - 14.6 %    PLATELET 506 884 - 557 K/uL    MPV 8.9 (L) 9.4 - 12.3 FL    ABSOLUTE NRBC 0.00 0.0 - 0.2 K/uL    DF AUTOMATED      NEUTROPHILS 51 43 - 78 %    LYMPHOCYTES 35 13 - 44 %    MONOCYTES 11 4.0 - 12.0 %    EOSINOPHILS 1 0.5 - 7.8 %    BASOPHILS 1 0.0 - 2.0 %    IMMATURE GRANULOCYTES 1 0.0 - 5.0 %    ABS. NEUTROPHILS 3.7 1.7 - 8.2 K/UL    ABS. LYMPHOCYTES 2.5 0.5 - 4.6 K/UL    ABS. MONOCYTES 0.8 0.1 - 1.3 K/UL    ABS. EOSINOPHILS 0.1 0.0 - 0.8 K/UL    ABS. BASOPHILS 0.1 0.0 - 0.2 K/UL    ABS. IMM. GRANS. 0.0 0.0 - 0.5 K/UL   METABOLIC PANEL, COMPREHENSIVE    Collection Time: 10/23/19  5:22 AM   Result Value Ref Range    Sodium 140 136 - 145 mmol/L    Potassium 4.2 3.5 - 5.1 mmol/L    Chloride 105 98 - 107 mmol/L    CO2 28 21 - 32 mmol/L    Anion gap 7 7 - 16 mmol/L    Glucose 100 65 - 100 mg/dL    BUN 6 (L) 8 - 23 MG/DL    Creatinine 0.57 (L) 0.8 - 1.5 MG/DL    GFR est AA >60 >60 ml/min/1.73m2    GFR est non-AA >60 >60 ml/min/1.73m2    Calcium 9.0 8.3 - 10.4 MG/DL    Bilirubin, total 0.4 0.2 - 1.1 MG/DL    ALT (SGPT) 25 12 - 65 U/L    AST (SGOT) 21 15 - 37 U/L    Alk.  phosphatase 71 50 - 136 U/L    Protein, total 8.1 6.3 - 8.2 g/dL    Albumin 2.5 (L) 3.2 - 4.6 g/dL    Globulin 5.6 (H) 2.3 - 3.5 g/dL    A-G Ratio 0.4 (L) 1.2 - 3.5     BNP    Collection Time: 10/23/19  5:22 AM   Result Value Ref Range    BNP 35 (H) 0 pg/mL       Imaging Studies:  CXR Results  (Last 48 hours)               10/23/19 0555  XR CHEST PA LAT Final result    Impression:  IMPRESSION:       Large alveolar consolidation in the right middle lobe, with suggestion of   associated cavitation. Presence of cavitation is new compared to prior exam.   Findings suspicious for necrotizing pneumonia. Tuberculosis is in the   differential. CT chest is recommended to better characterize. DC4                           Narrative:  Frontal and lateral views of the chest        COMPARISON: October 11, 2019       CLINICAL HISTORY: Productive cough. FINDINGS:       There is large alveolar opacity in the right midlung with suggestion of   associated cavitation. Presence of cavitation appears new compared to prior   exam. Left lung is relatively clear. No pneumothorax, pulmonary edema pleural   effusion. Cardiac mediastinal contour is within normal limits. Surrounding bones   are stable. CT Results  (Last 48 hours)               10/23/19 0734  CT CHEST W CONT Final result    Impression:  IMPRESSION:  A 9.5 CM CAVITARY LESION INFEROLATERALLY IN THE RIGHT UPPER LOBE IS   NEW SINCE OCTOBER 4 AND SUSPICIOUS FOR NECROTIZING PNEUMONIA. THE POSSIBILITY   OF TUBERCULOSIS OR OTHER GRANULOMATOUS DISEASE IS AGAIN SUGGESTED. FOLLOW-UP   PULMONARY MEDICINE CONSULTATION IS SUGGESTED. Narrative:  CHEST CT WITH CONTRAST:         CLINICAL HISTORY:  Productive cough with new cavitary lesion. TECHNIQUE:  During bolus injection of nonionic intravenous contrast, the chest   was scanned with spiral technique, and coronal reformats were produced. Radiation dose reduction was achieved using one or all of the following   techniques: automated exposure control, weight-based dosing, iterative   reconstruction. COMPARISON:  Radiograph today and CT of October 4, 2019.        FINDINGS: There is a new cavitary lesion centered inferolaterally in the right   upper lobe abutting the minor fissure with maximum diameter of approximately 9.5   cm. Infiltrate persists at the right lung base. Small, scattered pulmonary   nodules are again noted. No pathologically enlarged lymph nodes are evident. The epigastrium appears unremarkable as imaged. Assessment and Plan:     Hospital Problems as of 10/23/2019 Never Reviewed          Codes Class Noted - Resolved POA    * (Principal) Cavitary lesion of lung ICD-10-CM: J98.4  ICD-9-CM: 518.89  10/23/2019 - Present Unknown        A-fib ([address] ICD-10-CM: I48.91  ICD-9-CM: 427.31  5/5/2019 - Present Yes        ETOH abuse ICD-10-CM: F10.10  ICD-9-CM: 305.00  11/28/2016 - Present Yes        HTN (hypertension) (Chronic) ICD-10-CM: I10  ICD-9-CM: 401.9  11/19/2014 - Present Yes              AP:    -Cavitary lesion of the lung  Could be related to recent PNA. ?TB  ID evaluated patient on recent admission and recommended cipro EOT 10/25  CXR with new cavitary lesion      Plan  Cont cipro 750 mg BID EOT 10/25  Get AFB to rule out TB  Airborne isolation  CT chest pending- follow up report  Will ask Pulmonary to evaluate for new cavitary lesion    -LE edema  Likely secondary to diltiazem  BNP on normal ranges  Echo 05/2019 with EF 50-55%  D/c diltiazem  Trial of IV lasix once    -Afib  Rate controlled  Restart eliquis. Hx of substance abuse check UDS before starting BB. DVT ppx: eliquis  Code status: Full    Addendum: abxs to be restarted after bronchoscopy. Signed:   Teri Knight MD

## 2019-10-23 NOTE — PROGRESS NOTES
Called to do bedside bronchoscopy. Consent obtained from patient. Time out performed. Pts vitals monitored throughout procedure. Scope # 19 used. Procedure tolerated with no adverse rxn. 4 mg versed and 225 mcg Fentanyl  given Iv per MD order. Bronch wash and BAL sent to the lab and labeled appropriately.

## 2019-10-23 NOTE — DISCHARGE INSTRUCTIONS
Patient Education        Atrial Fibrillation: Care Instructions  Your Care Instructions    Atrial fibrillation is an irregular and often fast heartbeat. Treating this condition is important for several reasons. It can cause blood clots, which can travel from your heart to your brain and cause a stroke. If you have a fast heartbeat, you may feel lightheaded, dizzy, and weak. An irregular heartbeat can also increase your risk for heart failure. Atrial fibrillation is often the result of another heart condition, such as high blood pressure or coronary artery disease. Making changes to improve your heart condition will help you stay healthy and active. Follow-up care is a key part of your treatment and safety. Be sure to make and go to all appointments, and call your doctor if you are having problems. It's also a good idea to know your test results and keep a list of the medicines you take. How can you care for yourself at home? Medicines    · Take your medicines exactly as prescribed. Call your doctor if you think you are having a problem with your medicine. You will get more details on the specific medicines your doctor prescribes.     · If your doctor has given you a blood thinner to prevent a stroke, be sure you get instructions about how to take your medicine safely. Blood thinners can cause serious bleeding problems.     · Do not take any vitamins, over-the-counter drugs, or herbal products without talking to your doctor first.    Lifestyle changes    · Do not smoke. Smoking can increase your chance of a stroke and heart attack. If you need help quitting, talk to your doctor about stop-smoking programs and medicines. These can increase your chances of quitting for good.     · Eat a heart-healthy diet.     · Stay at a healthy weight. Lose weight if you need to.     · Limit alcohol to 2 drinks a day for men and 1 drink a day for women. Too much alcohol can cause health problems.     · Avoid colds and flu.  Get a pneumococcal vaccine shot. If you have had one before, ask your doctor whether you need another dose. Get a flu shot every year. If you must be around people with colds or flu, wash your hands often. Activity    · If your doctor recommends it, get more exercise. Walking is a good choice. Bit by bit, increase the amount you walk every day. Try for at least 30 minutes on most days of the week. You also may want to swim, bike, or do other activities. Your doctor may suggest that you join a cardiac rehabilitation program so that you can have help increasing your physical activity safely.     · Start light exercise if your doctor says it is okay. Even a small amount will help you get stronger, have more energy, and manage stress. Walking is an easy way to get exercise. Start out by walking a little more than you did in the hospital. Gradually increase the amount you walk.     · When you exercise, watch for signs that your heart is working too hard. You are pushing too hard if you cannot talk while you are exercising. If you become short of breath or dizzy or have chest pain, sit down and rest immediately.     · Check your pulse regularly. Place two fingers on the artery at the palm side of your wrist, in line with your thumb. If your heartbeat seems uneven or fast, talk to your doctor. When should you call for help? Call 911 anytime you think you may need emergency care. For example, call if:    · You have symptoms of a heart attack. These may include:  ? Chest pain or pressure, or a strange feeling in the chest.  ? Sweating. ? Shortness of breath. ? Nausea or vomiting. ? Pain, pressure, or a strange feeling in the back, neck, jaw, or upper belly or in one or both shoulders or arms. ? Lightheadedness or sudden weakness. ? A fast or irregular heartbeat. After you call 911, the  may tell you to chew 1 adult-strength or 2 to 4 low-dose aspirin. Wait for an ambulance.  Do not try to drive yourself.     · You have symptoms of a stroke. These may include:  ? Sudden numbness, tingling, weakness, or loss of movement in your face, arm, or leg, especially on only one side of your body. ? Sudden vision changes. ? Sudden trouble speaking. ? Sudden confusion or trouble understanding simple statements. ? Sudden problems with walking or balance. ? A sudden, severe headache that is different from past headaches.     · You passed out (lost consciousness).    Call your doctor now or seek immediate medical care if:    · You have new or increased shortness of breath.     · You feel dizzy or lightheaded, or you feel like you may faint.     · Your heart rate becomes irregular.     · You can feel your heart flutter in your chest or skip heartbeats. Tell your doctor if these symptoms are new or worse.    Watch closely for changes in your health, and be sure to contact your doctor if you have any problems. Where can you learn more? Go to http://nadeen-divya.info/. Enter U020 in the search box to learn more about \"Atrial Fibrillation: Care Instructions. \"  Current as of: April 9, 2019  Content Version: 12.2  © 4311-3713 [employer]. Care instructions adapted under license by [employer] (which disclaims liability or warranty for this information). If you have questions about a medical condition or this instruction, always ask your healthcare professional. [address] any warranty or liability for your use of this information. High Blood Pressure: Care Instructions  Overview    It's normal for blood pressure to go up and down throughout the day. But if it stays up, you have high blood pressure. Another name for high blood pressure is hypertension. Despite what a lot of people think, high blood pressure usually doesn't cause headaches or make you feel dizzy or lightheaded. It usually has no symptoms.  But it does increase your risk of stroke, heart attack, and other problems. You and your doctor will talk about your risks of these problems based on your blood pressure. Your doctor will give you a goal for your blood pressure. Your goal will be based on your health and your age. Lifestyle changes, such as eating healthy and being active, are always important to help lower blood pressure. You might also take medicine to reach your blood pressure goal.  Follow-up care is a key part of your treatment and safety. Be sure to make and go to all appointments, and call your doctor if you are having problems. It's also a good idea to know your test results and keep a list of the medicines you take. How can you care for yourself at home? Medical treatment  · If you stop taking your medicine, your blood pressure will go back up. You may take one or more types of medicine to lower your blood pressure. Be safe with medicines. Take your medicine exactly as prescribed. Call your doctor if you think you are having a problem with your medicine. · Talk to your doctor before you start taking aspirin every day. Aspirin can help certain people lower their risk of a heart attack or stroke. But taking aspirin isn't right for everyone, because it can cause serious bleeding. · See your doctor regularly. You may need to see the doctor more often at first or until your blood pressure comes down. · If you are taking blood pressure medicine, talk to your doctor before you take decongestants or anti-inflammatory medicine, such as ibuprofen. Some of these medicines can raise blood pressure. · Learn how to check your blood pressure at home. Lifestyle changes  · Stay at a healthy weight. This is especially important if you put on weight around the waist. Losing even 10 pounds can help you lower your blood pressure. · If your doctor recommends it, get more exercise. Walking is a good choice. Bit by bit, increase the amount you walk every day.  Try for at least 30 minutes on most days of the week. You also may want to swim, bike, or do other activities. · Avoid or limit alcohol. Talk to your doctor about whether you can drink any alcohol. · Try to limit how much sodium you eat to less than 2,300 milligrams (mg) a day. Your doctor may ask you to try to eat less than 1,500 mg a day. · Eat plenty of fruits (such as bananas and oranges), vegetables, legumes, whole grains, and low-fat dairy products. · Lower the amount of saturated fat in your diet. Saturated fat is found in animal products such as milk, cheese, and meat. Limiting these foods may help you lose weight and also lower your risk for heart disease. · Do not smoke. Smoking increases your risk for heart attack and stroke. If you need help quitting, talk to your doctor about stop-smoking programs and medicines. These can increase your chances of quitting for good. When should you call for help? Call  911 anytime you think you may need emergency care. This may mean having symptoms that suggest that your blood pressure is causing a serious heart or blood vessel problem. Your blood pressure may be over 180/120.   For example, call  911 if:    · You have symptoms of a heart attack. These may include:  ? Chest pain or pressure, or a strange feeling in the chest.  ? Sweating. ? Shortness of breath. ? Nausea or vomiting. ? Pain, pressure, or a strange feeling in the back, neck, jaw, or upper belly or in one or both shoulders or arms. ? Lightheadedness or sudden weakness. ? A fast or irregular heartbeat.     · You have symptoms of a stroke. These may include:  ? Sudden numbness, tingling, weakness, or loss of movement in your face, arm, or leg, especially on only one side of your body. ? Sudden vision changes. ? Sudden trouble speaking. ? Sudden confusion or trouble understanding simple statements. ? Sudden problems with walking or balance.   ? A sudden, severe headache that is different from past headaches.     · You have severe back or belly pain.    Do not wait until your blood pressure comes down on its own. Get help right away.   Call your doctor now or seek immediate care if:    · Your blood pressure is much higher than normal (such as 180/120 or higher), but you don't have symptoms.     · You think high blood pressure is causing symptoms, such as:  ? Severe headache.  ? Blurry vision.    Watch closely for changes in your health, and be sure to contact your doctor if:    · Your blood pressure measures higher than your doctor recommends at least 2 times. That means the top number is higher or the bottom number is higher, or both.     · You think you may be having side effects from your blood pressure medicine. Where can you learn more? Go to http://nadeen-divya.info/. Enter B680 in the search box to learn more about \"High Blood Pressure: Care Instructions. \"  Current as of: April 9, 2019  Content Version: 12.2  © 2553-9869 [employer]. Care instructions adapted under license by [employer] (which disclaims liability or warranty for this information). If you have questions about a medical condition or this instruction, always ask your healthcare professional. [place] [address] any warranty or liability for your use of this information.

## 2019-10-23 NOTE — PROGRESS NOTES
10/23/19 1621   Dual Skin Pressure Injury Assessment   Dual Skin Pressure Injury Assessment WDL   Second Care Provider (Based on [address] [place] [address]) Ulysess Bless RN   Skin Integumentary   Skin Integumentary (WDL) WDL   Skin Color Appropriate for ethnicity; Ecchymosis (comment)  (Rt inner FA)   Skin Condition/Temp Warm;Dry   Skin Integrity Intact   Turgor Epidermis thin w/ loss of subcut tissue   Hair Growth Sparce   Varicosities Absent   Wound Prevention and Protection Methods   Orientation of Wound Prevention Posterior   Location of Wound Prevention Sacrum/Coccyx   Dressing Present  No   Wound Offloading (Prevention Methods) Bed, pressure reduction mattress;Repositioning;Turning   To room 610 from Ed. Alert and oriented x 4. Oriented to room and floor. No oral bleeding or excessive swallowing noted. Resp unlabored. Has some bruising noted to rt inner FA from previous blood draws and IV sticks. BLE with 4+ pitting edema noted with some discoloration noted. Pedal pulses present. Voices no c/o pain.  Dual skin assessment completed with Briana Olmedo RN

## 2019-10-23 NOTE — ED NOTES
TRANSFER - OUT REPORT:    Verbal report given to Marilou(name) on Christopher Moreno  being transferred to 610(unit) for routine progression of care       Report consisted of patients Situation, Background, Assessment and   Recommendations(SBAR). Information from the following report(s) ED Summary was reviewed with the receiving nurse. Lines:   Peripheral IV Left Forearm (Active)   Site Assessment Clean, dry, & intact 10/23/2019  7:16 AM   Phlebitis Assessment 0 10/23/2019  7:16 AM   Infiltration Assessment 0 10/23/2019  7:16 AM   Dressing Status Clean, dry, & intact 10/23/2019  7:16 AM   Hub Color/Line Status Pink;Flushed 10/23/2019  7:16 AM        Opportunity for questions and clarification was provided.       Patient transported with:   [employer]

## 2019-10-23 NOTE — INTERVAL H&P NOTE
H&P Update: 
Juan Alberto Neighbor was seen and examined. History and physical has been reviewed. The patient has been examined.  There have been no significant clinical changes since the completion of the originally dated History and Physical.

## 2019-10-23 NOTE — CONSULTS
CONSULT NOTE    Enrrique Carolina    10/23/2019    Date of Admission:  10/23/2019    The patient's chart is reviewed and the patient is discussed with the staff. Subjective:     Patient is a 59 y.o.  male seen and evaluated at the request of Dr. Jaison Ho in the ED. He was recently admitted with pseudomonas pneumonia discharged just 6 days ago with a course of 14 days Cipro to complete which he is still taking. He came in today due to worsening lower extremity swelling for the past 2 days. He has not previously been on diuretics, but did have afib controlled with diltiazem during his last admission. During his evaluation he had a repeat CXR showing cavitating right sided pneumonia with air fluid level developed from prior R midlung opacity previously. He reports he has continued to cough, but it is dry now. He originally had bloody sputum, then rust colored then white and now clear. He continues to have a bothersome cough despite being non-productive. He has not had recurrent fevers. He had a PPD placed during his last admission which was reportedly negative. He reports he has been compliant with medications. We are consulted by the ER for cavitary pneumonia and further evaluation. Review of Systems  A comprehensive review of systems was negative except for that written in the HPI.     Patient Active Problem List   Diagnosis Code    Cellulitis L03.90    HTN (hypertension) I10    Chest pain R07.9    Shortness of breath R06.02    Tobacco abuse Z72.0    ETOH abuse F10.10    PNA (pneumonia) J18.9    Tachycardia R00.0    Thrombocytopenia (HCC) D69.6    GI bleed K92.2    A-fib (HCC) I48.91    Bacteremia due to Gram-negative bacteria R78.81    Clostridium difficile diarrhea A04.72    Hypomagnesemia E83.42    Hypokalemia E87.6    Diarrhea J39.4    Alcoholic hepatitis without ascites K70.10    Liver mass R16.0    Pseudomonas septicemia (HCC) A41.52    Right middle [facility] pneumonia ([facility] J18.1    Cavitary lesion of lung J98.4     Prior to Admission Medications   Prescriptions Last Dose Informant Patient Reported? Taking? albuterol (PROVENTIL VENTOLIN) 2.5 mg /3 mL (0.083 %) nebu   No No   Sig: 3 mL by Nebulization route every four (4) hours as needed for Other (shortness of breath). apixaban (ELIQUIS) 5 mg tablet   No No   Sig: Take 1 Tab by mouth every twelve (12) hours. atorvastatin (LIPITOR) 40 mg tablet   No No   Sig: Take 1 Tab by mouth daily. ciprofloxacin HCl (CIPRO) 750 mg tablet   No No   Sig: Take 1 Tab by mouth every twelve (12) hours for 13 days. dilTIAZem CD (CARDIZEM CD) 240 mg ER capsule   No No   Sig: Take 1 Cap by mouth daily. famotidine (PEPCID) 20 mg tablet   No No   Sig: Take 1 Tab by mouth two (2) times a day. folic acid (FOLVITE) 1 mg tablet   No No   Sig: Take 1 Tab by mouth daily. magnesium oxide (MAG-OX) 400 mg tablet   No No   Sig: Take 1 Tab by mouth daily. nitroglycerin (NITROSTAT) 0.4 mg SL tablet   No No   Si Tab by SubLINGual route every five (5) minutes as needed for Chest Pain.   pantoprazole (PROTONIX) 40 mg tablet   No No   Sig: Take 1 Tab by mouth Before breakfast and dinner. sucralfate (CARAFATE) 1 gram tablet   No No   Sig: Take 1 Tab by mouth Before breakfast, lunch, dinner and at bedtime. Indications: Gastritis   thiamine HCL (B-1) 100 mg tablet   No No   Sig: Take 1 Tab by mouth daily. Facility-Administered Medications: None     Past Medical History:   Diagnosis Date    A-fib [facility])     Alcoholic hepatitis without ascites 2019    Hypertension      No past surgical history on file.   Social History     Socioeconomic History    Marital status:      Spouse name: Not on file    Number of children: Not on file    Years of education: Not on file    Highest education level: Not on file   Occupational History    Not on file   Social Needs    Financial resource strain: Not on file   [employer] insecurity:     Worry: Not on file     Inability: Not on file    Transportation needs:     Medical: Not on file     Non-medical: Not on file   Tobacco Use    Smoking status: Current Every Day Smoker     Packs/day: 2.00    Smokeless tobacco: Never Used   Substance and Sexual Activity    Alcohol use: Yes     Comment: approx 1 pint per day    Drug use: Not on file    Sexual activity: Yes     Partners: Female   Lifestyle    Physical activity:     Days per week: Not on file     Minutes per session: Not on file    Stress: Not on file   Relationships    Social connections:     Talks on phone: Not on file     Gets together: Not on file     Attends Baptism service: Not on file     Active member of club or organization: Not on file     Attends meetings of clubs or organizations: Not on file     Relationship status: Not on file    Intimate partner violence:     Fear of current or ex partner: Not on file     Emotionally abused: Not on file     Physically abused: Not on file     Forced sexual activity: Not on file   Other Topics Concern    Not on file   Social History Narrative    Not on file     Family History   Problem Relation Age of Onset    Cancer Neg Hx      No Known Allergies    No current facility-administered medications for this encounter. Current Outpatient Medications   Medication Sig    metoprolol tartrate (LOPRESSOR) 50 mg tablet Take 1 Tab by mouth two (2) times a day for 30 days.  furosemide (LASIX) 20 mg tablet Take 1 Tab by mouth daily for 20 days.  promethazine (PHENERGAN) 25 mg tablet Take 0.5 Tabs by mouth every six (6) hours as needed for Nausea for up to 20 doses.  pantoprazole (PROTONIX) 40 mg tablet Take 1 Tab by mouth Before breakfast and dinner.  ciprofloxacin HCl (CIPRO) 750 mg tablet Take 1 Tab by mouth every twelve (12) hours for 13 days.  apixaban (ELIQUIS) 5 mg tablet Take 1 Tab by mouth every twelve (12) hours.     albuterol (PROVENTIL VENTOLIN) 2.5 mg /3 mL (0.083 %) nebu 3 mL by Nebulization route every four (4) hours as needed for Other (shortness of breath).  magnesium oxide (MAG-OX) 400 mg tablet Take 1 Tab by mouth daily.  famotidine (PEPCID) 20 mg tablet Take 1 Tab by mouth two (2) times a day.  folic acid (FOLVITE) 1 mg tablet Take 1 Tab by mouth daily.  sucralfate (CARAFATE) 1 gram tablet Take 1 Tab by mouth Before breakfast, lunch, dinner and at bedtime. Indications: Gastritis    thiamine HCL (B-1) 100 mg tablet Take 1 Tab by mouth daily.  nitroglycerin (NITROSTAT) 0.4 mg SL tablet 1 Tab by SubLINGual route every five (5) minutes as needed for Chest Pain.  atorvastatin (LIPITOR) 40 mg tablet Take 1 Tab by mouth daily. Objective:     Vitals:    10/23/19 0517 10/23/19 0519 10/23/19 0527 10/23/19 0713   BP: (!) 135/36  114/53 116/57   Pulse: 76  70 69   Resp: 20   18   Temp: 97.9 °F (36.6 °C)      SpO2: 96% 94% 94% 93%   Weight: 170 lb (77.1 kg)      Height: 5' 10\" (1.778 m)        PHYSICAL EXAM     Constitutional:  the patient is well developed and in no acute distress  EENMT:  Sclera clear, pupils equal, oral mucosa moist  Respiratory: clear, cough with deep breathing, non-productive  Cardiovascular:  RRR without M,G,R  Gastrointestinal: soft and non-tender; with positive bowel sounds. Musculoskeletal: warm without cyanosis. There is 3+ lower extremity edema.   Skin:  no jaundice or rashes, no wounds   Neurologic: no gross neuro deficits     Psychiatric:  alert and oriented x 4    CXR:  R cavitary infiltrate with air fluid level      CT 10/4/2019: RML and RLL bronchiectatic changes on recent lung cancer screening CT      Recent Labs     10/23/19  0522   WBC 7.3   HGB 12.9*   HCT 39.0*        Recent Labs     10/23/19  0522      K 4.2         CO2 28   BUN 6*   CREA 0.57*   CA 9.0   ALB 2.5*   TBILI 0.4   ALT 25   SGOT 21     No results for input(s): PH, PCO2, PO2, HCO3, PHI, PCO2I, PO2I, HCO3I in the last 72 hours. No results for input(s): LCAD, LAC in the last 72 hours. Assessment:  (Medical Decision Making)     Hospital Problems  Never Reviewed          Codes Class Noted POA    * (Principal) Cavitary lesion of lung ICD-10-CM: J98.4  ICD-9-CM: 518.89  10/23/2019 Unknown        A-fib [facility]) ICD-10-CM: I48.91  ICD-9-CM: 427.31  5/5/2019 Yes        ETOH abuse ICD-10-CM: F10.10  ICD-9-CM: 305.00  11/28/2016 Yes        HTN (hypertension) (Chronic) ICD-10-CM: I10  ICD-9-CM: 401.9  11/19/2014 Yes            Plan:  (Medical Decision Making)     --would hold on additional antibiotics for now  --following bronchoscopy consider resuming cipro with anerobic coverage as well, clinda?  --remain NPO  --will attempt to get bronchoscopy with BAL today for further evaluation, rule out TB (doubt) and mixed infection  --rate control afib per hospitalist  --airborne precautions for now until TB is ruled out    More than 50% of the time documented was spent in face-to-face contact with the patient and in the care of the patient on the floor/unit where the patient is located. Thank you very much for this referral.  We appreciate the opportunity to participate in this patient's care. Will follow along with above stated plan.     Tray Adams MD

## 2019-10-23 NOTE — PROGRESS NOTES
TRANSFER - IN REPORT:    Verbal report received from [facility] on [employer]  being received from ED for routine progression of care      Report consisted of patients Situation, Background, Assessment and   Recommendations(SBAR). Information from the following report(s) SBAR, Kardex, ED Summary, Intake/Output, MAR, Recent Results and Med Rec Status was reviewed with the receiving nurse. Opportunity for questions and clarification was provided. Assessment completed upon patients arrival to unit and care assumed.  Awaiting transport

## 2019-10-23 NOTE — H&P (VIEW-ONLY)
CONSULT NOTE Yg Carolina 
 
10/23/2019 Date of Admission:  10/23/2019 The patient's chart is reviewed and the patient is discussed with the staff. Subjective:  
 
Patient is a 59 y.o.  male seen and evaluated at the request of Dr. Genaro Baltazar in the ED. He was recently admitted with pseudomonas pneumonia discharged just 6 days ago with a course of 14 days Cipro to complete which he is still taking. He came in today due to worsening lower extremity swelling for the past 2 days. He has not previously been on diuretics, but did have afib controlled with diltiazem during his last admission. During his evaluation he had a repeat CXR showing cavitating right sided pneumonia with air fluid level developed from prior R midlung opacity previously. He reports he has continued to cough, but it is dry now. He originally had bloody sputum, then rust colored then white and now clear. He continues to have a bothersome cough despite being non-productive. He has not had recurrent fevers. He had a PPD placed during his last admission which was reportedly negative. He reports he has been compliant with medications. We are consulted by the ER for cavitary pneumonia and further evaluation. Review of Systems A comprehensive review of systems was negative except for that written in the HPI. Patient Active Problem List  
Diagnosis Code  Cellulitis L03.90  
 HTN (hypertension) I10  
 Chest pain R07.9  Shortness of breath R06.02  
 Tobacco abuse Z72.0  
 ETOH abuse F10.10  PNA (pneumonia) J18.9  Tachycardia R00.0  Thrombocytopenia ([address] D69.6  GI bleed K92.2  A-fib ([address] I48.91  
 Bacteremia due to Gram-negative bacteria R78.81  Clostridium difficile diarrhea A04.72  
 Hypomagnesemia E83.42  
 Hypokalemia E87.6  Diarrhea R19.7  Alcoholic hepatitis without ascites K70.10  Liver mass R16.0  Pseudomonas septicemia ([address] A41.52  
  Right middle lobe pneumonia ([facility] J18.1  Cavitary lesion of lung J98.4 Prior to Admission Medications Prescriptions Last Dose Informant Patient Reported? Taking? albuterol (PROVENTIL VENTOLIN) 2.5 mg /3 mL (0.083 %) nebu   No No  
Sig: 3 mL by Nebulization route every four (4) hours as needed for Other (shortness of breath). apixaban (ELIQUIS) 5 mg tablet   No No  
Sig: Take 1 Tab by mouth every twelve (12) hours. atorvastatin (LIPITOR) 40 mg tablet   No No  
Sig: Take 1 Tab by mouth daily. ciprofloxacin HCl (CIPRO) 750 mg tablet   No No  
Sig: Take 1 Tab by mouth every twelve (12) hours for 13 days. dilTIAZem CD (CARDIZEM CD) 240 mg ER capsule   No No  
Sig: Take 1 Cap by mouth daily. famotidine (PEPCID) 20 mg tablet   No No  
Sig: Take 1 Tab by mouth two (2) times a day. folic acid (FOLVITE) 1 mg tablet   No No  
Sig: Take 1 Tab by mouth daily. magnesium oxide (MAG-OX) 400 mg tablet   No No  
Sig: Take 1 Tab by mouth daily. nitroglycerin (NITROSTAT) 0.4 mg SL tablet   No No  
Si Tab by SubLINGual route every five (5) minutes as needed for Chest Pain.  
pantoprazole (PROTONIX) 40 mg tablet   No No  
Sig: Take 1 Tab by mouth Before breakfast and dinner. sucralfate (CARAFATE) 1 gram tablet   No No  
Sig: Take 1 Tab by mouth Before breakfast, lunch, dinner and at bedtime. Indications: Gastritis  
thiamine HCL (B-1) 100 mg tablet   No No  
Sig: Take 1 Tab by mouth daily. Facility-Administered Medications: None Past Medical History:  
Diagnosis Date  A-fib ([facility] 2019  Alcoholic hepatitis without ascites 2019  Hypertension No past surgical history on file. Social History Socioeconomic History  Marital status:  Spouse name: Not on file  Number of children: Not on file  Years of education: Not on file  Highest education level: Not on file Occupational History  Not on file Social Needs  Financial resource strain: Not on file  Food insecurity:  
  Worry: Not on file Inability: Not on file  Transportation needs:  
  Medical: Not on file Non-medical: Not on file Tobacco Use  Smoking status: Current Every Day Smoker Packs/day: 2.00  Smokeless tobacco: Never Used Substance and Sexual Activity  Alcohol use: Yes Comment: approx 1 pint per day  Drug use: Not on file  Sexual activity: Yes  
  Partners: Female Lifestyle  Physical activity:  
  Days per week: Not on file Minutes per session: Not on file  Stress: Not on file Relationships  Social connections:  
  Talks on phone: Not on file Gets together: Not on file Attends Bahai service: Not on file Active member of club or organization: Not on file Attends meetings of clubs or organizations: Not on file Relationship status: Not on file  Intimate partner violence:  
  Fear of current or ex partner: Not on file Emotionally abused: Not on file Physically abused: Not on file Forced sexual activity: Not on file Other Topics Concern  Not on file Social History Narrative  Not on file Family History Problem Relation Age of Onset  Cancer Neg Hx No Known Allergies No current facility-administered medications for this encounter. Current Outpatient Medications Medication Sig  
 metoprolol tartrate (LOPRESSOR) 50 mg tablet Take 1 Tab by mouth two (2) times a day for 30 days.  furosemide (LASIX) 20 mg tablet Take 1 Tab by mouth daily for 20 days.  promethazine (PHENERGAN) 25 mg tablet Take 0.5 Tabs by mouth every six (6) hours as needed for Nausea for up to 20 doses.  pantoprazole (PROTONIX) 40 mg tablet Take 1 Tab by mouth Before breakfast and dinner.  ciprofloxacin HCl (CIPRO) 750 mg tablet Take 1 Tab by mouth every twelve (12) hours for 13 days.   
 apixaban (ELIQUIS) 5 mg tablet Take 1 Tab by mouth every twelve (12) hours.  
 albuterol (PROVENTIL VENTOLIN) 2.5 mg /3 mL (0.083 %) nebu 3 mL by Nebulization route every four (4) hours as needed for Other (shortness of breath).  magnesium oxide (MAG-OX) 400 mg tablet Take 1 Tab by mouth daily.  famotidine (PEPCID) 20 mg tablet Take 1 Tab by mouth two (2) times a day.  folic acid (FOLVITE) 1 mg tablet Take 1 Tab by mouth daily.  sucralfate (CARAFATE) 1 gram tablet Take 1 Tab by mouth Before breakfast, lunch, dinner and at bedtime. Indications: Gastritis  thiamine HCL (B-1) 100 mg tablet Take 1 Tab by mouth daily.  nitroglycerin (NITROSTAT) 0.4 mg SL tablet 1 Tab by SubLINGual route every five (5) minutes as needed for Chest Pain.  atorvastatin (LIPITOR) 40 mg tablet Take 1 Tab by mouth daily. Objective:  
 
Vitals:  
 10/23/19 9371 10/23/19 8150 10/23/19 0527 10/23/19 1884 BP: (!) 135/36  114/53 116/57 Pulse: 76  70 69 Resp: 20   18 Temp: 97.9 °F (36.6 °C) SpO2: 96% 94% 94% 93% Weight: 170 lb (77.1 kg) Height: 5' 10\" (1.778 m) PHYSICAL EXAM  
 
Constitutional:  the patient is well developed and in no acute distress EENMT:  Sclera clear, pupils equal, oral mucosa moist 
Respiratory: clear, cough with deep breathing, non-productive Cardiovascular:  RRR without M,G,R 
Gastrointestinal: soft and non-tender; with positive bowel sounds. Musculoskeletal: warm without cyanosis. There is 3+ lower extremity edema. Skin:  no jaundice or rashes, no wounds Neurologic: no gross neuro deficits Psychiatric:  alert and oriented x 4 CXR:  R cavitary infiltrate with air fluid level CT 10/4/2019: RML and RLL bronchiectatic changes on recent lung cancer screening CT Recent Labs 10/23/19 
0522 WBC 7.3 HGB 12.9*  
HCT 39.0*  
 Recent Labs 10/23/19 
0522   
K 4.2   CO2 28 BUN 6*  
CREA 0.57* CA 9.0 ALB 2.5* TBILI 0.4 ALT 25 SGOT 21  
 
 No results for input(s): PH, PCO2, PO2, HCO3, PHI, PCO2I, PO2I, HCO3I in the last 72 hours. No results for input(s): LCAD, LAC in the last 72 hours. Assessment:  (Medical Decision Making) Hospital Problems  Never Reviewed Codes Class Noted POA * (Principal) Cavitary lesion of lung ICD-10-CM: J98.4 ICD-9-CM: 518.89  10/23/2019 Unknown A-fib [facility]) ICD-10-CM: I48.91 
ICD-9-CM: 427.31  5/5/2019 Yes ETOH abuse ICD-10-CM: F10.10 ICD-9-CM: 305.00  11/28/2016 Yes HTN (hypertension) (Chronic) ICD-10-CM: I10 
ICD-9-CM: 401.9  11/19/2014 Yes Plan:  (Medical Decision Making)  
 
--would hold on additional antibiotics for now 
--following bronchoscopy consider resuming cipro with anerobic coverage as well, clinda? 
--remain NPO 
--will attempt to get bronchoscopy with BAL today for further evaluation, rule out TB (doubt) and mixed infection 
--rate control afib per hospitalist 
--airborne precautions for now until TB is ruled out More than 50% of the time documented was spent in face-to-face contact with the patient and in the care of the patient on the floor/unit where the patient is located. Thank you very much for this referral.  We appreciate the opportunity to participate in this patient's care. Will follow along with above stated plan.  
 
Darlene Mosley MD

## 2019-10-24 LAB
ANION GAP SERPL CALC-SCNC: 6 MMOL/L (ref 7–16)
BASOPHILS # BLD: 0.1 K/UL (ref 0–0.2)
BASOPHILS NFR BLD: 1 % (ref 0–2)
BUN SERPL-MCNC: 10 MG/DL (ref 8–23)
CALCIUM SERPL-MCNC: 8.5 MG/DL (ref 8.3–10.4)
CHLORIDE SERPL-SCNC: 100 MMOL/L (ref 98–107)
CO2 SERPL-SCNC: 30 MMOL/L (ref 21–32)
CREAT SERPL-MCNC: 0.45 MG/DL (ref 0.8–1.5)
DIFFERENTIAL METHOD BLD: ABNORMAL
EOSINOPHIL # BLD: 0.1 K/UL (ref 0–0.8)
EOSINOPHIL NFR BLD: 1 % (ref 0.5–7.8)
ERYTHROCYTE [DISTWIDTH] IN BLOOD BY AUTOMATED COUNT: 15.7 % (ref 11.9–14.6)
GLUCOSE SERPL-MCNC: 81 MG/DL (ref 65–100)
HCT VFR BLD AUTO: 30.5 % (ref 41.1–50.3)
HGB BLD-MCNC: 10.3 G/DL (ref 13.6–17.2)
IMM GRANULOCYTES # BLD AUTO: 0 K/UL (ref 0–0.5)
IMM GRANULOCYTES NFR BLD AUTO: 1 % (ref 0–5)
LYMPHOCYTES # BLD: 2.3 K/UL (ref 0.5–4.6)
LYMPHOCYTES NFR BLD: 36 % (ref 13–44)
MCH RBC QN AUTO: 33.1 PG (ref 26.1–32.9)
MCHC RBC AUTO-ENTMCNC: 33.8 G/DL (ref 31.4–35)
MCV RBC AUTO: 98.1 FL (ref 79.6–97.8)
MONOCYTES # BLD: 0.7 K/UL (ref 0.1–1.3)
MONOCYTES NFR BLD: 11 % (ref 4–12)
NEUTS SEG # BLD: 3.2 K/UL (ref 1.7–8.2)
NEUTS SEG NFR BLD: 51 % (ref 43–78)
NRBC # BLD: 0 K/UL (ref 0–0.2)
PLATELET # BLD AUTO: 354 K/UL (ref 150–450)
PMV BLD AUTO: 9 FL (ref 9.4–12.3)
POTASSIUM SERPL-SCNC: 3.6 MMOL/L (ref 3.5–5.1)
RBC # BLD AUTO: 3.11 M/UL (ref 4.23–5.6)
SODIUM SERPL-SCNC: 136 MMOL/L (ref 136–145)
WBC # BLD AUTO: 6.4 K/UL (ref 4.3–11.1)

## 2019-10-24 PROCEDURE — 85025 COMPLETE CBC W/AUTO DIFF WBC: CPT

## 2019-10-24 PROCEDURE — 74011250637 HC RX REV CODE- 250/637: Performed by: INTERNAL MEDICINE

## 2019-10-24 PROCEDURE — 94760 N-INVAS EAR/PLS OXIMETRY 1: CPT

## 2019-10-24 PROCEDURE — 74011250637 HC RX REV CODE- 250/637: Performed by: NURSE PRACTITIONER

## 2019-10-24 PROCEDURE — 96366 THER/PROPH/DIAG IV INF ADDON: CPT

## 2019-10-24 PROCEDURE — 99232 SBSQ HOSP IP/OBS MODERATE 35: CPT | Performed by: INTERNAL MEDICINE

## 2019-10-24 PROCEDURE — 74011250636 HC RX REV CODE- 250/636: Performed by: INTERNAL MEDICINE

## 2019-10-24 PROCEDURE — 80048 BASIC METABOLIC PNL TOTAL CA: CPT

## 2019-10-24 PROCEDURE — 36415 COLL VENOUS BLD VENIPUNCTURE: CPT

## 2019-10-24 PROCEDURE — 96365 THER/PROPH/DIAG IV INF INIT: CPT

## 2019-10-24 PROCEDURE — 99218 HC RM OBSERVATION: CPT

## 2019-10-24 PROCEDURE — 94640 AIRWAY INHALATION TREATMENT: CPT

## 2019-10-24 PROCEDURE — 96367 TX/PROPH/DG ADDL SEQ IV INF: CPT

## 2019-10-24 PROCEDURE — 77030013140 HC MSK NEB VYRM -A

## 2019-10-24 PROCEDURE — 74011000250 HC RX REV CODE- 250: Performed by: NURSE PRACTITIONER

## 2019-10-24 RX ORDER — LORAZEPAM 1 MG/1
1 TABLET ORAL
Status: DISCONTINUED | OUTPATIENT
Start: 2019-10-24 | End: 2019-10-26 | Stop reason: HOSPADM

## 2019-10-24 RX ORDER — IPRATROPIUM BROMIDE AND ALBUTEROL SULFATE 2.5; .5 MG/3ML; MG/3ML
3 SOLUTION RESPIRATORY (INHALATION)
Status: DISCONTINUED | OUTPATIENT
Start: 2019-10-24 | End: 2019-10-25

## 2019-10-24 RX ORDER — HYDROCODONE BITARTRATE AND HOMATROPINE METHYLBROMIDE 1.5; 5 MG/5ML; MG/5ML
5 SYRUP ORAL
Status: DISCONTINUED | OUTPATIENT
Start: 2019-10-24 | End: 2019-10-26 | Stop reason: HOSPADM

## 2019-10-24 RX ORDER — CLINDAMYCIN PHOSPHATE 600 MG/50ML
600 INJECTION INTRAVENOUS EVERY 8 HOURS
Status: DISCONTINUED | OUTPATIENT
Start: 2019-10-24 | End: 2019-10-26 | Stop reason: HOSPADM

## 2019-10-24 RX ORDER — FAMOTIDINE 20 MG/1
20 TABLET, FILM COATED ORAL DAILY
Status: DISCONTINUED | OUTPATIENT
Start: 2019-10-25 | End: 2019-10-26 | Stop reason: HOSPADM

## 2019-10-24 RX ORDER — BENZONATATE 100 MG/1
100 CAPSULE ORAL
Status: DISCONTINUED | OUTPATIENT
Start: 2019-10-24 | End: 2019-10-26 | Stop reason: HOSPADM

## 2019-10-24 RX ORDER — IBUPROFEN 200 MG
1 TABLET ORAL EVERY 24 HOURS
Status: DISCONTINUED | OUTPATIENT
Start: 2019-10-24 | End: 2019-10-26 | Stop reason: HOSPADM

## 2019-10-24 RX ORDER — CIPROFLOXACIN 2 MG/ML
400 INJECTION, SOLUTION INTRAVENOUS EVERY 12 HOURS
Status: DISCONTINUED | OUTPATIENT
Start: 2019-10-24 | End: 2019-10-26 | Stop reason: HOSPADM

## 2019-10-24 RX ORDER — GUAIFENESIN 600 MG/1
1200 TABLET, EXTENDED RELEASE ORAL EVERY 12 HOURS
Status: DISCONTINUED | OUTPATIENT
Start: 2019-10-24 | End: 2019-10-26 | Stop reason: HOSPADM

## 2019-10-24 RX ADMIN — PANTOPRAZOLE SODIUM 40 MG: 40 TABLET, DELAYED RELEASE ORAL at 17:04

## 2019-10-24 RX ADMIN — CLINDAMYCIN PHOSPHATE 600 MG: 600 INJECTION, SOLUTION INTRAVENOUS at 17:10

## 2019-10-24 RX ADMIN — APIXABAN 5 MG: 5 TABLET, FILM COATED ORAL at 08:29

## 2019-10-24 RX ADMIN — FOLIC ACID 1 MG: 1 TABLET ORAL at 08:29

## 2019-10-24 RX ADMIN — IPRATROPIUM BROMIDE AND ALBUTEROL SULFATE 3 ML: .5; 3 SOLUTION RESPIRATORY (INHALATION) at 19:32

## 2019-10-24 RX ADMIN — FAMOTIDINE 20 MG: 20 TABLET, FILM COATED ORAL at 08:29

## 2019-10-24 RX ADMIN — LORAZEPAM 1 MG: 1 TABLET ORAL at 21:13

## 2019-10-24 RX ADMIN — Medication 100 MG: at 08:29

## 2019-10-24 RX ADMIN — GUAIFENESIN 1200 MG: 600 TABLET ORAL at 20:11

## 2019-10-24 RX ADMIN — ACETAMINOPHEN 650 MG: 325 TABLET, FILM COATED ORAL at 20:10

## 2019-10-24 RX ADMIN — ATORVASTATIN CALCIUM 40 MG: 40 TABLET, FILM COATED ORAL at 08:29

## 2019-10-24 RX ADMIN — SUCRALFATE 1 G: 1 TABLET ORAL at 12:31

## 2019-10-24 RX ADMIN — PANTOPRAZOLE SODIUM 40 MG: 40 TABLET, DELAYED RELEASE ORAL at 05:31

## 2019-10-24 RX ADMIN — SUCRALFATE 1 G: 1 TABLET ORAL at 21:12

## 2019-10-24 RX ADMIN — APIXABAN 5 MG: 5 TABLET, FILM COATED ORAL at 20:18

## 2019-10-24 RX ADMIN — SUCRALFATE 1 G: 1 TABLET ORAL at 05:31

## 2019-10-24 RX ADMIN — CIPROFLOXACIN 400 MG: 2 INJECTION, SOLUTION INTRAVENOUS at 10:45

## 2019-10-24 RX ADMIN — CLINDAMYCIN PHOSPHATE 600 MG: 600 INJECTION, SOLUTION INTRAVENOUS at 08:30

## 2019-10-24 RX ADMIN — Medication 10 ML: at 14:10

## 2019-10-24 RX ADMIN — MAGNESIUM GLUCONATE 500 MG ORAL TABLET 400 MG: 500 TABLET ORAL at 08:29

## 2019-10-24 RX ADMIN — CIPROFLOXACIN 400 MG: 2 INJECTION, SOLUTION INTRAVENOUS at 20:13

## 2019-10-24 RX ADMIN — Medication 10 ML: at 22:00

## 2019-10-24 RX ADMIN — SUCRALFATE 1 G: 1 TABLET ORAL at 17:04

## 2019-10-24 RX ADMIN — Medication 10 ML: at 21:13

## 2019-10-24 RX ADMIN — FAMOTIDINE 20 MG: 20 TABLET, FILM COATED ORAL at 17:04

## 2019-10-24 NOTE — PROGRESS NOTES
Resting in bed without complaints. Continues to have productive cough. Hourly rounds completed, all needs met this shift. Will continue to monitor until night shift nurse takes over.

## 2019-10-24 NOTE — PROGRESS NOTES
Hospitalist Progress Note    Subjective:   Daily Progress Note: 10/24/2019 5:02 PM    Patient presented to ER 10/23 with complaints of distal edema, productive cough and SOB that began 2 days prior. He was admitted here from 10/11-10/17 with pseudomonas septicemia, right middle lobe pneumonia and ETOH abuse on valium taper. He also developed atrial fib with RVR requiring cardizem and eliquis. ID, pulmonary and cards were following. He was discharged on cipro x total of 14 days. Bilateral lower extremity edema, attributes to the addition of diltiazem. Denies history of prior CHF. Was to follow up with [facility], however, reports [facility]. Currently he reports right side and back pain with SOB, all worse with cough that has persisted since discharge. Denies risk factors for TB. Found with new cavitary lesion on ER CXR. He admits to drinking a pint of liquor daily. Negative PPD last admission. Pulmonary in, holding additional abx for now. Plans for bronch, NPO, airborne precautions until TB ruled out. 10/24:  Underwent bedside bronchoscopy yesterday pm per Dr Gaurav Key with airway inspection and cleanout. Multiple cultures sent. Restarted on cipro, clindamycin added. CT today with pulmonary emboli, see below. Reports feeling better, expectorating small amounts thick, clear and rust colored sputum. Afebrile. ADDITIONAL HISTORY:  ETOH abuse, has been to rehab, new chronic atrial fib on eliquis and amiodarone ,  [facility] patient, hypertension, alcoholic hepatitis without ascites, longstanding ongoing tobacco abuse.   Current Facility-Administered Medications   Medication Dose Route Frequency    ciprofloxacin (CIPRO) 400 mg in D5W IVPB (premix)  400 mg IntraVENous Q12H    clindamycin (CLEOCIN) 600mg D5W 50mL IVPB (premix)  600 mg IntraVENous Q8H    nicotine (NICODERM CQ) 21 mg/24 hr patch 1 Patch  1 Patch TransDERmal Q24H    sodium chloride (NS) flush 5-40 mL  5-40 mL IntraVENous Q8H    sodium chloride (NS) flush 5-40 mL  5-40 mL IntraVENous PRN    acetaminophen (TYLENOL) tablet 650 mg  650 mg Oral Q4H PRN    albuterol (PROVENTIL VENTOLIN) nebulizer solution 2.5 mg  2.5 mg Nebulization Q4H PRN    apixaban (ELIQUIS) tablet 5 mg  5 mg Oral Q12H    atorvastatin (LIPITOR) tablet 40 mg  40 mg Oral DAILY    folic acid (FOLVITE) tablet 1 mg  1 mg Oral DAILY    magnesium oxide (MAG-OX) tablet 400 mg  400 mg Oral DAILY    pantoprazole (PROTONIX) tablet 40 mg  40 mg Oral ACB&D    sucralfate (CARAFATE) tablet 1 g  1 g Oral AC&HS    thiamine HCL (B-1) tablet 100 mg  100 mg Oral DAILY    famotidine (PEPCID) tablet 20 mg  20 mg Oral BID    sodium chloride (NS) flush 5-40 mL  5-40 mL IntraVENous Q8H    sodium chloride (NS) flush 5-40 mL  5-40 mL IntraVENous PRN    traZODone (DESYREL) tablet 50 mg  50 mg Oral QHS PRN        Review of Systems  A comprehensive review of systems was negative except for that written in the HPI. Objective:     Visit Vitals  /57 (BP 1 Location: Left arm, BP Patient Position: Head of bed elevated (Comment degrees)) Comment (BP Patient Position): 45   Pulse 69   Temp 98.8 °F (37.1 °C)   Resp 22   Ht 5' 10\" (1.778 m)   Wt 78 kg (172 lb)   SpO2 94%   BMI 24.68 kg/m²    O2 Flow Rate (L/min): 3 l/min O2 Device: Nasal cannula    Temp (24hrs), Av.9 °F (37.2 °C), Min:98.4 °F (36.9 °C), Max:99.5 °F (37.5 °C)    10/24 0701 - 10/24 1900  In: 600 [P.O.:600]  Out: -   10/22 1901 - 10/24 0700  In: 800 [P.O.:800]  Out:  [Urine:2000]    General appearance: Oriented, alert, cooperative, feeling better. Still coughing some. Head: Normocephalic, without obvious abnormality, atraumatic  Throat: Lips, mucosa, and tongue normal. Teeth and gums normal  Neck: supple, symmetrical, trachea midline, no adenopathy, and no JVD  Lungs: Rhonchi right upper fields, few scattered expiratory wheezes, otherwise clear to auscultation bilaterally.  Cough as above  Heart: Irregular rate and rhythm, S1, S2 normal, no murmur, click, rub or gallop  Abdomen: soft, non-tender. Bowel sounds normal. No masses,  no organomegaly  Extremities: extremities normal, atraumatic, no cyanosis or edema  Skin: Skin color, texture, turgor normal. No rashes or lesions  Neurologic: Grossly normal    Additional comments: Notes,orders, test results, vitals reviewed    Data Review  Recent Results (from the past 24 hour(s))   METABOLIC PANEL, BASIC    Collection Time: 10/24/19  5:23 AM   Result Value Ref Range    Sodium 136 136 - 145 mmol/L    Potassium 3.6 3.5 - 5.1 mmol/L    Chloride 100 98 - 107 mmol/L    CO2 30 21 - 32 mmol/L    Anion gap 6 (L) 7 - 16 mmol/L    Glucose 81 65 - 100 mg/dL    BUN 10 8 - 23 MG/DL    Creatinine 0.45 (L) 0.8 - 1.5 MG/DL    GFR est AA >60 >60 ml/min/1.73m2    GFR est non-AA >60 >60 ml/min/1.73m2    Calcium 8.5 8.3 - 10.4 MG/DL   CBC WITH AUTOMATED DIFF    Collection Time: 10/24/19  5:23 AM   Result Value Ref Range    WBC 6.4 4.3 - 11.1 K/uL    RBC 3.11 (L) 4.23 - 5.6 M/uL    HGB 10.3 (L) 13.6 - 17.2 g/dL    HCT 30.5 (L) 41.1 - 50.3 %    MCV 98.1 (H) 79.6 - 97.8 FL    MCH 33.1 (H) 26.1 - 32.9 PG    MCHC 33.8 31.4 - 35.0 g/dL    RDW 15.7 (H) 11.9 - 14.6 %    PLATELET 491 315 - 892 K/uL    MPV 9.0 (L) 9.4 - 12.3 FL    ABSOLUTE NRBC 0.00 0.0 - 0.2 K/uL    DF AUTOMATED      NEUTROPHILS 51 43 - 78 %    LYMPHOCYTES 36 13 - 44 %    MONOCYTES 11 4.0 - 12.0 %    EOSINOPHILS 1 0.5 - 7.8 %    BASOPHILS 1 0.0 - 2.0 %    IMMATURE GRANULOCYTES 1 0.0 - 5.0 %    ABS. NEUTROPHILS 3.2 1.7 - 8.2 K/UL    ABS. LYMPHOCYTES 2.3 0.5 - 4.6 K/UL    ABS. MONOCYTES 0.7 0.1 - 1.3 K/UL    ABS. EOSINOPHILS 0.1 0.0 - 0.8 K/UL    ABS. BASOPHILS 0.1 0.0 - 0.2 K/UL    ABS. IMM. GRANS. 0.0 0.0 - 0.5 K/UL      10/11:  CXR: Large alveolar consolidation in the right middle lobe, with suggestion of  associated cavitation. Presence of cavitation is new compared to prior exam. Findings suspicious for necrotizing pneumonia.  Tuberculosis is in the differential. CT chest is recommended to better characterize. 10/24:  CT CHEST:  POSITIVE BILATERAL PULMONARY EMBOLI.   A 9.5 CM CAVITARY LESION INFEROLATERALLY IN THE RIGHT UPPER LOBE IS NEW SINCE OCTOBER 4 AND SUSPICIOUS FOR NECROTIZING PNEUMONIA. THE POSSIBILITY OF TUBERCULOSIS OR OTHER GRANULOMATOUS DISEASE IS AGAIN SUGGESTED.   FOLLOW-UP PULMONARY MEDICINE CONSULTATION IS SUGGESTED    Assessment/Plan:   Cavitary lesion of lung:  Necrotizing PNA vs TB vs ?   Pulmonary on board   Bronch 10/24, patho pending   cipro and clinda resumed 10/24   Add mucinex, cough meds   Schedule duonebs, prn albuterol   Airborne precautions until patho resulted  Alcohol dependence   Monitor for with drawls   May need scheduled librium    Prn ativan   Continue PPI and H2 blocker,  decreased doses 10/24  HTN:  Continue meds   A-fib:  Continue  eliquis   Diltiazem discontinued  PE:  Continue eliquis    Care Plan discussed with: Patient and Nurse    Signed By: Farzana Reeder NP     October 24, 2019

## 2019-10-24 NOTE — PROGRESS NOTES
CM met with pt to discuss DC needs. Pt uses the [facility] for all medications and PCP. Per pt request, information faxed to [facility] to arrange for medications to be filled. CM spoke with Parish Baeza [phone] ext [phone] to coordinate pt care. [facility]. Appointment arranged for pt to go to [facility] clinic after dc for PCP appointment. [facility] and provide Nebulizer. Pt verbalized understanding. No further CM needs. Care Management Interventions  PCP Verified by CM: Jhoana Galicia MD)  Mode of Transport at Discharge:  Other (see comment)(family)  Transition of Care Consult (CM Consult): Discharge Planning  Discharge Durable Medical Equipment: No  Physical Therapy Consult: Yes  Occupational Therapy Consult: Yes  Speech Therapy Consult: No  Current Support Network: Own Home, Lives with Spouse  Confirm Follow Up Transport: Family  Plan discussed with Pt/Family/Caregiver: Yes  Freedom of Choice Offered: Yes  [address] Provided?: No  Discharge Location  Discharge Placement: Home

## 2019-10-24 NOTE — CONSULTS
CONSULT NOTE    Mary Kate Carolina    10/24/2019    Date of Admission:  10/23/2019    The patient's chart is reviewed and the patient is discussed with the staff. Patient is a 59 y.o.  male seen and evaluated at the request of Dr. Sandi Gohsh in the ED. He was recently admitted with pseudomonas pneumonia discharged just 6 days ago with a course of 14 days Cipro to complete which he is still taking. He came in today due to worsening lower extremity swelling for the past 2 days. He has not previously been on diuretics, but did have afib controlled with diltiazem during his last admission. During his evaluation he had a repeat CXR showing cavitating right sided pneumonia with air fluid level developed from prior R midlung opacity previously. He reports he has continued to cough, but it is dry now. He originally had bloody sputum, then rust colored then white and now clear. He continues to have a bothersome cough despite being non-productive. He has not had recurrent fevers. He had a PPD placed during his last admission which was reportedly negative. He reports he has been compliant with medications. We are consulted by the ER for cavitary pneumonia and further evaluation. Subjective:     No new complains  Had bronchoscopy yesterday        Review of Systems  A comprehensive review of systems was negative except for that written in the HPI.     Patient Active Problem List   Diagnosis Code    Cellulitis L03.90    HTN (hypertension) I10    Chest pain R07.9    Shortness of breath R06.02    Tobacco abuse Z72.0    ETOH abuse F10.10    PNA (pneumonia) J18.9    Tachycardia R00.0    Thrombocytopenia (HCC) D69.6    GI bleed K92.2    A-fib (HCC) I48.91    Bacteremia due to Gram-negative bacteria R78.81    Clostridium difficile diarrhea A04.72    Hypomagnesemia E83.42    Hypokalemia E87.6    Diarrhea X83.3    Alcoholic hepatitis without ascites K70.10    Liver mass R16.0    Pseudomonas septicemia ([facility]) A41.52    Right middle lobe pneumonia ([address] J18.1    Cavitary lesion of lung J98.4     Prior to Admission Medications   Prescriptions Last Dose Informant Patient Reported? Taking? albuterol (PROVENTIL VENTOLIN) 2.5 mg /3 mL (0.083 %) nebu   No No   Sig: 3 mL by Nebulization route every four (4) hours as needed for Other (shortness of breath). apixaban (ELIQUIS) 5 mg tablet   No No   Sig: Take 1 Tab by mouth every twelve (12) hours. atorvastatin (LIPITOR) 40 mg tablet   No No   Sig: Take 1 Tab by mouth daily. ciprofloxacin HCl (CIPRO) 750 mg tablet   No No   Sig: Take 1 Tab by mouth every twelve (12) hours for 13 days. dilTIAZem CD (CARDIZEM CD) 240 mg ER capsule   No No   Sig: Take 1 Cap by mouth daily. famotidine (PEPCID) 20 mg tablet   No No   Sig: Take 1 Tab by mouth two (2) times a day. folic acid (FOLVITE) 1 mg tablet   No No   Sig: Take 1 Tab by mouth daily. magnesium oxide (MAG-OX) 400 mg tablet   No No   Sig: Take 1 Tab by mouth daily. nitroglycerin (NITROSTAT) 0.4 mg SL tablet   No No   Si Tab by SubLINGual route every five (5) minutes as needed for Chest Pain.   pantoprazole (PROTONIX) 40 mg tablet   No No   Sig: Take 1 Tab by mouth Before breakfast and dinner. sucralfate (CARAFATE) 1 gram tablet   No No   Sig: Take 1 Tab by mouth Before breakfast, lunch, dinner and at bedtime. Indications: Gastritis   thiamine HCL (B-1) 100 mg tablet   No No   Sig: Take 1 Tab by mouth daily. Facility-Administered Medications: None     Past Medical History:   Diagnosis Date    A-fib [facility]) 6298    Alcoholic hepatitis without ascites 2019    Hypertension      No past surgical history on file.   Social History     Socioeconomic History    Marital status:      Spouse name: Not on file    Number of children: Not on file    Years of education: Not on file    Highest education level: Not on file   Occupational History    Not on file   Social Needs  Financial resource strain: Not on file   [place] insecurity:     Worry: Not on file     Inability: Not on file    Transportation needs:     Medical: Not on file     Non-medical: Not on file   Tobacco Use    Smoking status: Current Every Day Smoker     Packs/day: 2.00    Smokeless tobacco: Never Used   Substance and Sexual Activity    Alcohol use: Yes     Comment: approx 1 pint per day    Drug use: Not on file    Sexual activity: Yes     Partners: Female   Lifestyle    Physical activity:     Days per week: Not on file     Minutes per session: Not on file    Stress: Not on file   Relationships    Social connections:     Talks on phone: Not on file     Gets together: Not on file     Attends [employer] service: Not on file     Active member of club or organization: Not on file     Attends meetings of clubs or organizations: Not on file     Relationship status: Not on file    Intimate partner violence:     Fear of current or ex partner: Not on file     Emotionally abused: Not on file     Physically abused: Not on file     Forced sexual activity: Not on file   Other Topics Concern    Not on file   Social History Narrative    Not on file     Family History   Problem Relation Age of Onset    Cancer Neg Hx      No Known Allergies    Current Facility-Administered Medications   Medication Dose Route Frequency    ciprofloxacin (CIPRO) 400 mg in D5W IVPB (premix)  400 mg IntraVENous Q12H    clindamycin (CLEOCIN) 600mg D5W 50mL IVPB (premix)  600 mg IntraVENous Q8H    sodium chloride (NS) flush 5-40 mL  5-40 mL IntraVENous Q8H    sodium chloride (NS) flush 5-40 mL  5-40 mL IntraVENous PRN    acetaminophen (TYLENOL) tablet 650 mg  650 mg Oral Q4H PRN    albuterol (PROVENTIL VENTOLIN) nebulizer solution 2.5 mg  2.5 mg Nebulization Q4H PRN    apixaban (ELIQUIS) tablet 5 mg  5 mg Oral Q12H    atorvastatin (LIPITOR) tablet 40 mg  40 mg Oral DAILY    folic acid (FOLVITE) tablet 1 mg  1 mg Oral DAILY    magnesium oxide (MAG-OX) tablet 400 mg  400 mg Oral DAILY    pantoprazole (PROTONIX) tablet 40 mg  40 mg Oral ACB&D    sucralfate (CARAFATE) tablet 1 g  1 g Oral AC&HS    thiamine HCL (B-1) tablet 100 mg  100 mg Oral DAILY    famotidine (PEPCID) tablet 20 mg  20 mg Oral BID    sodium chloride (NS) flush 5-40 mL  5-40 mL IntraVENous Q8H    sodium chloride (NS) flush 5-40 mL  5-40 mL IntraVENous PRN    traZODone (DESYREL) tablet 50 mg  50 mg Oral QHS PRN     Objective:     Vitals:    10/23/19 1932 10/23/19 2314 10/24/19 0420 10/24/19 0923   BP: 108/57 111/60 128/70 94/54   Pulse: 89 81 88 73   Resp: 20 19 22 18   Temp: 98.9 °F (37.2 °C) 99.1 °F (37.3 °C) 99.5 °F (37.5 °C) 98.4 °F (36.9 °C)   SpO2: 93% 91% 92% 95%   Weight:   172 lb (78 kg)    Height:         PHYSICAL EXAM     Constitutional:  the patient is well developed and in no acute distress  EENMT:  Sclera clear, pupils equal, oral mucosa moist  Respiratory: clear, cough with deep breathing, non-productive  Cardiovascular:  RRR without M,G,R  Gastrointestinal: soft and non-tender; with positive bowel sounds. Musculoskeletal: warm without cyanosis. There is 3+ lower extremity edema.   Skin:  no jaundice or rashes, no wounds   Neurologic: no gross neuro deficits     Psychiatric:  alert and oriented x 4    CXR:  R cavitary infiltrate with air fluid level      CT 10/4/2019: RML and RLL bronchiectatic changes on recent lung cancer screening CT      Recent Labs     10/24/19  0523 10/23/19  0522   WBC 6.4 7.3   HGB 10.3* 12.9*   HCT 30.5* 39.0*    437     Recent Labs     10/24/19  0523 10/23/19  0522    140   K 3.6 4.2    105   GLU 81 100   CO2 30 28   BUN 10 6*   CREA 0.45* 0.57*   CA 8.5 9.0   ALB  --  2.5*   TBILI  --  0.4   ALT  --  25   SGOT  --  21         Assessment:  (Medical Decision Making)     Hospital Problems  Never Reviewed          Codes Class Noted POA    * (Principal) Cavitary lesion of lung ICD-10-CM: J98.4  ICD-9-CM: 518.89 10/23/2019 Unknown        A-fib ([facility] [address] ICD-10-CM: I48.91  ICD-9-CM: 427.31  5/5/2019 Yes        ETOH abuse ICD-10-CM: F10.10  ICD-9-CM: 305.00  11/28/2016 Yes        HTN (hypertension) (Chronic) ICD-10-CM: I10  ICD-9-CM: 401.9  11/19/2014 Yes            Plan:  (Medical Decision Making)   -continue cipro and clinda till await bronch results  -- on airborne precautions for now until TB is ruled out  - his chest CT was read and now has addendum with -Upon review, there ae soft tissue densities within the pulmonary arterial tree  consistent with pulmonary emboli including the bifurcation of the right interlobar pulmonary artery, the left laterobasal segmental pulmonary artey,  and the left upper lobe apicoposterior segmental pulmonary artery. He has been on Eloquis only since last week when he was admitted for A fib  and PNA - hard to tell  if this happened before last week ,it is quite small ,doubt its just happened and he failed Eloquis         More than 50% of the time documented was spent in face-to-face contact with the patient and in the care of the patient on the floor/unit where the patient is located. Thank you very much for this referral.  We appreciate the opportunity to participate in this patient's care. Will follow along with above stated plan.     Rochelle Watts MD

## 2019-10-24 NOTE — PROGRESS NOTES
Interdisciplinary Rounds completed 10/24/19. Nursing, Case Management, Physician and PT present. Plan of care reviewed and updated.

## 2019-10-24 NOTE — PROGRESS NOTES
Initial visit by  to convey care and concern and encourage patient that  services are available if desired. Provided 's business card for future reference. Chaplains remain available for support.      Abraham Posadas 66  Board Certified

## 2019-10-25 PROBLEM — J18.9 PNEUMONIA: Status: ACTIVE | Noted: 2019-10-25

## 2019-10-25 LAB
ALBUMIN SERPL-MCNC: 2.1 G/DL (ref 3.2–4.6)
ALBUMIN/GLOB SERPL: 0.5 {RATIO} (ref 1.2–3.5)
ALP SERPL-CCNC: 56 U/L (ref 50–136)
ALT SERPL-CCNC: 28 U/L (ref 12–65)
ANION GAP SERPL CALC-SCNC: 5 MMOL/L (ref 7–16)
AST SERPL-CCNC: 30 U/L (ref 15–37)
BASOPHILS # BLD: 0.1 K/UL (ref 0–0.2)
BASOPHILS NFR BLD: 1 % (ref 0–2)
BILIRUB SERPL-MCNC: 0.4 MG/DL (ref 0.2–1.1)
BUN SERPL-MCNC: 8 MG/DL (ref 8–23)
CALCIUM SERPL-MCNC: 8.3 MG/DL (ref 8.3–10.4)
CHLORIDE SERPL-SCNC: 102 MMOL/L (ref 98–107)
CO2 SERPL-SCNC: 30 MMOL/L (ref 21–32)
CREAT SERPL-MCNC: 0.55 MG/DL (ref 0.8–1.5)
DIFFERENTIAL METHOD BLD: ABNORMAL
EOSINOPHIL # BLD: 0.1 K/UL (ref 0–0.8)
EOSINOPHIL NFR BLD: 2 % (ref 0.5–7.8)
ERYTHROCYTE [DISTWIDTH] IN BLOOD BY AUTOMATED COUNT: 15.8 % (ref 11.9–14.6)
GLOBULIN SER CALC-MCNC: 4.2 G/DL (ref 2.3–3.5)
GLUCOSE BLD STRIP.AUTO-MCNC: 100 MG/DL (ref 65–100)
GLUCOSE SERPL-MCNC: 95 MG/DL (ref 65–100)
HCT VFR BLD AUTO: 29.5 % (ref 41.1–50.3)
HGB BLD-MCNC: 9.9 G/DL (ref 13.6–17.2)
IMM GRANULOCYTES # BLD AUTO: 0.1 K/UL (ref 0–0.5)
IMM GRANULOCYTES NFR BLD AUTO: 1 % (ref 0–5)
LYMPHOCYTES # BLD: 2.2 K/UL (ref 0.5–4.6)
LYMPHOCYTES NFR BLD: 32 % (ref 13–44)
MAGNESIUM SERPL-MCNC: 1.7 MG/DL (ref 1.8–2.4)
MCH RBC QN AUTO: 33.4 PG (ref 26.1–32.9)
MCHC RBC AUTO-ENTMCNC: 33.6 G/DL (ref 31.4–35)
MCV RBC AUTO: 99.7 FL (ref 79.6–97.8)
MONOCYTES # BLD: 0.7 K/UL (ref 0.1–1.3)
MONOCYTES NFR BLD: 11 % (ref 4–12)
NEUTS SEG # BLD: 3.8 K/UL (ref 1.7–8.2)
NEUTS SEG NFR BLD: 54 % (ref 43–78)
NRBC # BLD: 0 K/UL (ref 0–0.2)
PLATELET # BLD AUTO: 332 K/UL (ref 150–450)
PMV BLD AUTO: 9 FL (ref 9.4–12.3)
POTASSIUM SERPL-SCNC: 3.8 MMOL/L (ref 3.5–5.1)
PROT SERPL-MCNC: 6.3 G/DL (ref 6.3–8.2)
RBC # BLD AUTO: 2.96 M/UL (ref 4.23–5.6)
SODIUM SERPL-SCNC: 137 MMOL/L (ref 136–145)
WBC # BLD AUTO: 7 K/UL (ref 4.3–11.1)

## 2019-10-25 PROCEDURE — 74011250637 HC RX REV CODE- 250/637: Performed by: NURSE PRACTITIONER

## 2019-10-25 PROCEDURE — 74011000250 HC RX REV CODE- 250: Performed by: NURSE PRACTITIONER

## 2019-10-25 PROCEDURE — 74011250637 HC RX REV CODE- 250/637: Performed by: INTERNAL MEDICINE

## 2019-10-25 PROCEDURE — 99232 SBSQ HOSP IP/OBS MODERATE 35: CPT | Performed by: INTERNAL MEDICINE

## 2019-10-25 PROCEDURE — 82962 GLUCOSE BLOOD TEST: CPT

## 2019-10-25 PROCEDURE — 83735 ASSAY OF MAGNESIUM: CPT

## 2019-10-25 PROCEDURE — 80053 COMPREHEN METABOLIC PANEL: CPT

## 2019-10-25 PROCEDURE — 96367 TX/PROPH/DG ADDL SEQ IV INF: CPT

## 2019-10-25 PROCEDURE — 65270000029 HC RM PRIVATE

## 2019-10-25 PROCEDURE — 85025 COMPLETE CBC W/AUTO DIFF WBC: CPT

## 2019-10-25 PROCEDURE — 94760 N-INVAS EAR/PLS OXIMETRY 1: CPT

## 2019-10-25 PROCEDURE — 96366 THER/PROPH/DIAG IV INF ADDON: CPT

## 2019-10-25 PROCEDURE — 36415 COLL VENOUS BLD VENIPUNCTURE: CPT

## 2019-10-25 PROCEDURE — 74011250636 HC RX REV CODE- 250/636: Performed by: NURSE PRACTITIONER

## 2019-10-25 PROCEDURE — 99218 HC RM OBSERVATION: CPT

## 2019-10-25 PROCEDURE — 74011250636 HC RX REV CODE- 250/636: Performed by: INTERNAL MEDICINE

## 2019-10-25 PROCEDURE — 97161 PT EVAL LOW COMPLEX 20 MIN: CPT

## 2019-10-25 PROCEDURE — 97530 THERAPEUTIC ACTIVITIES: CPT

## 2019-10-25 PROCEDURE — 94640 AIRWAY INHALATION TREATMENT: CPT

## 2019-10-25 RX ORDER — MAGNESIUM SULFATE 1 G/100ML
1 INJECTION INTRAVENOUS ONCE
Status: COMPLETED | OUTPATIENT
Start: 2019-10-25 | End: 2019-10-25

## 2019-10-25 RX ORDER — IPRATROPIUM BROMIDE AND ALBUTEROL SULFATE 2.5; .5 MG/3ML; MG/3ML
3 SOLUTION RESPIRATORY (INHALATION)
Status: DISCONTINUED | OUTPATIENT
Start: 2019-10-25 | End: 2019-10-26 | Stop reason: HOSPADM

## 2019-10-25 RX ADMIN — SUCRALFATE 1 G: 1 TABLET ORAL at 17:52

## 2019-10-25 RX ADMIN — Medication 10 ML: at 04:47

## 2019-10-25 RX ADMIN — GUAIFENESIN 1200 MG: 600 TABLET ORAL at 21:33

## 2019-10-25 RX ADMIN — HYDROCODONE BITARTRATE AND HOMATROPINE METHYLBROMIDE 5 ML: 5; 1.5 SOLUTION ORAL at 09:35

## 2019-10-25 RX ADMIN — CLINDAMYCIN PHOSPHATE 600 MG: 600 INJECTION, SOLUTION INTRAVENOUS at 23:13

## 2019-10-25 RX ADMIN — Medication 10 ML: at 14:09

## 2019-10-25 RX ADMIN — HYDROCODONE BITARTRATE AND HOMATROPINE METHYLBROMIDE 5 ML: 5; 1.5 SOLUTION ORAL at 23:13

## 2019-10-25 RX ADMIN — MAGNESIUM GLUCONATE 500 MG ORAL TABLET 400 MG: 500 TABLET ORAL at 12:05

## 2019-10-25 RX ADMIN — IPRATROPIUM BROMIDE AND ALBUTEROL SULFATE 3 ML: .5; 3 SOLUTION RESPIRATORY (INHALATION) at 01:59

## 2019-10-25 RX ADMIN — APIXABAN 5 MG: 5 TABLET, FILM COATED ORAL at 09:35

## 2019-10-25 RX ADMIN — SUCRALFATE 1 G: 1 TABLET ORAL at 21:34

## 2019-10-25 RX ADMIN — PANTOPRAZOLE SODIUM 40 MG: 40 TABLET, DELAYED RELEASE ORAL at 17:52

## 2019-10-25 RX ADMIN — LORAZEPAM 1 MG: 1 TABLET ORAL at 21:35

## 2019-10-25 RX ADMIN — FAMOTIDINE 20 MG: 20 TABLET, FILM COATED ORAL at 12:05

## 2019-10-25 RX ADMIN — FOLIC ACID 1 MG: 1 TABLET ORAL at 09:35

## 2019-10-25 RX ADMIN — GUAIFENESIN 1200 MG: 600 TABLET ORAL at 09:35

## 2019-10-25 RX ADMIN — SUCRALFATE 1 G: 1 TABLET ORAL at 12:05

## 2019-10-25 RX ADMIN — BENZONATATE 100 MG: 100 CAPSULE ORAL at 21:33

## 2019-10-25 RX ADMIN — MAGNESIUM SULFATE 1 G: 1 INJECTION INTRAVENOUS at 12:05

## 2019-10-25 RX ADMIN — Medication 100 MG: at 09:35

## 2019-10-25 RX ADMIN — CIPROFLOXACIN 400 MG: 2 INJECTION, SOLUTION INTRAVENOUS at 21:34

## 2019-10-25 RX ADMIN — CIPROFLOXACIN 400 MG: 2 INJECTION, SOLUTION INTRAVENOUS at 10:37

## 2019-10-25 RX ADMIN — Medication 10 ML: at 21:35

## 2019-10-25 RX ADMIN — Medication 10 ML: at 05:01

## 2019-10-25 RX ADMIN — HYDROCODONE BITARTRATE AND HOMATROPINE METHYLBROMIDE 5 ML: 5; 1.5 SOLUTION ORAL at 18:44

## 2019-10-25 RX ADMIN — SUCRALFATE 1 G: 1 TABLET ORAL at 04:46

## 2019-10-25 RX ADMIN — IPRATROPIUM BROMIDE AND ALBUTEROL SULFATE 3 ML: .5; 3 SOLUTION RESPIRATORY (INHALATION) at 08:55

## 2019-10-25 RX ADMIN — CLINDAMYCIN PHOSPHATE 600 MG: 600 INJECTION, SOLUTION INTRAVENOUS at 09:35

## 2019-10-25 RX ADMIN — PANTOPRAZOLE SODIUM 40 MG: 40 TABLET, DELAYED RELEASE ORAL at 04:45

## 2019-10-25 RX ADMIN — CLINDAMYCIN PHOSPHATE 600 MG: 600 INJECTION, SOLUTION INTRAVENOUS at 00:25

## 2019-10-25 RX ADMIN — Medication 10 ML: at 21:34

## 2019-10-25 RX ADMIN — APIXABAN 5 MG: 5 TABLET, FILM COATED ORAL at 21:34

## 2019-10-25 RX ADMIN — CLINDAMYCIN PHOSPHATE 600 MG: 600 INJECTION, SOLUTION INTRAVENOUS at 18:04

## 2019-10-25 RX ADMIN — BENZONATATE 100 MG: 100 CAPSULE ORAL at 09:35

## 2019-10-25 RX ADMIN — ATORVASTATIN CALCIUM 40 MG: 40 TABLET, FILM COATED ORAL at 09:35

## 2019-10-25 NOTE — PROGRESS NOTES
Progress Note    10/25/2019  Admit Date: 10/23/2019  5:07 AM   NAME: Jennifer Gutiérrez   :  1960   MRN:  [id number]   Attending: Neo Zheng MD  PCP:  Ty Lewis MD  Treatment Team: Attending Provider: Neo Zheng MD; Physician: Emily Westbrook MD; Consulting Provider: Emily Wsetbrook MD; Utilization Review: Caryle Brasher, RN; Care Manager: Mago Webber RN; Occupational Therapist: Saleem Portillo OT; Physical Therapist: Fausto Padron DPT; Primary Nurse: Kayla Samuels RN    Full Code   SUBJECTIVE:   Mr. Ant Rene is a 60 yo male with PMH of ETOH abuse, new onset chronic afib on eliquis, HTN, alcoholic hepatitis, tobacco abuse who presented with c/o LE edema, productive cough with brown sputum, SOB. He had recent admission 10/11-10/17 with pseudomonas septicemia, right middle lobe pneumonia, ETOH abuse on valium taper. He developed afib and required cardizem and eliquis last admission. ID, Cards and Pulm all following last admission. He was DC on cipro to complete 14 day course. Bilateral LE edema attributed to cardizem therefore stopped. He was found to have a new cavitary lesion on CXR in ED. Neg PPD last admission. Pulmonary consulted. Pt on airborne precautions until TB ruled out. 10/23 underwent bedside bronch, cx sent. Cipro and clindamycin started. CT chest 10/24 showed PE. Pulmonary felt PE did not just happen, not considering failed eliquis. Bronch results, AFB pending. On RA. Reports increased in sputum production, brown danni color, I did visualize during my exam.  Denies n/v/d, abd pain.       Past Medical History:   Diagnosis Date    A-fib [facility]) 3/6/6681    Alcoholic hepatitis without ascites 2019    Hypertension      Recent Results (from the past 24 hour(s))   CBC WITH AUTOMATED DIFF    Collection Time: 10/25/19  5:37 AM   Result Value Ref Range    WBC 7.0 4.3 - 11.1 K/uL    RBC 2.96 (L) 4.23 - 5.6 M/uL    HGB 9.9 (L) 13.6 - 17.2 g/dL HCT 29.5 (L) 41.1 - 50.3 %    MCV 99.7 (H) 79.6 - 97.8 FL    MCH 33.4 (H) 26.1 - 32.9 PG    MCHC 33.6 31.4 - 35.0 g/dL    RDW 15.8 (H) 11.9 - 14.6 %    PLATELET 567 443 - 560 K/uL    MPV 9.0 (L) 9.4 - 12.3 FL    ABSOLUTE NRBC 0.00 0.0 - 0.2 K/uL    DF AUTOMATED      NEUTROPHILS 54 43 - 78 %    LYMPHOCYTES 32 13 - 44 %    MONOCYTES 11 4.0 - 12.0 %    EOSINOPHILS 2 0.5 - 7.8 %    BASOPHILS 1 0.0 - 2.0 %    IMMATURE GRANULOCYTES 1 0.0 - 5.0 %    ABS. NEUTROPHILS 3.8 1.7 - 8.2 K/UL    ABS. LYMPHOCYTES 2.2 0.5 - 4.6 K/UL    ABS. MONOCYTES 0.7 0.1 - 1.3 K/UL    ABS. EOSINOPHILS 0.1 0.0 - 0.8 K/UL    ABS. BASOPHILS 0.1 0.0 - 0.2 K/UL    ABS. IMM. GRANS. 0.1 0.0 - 0.5 K/UL   METABOLIC PANEL, COMPREHENSIVE    Collection Time: 10/25/19  5:37 AM   Result Value Ref Range    Sodium 137 136 - 145 mmol/L    Potassium 3.8 3.5 - 5.1 mmol/L    Chloride 102 98 - 107 mmol/L    CO2 30 21 - 32 mmol/L    Anion gap 5 (L) 7 - 16 mmol/L    Glucose 95 65 - 100 mg/dL    BUN 8 8 - 23 MG/DL    Creatinine 0.55 (L) 0.8 - 1.5 MG/DL    GFR est AA >60 >60 ml/min/1.73m2    GFR est non-AA >60 >60 ml/min/1.73m2    Calcium 8.3 8.3 - 10.4 MG/DL    Bilirubin, total 0.4 0.2 - 1.1 MG/DL    ALT (SGPT) 28 12 - 65 U/L    AST (SGOT) 30 15 - 37 U/L    Alk.  phosphatase 56 50 - 136 U/L    Protein, total 6.3 6.3 - 8.2 g/dL    Albumin 2.1 (L) 3.2 - 4.6 g/dL    Globulin 4.2 (H) 2.3 - 3.5 g/dL    A-G Ratio 0.5 (L) 1.2 - 3.5     MAGNESIUM    Collection Time: 10/25/19  5:37 AM   Result Value Ref Range    Magnesium 1.7 (L) 1.8 - 2.4 mg/dL   GLUCOSE, POC    Collection Time: 10/25/19  6:57 AM   Result Value Ref Range    Glucose (POC) 100 65 - 100 mg/dL     No Known Allergies  Current Facility-Administered Medications   Medication Dose Route Frequency Provider Last Rate Last Dose    albuterol-ipratropium (DUO-NEB) 2.5 MG-0.5 MG/3 ML  3 mL Nebulization Q6H PRN Sonu Jain MD        ciprofloxacin (CIPRO) 400 mg in D5W IVPB (premix)  400 mg IntraVENous Q12H Salcie Barry Schumacher  mL/hr at 10/25/19 1037 400 mg at 10/25/19 1037    clindamycin (CLEOCIN) 600mg D5W 50mL IVPB (premix)  600 mg IntraVENous Q8H Patricia Fisher  mL/hr at 10/25/19 0935 600 mg at 10/25/19 0935    nicotine (NICODERM CQ) 21 mg/24 hr patch 1 Patch  1 Patch TransDERmal Q24H Janet Henriquez NP   1 Patch at 10/24/19 1704    famotidine (PEPCID) tablet 20 mg  20 mg Oral DAILY Janet Henriquez NP   20 mg at 10/25/19 1205    LORazepam (ATIVAN) tablet 1 mg  1 mg Oral QHS Janet Henriquez NP   1 mg at 10/24/19 2113    LORazepam (ATIVAN) tablet 1 mg  1 mg Oral Q4H PRN Janet Henriquez NP        HYDROcodone-homatropine (HYCODAN) 5-1.5 mg/5 mL (5 mL) syrup 5 mL  5 mL Oral Q4H PRN Melisa Henriquez NP   5 mL at 10/25/19 0935    benzonatate (TESSALON) capsule 100 mg  100 mg Oral TID PRN Janet Henriquez NP   100 mg at 10/25/19 0935    guaiFENesin ER (MUCINEX) tablet 1,200 mg  1,200 mg Oral Q12H Melisa Henriquez NP   1,200 mg at 10/25/19 0935    sodium chloride (NS) flush 5-40 mL  5-40 mL IntraVENous Q8H Patricia Fisher MD   10 mL at 10/25/19 0447    sodium chloride (NS) flush 5-40 mL  5-40 mL IntraVENous PRN Cesar Bliss MD        acetaminophen (TYLENOL) tablet 650 mg  650 mg Oral Q4H PRN Cesar Bliss MD   650 mg at 10/24/19 2010    albuterol (PROVENTIL VENTOLIN) nebulizer solution 2.5 mg  2.5 mg Nebulization Q4H PRN Cesar Bliss MD        apixaban [facility]) tablet 5 mg  5 mg Oral Q12H Cesar Bliss MD   5 mg at 10/25/19 0935    atorvastatin (LIPITOR) tablet 40 mg  40 mg Oral DAILY Cesar Bliss MD   40 mg at 52/58/07 3771    folic acid (FOLVITE) tablet 1 mg  1 mg Oral DAILY Cesar Bliss MD   1 mg at 10/25/19 0935    magnesium oxide (MAG-OX) tablet 400 mg  400 mg Oral DAILY Cesar Bliss MD   400 mg at 10/25/19 1205    pantoprazole (PROTONIX) tablet 40 mg  40 mg Oral ACB&D Clint Selby, NP   40 mg at 10/25/19 0445    sucralfate (CARAFATE) tablet 1 g  1 g Oral AC&HS Blanquita Thompson MD   1 g at 10/25/19 1205    thiamine HCL (B-1) tablet 100 mg  100 mg Oral DAILY Blanquita Thompson MD   100 mg at 10/25/19 0935    sodium chloride (NS) flush 5-40 mL  5-40 mL IntraVENous Q8H Jesus Espinoza MD   10 mL at 10/25/19 0501    sodium chloride (NS) flush 5-40 mL  5-40 mL IntraVENous PRN Jesus Espinoza MD        traZODone (DESYREL) tablet 50 mg  50 mg Oral QHS PRN Carolina Morgan MD   50 mg at 10/23/19 2332       Review of Systems negative with exception of pertinent positives noted above  PHYSICAL EXAM     Visit Vitals  BP 98/57 (BP 1 Location: Left arm, BP Patient Position: At rest)   Pulse 78   Temp 98.6 °F (37 °C)   Resp 18   Ht 5' 10\" (1.778 m)   Wt 78 kg (172 lb)   SpO2 95%   BMI 24.68 kg/m²      Temp (24hrs), Av °F (37.2 °C), Min:98.3 °F (36.8 °C), Max:100.4 °F (38 °C)    Oxygen Therapy  O2 Sat (%): 95 % (10/25/19 1030)  Pulse via Oximetry: 88 beats per minute (10/25/19 0855)  O2 Device: Room air (10/25/19 0855)  O2 Flow Rate (L/min): 3 l/min (10/23/19 1517)    Intake/Output Summary (Last 24 hours) at 10/25/2019 1350  Last data filed at 10/25/2019 1248  Gross per 24 hour   Intake 480 ml   Output 1700 ml   Net -1220 ml      General: No acute distress    Lungs: rhonci bilateral bases. resp even and nonlabored  Heart:  Irregular. No murmurs rubs gallops. No LE edema  Abdomen: Soft, non tender, on distended. BS present  Extremities: Moves ext spontaneously. No cyanosis  Neurologic:   A/O X4.  No focal deficits    Results summary of Diagnostic Studies/Procedures copied from within [facility] EMR:        Vince F[address] Problems    Diagnosis Date Noted    Cavitary lesion of lung 10/23/2019    A-fib ([address] 2019    ETOH abuse 2016    HTN (hypertension) 2014     Plan:    Cavitary lesion of lung: necrotizing pneumonia vs TB  Pulmonary following  S/p bronch 10/23, results pending, AFB pending  On cipro and clinda, started 10/24  Continue nebs  Airborne precautions    ETOH abuse  Monitor for withdrawals  Prn attivan    HTN  Continue meds    Afib  Continue eliquis  Holding cardizem given LE swelling    PE  Continue eliquis    Hypomagnesemia  Replace  Check Mag in AM    Notes, labs, VS, diagnostic testing reviewed  Time spent with pt 20 min        DVT Prophylaxis: eliquis  Plan of Care Discussed with: Supervising MD Dr. Holly Edwards, care team, pt      Tara Linda, LEANNA

## 2019-10-25 NOTE — PROGRESS NOTES
Problem: Risk for Spread of Infection  Goal: Prevent transmission of infectious organism to others  Description  Prevent the transmission of infectious organisms to other patients, staff members, and visitors. Outcome: Progressing Towards Goal     Problem: Patient Education:  Go to Education Activity  Goal: Patient/Family Education  Outcome: Progressing Towards Goal     Problem: Falls - Risk of  Goal: *Absence of Falls  Description  Document Little Luis Fall Risk and appropriate interventions in the flowsheet.   Outcome: Progressing Towards Goal  Note:   Fall Risk Interventions:            Medication Interventions: Bed/chair exit alarm                   Problem: Patient Education: Go to Patient Education Activity  Goal: Patient/Family Education  Outcome: Progressing Towards Goal     Problem: Breathing Pattern - Ineffective  Goal: *Absence of hypoxia  Outcome: Progressing Towards Goal

## 2019-10-25 NOTE — PROGRESS NOTES
PHYSICAL THERAPY: Initial Assessment and AM 10/25/2019  OBSERVATION:    Payor: Simone Montgomery / Plan: [place] [employer] / Product Type: Federal Funded Programs /       NAME/AGE/GENDER: Luis Palmer is a 59 y.o. male   PRIMARY DIAGNOSIS: Cavitary lesion of lung [J98.4] Cavitary lesion of lung   Cavitary lesion of lung    Procedure(s) (LRB):  BRONCHOSCOPY/ Patient being admitted from ER, R/O TB (on Airborne Precautions) (N/A)  BRONCHIAL ALVEOLAR LAVAGE (N/A)  2 Days Post-Op  ICD-10: Treatment Diagnosis:    Generalized Muscle Weakness (M62.81)  Difficulty in walking, Not elsewhere classified (R26.2)   Precaution/Allergies:  Patient has no known allergies. ASSESSMENT:     Mr. Ivanna Barber presents supine at arrival agrees to participate. He reports he has been up going to toilet by himself. He was able to get to EOB IND, stand IND and ambulate with mask 400ft, no AD,SUP. He is IND and will be discharged from PT at this time. Anticipate discharge to home when medically cleared. This section established at most recent assessment   PROBLEM LIST (Impairments causing functional limitations):  NA    INTERVENTIONS PLANNED: (Benefits and precautions of physical therapy have been discussed with the patient.)  NA      TREATMENT PLAN: Frequency/Duration: 1 time a week for 1 week  Rehabilitation Potential For Stated Goals: Excellent     REHAB RECOMMENDATIONS (at time of discharge pending progress):    Placement: It is my opinion, based on this patient's performance to date, that Mr. Ivanna Barber may benefit from being discharged with NO further skilled therapy due to a proven ability to function at baseline. Equipment:   None at this time              HISTORY:   History of Present Injury/Illness (Reason for Referral):   See HnP  Past Medical History/Comorbidities:   Mr. Ivanna Barber  has a past medical history of A-fib ([facility] [address] (1/9/0502), Alcoholic hepatitis without ascites (5/11/2019), and Hypertension.  He also has no past medical history of Aneurysm ([facility] [address] Arthritis, Asthma, Autoimmune disease ([facility] [address] CAD (coronary artery disease), Cancer ([facility] [address] Chronic kidney disease, Chronic obstructive pulmonary disease ([facility] Chronic pain, Coagulation disorder ([facility] [address] Diabetes ([facility] [address] [facility] GERD (gastroesophageal reflux disease), Heart failure ([facility] [facility] Ill-defined condition, Morbid obesity ([facility] [facility] Psychiatric disorder, PUD (peptic ulcer disease), Seizures ([facility] [address] Stroke ([facility] Thromboembolus ([facility] Thyroid disease, or Unspecified sleep apnea. Mr. Eyal Calhoun  has no past surgical history on file.   Social History/Living Environment:   Home Environment: Private residence  One/Two Story Residence: One story  Living Alone: No  Support Systems: Spouse/Significant Other/Partner  Patient Expects to be Discharged to[de-identified] Private residence  Current DME Used/Available at Home: Walker, rolling  Prior Level of Function/Work/Activity:  Completely IND, drives, retired     Number of Personal Factors/Comorbidities that affect the Plan of Care: 0: LOW COMPLEXITY   EXAMINATION:   Most Recent Physical Functioning:   Gross Assessment:                  Posture:     Balance:  Sitting: Intact  Standing: Intact Bed Mobility:  Rolling: Independent  Supine to Sit: Independent  Scooting: Independent  Wheelchair Mobility:     Transfers:  Sit to Stand: Independent  Stand to Sit: Independent  Gait:     Distance (ft): 400 Feet (ft)  Ambulation - Level of Assistance: Supervision      Body Structures Involved:  Lungs Body Functions Affected:  Mental  Sensory/Pain  Respiratory Activities and Participation Affected:  General Tasks and [facility], Social and [place]   Number of elements that affect the Plan of Care: 1-2: LOW COMPLEXITY   CLINICAL PRESENTATION:   Presentation: Stable and uncomplicated: LOW COMPLEXITY   CLINICAL DECISION MAKIN [facility] [address] [id number] AM-[facility]  How much difficulty does the patient currently have. .. Unable A Lot A Little None   1. Turning over in bed (including adjusting bedclothes, sheets and blankets)? ? 1   ? 2   ? 3   ? 4   2. Sitting down on and standing up from a chair with arms ( e.g., wheelchair, bedside commode, etc.)   ? 1   ? 2   ? 3   ? 4   3. Moving from lying on back to sitting on the side of the bed?   ? 1   ? 2   ? 3   ? 4   How much help from another person does the patient currently need. .. Total A Lot A Little None   4. Moving to and from a bed to a chair (including a wheelchair)? ? 1   ? 2   ? 3   ? 4   5. Need to walk in hospital room? ? 1   ? 2   ? 3   ? 4   6. Climbing 3-5 steps with a railing? ? 1   ? 2   ? 3   ? 4   © 2007, Trustees of [facility] [employer], under license to [employer]. All rights reserved      Score:  Initial: 24 Most Recent: X (Date: -- )    Interpretation of Tool:  Represents activities that are increasingly more difficult (i.e. Bed mobility, Transfers, Gait). Medical Necessity:     NA.  Reason for Services/Other Comments:  NA.   Use of outcome tool(s) and clinical judgement create a POC that gives a: Clear prediction of patient's progress: LOW COMPLEXITY            TREATMENT:   (In addition to Assessment/Re-Assessment sessions the following treatments were rendered)   Pre-treatment Symptoms/Complaints:  none  Pain: Initial:   Pain Intensity 1: 0  Post Session:  0     Therapeutic Activity: (    8min): Therapeutic activities including Bed transfers, Chair transfers and Ambulation on level ground to improve mobility, strength, balance and coordination. Required no   to promote static and dynamic balance in standing, promote coordination of bilateral, lower extremity(s) and promote motor control of bilateral, lower extremity(s).          Braces/Orthotics/Lines/Etc:   IV  O2 Device: Room air  Treatment/Session Assessment:    Response to Treatment:  see above  Interdisciplinary Collaboration:   Physical Therapist  Registered Nurse  After treatment position/precautions:   Up in chair  Call light within reach  RN notified   Compliance with Program/Exercises: Compliant all of the time  Recommendations/Intent for next treatment session: \"Next visit will focus on NA \".   Total Treatment Duration:  PT Patient Time In/Time Out  Time In: 1255  Time Out: Felix Denise DPT

## 2019-10-25 NOTE — PROGRESS NOTES
Problem: Risk for Spread of Infection  Goal: Prevent transmission of infectious organism to others  Description  Prevent the transmission of infectious organisms to other patients, staff members, and visitors. Outcome: Progressing Towards Goal     Problem: Patient Education:  Go to Education Activity  Goal: Patient/Family Education  Outcome: Progressing Towards Goal     Problem: Falls - Risk of  Goal: *Absence of Falls  Description  Document Donata Carrel Fall Risk and appropriate interventions in the flowsheet.   Outcome: Progressing Towards Goal  Note:   Fall Risk Interventions:            Medication Interventions: Bed/chair exit alarm                   Problem: Patient Education: Go to Patient Education Activity  Goal: Patient/Family Education  Outcome: Progressing Towards Goal

## 2019-10-25 NOTE — PROGRESS NOTES
[facility]  Admission Date: 10/23/2019             Daily Progress Note: 10/25/2019    The patient's chart is reviewed and the patient is discussed with the staff. Patient is a 59 y.o.  male seen and evaluated at the request of Dr. Schuster Later in the ED. He was recently admitted with pseudomonas pneumonia discharged just 6 days ago with a course of 14 days Cipro to complete which he is still taking. He came in today due to worsening lower extremity swelling for the past 2 days. He has not previously been on diuretics, but did have afib controlled with diltiazem during his last admission. During his evaluation he had a repeat CXR showing cavitating right sided pneumonia with air fluid level developed from prior R midlung opacity previously. He reports he has continued to cough, but it is dry now. He originally had bloody sputum, then rust colored then white and now clear. He continues to have a bothersome cough despite being non-productive. He has not had recurrent fevers. He had a PPD placed during his last admission which was reportedly negative. He reports he has been compliant with medications. We are consulted by the ER for cavitary pneumonia and further evaluation. Subjective:        On RA  Cough more with more sputum    Current Facility-Administered Medications   Medication Dose Route Frequency    magnesium sulfate 1 g/100 ml IVPB (premix or compounded)  1 g IntraVENous ONCE    ciprofloxacin (CIPRO) 400 mg in D5W IVPB (premix)  400 mg IntraVENous Q12H    clindamycin (CLEOCIN) 600mg D5W 50mL IVPB (premix)  600 mg IntraVENous Q8H    nicotine (NICODERM CQ) 21 mg/24 hr patch 1 Patch  1 Patch TransDERmal Q24H    famotidine (PEPCID) tablet 20 mg  20 mg Oral DAILY    LORazepam (ATIVAN) tablet 1 mg  1 mg Oral QHS    LORazepam (ATIVAN) tablet 1 mg  1 mg Oral Q4H PRN    albuterol-ipratropium (DUO-NEB) 2.5 MG-0.5 MG/3 ML  3 mL Nebulization Q6H RT    HYDROcodone-homatropine (HYCODAN) 5-1.5 mg/5 mL (5 mL) syrup 5 mL  5 mL Oral Q4H PRN    benzonatate (TESSALON) capsule 100 mg  100 mg Oral TID PRN    guaiFENesin ER (MUCINEX) tablet 1,200 mg  1,200 mg Oral Q12H    sodium chloride (NS) flush 5-40 mL  5-40 mL IntraVENous Q8H    sodium chloride (NS) flush 5-40 mL  5-40 mL IntraVENous PRN    acetaminophen (TYLENOL) tablet 650 mg  650 mg Oral Q4H PRN    albuterol (PROVENTIL VENTOLIN) nebulizer solution 2.5 mg  2.5 mg Nebulization Q4H PRN    apixaban (ELIQUIS) tablet 5 mg  5 mg Oral Q12H    atorvastatin (LIPITOR) tablet 40 mg  40 mg Oral DAILY    folic acid (FOLVITE) tablet 1 mg  1 mg Oral DAILY    magnesium oxide (MAG-OX) tablet 400 mg  400 mg Oral DAILY    pantoprazole (PROTONIX) tablet 40 mg  40 mg Oral ACB&D    sucralfate (CARAFATE) tablet 1 g  1 g Oral AC&HS    thiamine HCL (B-1) tablet 100 mg  100 mg Oral DAILY    sodium chloride (NS) flush 5-40 mL  5-40 mL IntraVENous Q8H    sodium chloride (NS) flush 5-40 mL  5-40 mL IntraVENous PRN    traZODone (DESYREL) tablet 50 mg  50 mg Oral QHS PRN       Review of Systems    Constitutional: negative for fever, chills, sweats  Cardiovascular: negative for chest pain, palpitations, syncope, edema  Gastrointestinal:  negative for dysphagia, reflux, vomiting, diarrhea, abdominal pain, or melena  Neurologic:  negative for focal weakness, numbness, headache    Objective:     Vitals:    10/25/19 0419 10/25/19 0659 10/25/19 0855 10/25/19 1030   BP: 94/55 108/59  98/57   Pulse: 89 69  78   Resp: 20 18     Temp: 98.9 °F (37.2 °C) 98.7 °F (37.1 °C)  98.6 °F (37 °C)   SpO2: 95% 94% 97% 95%   Weight:       Height:             Intake/Output Summary (Last 24 hours) at 10/25/2019 1110  Last data filed at 10/25/2019 0659  Gross per 24 hour   Intake 240 ml   Output 1700 ml   Net -1460 ml       Physical Exam:   Constitution:  the patient is well developed and in no acute distress  EENMT:  Sclera clear, pupils equal, oral mucosa moist  Respiratory: rhonchi  Cardiovascular:  RRR without M,G,R  Gastrointestinal: soft and non-tender; with positive bowel sounds. Musculoskeletal: warm without cyanosis. There is no lower extremity edema. Skin:  no jaundice or rashes, no wounds   Neurologic: no gross neuro deficits     Psychiatric:  alert and oriented x 3    CXR:       LAB  Recent Labs     10/25/19  0657   GLUCPOC 100      Recent Labs     10/25/19  0537 10/24/19  0523 10/23/19  0522   WBC 7.0 6.4 7.3   HGB 9.9* 10.3* 12.9*   HCT 29.5* 30.5* 39.0*    354 437     Recent Labs     10/25/19  0537 10/24/19  0523 10/23/19  0522    136 140   K 3.8 3.6 4.2    100 105   CO2 30 30 28   GLU 95 81 100   BUN 8 10 6*   CREA 0.55* 0.45* 0.57*   MG 1.7*  --   --    CA 8.3 8.5 9.0   ALB 2.1*  --  2.5*   TBILI 0.4  --  0.4   ALT 28  --  25   SGOT 30  --  21       Assessment:  (Medical Decision Making)     Hospital Problems  Never Reviewed          Codes Class Noted POA    * (Principal) Cavitary lesion of lung ICD-10-CM: J98.4  ICD-9-CM: 518.89  10/23/2019 Unknown        A-fib ([facility] [address] ICD-10-CM: I48.91  ICD-9-CM: 427.31  5/5/2019 Yes        ETOH abuse ICD-10-CM: F10.10  ICD-9-CM: 305.00  11/28/2016 Yes        HTN (hypertension) (Chronic) ICD-10-CM: I10  ICD-9-CM: 401.9  11/19/2014 Yes              Plan:  (Medical Decision Making)     -- continue cipro and clinda  - await bronch results- AFB pending  - continue eloquis    More than 50% of the time documented was spent in face-to-face contact with the patient and in the care of the patient on the floor/unit where the patient is located.     Robel Bryan MD

## 2019-10-25 NOTE — PROGRESS NOTES
Interdisciplinary Rounds completed 10/25/19. Nursing, Case Management, Physician and PT present. Plan of care reviewed and updated. Up ambulating in rivera with mask on. Pulm following.

## 2019-10-26 VITALS
DIASTOLIC BLOOD PRESSURE: 69 MMHG | RESPIRATION RATE: 18 BRPM | SYSTOLIC BLOOD PRESSURE: 108 MMHG | BODY MASS INDEX: 24.62 KG/M2 | HEART RATE: 78 BPM | OXYGEN SATURATION: 98 % | HEIGHT: 70 IN | TEMPERATURE: 98.4 F | WEIGHT: 172 LBS

## 2019-10-26 PROBLEM — J85.0 NECROTIC PNEUMONIA (HCC): Status: ACTIVE | Noted: 2019-10-25

## 2019-10-26 PROBLEM — I26.94 MULTIPLE SUBSEGMENTAL PULMONARY EMBOLI WITHOUT ACUTE COR PULMONALE (HCC): Status: ACTIVE | Noted: 2019-10-26

## 2019-10-26 LAB — MAGNESIUM SERPL-MCNC: 2 MG/DL (ref 1.8–2.4)

## 2019-10-26 PROCEDURE — 99232 SBSQ HOSP IP/OBS MODERATE 35: CPT | Performed by: INTERNAL MEDICINE

## 2019-10-26 PROCEDURE — 74011250637 HC RX REV CODE- 250/637: Performed by: INTERNAL MEDICINE

## 2019-10-26 PROCEDURE — 36415 COLL VENOUS BLD VENIPUNCTURE: CPT

## 2019-10-26 PROCEDURE — 74011250637 HC RX REV CODE- 250/637: Performed by: NURSE PRACTITIONER

## 2019-10-26 PROCEDURE — 74011250636 HC RX REV CODE- 250/636: Performed by: INTERNAL MEDICINE

## 2019-10-26 PROCEDURE — 83735 ASSAY OF MAGNESIUM: CPT

## 2019-10-26 RX ORDER — PANTOPRAZOLE SODIUM 40 MG/1
40 TABLET, DELAYED RELEASE ORAL
Qty: 60 TAB | Refills: 0 | Status: SHIPPED | OUTPATIENT
Start: 2019-10-26 | End: 2019-11-25

## 2019-10-26 RX ORDER — CLINDAMYCIN HYDROCHLORIDE 300 MG/1
300 CAPSULE ORAL 3 TIMES DAILY
Qty: 42 CAP | Refills: 0 | Status: SHIPPED | OUTPATIENT
Start: 2019-10-26 | End: 2019-11-09

## 2019-10-26 RX ORDER — TRAZODONE HYDROCHLORIDE 50 MG/1
50 TABLET ORAL
Qty: 15 TAB | Refills: 0 | Status: SHIPPED | OUTPATIENT
Start: 2019-10-26

## 2019-10-26 RX ORDER — FAMOTIDINE 20 MG/1
20 TABLET, FILM COATED ORAL DAILY
Qty: 30 TAB | Refills: 0 | Status: SHIPPED | OUTPATIENT
Start: 2019-10-26

## 2019-10-26 RX ORDER — CIPROFLOXACIN 750 MG/1
750 TABLET, FILM COATED ORAL EVERY 12 HOURS
Qty: 28 TAB | Refills: 0 | Status: SHIPPED | OUTPATIENT
Start: 2019-10-26 | End: 2019-11-09

## 2019-10-26 RX ADMIN — Medication 10 ML: at 13:58

## 2019-10-26 RX ADMIN — SUCRALFATE 1 G: 1 TABLET ORAL at 10:55

## 2019-10-26 RX ADMIN — SUCRALFATE 1 G: 1 TABLET ORAL at 05:08

## 2019-10-26 RX ADMIN — CIPROFLOXACIN 400 MG: 2 INJECTION, SOLUTION INTRAVENOUS at 08:46

## 2019-10-26 RX ADMIN — APIXABAN 5 MG: 5 TABLET, FILM COATED ORAL at 08:45

## 2019-10-26 RX ADMIN — CLINDAMYCIN PHOSPHATE 600 MG: 600 INJECTION, SOLUTION INTRAVENOUS at 08:22

## 2019-10-26 RX ADMIN — Medication 100 MG: at 08:46

## 2019-10-26 RX ADMIN — FAMOTIDINE 20 MG: 20 TABLET, FILM COATED ORAL at 08:45

## 2019-10-26 RX ADMIN — GUAIFENESIN 1200 MG: 600 TABLET ORAL at 08:45

## 2019-10-26 RX ADMIN — ATORVASTATIN CALCIUM 40 MG: 40 TABLET, FILM COATED ORAL at 08:45

## 2019-10-26 RX ADMIN — PANTOPRAZOLE SODIUM 40 MG: 40 TABLET, DELAYED RELEASE ORAL at 17:37

## 2019-10-26 RX ADMIN — PANTOPRAZOLE SODIUM 40 MG: 40 TABLET, DELAYED RELEASE ORAL at 05:08

## 2019-10-26 RX ADMIN — Medication 10 ML: at 05:07

## 2019-10-26 RX ADMIN — MAGNESIUM GLUCONATE 500 MG ORAL TABLET 400 MG: 500 TABLET ORAL at 08:45

## 2019-10-26 RX ADMIN — SUCRALFATE 1 G: 1 TABLET ORAL at 17:37

## 2019-10-26 RX ADMIN — FOLIC ACID 1 MG: 1 TABLET ORAL at 08:45

## 2019-10-26 NOTE — PROGRESS NOTES
Hourly round completed throughout the shift and no changes in Pt's status. Complain of persistent cough. Mediated per mar Denies any needs at this time Bed in lower position and call light/ personal items within reach. Will continue to monitor and give bed side shift report to on coming day shift nurse.

## 2019-10-26 NOTE — PROGRESS NOTES
Problem: Risk for Spread of Infection  Goal: Prevent transmission of infectious organism to others  Description  Prevent the transmission of infectious organisms to other patients, staff members, and visitors. Outcome: Progressing Towards Goal     Problem: Patient Education:  Go to Education Activity  Goal: Patient/Family Education  Outcome: Progressing Towards Goal     Problem: Falls - Risk of  Goal: *Absence of Falls  Description  Document Mela Freitas Fall Risk and appropriate interventions in the flowsheet.   Outcome: Progressing Towards Goal  Note:   Fall Risk Interventions:            Medication Interventions: Bed/chair exit alarm                   Problem: Patient Education: Go to Patient Education Activity  Goal: Patient/Family Education  Outcome: Progressing Towards Goal     Problem: Breathing Pattern - Ineffective  Goal: *Absence of hypoxia  Outcome: Progressing Towards Goal

## 2019-10-26 NOTE — PROGRESS NOTES
Dianne Stephens [facility]  Admission Date: 10/23/2019             Daily Progress Note: 10/26/2019    The patient's chart is reviewed and the patient is discussed with the staff. Patient is a 58 y.o.  male seen and evaluated at the request of Dr. Nick Wiseman in the ED. He was recently admitted with pseudomonas pneumonia discharged 10/17/19 with a course of 14 days Cipro to complete which he was still taking. He came in due to worsening lower extremity swelling for the past 2 days. He has not previously been on diuretics, but did have afib controlled with diltiazem during his last admission. During his evaluation he had a repeat CXR showing cavitating right sided pneumonia with air fluid level developed from prior R midlung opacity previously. He reports he has continued to cough, but it is dry now. He originally had bloody sputum, then rust colored then white and now clear. He continues to have a bothersome cough despite being non-productive. He has not had recurrent fevers. He had a PPD placed during his last admission which was reportedly negative. He reports he has been compliant with medications. We were consulted by the ER for cavitary pneumonia and further evaluation    Subjective:     Denies SOB. Cough improving, occasionally productive of clear sputum. On room air.   Ambulating without dyspnea  Anxiously wants to go home today    Current Facility-Administered Medications   Medication Dose Route Frequency    albuterol-ipratropium (DUO-NEB) 2.5 MG-0.5 MG/3 ML  3 mL Nebulization Q6H PRN    ciprofloxacin (CIPRO) 400 mg in D5W IVPB (premix)  400 mg IntraVENous Q12H    clindamycin (CLEOCIN) 600mg D5W 50mL IVPB (premix)  600 mg IntraVENous Q8H    nicotine (NICODERM CQ) 21 mg/24 hr patch 1 Patch  1 Patch TransDERmal Q24H    famotidine (PEPCID) tablet 20 mg  20 mg Oral DAILY    LORazepam (ATIVAN) tablet 1 mg  1 mg Oral QHS    LORazepam (ATIVAN) tablet 1 mg  1 mg Oral Q4H PRN    HYDROcodone-homatropine (HYCODAN) 5-1.5 mg/5 mL (5 mL) syrup 5 mL  5 mL Oral Q4H PRN    benzonatate (TESSALON) capsule 100 mg  100 mg Oral TID PRN    guaiFENesin ER (MUCINEX) tablet 1,200 mg  1,200 mg Oral Q12H    sodium chloride (NS) flush 5-40 mL  5-40 mL IntraVENous Q8H    sodium chloride (NS) flush 5-40 mL  5-40 mL IntraVENous PRN    acetaminophen (TYLENOL) tablet 650 mg  650 mg Oral Q4H PRN    albuterol (PROVENTIL VENTOLIN) nebulizer solution 2.5 mg  2.5 mg Nebulization Q4H PRN    apixaban (ELIQUIS) tablet 5 mg  5 mg Oral Q12H    atorvastatin (LIPITOR) tablet 40 mg  40 mg Oral DAILY    folic acid (FOLVITE) tablet 1 mg  1 mg Oral DAILY    magnesium oxide (MAG-OX) tablet 400 mg  400 mg Oral DAILY    pantoprazole (PROTONIX) tablet 40 mg  40 mg Oral ACB&D    sucralfate (CARAFATE) tablet 1 g  1 g Oral AC&HS    thiamine HCL (B-1) tablet 100 mg  100 mg Oral DAILY    sodium chloride (NS) flush 5-40 mL  5-40 mL IntraVENous Q8H    sodium chloride (NS) flush 5-40 mL  5-40 mL IntraVENous PRN    traZODone (DESYREL) tablet 50 mg  50 mg Oral QHS PRN       Review of Systems  Constitutional: negative for fever, chills, sweats  Cardiovascular: negative for chest pain, palpitations, syncope, edema  Gastrointestinal:  negative for dysphagia, reflux, vomiting, diarrhea, abdominal pain, or melena  Neurologic:  negative for focal weakness, numbness, headache    Objective:     Vitals:    10/26/19 0013 10/26/19 0332 10/26/19 0651 10/26/19 1044   BP: 114/64 112/63 122/67 99/62   Pulse: 77 87 76 76   Resp: 19 20 18 18   Temp: 98.6 °F (37 °C) 98.3 °F (36.8 °C) 98.9 °F (37.2 °C) 97.7 °F (36.5 °C)   SpO2: 94% 94% 95% 96%   Weight:       Height:             Intake/Output Summary (Last 24 hours) at 10/26/2019 1153  Last data filed at 10/26/2019 0858  Gross per 24 hour   Intake 1774 ml   Output 4100 ml   Net -2326 ml       CT chest 10/23/19  IMPRESSION:  A 9.5 CM CAVITARY LESION INFEROLATERALLY IN THE RIGHT UPPER LOBE IS  NEW SINCE OCTOBER 4 AND SUSPICIOUS FOR NECROTIZING PNEUMONIA. THE POSSIBILITY OF TUBERCULOSIS OR OTHER GRANULOMATOUS DISEASE IS AGAIN SUGGESTED. FOLLOW-UP PULMONARY MEDICINE CONSULTATION IS SUGGESTED. POSITIVE BILATERAL PULMONARY EMBOLI. Physical Exam:   Constitution:  the patient is well developed and in no acute distress;  Sitting up in chair eating lunch. EENMT:  Sclera clear, pupils equal, oral mucosa moist  Respiratory: Rhonchi bilaterally. On room air  Cardiovascular:  RRR without M,G,R  Gastrointestinal: soft and non-tender; with positive bowel sounds. Musculoskeletal: warm without cyanosis. There is no lower extremity edema.   Skin:  no jaundice or rashes, no open wounds   Neurologic: no gross neuro deficits     Psychiatric:  alert and oriented x 3        LAB  Recent Labs     10/25/19  0657   GLUCPOC 100      Recent Labs     10/25/19  0537 10/24/19  0523   WBC 7.0 6.4   HGB 9.9* 10.3*   HCT 29.5* 30.5*    354     Recent Labs     10/26/19  0522 10/25/19  0537 10/24/19  0523   NA  --  137 136   K  --  3.8 3.6   CL  --  102 100   CO2  --  30 30   GLU  --  95 81   BUN  --  8 10   CREA  --  0.55* 0.45*   MG 2.0 1.7*  --    CA  --  8.3 8.5   ALB  --  2.1*  --    TBILI  --  0.4  --    ALT  --  28  --    SGOT  --  30  --            BAL/sputum culture 10/23/19  GRAM STAIN 25  WBC'S/OIF   Preliminary   GRAM STAIN NO EPITHELIAL CELLS SEEN    Preliminary   GRAM STAIN FEW GRAM NEGATIVE RODS    Preliminary   Culture result: Abnormal     Preliminary   HEAVY GRAM NEGATIVE RODS IDENTIFICATION AND SUSCEPTIBILITY TO FOLLOW    Culture result:    Preliminary   CULTURE IN PROGRESS,FURTHER UPDATES TO FOLLOW        Acid Fast Smear NEGATIVE    AFB culture: pending    Assessment:  (Medical Decision Making)     Hospital Problems  Never Reviewed          Codes Class Noted POA    Pneumonia, necrotizing ICD-10-CM: J18.9  ICD-9-CM: 425  10/25/2019 Unknown    On abx    * (Principal) Cavitary lesion of lung ICD-10-CM: J98.4  ICD-9-CM: 518.89  10/23/2019 Unknown    S/p bronch; on ABX    A-fib [facility]) ICD-10-CM: I48.91  ICD-9-CM: 427.31  5/5/2019 Yes    On xarelto    ETOH abuse ICD-10-CM: F10.10  ICD-9-CM: 305.00  11/28/2016 Yes    Monitor for withdrawals    HTN (hypertension) (Chronic) ICD-10-CM: I10  ICD-9-CM: 401.9  11/19/2014 Yes    Chronic        PE: on Eliquis        Plan:  (Medical Decision Making)     --Continue Cipro and Clinda  --Final Bronch cultures still pending, culture shows heavy gram negative rods;  AFB pending  --Continue mucinex, prn albuterol and duo-nebs  --Continue airborne precautions  --Continue Eliquis    More than 50% of the time documented was spent in face-to-face contact with the patient and in the care of the patient on the floor/unit where the patient is located. [id number], NP    Lungs:  clear  Heart:  RRR with no Murmur/Rubs/Gallops    Additional Comments:  AFB smears are all neg. Clinically he is much better and likely has necrotizing pneumonia. Can change to oral Cipro 750 mg BID and Clindamycin 300 mg tid for 2 more weeks and repeat CT in 1 month with follow up after that. Need to follow up with cardiology and keep on Eliquis    I have spoken with and examined the patient. I agree with the above assessment and plan as documented.     Shaila Shipman MD

## 2019-10-26 NOTE — PROGRESS NOTES
Occupational Therapy Note: orders received, chart reviewed and eval attempted but pt not in his room. Will check back later as time and schedule allows.   Thank you for this referral.  Phillip Diallo MS, OTR/L

## 2019-10-26 NOTE — PROGRESS NOTES
Patient sitting up in recliner chair at this time. Patient requests to ambulate in the hallway. Will confirm with provider as patient is on airborne precautions at this time.

## 2019-10-28 LAB
BACTERIA SPEC CULT: ABNORMAL
GRAM STN SPEC: ABNORMAL
SERVICE CMNT-IMP: ABNORMAL
SERVICE CMNT-IMP: ABNORMAL

## 2019-10-30 LAB
BACTERIA SPEC CULT: NORMAL
SERVICE CMNT-IMP: NORMAL

## 2019-11-22 LAB
FUNGUS CULTURE, RFCO2T: NEGATIVE
FUNGUS CULTURE, RFCO2T: NEGATIVE
FUNGUS SMEAR, RFCO1T: NORMAL
FUNGUS SMEAR, RFCO1T: NORMAL
FUNGUS SPEC CULT: NORMAL
FUNGUS SPEC CULT: NORMAL
FUNGUS STAIN, 188244: NORMAL
FUNGUS STAIN, 188244: NORMAL
REFLEX TO ID, RFCO3T: NORMAL
REFLEX TO ID, RFCO3T: NORMAL
SPECIMEN SOURCE: NORMAL

## 2019-12-07 LAB
ACID FAST STN SPEC: NEGATIVE
ACID FAST STN SPEC: NEGATIVE
MYCOBACTERIUM SPEC QL CULT: NEGATIVE
MYCOBACTERIUM SPEC QL CULT: NEGATIVE
SPECIMEN PREPARATION: NORMAL
SPECIMEN PREPARATION: NORMAL
SPECIMEN SOURCE: NORMAL
SPECIMEN SOURCE: NORMAL

## 2019-12-12 LAB
ACID FAST STN SPEC: NEGATIVE
MYCOBACTERIUM SPEC QL CULT: NEGATIVE
SPECIMEN PREPARATION: NORMAL
SPECIMEN SOURCE: NORMAL

## 2020-05-17 ENCOUNTER — APPOINTMENT (OUTPATIENT)
Dept: GENERAL RADIOLOGY | Age: 60
End: 2020-05-17
Attending: EMERGENCY MEDICINE
Payer: COMMERCIAL

## 2020-05-17 ENCOUNTER — HOSPITAL ENCOUNTER (EMERGENCY)
Age: 60
Discharge: HOME | End: 2020-05-17
Attending: EMERGENCY MEDICINE
Payer: COMMERCIAL

## 2020-05-17 ENCOUNTER — APPOINTMENT (OUTPATIENT)
Dept: CT IMAGING | Age: 60
End: 2020-05-17
Attending: EMERGENCY MEDICINE
Payer: COMMERCIAL

## 2020-05-17 VITALS
DIASTOLIC BLOOD PRESSURE: 80 MMHG | OXYGEN SATURATION: 94 % | TEMPERATURE: 97.8 F | RESPIRATION RATE: 18 BRPM | BODY MASS INDEX: 22.9 KG/M2 | HEIGHT: 70 IN | HEART RATE: 61 BPM | WEIGHT: 160 LBS | SYSTOLIC BLOOD PRESSURE: 137 MMHG

## 2020-05-17 DIAGNOSIS — J20.9 ACUTE BRONCHITIS, UNSPECIFIED ORGANISM: Primary | ICD-10-CM

## 2020-05-17 LAB
ALBUMIN SERPL-MCNC: 3.7 G/DL (ref 3.2–4.6)
ALBUMIN/GLOB SERPL: 0.8 {RATIO} (ref 1.2–3.5)
ALP SERPL-CCNC: 96 U/L (ref 50–136)
ALT SERPL-CCNC: 63 U/L (ref 12–65)
ANION GAP SERPL CALC-SCNC: 16 MMOL/L (ref 7–16)
AST SERPL-CCNC: 110 U/L (ref 15–37)
BASOPHILS # BLD: 0.1 K/UL (ref 0–0.2)
BASOPHILS NFR BLD: 2 % (ref 0–2)
BILIRUB SERPL-MCNC: 0.7 MG/DL (ref 0.2–1.1)
BUN SERPL-MCNC: 14 MG/DL (ref 8–23)
CALCIUM SERPL-MCNC: 8.3 MG/DL (ref 8.3–10.4)
CHLORIDE SERPL-SCNC: 102 MMOL/L (ref 98–107)
CO2 SERPL-SCNC: 19 MMOL/L (ref 21–32)
CREAT SERPL-MCNC: 0.6 MG/DL (ref 0.8–1.5)
D DIMER PPP FEU-MCNC: 2.63 UG/ML(FEU)
DIFFERENTIAL METHOD BLD: ABNORMAL
EOSINOPHIL # BLD: 0 K/UL (ref 0–0.8)
EOSINOPHIL NFR BLD: 0 % (ref 0.5–7.8)
ERYTHROCYTE [DISTWIDTH] IN BLOOD BY AUTOMATED COUNT: 19.1 % (ref 11.9–14.6)
GLOBULIN SER CALC-MCNC: 4.5 G/DL (ref 2.3–3.5)
GLUCOSE SERPL-MCNC: 70 MG/DL (ref 65–100)
HCT VFR BLD AUTO: 44.1 % (ref 41.1–50.3)
HGB BLD-MCNC: 15.1 G/DL (ref 13.6–17.2)
IMM GRANULOCYTES # BLD AUTO: 0 K/UL (ref 0–0.5)
IMM GRANULOCYTES NFR BLD AUTO: 1 % (ref 0–5)
LYMPHOCYTES # BLD: 2.1 K/UL (ref 0.5–4.6)
LYMPHOCYTES NFR BLD: 40 % (ref 13–44)
MCH RBC QN AUTO: 34.2 PG (ref 26.1–32.9)
MCHC RBC AUTO-ENTMCNC: 34.2 G/DL (ref 31.4–35)
MCV RBC AUTO: 99.8 FL (ref 79.6–97.8)
MONOCYTES # BLD: 0.5 K/UL (ref 0.1–1.3)
MONOCYTES NFR BLD: 9 % (ref 4–12)
NEUTS SEG # BLD: 2.6 K/UL (ref 1.7–8.2)
NEUTS SEG NFR BLD: 49 % (ref 43–78)
NRBC # BLD: 0 K/UL (ref 0–0.2)
PLATELET # BLD AUTO: 85 K/UL (ref 150–450)
PMV BLD AUTO: 9.7 FL (ref 9.4–12.3)
POTASSIUM SERPL-SCNC: 4.4 MMOL/L (ref 3.5–5.1)
PROT SERPL-MCNC: 8.2 G/DL (ref 6.3–8.2)
RBC # BLD AUTO: 4.42 M/UL (ref 4.23–5.6)
SODIUM SERPL-SCNC: 137 MMOL/L (ref 136–145)
WBC # BLD AUTO: 5.3 K/UL (ref 4.3–11.1)

## 2020-05-17 PROCEDURE — 80053 COMPREHEN METABOLIC PANEL: CPT

## 2020-05-17 PROCEDURE — 99284 EMERGENCY DEPT VISIT MOD MDM: CPT

## 2020-05-17 PROCEDURE — 85025 COMPLETE CBC W/AUTO DIFF WBC: CPT

## 2020-05-17 PROCEDURE — 94640 AIRWAY INHALATION TREATMENT: CPT

## 2020-05-17 PROCEDURE — 74011000258 HC RX REV CODE- 258: Performed by: EMERGENCY MEDICINE

## 2020-05-17 PROCEDURE — 74011000250 HC RX REV CODE- 250: Performed by: EMERGENCY MEDICINE

## 2020-05-17 PROCEDURE — 85379 FIBRIN DEGRADATION QUANT: CPT

## 2020-05-17 PROCEDURE — 71260 CT THORAX DX C+: CPT

## 2020-05-17 PROCEDURE — 71045 X-RAY EXAM CHEST 1 VIEW: CPT

## 2020-05-17 PROCEDURE — 74011636320 HC RX REV CODE- 636/320: Performed by: EMERGENCY MEDICINE

## 2020-05-17 RX ORDER — DOXYCYCLINE 100 MG/1
100 TABLET ORAL 2 TIMES DAILY
Qty: 20 TAB | Refills: 0 | Status: SHIPPED | OUTPATIENT
Start: 2020-05-17

## 2020-05-17 RX ORDER — CODEINE PHOSPHATE AND GUAIFENESIN 10; 100 MG/5ML; MG/5ML
5-10 SOLUTION ORAL
Qty: 150 ML | Refills: 0 | Status: SHIPPED | OUTPATIENT
Start: 2020-05-17 | End: 2020-05-22

## 2020-05-17 RX ORDER — SODIUM CHLORIDE 0.9 % (FLUSH) 0.9 %
10 SYRINGE (ML) INJECTION
Status: COMPLETED | OUTPATIENT
Start: 2020-05-17 | End: 2020-05-17

## 2020-05-17 RX ORDER — ALBUTEROL SULFATE 0.83 MG/ML
2.5 SOLUTION RESPIRATORY (INHALATION)
Status: COMPLETED | OUTPATIENT
Start: 2020-05-17 | End: 2020-05-17

## 2020-05-17 RX ORDER — ALBUTEROL SULFATE 90 UG/1
2 AEROSOL, METERED RESPIRATORY (INHALATION)
Qty: 1 INHALER | Refills: 0 | Status: SHIPPED | OUTPATIENT
Start: 2020-05-17

## 2020-05-17 RX ORDER — PREDNISONE 20 MG/1
20 TABLET ORAL 2 TIMES DAILY
Qty: 10 TAB | Refills: 0 | Status: SHIPPED | OUTPATIENT
Start: 2020-05-17 | End: 2020-05-22

## 2020-05-17 RX ADMIN — SODIUM CHLORIDE 100 ML: 900 INJECTION, SOLUTION INTRAVENOUS at 18:44

## 2020-05-17 RX ADMIN — ALBUTEROL SULFATE 2.5 MG: 2.5 SOLUTION RESPIRATORY (INHALATION) at 17:14

## 2020-05-17 RX ADMIN — Medication 10 ML: at 18:44

## 2020-05-17 RX ADMIN — IOPAMIDOL 100 ML: 755 INJECTION, SOLUTION INTRAVENOUS at 18:44

## 2020-05-17 NOTE — ED PROVIDER NOTES
40-year-old  male presents to the emergency department complaining of progressively worsening shortness of breath, cough productive of brownish sputum, and sore throat over the last month. Patient denies any fever or chills, UTI symptoms or change in bowel habits. Patient has a history of A. fib, as well as pulmonary embolism in the past, but is currently not on any medications. The history is provided by the patient. Sore Throat    This is a new problem. The current episode started more than 1 week ago. The problem has been gradually worsening. There has been no fever. Associated symptoms include shortness of breath and cough. Pertinent negatives include no diarrhea, no vomiting, no congestion, no drooling, no ear discharge, no ear pain, no headaches, no plugged ear sensation, no stridor, no swollen glands, no trouble swallowing and no stiff neck. He has had no exposure to strep or mono. The treatment provided no relief. Past Medical History:   Diagnosis Date    A-fib [facility]) 9/9/6550    Alcoholic hepatitis without ascites 5/11/2019    Hypertension     Multiple subsegmental pulmonary emboli without acute cor pulmonale ([facility] [address] 10/26/2019       History reviewed. No pertinent surgical history.       Family History:   Problem Relation Age of Onset    Cancer Neg Hx        Social History     Socioeconomic History    Marital status:      Spouse name: Not on file    Number of children: Not on file    Years of education: Not on file    Highest education level: Not on file   Occupational History    Not on file   Social Needs    Financial resource strain: Not on file    Food insecurity     Worry: Not on file     Inability: Not on file    Transportation needs     Medical: Not on file     Non-medical: Not on file   Tobacco Use    Smoking status: Current Every Day Smoker     Packs/day: 0.50    Smokeless tobacco: Never Used   Substance and Sexual Activity    Alcohol use: Yes     Comment: approx 1 pint per day    Drug use: Never    Sexual activity: Yes     Partners: Female   Lifestyle    Physical activity     Days per week: Not on file     Minutes per session: Not on file    Stress: Not on file   Relationships    Social connections     Talks on phone: Not on file     Gets together: Not on file     Attends Spiritism service: Not on file     Active member of club or organization: Not on file     Attends meetings of clubs or organizations: Not on file     Relationship status: Not on file    Intimate partner violence     Fear of current or ex partner: Not on file     Emotionally abused: Not on file     Physically abused: Not on file     Forced sexual activity: Not on file   Other Topics Concern    Not on file   Social History Narrative    Not on file         ALLERGIES: Patient has no known allergies. Review of Systems   Constitutional: Positive for fatigue. Negative for activity change, appetite change, chills and fever. HENT: Positive for sore throat. Negative for congestion, drooling, ear discharge, ear pain and trouble swallowing. Respiratory: Positive for cough and shortness of breath. Negative for stridor. Cardiovascular: Negative for chest pain, palpitations and leg swelling. Gastrointestinal: Negative for abdominal pain, diarrhea and vomiting. Neurological: Negative for headaches. All other systems reviewed and are negative. Vitals:    05/17/20 1635   BP: 139/76   Pulse: 91   Resp: 18   Temp: 97.8 °F (36.6 °C)   SpO2: 94%   Weight: 72.6 kg (160 lb)   Height: 5' 10\" (1.778 m)            Physical Exam  Vitals signs and nursing note reviewed. Constitutional:       General: He is not in acute distress. Appearance: He is well-developed. Interventions: He is not intubated. HENT:      Head: Normocephalic and atraumatic.       Right Ear: External ear normal.      Left Ear: External ear normal.      Mouth/Throat:      Mouth: Mucous membranes are moist.   Eyes: Extraocular Movements: Extraocular movements intact. Conjunctiva/sclera: Conjunctivae normal.      Pupils: Pupils are equal, round, and reactive to light. Neck:      Musculoskeletal: Normal range of motion and neck supple. Cardiovascular:      Rate and Rhythm: Normal rate and regular rhythm. Heart sounds: Normal heart sounds. No murmur. Pulmonary:      Effort: Pulmonary effort is normal. No tachypnea, bradypnea, accessory muscle usage or respiratory distress. He is not intubated. Breath sounds: No stridor. Wheezing (Mild expiratory throughout) and rhonchi (Scattered) present. No rales. Abdominal:      General: Bowel sounds are normal.      Palpations: Abdomen is soft. Tenderness: There is no abdominal tenderness. Musculoskeletal: Normal range of motion. Skin:     General: Skin is warm and dry. Capillary Refill: Capillary refill takes less than 2 seconds. Neurological:      Mental Status: He is alert and oriented to person, place, and time. MDM  Number of Diagnoses or Management Options  Acute bronchitis, unspecified organism: new and requires workup     Amount and/or Complexity of Data Reviewed  Clinical lab tests: ordered and reviewed  Tests in the radiology section of CPT®: ordered and reviewed  Review and summarize past medical records: yes (Discharge Summary     Patient: Ronni  MRN: [id number]  SSN: [id number]   YOB: 1960  Age: 59 y.o.   Sex: male      Admit Date: 10/23/2019     Discharge Date: 10/26/2019       Admission Diagnoses: Cavitary lesion of lung (J98.4)  Pneumonia (J18.9)     Discharge Diagnoses:   Problem List as of 10/26/2019 Date Reviewed: 10/26/2019          Codes Class Noted - Resolved    Multiple subsegmental pulmonary emboli without acute cor pulmonale ICD-10-CM: I26.94  ICD-9-CM: 415.19   10/26/2019 - Present         Necrotic pneumonia ([facility] [address] ICD-10-CM: J85.0  ICD-9-CM: 513.0   10/25/2019 - Present         * (Principal) Cavitary lesion of lung ICD-10-CM: J98.4  ICD-9-CM: 518.89   10/23/2019 - Present         Pseudomonas septicemia ([facility] [address] ICD-10-CM: A41.52  ICD-9-CM: 038.43, 995.91   10/11/2019 - Present         Right middle lobe pneumonia ([facility] [address] ICD-10-CM: J18.1  ICD-9-CM: 315   10/11/2019 - Present         Hypomagnesemia ICD-10-CM: E83.42  ICD-9-CM: 275. 2   5/12/2019 - Present         Diarrhea ICD-10-CM: R19.7  ICD-9-CM: 787.91   5/12/2019 - Present         Liver mass ICD-10-CM: R16.0  ICD-9-CM: 573.8   5/12/2019 - Present    Overview Signed 5/12/2019  3:44 PM by Maikel Chauhan MD        Further workup as an outpatient               Alcoholic hepatitis without ascites ICD-10-CM: K70.10  ICD-9-CM: 571. 1   5/11/2019 - Present         Hypokalemia ICD-10-CM: E87.6  ICD-9-CM: 276.8   5/10/2019 - Present         Clostridium difficile diarrhea ICD-10-CM: A04.72  ICD-9-CM: 008. 45   5/9/2019 - Present         Bacteremia due to Gram-negative bacteria ICD-10-CM: R78.81  ICD-9-CM: 790.7, 041.85   5/6/2019 - Present         PNA (pneumonia) ICD-10-CM: J18.9  ICD-9-CM: 486   5/5/2019 - Present         Tachycardia ICD-10-CM: R00.0  ICD-9-CM: 306. 0   5/5/2019 - Present         Thrombocytopenia (HCC) ICD-10-CM: D69.6  ICD-9-CM: 287. 5   5/5/2019 - Present         GI bleed ICD-10-CM: K92.2  ICD-9-CM: 812. 9   5/5/2019 - Present         fib [facility]) ICD-10-CM: I48.91  ICD-9-CM: 427.31   5/5/2019 - Present         Chest pain ICD-10-CM: R07.9  ICD-9-CM: 786.50   11/28/2016 - Present         Shortness of breath ICD-10-CM: R06.02  ICD-9-CM: 786.05   11/28/2016 - Present         Tobacco abuse ICD-10-CM: Z72.0  ICD-9-CM: 331. 1   11/28/2016 - Present         ETOH abuse ICD-10-CM: F10.10  ICD-9-CM: 305.00   11/28/2016 - Present         Cellulitis ICD-10-CM: L03.90  ICD-9-CM: 682. 9   11/19/2014 - Present         HTN (hypertension) (Chronic) ICD-10-CM: I10  ICD-9-CM: 401. 9   11/19/2014 - Present                Discharge Condition: Good     Hospital Course: Mr. Ej Weems is a 58 yo male with PMH of ETOH abuse, new onset chronic afib on eliquis, HTN, alcoholic hepatitis, tobacco abuse who presented with c/o LE edema, productive cough with brown sputum, SOB.  He had recent admission 10/11-10/17 with pseudomonas septicemia, right middle lobe pneumonia, ETOH abuse on valium taper.  He developed afib and required cardizem and eliquis last admission. ID, Cards and Pulm all following last admission.  He was DC on cipro to complete 14 day course. Bilateral LE edema attributed to cardizem therefore stopped.  He was found to have a new cavitary lesion on CXR in ED on 10/23/19. Neg PPD last admission. Pulmonary consulted.  Pt on airborne precautions until TB ruled out.  10/23 underwent bedside bronch, cx sent. Cipro and clindamycin started.  CT chest 10/24 showed PE. Pulmonary felt PE did not just happen, not considering failed eliquis.  Bronch results, AFB pending, cx currently growing heavy GNR. Given these results, and patients improvement on medication regimen, thought to have necrotizing pneumonia and not TB. Pt was very anxious to return home to his wife, as she has mild dementia and he is her caretaker. On day of discharge, pt still with cough, although sputum lighter in color. He was not requiring oxygen and was ambulatory in the hallway (wearing N95). Pulmonary cleared patient to be discharged home while awaiting final cx results.  Pt to complete 14 more days of Cipro and Clindamycin and to have repeat imaging and f/u with pulm in 1 month.     )  Independent visualization of images, tracings, or specimens: yes    Risk of Complications, Morbidity, and/or Mortality  Presenting problems: high  Diagnostic procedures: moderate  Management options: moderate    Patient Progress  Patient progress: improved         Procedures      Results Reviewed:      Recent Results (from the past 24 hour(s))   CBC WITH AUTOMATED DIFF    Collection Time: 05/17/20  4:52 PM   Result Value Ref Range    WBC 5.3 4.3 - 11.1 K/uL    RBC 4.42 4.23 - 5.6 M/uL    HGB 15.1 13.6 - 17.2 g/dL    HCT 44.1 41.1 - 50.3 %    MCV 99.8 (H) 79.6 - 97.8 FL    MCH 34.2 (H) 26.1 - 32.9 PG    MCHC 34.2 31.4 - 35.0 g/dL    RDW 19.1 (H) 11.9 - 14.6 %    PLATELET 85 (L) 779 - 450 K/uL    MPV 9.7 9.4 - 12.3 FL    ABSOLUTE NRBC 0.00 0.0 - 0.2 K/uL    DF AUTOMATED      NEUTROPHILS 49 43 - 78 %    LYMPHOCYTES 40 13 - 44 %    MONOCYTES 9 4.0 - 12.0 %    EOSINOPHILS 0 (L) 0.5 - 7.8 %    BASOPHILS 2 0.0 - 2.0 %    IMMATURE GRANULOCYTES 1 0.0 - 5.0 %    ABS. NEUTROPHILS 2.6 1.7 - 8.2 K/UL    ABS. LYMPHOCYTES 2.1 0.5 - 4.6 K/UL    ABS. MONOCYTES 0.5 0.1 - 1.3 K/UL    ABS. EOSINOPHILS 0.0 0.0 - 0.8 K/UL    ABS. BASOPHILS 0.1 0.0 - 0.2 K/UL    ABS. IMM. GRANS. 0.0 0.0 - 0.5 K/UL   METABOLIC PANEL, COMPREHENSIVE    Collection Time: 05/17/20  4:52 PM   Result Value Ref Range    Sodium 137 136 - 145 mmol/L    Potassium 4.4 3.5 - 5.1 mmol/L    Chloride 102 98 - 107 mmol/L    CO2 19 (L) 21 - 32 mmol/L    Anion gap 16 7 - 16 mmol/L    Glucose 70 65 - 100 mg/dL    BUN 14 8 - 23 MG/DL    Creatinine 0.60 (L) 0.8 - 1.5 MG/DL    GFR est AA >60 >60 ml/min/1.73m2    GFR est non-AA >60 >60 ml/min/1.73m2    Calcium 8.3 8.3 - 10.4 MG/DL    Bilirubin, total 0.7 0.2 - 1.1 MG/DL    ALT (SGPT) 63 12 - 65 U/L    AST (SGOT) 110 (H) 15 - 37 U/L    Alk. phosphatase 96 50 - 136 U/L    Protein, total 8.2 6.3 - 8.2 g/dL    Albumin 3.7 3.2 - 4.6 g/dL    Globulin 4.5 (H) 2.3 - 3.5 g/dL    A-G Ratio 0.8 (L) 1.2 - 3.5     D DIMER    Collection Time: 05/17/20  4:52 PM   Result Value Ref Range    D DIMER 2.63 (HH) <0.56 ug/ml(FEU)     CT CHEST W CONT   Final Result   IMPRESSION:   1. No evidence of pulmonary embolism. 2. Bronchial wall thickening and debris-filled bronchi within the lower lobes,   more so on the right suggesting bronchitis. 3.Reticular opacities within the right lung may represent residual scarring.    Developing pneumonia within the right lower lobe cannot be entirely excluded. XR CHEST PORT   Final Result   Impression: Residual scarring within the right lung at site of previous   extensive cavitary process. I discussed the results of all labs, procedures, radiographs, and treatments with the patient and available family. Treatment plan is agreed upon and the patient is ready for discharge. All voiced understanding of the discharge plan and medication instructions or changes as appropriate. Questions about treatment in the ED were answered. All were encouraged to return should symptoms worsen or new problems develop.

## 2020-05-17 NOTE — ED NOTES
Pt left building after md spoke with him before I could go to him with discharge instructions. Other nurse called security , upon seeing him leave, as pt still had IV in arm. Security returned pt who refused to go back to room or have vitals taken. I removed IV and went over discharge prescriptions and instructions with pt. I have reviewed discharge instructions with the patient. The patient verbalized understanding. Patient left ED via Discharge Method: ambulatory to Home with Dylan Gordon, who I called to come and get pt. .    Opportunity for questions and clarification provided. Patient given 5 scripts. To continue your aftercare when you leave the hospital, you may receive an automated call from our care team to check in on how you are doing. This is a free service and part of our promise to provide the best care and service to meet your aftercare needs.  If you have questions, or wish to unsubscribe from this service please call [phone]. Thank you for Choosing our [address] [place] [address] Emergency Department.

## 2020-05-17 NOTE — DISCHARGE INSTRUCTIONS
Patient Education        Bronchitis: Care Instructions  Your Care Instructions    Bronchitis is inflammation of the bronchial tubes, which carry air to the lungs. The tubes swell and produce mucus, or phlegm. The mucus and inflamed bronchial tubes make you cough. You may have trouble breathing. Most cases of bronchitis are caused by viruses like those that cause colds. Antibiotics usually do not help and they may be harmful. Bronchitis usually develops rapidly and lasts about 2 to 3 weeks in otherwise healthy people. Follow-up care is a key part of your treatment and safety. Be sure to make and go to all appointments, and call your doctor if you are having problems. It's also a good idea to know your test results and keep a list of the medicines you take. How can you care for yourself at home? · Take all medicines exactly as prescribed. Call your doctor if you think you are having a problem with your medicine. · Get some extra rest.  · Take an over-the-counter pain medicine, such as acetaminophen (Tylenol), ibuprofen (Advil, Motrin), or naproxen (Aleve) to reduce fever and relieve body aches. Read and follow all instructions on the label. · Do not take two or more pain medicines at the same time unless the doctor told you to. Many pain medicines have acetaminophen, which is Tylenol. Too much acetaminophen (Tylenol) can be harmful. · Take an over-the-counter cough medicine that contains dextromethorphan to help quiet a dry, hacking cough so that you can sleep. Avoid cough medicines that have more than one active ingredient. Read and follow all instructions on the label. · Breathe moist air from a humidifier, hot shower, or sink filled with hot water. The heat and moisture will thin mucus so you can cough it out. · Do not smoke. Smoking can make bronchitis worse. If you need help quitting, talk to your doctor about stop-smoking programs and medicines.  These can increase your chances of quitting for good.  When should you call for help? Call 911 anytime you think you may need emergency care. For example, call if:    · You have severe trouble breathing.    Call your doctor now or seek immediate medical care if:    · You have new or worse trouble breathing.     · You cough up dark brown or bloody mucus (sputum).     · You have a new or higher fever.     · You have a new rash.    Watch closely for changes in your health, and be sure to contact your doctor if:    · You cough more deeply or more often, especially if you notice more mucus or a change in the color of your mucus.     · You are not getting better as expected. Where can you learn more? Go to http://nadeen-divya.info/  Enter H333 in the search box to learn more about \"Bronchitis: Care Instructions. \"  Current as of: June 9, 2019Content Version: 12.4  © 7354-7898 [employer], [employer]. Care instructions adapted under license by [employer] (which disclaims liability or warranty for this information). If you have questions about a medical condition or this instruction, always ask your healthcare professional. [address] any warranty or liability for your use of this information.

## 2020-05-17 NOTE — ED TRIAGE NOTES
Pt c/o constant sore throat, shortness of breath, productive cough with green mucus, bilateral clear eye drainage x1 month. Pt denies fever and chest pain. Pt masked upon arrival to the ED.

## 2020-05-18 NOTE — DISCHARGE SUMMARY
Discharge Summary     Patient: Nicole Raphael MRN: [id number]  SSN: [id number]    YOB: 1960  Age: 59 y.o.   Sex: male       Admit Date: 10/23/2019    Discharge Date: 10/26/2019      Admission Diagnoses: Cavitary lesion of lung [J98.4]  Pneumonia [J18.9]    Discharge Diagnoses:   Problem List as of 10/26/2019 Date Reviewed: 10/26/2019          Codes Class Noted - Resolved    Multiple subsegmental pulmonary emboli without acute cor pulmonale ICD-10-CM: I26.94  ICD-9-CM: 415.19  10/26/2019 - Present        Necrotic pneumonia ([facility] ICD-10-CM: J85.0  ICD-9-CM: 513.0  10/25/2019 - Present        * (Principal) Cavitary lesion of lung ICD-10-CM: J98.4  ICD-9-CM: 518.89  10/23/2019 - Present        Pseudomonas septicemia ([facility] ICD-10-CM: A41.52  ICD-9-CM: 038.43, 995.91  10/11/2019 - Present        Right middle lobe pneumonia ([facility] ICD-10-CM: J18.1  ICD-9-CM: 661  10/11/2019 - Present        Hypomagnesemia ICD-10-CM: E83.42  ICD-9-CM: 275.2  5/12/2019 - Present        Diarrhea ICD-10-CM: R19.7  ICD-9-CM: 787.91  5/12/2019 - Present        Liver mass ICD-10-CM: R16.0  ICD-9-CM: 573.8  5/12/2019 - Present    Overview Signed 5/12/2019  3:44 PM by Denia Salazar MD     Further workup as an outpatient              Alcoholic hepatitis without ascites ICD-10-CM: K70.10  ICD-9-CM: 571.1  5/11/2019 - Present        Hypokalemia ICD-10-CM: E87.6  ICD-9-CM: 276.8  5/10/2019 - Present        Clostridium difficile diarrhea ICD-10-CM: A04.72  ICD-9-CM: 008.45  5/9/2019 - Present        Bacteremia due to Gram-negative bacteria ICD-10-CM: R78.81  ICD-9-CM: 790.7, 041.85  5/6/2019 - Present        PNA (pneumonia) ICD-10-CM: J18.9  ICD-9-CM: 741  5/5/2019 - Present        Tachycardia ICD-10-CM: R00.0  ICD-9-CM: 785.0  5/5/2019 - Present        Thrombocytopenia ([facility] ICD-10-CM: D69.6  ICD-9-CM: 287.5  5/5/2019 - Present        GI bleed ICD-10-CM: K92.2  ICD-9-CM: 578.9  5/5/2019 - Present        A-fib ([facility] Reviewed discharge instructions and medications with pt. Pt verbalized understanding and used the teach back method. Pt discharged with all of his belongings glasses, clothes, socks, shoes, phone and phone , and was escorted to the main entrance by the CNA. Pt was picked up by friend. Pt stable upon discharge.   ICD-10-CM: I48.91  ICD-9-CM: 427.31  5/5/2019 - Present        Chest pain ICD-10-CM: R07.9  ICD-9-CM: 786.50  11/28/2016 - Present        Shortness of breath ICD-10-CM: R06.02  ICD-9-CM: 786.05  11/28/2016 - Present        Tobacco abuse ICD-10-CM: Z72.0  ICD-9-CM: 305.1  11/28/2016 - Present        ETOH abuse ICD-10-CM: F10.10  ICD-9-CM: 305.00  11/28/2016 - Present        Cellulitis ICD-10-CM: L03.90  ICD-9-CM: 682.9  11/19/2014 - Present        HTN (hypertension) (Chronic) ICD-10-CM: I10  ICD-9-CM: 401.9  11/19/2014 - Present               Discharge Condition: Good    Hospital Course: Mr. Linh Montgomery is a 60 yo male with PMH of ETOH abuse, new onset chronic afib on eliquis, HTN, alcoholic hepatitis, tobacco abuse who presented with c/o LE edema, productive cough with brown sputum, SOB. He had recent admission 10/11-10/17 with pseudomonas septicemia, right middle lobe pneumonia, ETOH abuse on valium taper. He developed afib and required cardizem and eliquis last admission. ID, Cards and Pulm all following last admission. He was DC on cipro to complete 14 day course. Bilateral LE edema attributed to cardizem therefore stopped. He was found to have a new cavitary lesion on CXR in ED on 10/23/19. Neg PPD last admission. Pulmonary consulted. Pt on airborne precautions until TB ruled out. 10/23 underwent bedside bronch, cx sent. Cipro and clindamycin started. CT chest 10/24 showed PE. Pulmonary felt PE did not just happen, not considering failed eliquis. Bronch results, AFB pending, cx currently growing heavy GNR. Given these results, and patients improvement on medication regimen, thought to have necrotizing pneumonia and not TB. Pt was very anxious to return home to his wife, as she has mild dementia and he is her caretaker. On day of discharge, pt still with cough, although sputum lighter in color. He was not requiring oxygen and was ambulatory in the hallway (wearing N95).  Pulmonary cleared patient to be discharged home while awaiting final cx results. Pt to complete 14 more days of Cipro and Clindamycin and to have repeat imaging and f/u with pulm in 1 month. Consults: Pulmonary/Critical Care    Significant Diagnostic Studies: as per below    Physical exam:  Visit Vitals  /69 (BP 1 Location: Left arm, BP Patient Position: At rest)   Pulse 78   Temp 98.4 °F (36.9 °C)   Resp 18   Ht 5' 10\" (1.778 m)   Wt 78 kg (172 lb)   SpO2 98%   BMI 24.68 kg/m²     General:  Alert, cooperative, no distress. Head:  Normocephalic, without obvious abnormality, atraumatic. Eyes:  Conjunctivae/corneas clear. Pupils equal, round, reactive to light. Extraocular movements intact. Lungs:   Coarse breath sounds, R sided, + productive cough. Chest wall:  No tenderness or deformity. Heart:  Regular rate and rhythm, S1, S2 normal, no murmur, click, rub, or gallop. Abdomen:   Soft, non-tender. Bowel sounds normal. No masses. No organomegaly. Extremities: Extremities normal, atraumatic, no cyanosis or edema. Pulses: 2+ and symmetric all extremities. Skin: Skin color, texture, turgor normal. No rashes or lesions. Lymph nodes: Cervical, supraclavicular, and axillary nodes normal.   Neurologic: CNII-XII intact. Normal strength, sensation, and reflexes throughout.    Data Review:  I have reviewed all labs, meds, telemetry events, and studies from the last 24 hours:    Recent Results (from the past 24 hour(s))   MAGNESIUM    Collection Time: 10/26/19  5:22 AM   Result Value Ref Range    Magnesium 2.0 1.8 - 2.4 mg/dL        All Micro Results     Procedure Component Value Units Date/Time    FUNGUS CULTURE AND SMEAR Wilman Benitez [[id number]] Collected:  10/23/19 1510    Order Status:  Completed Specimen:  Other from Miscellaneous sample Updated:  10/26/19 1236     Source BRONCHIAL LAVAGE        Fungus stain Direct Inoculation     Fungus (Mycology) Culture Other source received     Comment: (NOTE)  Performed At: [facility] [address] [id number]  Fabricio Lacy MD OO:[id number]         [facility] CULTURE AND SMEAR Taylor Paulino [[id number]] Collected:  10/23/19 1510    Order Status:  Completed Specimen:  Other from Miscellaneous sample Updated:  10/26/19 1236     Source BRONCHIAL WASHING        Fungus stain Direct Inoculation     Fungus (Mycology) Culture Other source received     Comment: (NOTE)  Performed At: [address] [id number]  Fabricio Lacy MD RX:[id number]         CULTURE, [address] [place] [address] [[id number]]  (Abnormal) Collected:  10/23/19 1510    Order Status:  Completed Specimen:  Sputum from Bronchial lavage Updated:  10/26/19 0754     Special Requests: NO SPECIAL REQUESTS        GRAM STAIN 25  WBC'S/OIF      NO EPITHELIAL CELLS SEEN         FEW GRAM NEGATIVE RODS        Culture result:       HEAVY GRAM NEGATIVE RODS IDENTIFICATION AND SUSCEPTIBILITY TO FOLLOW                  CULTURE IN [facility] UPDATES TO FOLLOW          CULTURE, RESPIRATORY/SPUTUM/BRONCH Lucy Johnnie STAIN [[id number]]  (Abnormal) Collected:  10/23/19 1510    Order Status:  Completed Specimen:  Sputum from Bronchial Washing Updated:  10/26/19 0751     Special Requests: NO SPECIAL REQUESTS        GRAM STAIN 15  WBC'S/OIF      0 TO 1 EPITHELIAL CELLS SEEN /OIF      FEW GRAM POSITIVE COCCI         FEW GRAM NEGATIVE RODS        Culture result:       MODERATE GRAM NEGATIVE RODS IDENTIFICATION AND SUSCEPTIBILITY TO FOLLOW                  SCANT NORMAL RESPIRATORY MARZENA          AFB (MYCOBACTERIUM) CULTURE & SMEAR W/REFLEX ID - Fany Diego [[id number]] Collected:  10/23/19 1510    Order Status:  Completed Specimen:  Miscellaneous sample Updated:  10/25/19 1836     Source BRONCHIAL WASHING        AFB Specimen processing Concentration     Acid Fast Smear NEGATIVE         Comment: (NOTE)  Performed At: [place] [address] [place], [place] [id number]  Azeem Brock MD UP:[id number]          Acid Fast Culture PENDING    AFB (MYCOBACTERIUM) CULTURE & SMEAR W/REFLEX ID Jaki Grullon NOCARDIA [[id number]] Collected:  10/23/19 1510    Order Status:  Completed Specimen:  Miscellaneous sample Updated:  10/25/19 1836     Source BRONCHIAL LAVAGE        AFB Specimen processing Concentration     Acid Fast Smear NEGATIVE         Comment: (NOTE)  Performed At: [address] [id number]  Azeem Brock MD SZ:[id number]          Acid Fast Culture PENDING    ANAEROBIC CULTURE - ONLY PERFORMED ON BRONCHIAL BRUSHING [[id number]] Collected:  10/23/19 1510    Order Status:  Completed Specimen:  Bronchial lavage Updated:  10/25/19 0952     Special Requests: NO SPECIAL REQUESTS        Culture result:       SUBCULTURE IS NECESSARY TO DETERMINE PRESENCE OR ABSENCE OF ANAEROBIC BACTERIA IN THIS CULTURE. FURTHER REPORT TO FOLLOW AFTER INCUBATION OF SUBCULTURE. AFB CULTURE + SMEAR W/RFLX ID FROM CULTURE [[id number]] Collected:  10/23/19 1006    Order Status:  Completed Specimen:  Miscellaneous sample Updated:  10/24/19 1637     Source SPUTUM        AFB Specimen processing Concentration     Acid Fast Smear NEGATIVE         Comment: (NOTE)  Performed At: [facility]  [address] [place], [place] [id number]  Azeem Brock MD VD:[id number]          Acid Fast Culture PENDING    AFB CULTURE + SMEAR W/RFLX ID FROM CULTURE [[id number]]     Order Status:  Sent     AFB CULTURE + SMEAR W/RFLX ID FROM CULTURE [[id number]] Collected:  10/23/19 2000    Order Status:  Canceled           No results found for this visit on 10/23/19.     Current Meds:  Current Facility-Administered Medications   Medication Dose Route Frequency    albuterol-ipratropium (DUO-NEB) 2.5 MG-0.5 MG/3 ML  3 mL Nebulization Q6H PRN    ciprofloxacin (CIPRO) 400 mg in D5W IVPB (premix)  400 mg IntraVENous Q12H    clindamycin (CLEOCIN) 600mg D5W 50mL IVPB (premix)  600 mg IntraVENous Q8H    nicotine (NICODERM CQ) 21 mg/24 hr patch 1 Patch  1 Patch TransDERmal Q24H    famotidine (PEPCID) tablet 20 mg  20 mg Oral DAILY    LORazepam (ATIVAN) tablet 1 mg  1 mg Oral QHS    LORazepam (ATIVAN) tablet 1 mg  1 mg Oral Q4H PRN    HYDROcodone-homatropine (HYCODAN) 5-1.5 mg/5 mL (5 mL) syrup 5 mL  5 mL Oral Q4H PRN    benzonatate (TESSALON) capsule 100 mg  100 mg Oral TID PRN    guaiFENesin ER (MUCINEX) tablet 1,200 mg  1,200 mg Oral Q12H    sodium chloride (NS) flush 5-40 mL  5-40 mL IntraVENous Q8H    sodium chloride (NS) flush 5-40 mL  5-40 mL IntraVENous PRN    acetaminophen (TYLENOL) tablet 650 mg  650 mg Oral Q4H PRN    albuterol (PROVENTIL VENTOLIN) nebulizer solution 2.5 mg  2.5 mg Nebulization Q4H PRN    apixaban (ELIQUIS) tablet 5 mg  5 mg Oral Q12H    atorvastatin (LIPITOR) tablet 40 mg  40 mg Oral DAILY    folic acid (FOLVITE) tablet 1 mg  1 mg Oral DAILY    magnesium oxide (MAG-OX) tablet 400 mg  400 mg Oral DAILY    pantoprazole (PROTONIX) tablet 40 mg  40 mg Oral ACB&D    sucralfate (CARAFATE) tablet 1 g  1 g Oral AC&HS    thiamine HCL (B-1) tablet 100 mg  100 mg Oral DAILY    sodium chloride (NS) flush 5-40 mL  5-40 mL IntraVENous Q8H    sodium chloride (NS) flush 5-40 mL  5-40 mL IntraVENous PRN    traZODone (DESYREL) tablet 50 mg  50 mg Oral QHS PRN       Other Studies (last 24 hours):  No results found. Disposition: home    Discharge Medications:   Current Discharge Medication List      START taking these medications    Details   guaiFENesin ER (MUCINEX) 1,200 mg Ta12 ER tablet Take 1 Tab by mouth every twelve (12) hours for 30 days. Qty: 60 Tab, Refills: 0      traZODone (DESYREL) 50 mg tablet Take 1 Tab by mouth nightly as needed for Sleep for up to 15 doses. Qty: 15 Tab, Refills: 0      clindamycin (CLEOCIN) 300 mg capsule Take 1 Cap by mouth three (3) times daily for 14 days.   Qty: 42 Cap, Refills: 0         CONTINUE these medications which have CHANGED    Details   ciprofloxacin HCl (CIPRO) 750 mg tablet Take 1 Tab by mouth every twelve (12) hours for 14 days. Indications: Pneumonia caused by the Bacteria Pseudomonas Aeruginosa  Qty: 28 Tab, Refills: 0      famotidine (PEPCID) 20 mg tablet Take 1 Tab by mouth daily. Qty: 30 Tab, Refills: 0      pantoprazole (PROTONIX) 40 mg tablet Take 1 Tab by mouth Before breakfast and dinner for 30 days. Qty: 60 Tab, Refills: 0         CONTINUE these medications which have NOT CHANGED    Details   metoprolol tartrate (LOPRESSOR) 50 mg tablet Take 1 Tab by mouth two (2) times a day for 30 days. Qty: 60 Tab, Refills: 0      furosemide (LASIX) 20 mg tablet Take 1 Tab by mouth daily for 20 days. Qty: 15 Tab, Refills: 0      promethazine (PHENERGAN) 25 mg tablet Take 0.5 Tabs by mouth every six (6) hours as needed for Nausea for up to 20 doses. Qty: 10 Tab, Refills: 0      apixaban (ELIQUIS) 5 mg tablet Take 1 Tab by mouth every twelve (12) hours. Qty: 60 Tab, Refills: 0      albuterol (PROVENTIL VENTOLIN) 2.5 mg /3 mL (0.083 %) nebu 3 mL by Nebulization route every four (4) hours as needed for Other (shortness of breath). Qty: 30 Nebule, Refills: 0      magnesium oxide (MAG-OX) 400 mg tablet Take 1 Tab by mouth daily. Qty: 7 Tab, Refills: 0      folic acid (FOLVITE) 1 mg tablet Take 1 Tab by mouth daily. Qty: 30 Tab, Refills: 0      sucralfate (CARAFATE) 1 gram tablet Take 1 Tab by mouth Before breakfast, lunch, dinner and at bedtime. Indications: Gastritis  Qty: 120 Tab, Refills: 0      thiamine HCL (B-1) 100 mg tablet Take 1 Tab by mouth daily. Qty: 30 Tab, Refills: 0      nitroglycerin (NITROSTAT) 0.4 mg SL tablet 1 Tab by SubLINGual route every five (5) minutes as needed for Chest Pain. Qty: 1 Bottle, Refills: 5      atorvastatin (LIPITOR) 40 mg tablet Take 1 Tab by mouth daily.   Qty: 30 Tab, Refills: 11         STOP taking these medications       dilTIAZem CD (CARDIZEM CD) 240 mg ER capsule Comments:   Reason for Stopping:               Activity: Activity as tolerated  Diet: Cardiac Diet  Wound Care: None needed    Follow-up Appointments   Procedures    FOLLOW UP VISIT Appointment in: One Week PCP     PCP     Standing Status:   Standing     Number of Occurrences:   1     Order Specific Question:   Appointment in     Answer:    One Week       Signed By: GEO Galdamez     October 26, 2019

## 2020-05-19 ENCOUNTER — PATIENT OUTREACH (OUTPATIENT)
Dept: CASE MANAGEMENT | Age: 60
End: 2020-05-19

## 2020-05-19 NOTE — PROGRESS NOTES
COVID-19 ED/Flu Clinic Initial Outreach Note    The patient was contacted regarding recent visit for viral symptoms. This author contacted the ivanan by telephone to perform post discharge call. Verified name and  with patient as identifiers. Provided introduction to self, and reason for call due to viral symptoms of infection and/or possible exposure to COVID-19. Patient presented to emergency department/flu clinic with Shortness of breath/Pneumonia  Patient reported symptoms are ongoing  Due to no new or worsening sypmtoms the RN CTN/FRANCK was not notified for escalation. Discussed exposure protocols and quarantine with CDC Guidelines What To Do If You Are Sick   The patient was given an opportunity for questions and concerns. Stay home except to get medical care  People who are mildly ill with COVID-19 are able to isolate at home during their illness. You should restrict activities outside your home, except for getting medical care. Do not go to work, school, or public areas. Avoid using public transportation, ride-sharing, or taxis. Separate yourself from other people and animals in your home  People: As much as possible, you should stay in a specific room and away from other people in your home. Also, you should use a separate bathroom, if available. Animals: You should restrict contact with pets and other animals while you are sick with COVID-19, just like you would around other people. Although there have not been reports of pets or other animals becoming sick with COVID-19, it is still recommended that people sick with COVID-19 limit contact with animals until more information is known about the virus. When possible, have another member of your household care for your animals while you are sick. If you are sick with COVID-19, avoid contact with your pet, including petting, snuggling, being kissed or licked, and sharing food.  If you must care for your pet or be around animals while you are sick, wash your hands before and after you interact with pets and wear a facemask. Call ahead before visiting your doctor  If you have a medical appointment, call the healthcare provider and tell them that you have or may have COVID-19. This will help the healthcare provider's office take steps to keep other people from getting infected or exposed. Wear a facemask  You should wear a facemask when you are around other people (e.g., sharing a room or vehicle) or pets and before you enter a healthcare provider's office. If you are not able to wear a facemask (for example, because it causes trouble breathing), then people who live with you should not stay in the same room with you, or they should wear a facemask if they enter your room. Cover your coughs and sneezes  Cover your mouth and nose with a tissue when you cough or sneeze. Throw used tissues in a lined trash can. Immediately wash your hands with soap and water for at least 20 seconds or, if soap and water are not available, clean your hands with an alcohol-based hand  that contains at least 60% alcohol. Clean your hands often  Wash your hands often with soap and water for at least 20 seconds, especially after blowing your nose, coughing, or sneezing; going to the bathroom; and before eating or preparing food. If soap and water are not readily available, use an alcohol-based hand  with at least 60% alcohol, covering all surfaces of your hands and rubbing them together until they feel dry. Soap and water are the best option if hands are visibly dirty. Avoid touching your eyes, nose, and mouth with unwashed hands. Avoid sharing personal household items  You should not share dishes, drinking glasses, cups, eating utensils, towels, or bedding with other people or pets in your home. After using these items, they should be washed thoroughly with soap and water.   Clean all high-touch surfaces everyday  High touch surfaces include counters, tabletops, doorknobs, bathroom fixtures, toilets, phones, keyboards, tablets, and bedside tables. Also, clean any surfaces that may have blood, stool, or body fluids on them. Use a household cleaning spray or wipe, according to the label instructions. Labels contain instructions for safe and effective use of the cleaning product including precautions you should take when applying the product, such as wearing gloves and making sure you have good ventilation during use of the product. Monitor your symptoms  Seek prompt medical attention if your illness is worsening (e.g., difficulty breathing). Before seeking care, call your healthcare provider and tell them that you have, or are being evaluated for, COVID-19. Put on a facemask before you enter the facility. These steps will help the healthcare provider's office to keep other people in the office or waiting room from getting infected or exposed. Ask your healthcare provider to call the local or [facility] health department. Persons who are placed under If you have a medical emergency and need to call 911, notify the dispatch personnel that you have, or are being evaluated for COVID-19. If possible, put on a facemask before emergency medical services arrive. The patient agrees to contact the [employer] exposure line [phone], local health department [facility] [address] ([phone]) and PCP office for questions related to their healthcare. Author provided contact information for future reference. Patient/family/caregiver given information for [employer] and agrees to enroll: No  Patient preferred e-mail:  [email or web address]  Based on Loop alert triggers, patient will be contacted by nurse care manager for worsening symptoms.

## 2021-10-06 NOTE — CDMP QUERY
Pt admitted with pneumonia./Pt noted to have possible sepsis. If possible, please document in the progress notes and d/c summary if you are evaluating and / or treating any of the following: 
 
? Sepsis, present on admission, suspected or probable causative organism (please specify) ? Sepsis, now resolved, suspected or probable causative organism (please specify) ? Sepsis, not present on admission, suspected or probable causative organism (please specify) ? No Sepsis, suspected or probable localized infection (please specify) ? Sepsis was ruled out (include corresponding diagnosis for patients clinical picture and treatment) ? Other, please specify ? Clinically unable to determine The medical record reflects the following: 
   Risk Factors: Age, PNA, ETOH dependence Clinical Indicators: T-103 on 5/6 @ 1712, P-101 and R-20 on 5/6 @1911 Treatment: 1L NS Bolus in ED and Zosyn, 5/7 Rocephin added Thanks, 
Nehemias Matos, BSN, RN, CDS Compliant Documentation Management Program 
[phone] weakness  Hyperkalemia  VT

## 2022-03-18 PROBLEM — K70.10 ALCOHOLIC HEPATITIS WITHOUT ASCITES: Status: ACTIVE | Noted: 2019-05-11

## 2022-03-18 PROBLEM — I26.94 MULTIPLE SUBSEGMENTAL PULMONARY EMBOLI WITHOUT ACUTE COR PULMONALE (HCC): Status: ACTIVE | Noted: 2019-10-26

## 2022-03-19 PROBLEM — R16.0 LIVER MASS: Status: ACTIVE | Noted: 2019-05-12

## 2022-03-19 PROBLEM — I48.91 A-FIB (HCC): Status: ACTIVE | Noted: 2019-05-05

## 2022-03-19 PROBLEM — A04.72 CLOSTRIDIUM DIFFICILE DIARRHEA: Status: ACTIVE | Noted: 2019-05-09

## 2022-03-19 PROBLEM — E87.6 HYPOKALEMIA: Status: ACTIVE | Noted: 2019-05-10

## 2022-03-19 PROBLEM — A41.52 PSEUDOMONAS SEPTICEMIA (HCC): Status: ACTIVE | Noted: 2019-10-11

## 2022-03-19 PROBLEM — K92.2 GI BLEED: Status: ACTIVE | Noted: 2019-05-05

## 2022-03-19 PROBLEM — J85.0 NECROTIC PNEUMONIA (HCC): Status: ACTIVE | Noted: 2019-10-25

## 2022-03-19 PROBLEM — R78.81 BACTEREMIA DUE TO GRAM-NEGATIVE BACTERIA: Status: ACTIVE | Noted: 2019-05-06

## 2022-03-19 PROBLEM — J18.9 RIGHT MIDDLE LOBE PNEUMONIA: Status: ACTIVE | Noted: 2019-10-11

## 2022-03-19 PROBLEM — J98.4 CAVITARY LESION OF LUNG: Status: ACTIVE | Noted: 2019-10-23

## 2022-03-20 PROBLEM — E83.42 HYPOMAGNESEMIA: Status: ACTIVE | Noted: 2019-05-12

## 2022-03-20 PROBLEM — R19.7 DIARRHEA: Status: ACTIVE | Noted: 2019-05-12

## 2022-03-20 PROBLEM — D69.6 THROMBOCYTOPENIA (HCC): Status: ACTIVE | Noted: 2019-05-05

## 2022-03-20 PROBLEM — J18.9 PNA (PNEUMONIA): Status: ACTIVE | Noted: 2019-05-05

## 2022-03-20 PROBLEM — R00.0 TACHYCARDIA: Status: ACTIVE | Noted: 2019-05-05

## 2022-07-01 ENCOUNTER — HOSPITAL ENCOUNTER (EMERGENCY)
Age: 62
Discharge: HOME | End: 2022-07-01
Attending: STUDENT IN AN ORGANIZED HEALTH CARE EDUCATION/TRAINING PROGRAM

## 2022-07-01 ENCOUNTER — APPOINTMENT (OUTPATIENT)
Dept: CT IMAGING | Age: 62
End: 2022-07-01

## 2022-07-01 ENCOUNTER — APPOINTMENT (OUTPATIENT)
Dept: GENERAL RADIOLOGY | Age: 62
End: 2022-07-01

## 2022-07-01 VITALS
RESPIRATION RATE: 18 BRPM | HEART RATE: 77 BPM | BODY MASS INDEX: 30.02 KG/M2 | WEIGHT: 159 LBS | HEIGHT: 61 IN | SYSTOLIC BLOOD PRESSURE: 119 MMHG | OXYGEN SATURATION: 99 % | TEMPERATURE: 97.8 F | DIASTOLIC BLOOD PRESSURE: 74 MMHG

## 2022-07-01 DIAGNOSIS — R94.31 QT PROLONGATION: Primary | ICD-10-CM

## 2022-07-01 DIAGNOSIS — R53.83 FATIGUE, UNSPECIFIED TYPE: ICD-10-CM

## 2022-07-01 LAB
ALBUMIN SERPL-MCNC: 3.7 G/DL (ref 3.2–4.6)
ALBUMIN/GLOB SERPL: 0.9 {RATIO} (ref 1.2–3.5)
ALP SERPL-CCNC: 74 U/L (ref 50–136)
ALT SERPL-CCNC: 35 U/L (ref 12–65)
ANION GAP SERPL CALC-SCNC: 19 MMOL/L (ref 7–16)
APPEARANCE UR: CLEAR
ARTERIAL PATENCY WRIST A: ABNORMAL
AST SERPL-CCNC: 95 U/L (ref 15–37)
BACTERIA URNS QL MICRO: NEGATIVE /HPF
BASE DEFICIT BLDV-SCNC: 6.7 MMOL/L
BASOPHILS # BLD: 0 K/UL (ref 0–0.2)
BASOPHILS NFR BLD: 0 % (ref 0–2)
BILIRUB SERPL-MCNC: 1.4 MG/DL (ref 0.2–1.1)
BILIRUB UR QL: NEGATIVE
BUN SERPL-MCNC: 15 MG/DL (ref 8–23)
CALCIUM SERPL-MCNC: 8.9 MG/DL (ref 8.3–10.4)
CASTS URNS QL MICRO: ABNORMAL /LPF
CHLORIDE SERPL-SCNC: 92 MMOL/L (ref 98–107)
CO2 SERPL-SCNC: 22 MMOL/L (ref 21–32)
COLOR UR: ABNORMAL
CREAT SERPL-MCNC: 1.1 MG/DL (ref 0.8–1.5)
DIFFERENTIAL METHOD BLD: ABNORMAL
EKG ATRIAL RATE: 73 BPM
EKG ATRIAL RATE: 92 BPM
EKG DIAGNOSIS: NORMAL
EKG DIAGNOSIS: NORMAL
EKG P AXIS: 74 DEGREES
EKG P AXIS: 75 DEGREES
EKG P-R INTERVAL: 150 MS
EKG P-R INTERVAL: 151 MS
EKG Q-T INTERVAL: 460 MS
EKG Q-T INTERVAL: 504 MS
EKG QRS DURATION: 58 MS
EKG QRS DURATION: 84 MS
EKG QTC CALCULATION (BAZETT): 552 MS
EKG QTC CALCULATION (BAZETT): 567 MS
EKG R AXIS: 11 DEGREES
EKG R AXIS: 3 DEGREES
EKG T AXIS: 173 DEGREES
EKG VENTRICULAR RATE: 72 BPM
EKG VENTRICULAR RATE: 91 BPM
EOSINOPHIL # BLD: 0.3 K/UL (ref 0–0.8)
EOSINOPHIL NFR BLD: 5 % (ref 0.5–7.8)
EPI CELLS #/AREA URNS HPF: ABNORMAL /HPF
ERYTHROCYTE [DISTWIDTH] IN BLOOD BY AUTOMATED COUNT: 17.2 % (ref 11.9–14.6)
GAS FLOW.O2 O2 DELIVERY SYS: ABNORMAL L/MIN
GLOBULIN SER CALC-MCNC: 4.2 G/DL (ref 2.3–3.5)
GLUCOSE SERPL-MCNC: 88 MG/DL (ref 65–100)
GLUCOSE UR STRIP.AUTO-MCNC: NEGATIVE MG/DL
HCO3 BLDV-SCNC: 17 MMOL/L (ref 23–28)
HCT VFR BLD AUTO: 35.3 % (ref 41.1–50.3)
HGB BLD-MCNC: 12.3 G/DL (ref 13.6–17.2)
HGB UR QL STRIP: NEGATIVE
IMM GRANULOCYTES # BLD AUTO: 0 K/UL (ref 0–0.5)
IMM GRANULOCYTES NFR BLD AUTO: 1 % (ref 0–5)
KETONES UR QL STRIP.AUTO: >80 MG/DL
LACTATE SERPL-SCNC: 0.4 MMOL/L (ref 0.4–2)
LEUKOCYTE ESTERASE UR QL STRIP.AUTO: NEGATIVE
LYMPHOCYTES # BLD: 1.3 K/UL (ref 0.5–4.6)
LYMPHOCYTES NFR BLD: 24 % (ref 13–44)
MAGNESIUM SERPL-MCNC: 2.1 MG/DL (ref 1.8–2.4)
MCH RBC QN AUTO: 39.9 PG (ref 26.1–32.9)
MCHC RBC AUTO-ENTMCNC: 34.8 G/DL (ref 31.4–35)
MCV RBC AUTO: 114.6 FL (ref 79.6–97.8)
MONOCYTES # BLD: 0.7 K/UL (ref 0.1–1.3)
MONOCYTES NFR BLD: 13 % (ref 4–12)
NEUTS SEG # BLD: 3.1 K/UL (ref 1.7–8.2)
NEUTS SEG NFR BLD: 57 % (ref 43–78)
NITRITE UR QL STRIP.AUTO: NEGATIVE
NRBC # BLD: 0 K/UL (ref 0–0.2)
O2/TOTAL GAS SETTING VFR VENT: 21 %
PCO2 BLDV: 27.3 MMHG (ref 41–51)
PH BLDV: 7.4 [PH] (ref 7.32–7.42)
PH UR STRIP: 6 [PH] (ref 5–9)
PLATELET # BLD AUTO: 143 K/UL (ref 150–450)
PMV BLD AUTO: 12.2 FL (ref 9.4–12.3)
PO2 BLDV: 29 MMHG
POTASSIUM SERPL-SCNC: 5 MMOL/L (ref 3.5–5.1)
PROT SERPL-MCNC: 7.9 G/DL (ref 6.3–8.2)
PROT UR STRIP-MCNC: 30 MG/DL
RBC # BLD AUTO: 3.08 M/UL (ref 4.23–5.6)
RBC #/AREA URNS HPF: ABNORMAL /HPF
SAO2 % BLDV: 55.8 % (ref 65–88)
SERVICE CMNT-IMP: ABNORMAL
SERVICE CMNT-IMP: ABNORMAL
SODIUM SERPL-SCNC: 133 MMOL/L (ref 138–145)
SP GR UR REFRACTOMETRY: 1.02 (ref 1–1.02)
SPECIMEN TYPE: ABNORMAL
TSH, 3RD GENERATION: 1.9 UIU/ML (ref 0.36–3.74)
UROBILINOGEN UR QL STRIP.AUTO: 1 EU/DL (ref 0.2–1)
WBC # BLD AUTO: 5.5 K/UL (ref 4.3–11.1)
WBC URNS QL MICRO: ABNORMAL /HPF

## 2022-07-01 PROCEDURE — 81001 URINALYSIS AUTO W/SCOPE: CPT

## 2022-07-01 PROCEDURE — 71045 X-RAY EXAM CHEST 1 VIEW: CPT

## 2022-07-01 PROCEDURE — 83735 ASSAY OF MAGNESIUM: CPT

## 2022-07-01 PROCEDURE — 72125 CT NECK SPINE W/O DYE: CPT

## 2022-07-01 PROCEDURE — 99285 EMERGENCY DEPT VISIT HI MDM: CPT

## 2022-07-01 PROCEDURE — 80053 COMPREHEN METABOLIC PANEL: CPT

## 2022-07-01 PROCEDURE — 93005 ELECTROCARDIOGRAM TRACING: CPT | Performed by: STUDENT IN AN ORGANIZED HEALTH CARE EDUCATION/TRAINING PROGRAM

## 2022-07-01 PROCEDURE — 96365 THER/PROPH/DIAG IV INF INIT: CPT

## 2022-07-01 PROCEDURE — 83605 ASSAY OF LACTIC ACID: CPT

## 2022-07-01 PROCEDURE — 6360000002 HC RX W HCPCS: Performed by: STUDENT IN AN ORGANIZED HEALTH CARE EDUCATION/TRAINING PROGRAM

## 2022-07-01 PROCEDURE — 96366 THER/PROPH/DIAG IV INF ADDON: CPT

## 2022-07-01 PROCEDURE — 82803 BLOOD GASES ANY COMBINATION: CPT

## 2022-07-01 PROCEDURE — 2580000003 HC RX 258: Performed by: STUDENT IN AN ORGANIZED HEALTH CARE EDUCATION/TRAINING PROGRAM

## 2022-07-01 PROCEDURE — 70450 CT HEAD/BRAIN W/O DYE: CPT

## 2022-07-01 PROCEDURE — 84443 ASSAY THYROID STIM HORMONE: CPT

## 2022-07-01 PROCEDURE — 85025 COMPLETE CBC W/AUTO DIFF WBC: CPT

## 2022-07-01 RX ORDER — 0.9 % SODIUM CHLORIDE 0.9 %
1000 INTRAVENOUS SOLUTION INTRAVENOUS
Status: COMPLETED | OUTPATIENT
Start: 2022-07-01 | End: 2022-07-01

## 2022-07-01 RX ORDER — MAGNESIUM SULFATE IN WATER 40 MG/ML
2000 INJECTION, SOLUTION INTRAVENOUS ONCE
Status: COMPLETED | OUTPATIENT
Start: 2022-07-01 | End: 2022-07-01

## 2022-07-01 RX ADMIN — SODIUM CHLORIDE 1000 ML: 9 INJECTION, SOLUTION INTRAVENOUS at 10:37

## 2022-07-01 RX ADMIN — MAGNESIUM SULFATE HEPTAHYDRATE 2000 MG: 40 INJECTION, SOLUTION INTRAVENOUS at 10:40

## 2022-07-01 ASSESSMENT — PAIN SCALES - GENERAL: PAINLEVEL_OUTOF10: 8

## 2022-07-01 ASSESSMENT — ENCOUNTER SYMPTOMS
PHOTOPHOBIA: 0
SHORTNESS OF BREATH: 0
ABDOMINAL PAIN: 0
VOMITING: 1
SINUS PAIN: 0
NAUSEA: 0
DIARRHEA: 0
BACK PAIN: 0

## 2022-07-01 ASSESSMENT — PAIN - FUNCTIONAL ASSESSMENT: PAIN_FUNCTIONAL_ASSESSMENT: 0-10

## 2022-07-01 NOTE — ED NOTES
Report given to [facility] MIRYAM FULLER.       Adri Stephenson RN  07/01/22 4106

## 2022-07-01 NOTE — ED TRIAGE NOTES
Patient arrived via [facility] from home with complaint of generalized body aches,  SOB and weakness x 2 months. Patient states he is prone to viral infections. Patient states 3 falls yesterday and has not been able to eat.  bgl 101.  /69

## 2022-07-01 NOTE — ED PROVIDER NOTES
physician as well as cardiology outpatient given strict return precautions. He voiced understanding and agreement. EKG shows sr rate 91, pr 150, qrs 58, qtc 567, normal axis prolonged QTC, no significant ST elevation or depression. EKG from 1342, sinus rhythm, rate 72, , QRS 84, QTc 552, normal axis, prolonged QTC, no significant ST elevation or depression. Amount and/or Complexity of Data Reviewed  Clinical lab tests: ordered and reviewed  Discussion of test results with the performing providers: yes  Discuss the patient with other providers: yes  Independent visualization of images, tracings, or specimens: yes    Risk of Complications, Morbidity, and/or Mortality  Presenting problems: moderate  Diagnostic procedures: moderate  Management options: grant         Dilshad Islas is a 59 y.o. male who presents to the Emergency Department with chief complaint of    Chief Complaint   Patient presents with    Fatigue      68-year-old male presents to the emergency department with myalgias, fatigue as well as decreased appetite with decreased p.o. intake. States he takes no medication daily. Follows with the [facility]. He denies fever, chills, chest pain, shortness of breath. Denies any nausea currently, states he did have some vomiting last night. Denies diarrhea or constipation. Denies dysuria. Denies swelling in lower extremity. States he has a chronic cough but does smoke cigarettes. No change in his cough or sputum production. States he drinks alcohol few times a week. Denies any drug use. Review of Systems   Constitutional: Positive for fatigue. Negative for chills and fever. HENT: Negative for congestion and sinus pain. Eyes: Negative for photophobia. Respiratory: Negative for shortness of breath. Cardiovascular: Negative for chest pain. Gastrointestinal: Positive for vomiting. Negative for abdominal pain, diarrhea and nausea.    Genitourinary: Negative for Cervical back: Normal range of motion. No rigidity. Skin:     General: Skin is warm. Capillary Refill: Capillary refill takes less than 2 seconds. Findings: No rash. Neurological:      General: No focal deficit present. Mental Status: He is alert and oriented to person, place, and time. Psychiatric:         Mood and Affect: Mood normal.          Procedures      Labs Reviewed   CBC WITH AUTO DIFFERENTIAL - Abnormal; Notable for the following components:       Result Value    RBC 3.08 (*)     Hemoglobin 12.3 (*)     Hematocrit 35.3 (*)     .6 (*)     MCH 39.9 (*)     RDW 17.2 (*)     Platelets 711 (*)     Monocytes 13 (*)     All other components within normal limits   COMPREHENSIVE METABOLIC PANEL - Abnormal; Notable for the following components:    Sodium 133 (*)     Chloride 92 (*)     Anion Gap 19 (*)     Total Bilirubin 1.4 (*)     AST 95 (*)     Globulin 4.2 (*)     Albumin/Globulin Ratio 0.9 (*)     All other components within normal limits   URINALYSIS - Abnormal; Notable for the following components:    Protein, UA 30 (*)     Ketones, Urine >80 (*)     WBC, UA 0-4 (*)     RBC, UA 0-5 (*)     Epithelial Cells UA 0-5 (*)     BACTERIA, URINE Negative (*)     Casts 0-2 (*)     All other components within normal limits   VENOUS BLOOD GAS, POINT OF CARE - Abnormal; Notable for the following components:    PCO2, Jade, POC 27.3 (*)     HCO3, Venous 17.0 (*)     SO2, VENOUS (POC) 55.8 (*)     All other components within normal limits   MAGNESIUM   TSH   LACTIC ACID   BLOOD GAS, VENOUS        CT HEAD WO CONTRAST   Final Result   Remote appearing lacunar infarct of the left basal ganglia. CT CERVICAL SPINE WO CONTRAST   Final Result   Multilevel cervical spondylosis without acute posttraumatic sequela. XR CHEST PORTABLE   Final Result   No acute findings in the chest.                                Voice dictation software was used during the making of this note.   This software is not perfect and grammatical and other typographical errors may be present. This note has not been completely proofread for errors.         Kermit Shaw, DO  07/01/22 [address] [place] [address], DO  07/01/22 [facility]

## 2022-11-18 NOTE — DISCHARGE SUMMARY
Hospitalist Discharge Summary     Admit Date:  10/11/2019 10:50 AM   Name:  Anni Moran   Age:  59 y.o.  :  1960   MRN:  [id number]   PCP:  Lindsay Womack MD  Treatment Team: Attending Provider: Iggy Banegas MD; Consulting Provider: Vignesh Benitez; Consulting Provider: Сергей Mijares MD; Care Manager: Angela Beltran    Problem List for this Hospitalization:  Hospital Problems as of 10/17/2019 Never Reviewed          Codes Class Noted - Resolved POA    * (Principal) Pseudomonas septicemia ([facility] ICD-10-CM: A41.52  ICD-9-CM: 038.43, 995.91  10/11/2019 - Present         Right middle lobe pneumonia ([facility] ICD-10-CM: J18.1  ICD-9-CM: 734  10/11/2019 - Present Unknown        Hypokalemia ICD-10-CM: E87.6  ICD-9-CM: 276.8  5/10/2019 - Present Yes        Tachycardia ICD-10-CM: R00.0  ICD-9-CM: 785.0  2019 - Present Yes        Thrombocytopenia ([facility] ICD-10-CM: D69.6  ICD-9-CM: 287.5  2019 - Present Yes        A-fib ([facility] ICD-10-CM: I48.91  ICD-9-CM: 427.31  2019 - Present Yes        Tobacco abuse ICD-10-CM: Z72.0  ICD-9-CM: 305.1  2016 - Present Yes        ETOH abuse ICD-10-CM: F10.10  ICD-9-CM: 305.00  2016 - Present Yes        HTN (hypertension) (Chronic) ICD-10-CM: I10  ICD-9-CM: 401.9  2014 - Present Yes                Hospital Course:      Mr. Vicki Madison is a 58 yo male with PMH of polysubstance use, AFIB admitted with RML pneumonia and found to have pseudomonas aeruginosa bacteremia. He has been managed with zosyn. Repeat BC 10-12-19 NGTD. Sputum cx shows intermediate sensitivity to zosyn. ID following. AFIB has been managed with IV heparin/esmolol then eliquis/ amiodarone. He is being followed by cardiology. He is on valium taper  for ETOH use history and no ryan withdrawals noted. Plans per ID were to convert to oral cipro for 14 total days. His amiodarone was changed to cardizem as a result.  He will need [facility] going forward and ETOH use cessation. He will have cardiology followup to discuss his afib as well as referral to pulmonary for COPD. Discharge plans are to home. Disposition: Home or Self Care  Activity: Activity as tolerated  Diet: DIET CARDIAC Regular  Code Status: Full Code      Follow up instructions, discharge meds at bottom of this note. Plan was discussed with patient and wife. All questions answered. Patient was stable at time of discharge. Patient will call a physician or return if any concerns. Diagnostic Imaging/Tests:   Xr Chest Pa Lat    Result Date: 10/11/2019  Chest 2 view CLINICAL INDICATION: Acute moderate dyspnea, pneumonia, coughing COMPARISON: Radiograph yesterday, CT 10/4/2019 TECHNIQUE: Upright PA and lateral views of the chest FINDINGS: Lung volumes are well inflated. Cardiomediastinal silhouette and hilar contours within normal limits. There is slightly increasing groundglass and partially consolidative opacity involving perihilar right upper and lower lobes, most compatible with pneumonia although continued follow-up is recommended after treatment to ensure resolution. The lungs elsewhere unchanged. No acute osseous abnormalities are seen. IMPRESSION: Right pneumonia. Echocardiogram results:  No results found for this visit on 10/11/19.     Procedures done this admission:  * No surgery found *    All Micro Results     Procedure Component Value Units Date/Time    CULTURE, BLOOD [[id number]] Collected:  10/12/19 1509    Order Status:  Completed Specimen:  Blood Updated:  10/17/19 0822     Special Requests: --        RIGHT  HAND       Culture result: NO GROWTH 5 DAYS       CULTURE, BLOOD [[id number]] Collected:  10/12/19 1506    Order Status:  Completed Specimen:  Blood Updated:  10/17/19 0822     Special Requests: --        RIGHT  HAND       Culture result: NO GROWTH 5 DAYS       CULTURE, BLOOD [[id number]] Collected:  10/11/19 1438    Order Status:  Completed Specimen:  Blood Updated:  10/16/19 7775     Special Requests: --        RIGHT  HAND       Culture result: NO GROWTH 5 DAYS       CULTURE, BLOOD [[id number]] Collected:  10/11/19 1028    Order Status:  Completed Specimen:  Blood Updated:  10/16/19 0941     Special Requests: --        LEFT  FOREARM       Culture result: NO GROWTH 5 DAYS       CULTURE, RESPIRATORY/SPUTUM/BRONCH W GRAM STAIN [[id number]]  (Abnormal)  (Susceptibility) Collected:  10/11/19 1441    Order Status:  Completed Specimen:  Sputum Updated:  10/13/19 0922     Special Requests: NO SPECIAL REQUESTS        GRAM STAIN 3 TO 45 WBC'S/OIF      0 TO 2 EPITHELIAL CELLS SEEN /OIF            MODERATE GRAM NEGATIVE RODS                  FEW GRAM POSITIVE COCCI IN PAIRS CHAINS AND CLUSTERS            FEW YEAST         4+ MUCUS PRESENT        Culture result:       MODERATE PSEUDOMONAS AERUGINOSA                  SCANT NORMAL RESPIRATORY MARZENA          MSSA/MRSA SC BY PCR, NASAL SWAB [[id number]] Collected:  10/11/19 1648    Order Status:  Completed Specimen:  Nasal swab Updated:  10/11/19 2038     Special Requests: NO SPECIAL REQUESTS        Culture result:       SA target not detected. A MRSA NEGATIVE, SA NEGATIVE test result does not preclude MRSA or SA nasal colonization. CULTURE, BLOOD [[id number]]     Order Status:  Canceled Specimen:  Blood           Labs: Results:       BMP, Mg, Phos Recent Labs     10/17/19  0404 10/16/19  0504 10/15/19  0427    138 137   K 3.8 3.5 3.2*    103 102   CO2 28 30 29   AGAP 7 5* 6*   BUN 12 10 9   CREA 0.45* 0.48* 0.47*   CA 8.0* 8.7 8.3   * 93 90      CBC Recent Labs     10/17/19  0403 10/16/19  1834 10/16/19  0504   WBC 13.0* 13.9* 14.2*   RBC 3.29* 3.36* 3.42*   HGB 10.9* 11.2* 11.3*   HCT 32.5* 32.8* 33.7*    259 231      LFT No results for input(s): SGOT, ALT, TBIL, AP, TP, ALB, GLOB, AGRAT, GPT in the last 72 hours.    Cardiac Testing Lab Results   Component Value Date/Time    BNP 42 (H) 10/10/2019 07:02 AM    BNP 56 (H) 05/05/2019 07:29 AM    Troponin-I, Qt. <0.02 (L) 05/05/2019 07:29 AM    Troponin-I, Qt. <0.02 (L) 11/28/2016 12:52 PM    Troponin-I, Qt. <0.04 11/28/2016 07:48 AM      Coagulation Tests Lab Results   Component Value Date/Time    Prothrombin time 14.7 (H) 10/11/2019 02:38 PM    Prothrombin time 14.2 05/05/2019 02:13 PM    INR 1.1 10/11/2019 02:38 PM    INR 1.1 05/05/2019 02:13 PM    aPTT 29.0 10/13/2019 06:23 AM    aPTT 31.6 10/12/2019 10:55 PM    aPTT 33.4 10/12/2019 03:06 PM      A1c No results found for: HBA1C, HGBE8, BSL4YQPO   Lipid Panel No results found for: CHOL, CHOLPOCT, CHOLX, CHLST, CHOLV, 561591, HDL, HDLP, LDL, LDLC, DLDLP, 411212, VLDLC, VLDL, TGLX, TRIGL, TRIGP, TGLPOCT, CHHD, CHHDX   Thyroid Panel No results found for: TSH, T4, FT4, TT3, T3U, TSHEXT     Most Recent UA No results found for: COLOR, APPRN, REFSG, PAYTON, PROTU, GLUCU, KETU, BILU, BLDU, UROU, SHARON, LEUKU, WBCU, RBCU, UEPI, BACTU, CASTS, UCRY, MUCUS, UCOM     No Known Allergies  Immunization History   Administered Date(s) Administered    TB Skin Test (PPD) Intradermal 05/05/2019, 10/11/2019       All Labs from Last 24 Hrs:  Recent Results (from the past 24 hour(s))   CBC W/O DIFF    Collection Time: 10/16/19  6:34 PM   Result Value Ref Range    WBC 13.9 (H) 4.3 - 11.1 K/uL    RBC 3.36 (L) 4.23 - 5.6 M/uL    HGB 11.2 (L) 13.6 - 17.2 g/dL    HCT 32.8 (L) 41.1 - 50.3 %    MCV 97.6 79.6 - 97.8 FL    MCH 33.3 (H) 26.1 - 32.9 PG    MCHC 34.1 31.4 - 35.0 g/dL    RDW 16.2 (H) 11.9 - 14.6 %    PLATELET 826 287 - 949 K/uL    MPV 9.7 9.4 - 12.3 FL    ABSOLUTE NRBC 0.00 0.0 - 0.2 K/uL   CBC W/O DIFF    Collection Time: 10/17/19  4:03 AM   Result Value Ref Range    WBC 13.0 (H) 4.3 - 11.1 K/uL    RBC 3.29 (L) 4.23 - 5.6 M/uL    HGB 10.9 (L) 13.6 - 17.2 g/dL    HCT 32.5 (L) 41.1 - 50.3 %    MCV 98.8 (H) 79.6 - 97.8 FL    MCH 33.1 (H) 26.1 - 32.9 PG    MCHC 33.5 31.4 - 35.0 g/dL    RDW 16.7 (H) 11.9 - 14.6 %    PLATELET 257 150 - 450 K/uL    MPV 10.1 9.4 - 12.3 FL    ABSOLUTE NRBC 0.00 0.0 - 0.2 K/uL   METABOLIC PANEL, BASIC    Collection Time: 10/17/19  4:04 AM   Result Value Ref Range    Sodium 139 136 - 145 mmol/L    Potassium 3.8 3.5 - 5.1 mmol/L    Chloride 104 98 - 107 mmol/L    CO2 28 21 - 32 mmol/L    Anion gap 7 7 - 16 mmol/L    Glucose 105 (H) 65 - 100 mg/dL    BUN 12 8 - 23 MG/DL    Creatinine 0.45 (L) 0.8 - 1.5 MG/DL    GFR est AA >60 >60 ml/min/1.73m2    GFR est non-AA >60 >60 ml/min/1.73m2    Calcium 8.0 (L) 8.3 - 10.4 MG/DL       Current Med List in Hospital:   Current Facility-Administered Medications   Medication Dose Route Frequency    ciprofloxacin HCl (CIPRO) tablet 750 mg  750 mg Oral Q12H    dilTIAZem CD (CARDIZEM CD) capsule 240 mg  240 mg Oral DAILY    ketorolac (TORADOL) injection 15 mg  15 mg IntraVENous Q6H PRN    pantoprazole (PROTONIX) tablet 40 mg  40 mg Oral ACB&D    magnesium oxide (MAG-OX) tablet 400 mg  400 mg Oral BID    HYDROcodone-homatropine (HYCODAN) 5-1.5 mg/5 mL (5 mL) syrup 5 mL  5 mL Oral Q4H PRN    albuterol (PROVENTIL VENTOLIN) nebulizer solution 2.5 mg  2.5 mg Nebulization Q6H RT    apixaban (ELIQUIS) tablet 5 mg  5 mg Oral Q12H    sodium chloride 3% hypertonic nebulizer soln  4 mL Nebulization BID RT    morphine injection 2 mg  2 mg IntraVENous Q4H PRN    HYDROcodone-acetaminophen (NORCO) 5-325 mg per tablet 1 Tab  1 Tab Oral Q6H PRN    acetaminophen (TYLENOL) tablet 650 mg  650 mg Oral Q6H PRN    sodium chloride (NS) flush 5-40 mL  5-40 mL IntraVENous Q8H    sodium chloride (NS) flush 5-40 mL  5-40 mL IntraVENous PRN    acetaminophen (TYLENOL) tablet 1,000 mg  1,000 mg Oral Q6H PRN    ondansetron (ZOFRAN) injection 4 mg  4 mg IntraVENous Q4H PRN    nicotine (NICODERM CQ) 21 mg/24 hr patch 1 Patch  1 Patch TransDERmal Q24H    thiamine HCL (B-1) tablet 100 mg  100 mg Oral DAILY    LORazepam (ATIVAN) injection 1 mg  1 mg IntraVENous Q1H PRN       Discharge Exam:  Patient Vitals for the past 24 hrs:   Temp Pulse Resp BP SpO2   10/17/19 0806 97.7 °F (36.5 °C) 66 18 147/74 99 %   10/17/19 0800     96 %   10/17/19 0448 98.2 °F (36.8 °C) 62 19 128/74 94 %   10/17/19 0136     96 %   10/17/19 0045 97.6 °F (36.4 °C) (!) 59 18 127/76 95 %   10/16/19 2141 98.7 °F (37.1 °C) (!) 58 18 109/66 98 %   10/16/19 2029     97 %   10/16/19 1824 98.6 °F (37 °C) 65 18 114/66 96 %   10/16/19 1440 98.5 °F (36.9 °C) 69 19 125/70 95 %   10/16/19 1436     96 %     Oxygen Therapy  O2 Sat (%): 99 % (10/17/19 0806)  Pulse via Oximetry: 86 beats per minute (10/17/19 0136)  O2 Device: Room air (10/17/19 0806)  O2 Flow Rate (L/min): 2 l/min (10/12/19 1543)  FIO2 (%): 21 % (10/17/19 0136)    Estimated body mass index is 24.64 kg/m² as calculated from the following:    Height as of this encounter: 5' 10\" (1.778 m). Weight as of this encounter: 77.9 kg (171 lb 11.2 oz). Intake/Output Summary (Last 24 hours) at 10/17/2019 1037  Last data filed at 10/16/2019 2143  Gross per 24 hour   Intake 800 ml   Output 1000 ml   Net -200 ml       *Note that automatically entered I/Os may not be accurate; dependent on patient compliance with collection and accurate  by assistants. General:    Well nourished. Alert. Elderly, no distress  CV:   Regular rate and rhythm. No murmur, rub, or gallop. No edema  Lungs:  diminished  Abdomen: Soft, nontender, nondistended. Bowel sounds normal.   Extremities: Warm and dry  Neurologic: grossly intact. Skin:     No rashes or jaundice. Psych:  Normal mood and affect. Discharge Info:   Current Discharge Medication List      START taking these medications    Details   pantoprazole (PROTONIX) 40 mg tablet Take 1 Tab by mouth Before breakfast and dinner. Qty: 60 Tab, Refills: 0      dilTIAZem CD (CARDIZEM CD) 240 mg ER capsule Take 1 Cap by mouth daily.   Qty: 90 Cap, Refills: 0      ciprofloxacin HCl (CIPRO) 750 mg tablet Take 1 Tab by mouth every twelve (12) hours for 13 days. Qty: 26 Tab, Refills: 0      apixaban (ELIQUIS) 5 mg tablet Take 1 Tab by mouth every twelve (12) hours. Qty: 60 Tab, Refills: 0      albuterol (PROVENTIL VENTOLIN) 2.5 mg /3 mL (0.083 %) nebu 3 mL by Nebulization route every four (4) hours as needed for Other (shortness of breath). Qty: 30 Nebule, Refills: 0         CONTINUE these medications which have NOT CHANGED    Details   magnesium oxide (MAG-OX) 400 mg tablet Take 1 Tab by mouth daily. Qty: 7 Tab, Refills: 0      famotidine (PEPCID) 20 mg tablet Take 1 Tab by mouth two (2) times a day. Qty: 60 Tab, Refills: 0      folic acid (FOLVITE) 1 mg tablet Take 1 Tab by mouth daily. Qty: 30 Tab, Refills: 0      sucralfate (CARAFATE) 1 gram tablet Take 1 Tab by mouth Before breakfast, lunch, dinner and at bedtime. Indications: Gastritis  Qty: 120 Tab, Refills: 0      thiamine HCL (B-1) 100 mg tablet Take 1 Tab by mouth daily. Qty: 30 Tab, Refills: 0      nitroglycerin (NITROSTAT) 0.4 mg SL tablet 1 Tab by SubLINGual route every five (5) minutes as needed for Chest Pain. Qty: 1 Bottle, Refills: 5      atorvastatin (LIPITOR) 40 mg tablet Take 1 Tab by mouth daily. Qty: 30 Tab, Refills: 11         STOP taking these medications       azithromycin (ZITHROMAX) 250 mg tablet Comments:   Reason for Stopping:         cefdinir (OMNICEF) 300 mg capsule Comments:   Reason for Stopping:         HYDROcodone-homatropine (HYCODAN) 5-1.5 mg/5 mL (5 mL) syrup Comments:   Reason for Stopping:         aspirin 81 mg chewable tablet Comments:   Reason for Stopping:         amLODIPine (NORVASC) 5 mg tablet Comments:   Reason for Stopping: Follow Up Orders:   Follow-up Appointments   Procedures    FOLLOW UP VISIT Appointment in: 3 - 5 Days PCP, needs to see [facility] cardio 2 weeks and needs referral to [facility] for COPD 4 weeks thanks     PCP, needs to see [facility] cardio 2 weeks and needs referral to [facility] for COPD 4 weeks thanks     Standing Status:   Standing     Number of Occurrences:   1     Order Specific Question:   Appointment in     Answer:   3 - 5 Days       Follow-up Information     Follow up With Specialties Details Why Contact Info    John Paul Jaime MD Internal Medicine   [address] [place] [id number] [phone]            Time spent in patient discharge planning and coordination 45 minutes.     Signed:  Sandip Reina MD Self

## 2023-10-20 NOTE — PROGRESS NOTES
Discharge instructions provided to patient. Patient verbalizes understanding. Allowed time for questions. Patient is alert and oriented with no complaints at this time. All needs met at this time. no Normal

## (undated) DEVICE — KENDALL RADIOLUCENT FOAM MONITORING ELECTRODE RECTANGULAR SHAPE: Brand: KENDALL

## (undated) DEVICE — CANNULA NSL ORAL AD FOR CAPNOFLEX CO2 O2 AIRLFE

## (undated) DEVICE — BLOCK BITE AD 60FR W/ VELC STRP ADDRESSES MOST PT AND

## (undated) DEVICE — BRONCHOSCOPY PACK: Brand: MEDLINE INDUSTRIES, INC.

## (undated) DEVICE — CONNECTOR TBNG OD5-7MM O2 END DISP

## (undated) DEVICE — SINGLE USE SUCTION VALVE MAJ-209: Brand: SINGLE USE SUCTION VALVE (STERILE)

## (undated) DEVICE — SINGLE USE BIOPSY VALVE MAJ-210: Brand: SINGLE USE BIOPSY VALVE (STERILE)

## (undated) DEVICE — SYR 50ML SLIP TIP NSAF LF STRL --

## (undated) DEVICE — MOUTHPIECE ENDOSCP 20X27MM --